# Patient Record
Sex: FEMALE | Race: WHITE | Employment: OTHER
[De-identification: names, ages, dates, MRNs, and addresses within clinical notes are randomized per-mention and may not be internally consistent; named-entity substitution may affect disease eponyms.]

---

## 2017-01-11 ENCOUNTER — PRIOR ORIGINAL RECORDS (OUTPATIENT)
Dept: OTHER | Age: 67
End: 2017-01-11

## 2017-01-11 DIAGNOSIS — I20.1 PRINZMETAL ANGINA (HCC): Primary | ICD-10-CM

## 2017-01-11 RX ORDER — METOPROLOL TARTRATE 50 MG/1
TABLET, FILM COATED ORAL
Qty: 180 TABLET | Refills: 0 | Status: SHIPPED | OUTPATIENT
Start: 2017-01-11 | End: 2017-04-05

## 2017-01-11 RX ORDER — LISINOPRIL AND HYDROCHLOROTHIAZIDE 25; 20 MG/1; MG/1
TABLET ORAL
Qty: 90 TABLET | Refills: 0 | Status: SHIPPED | OUTPATIENT
Start: 2017-01-11 | End: 2017-04-05

## 2017-02-13 ENCOUNTER — APPOINTMENT (OUTPATIENT)
Dept: LAB | Age: 67
End: 2017-02-13
Attending: FAMILY MEDICINE
Payer: COMMERCIAL

## 2017-02-13 DIAGNOSIS — E11.9 TYPE II OR UNSPECIFIED TYPE DIABETES MELLITUS WITHOUT MENTION OF COMPLICATION, NOT STATED AS UNCONTROLLED: ICD-10-CM

## 2017-02-13 LAB
EST. AVERAGE GLUCOSE BLD GHB EST-MCNC: 148 MG/DL (ref 68–126)
GLUCOSE BLD-MCNC: 154 MG/DL (ref 70–99)
HBA1C MFR BLD HPLC: 6.8 % (ref ?–5.7)

## 2017-02-13 PROCEDURE — 83036 HEMOGLOBIN GLYCOSYLATED A1C: CPT | Performed by: FAMILY MEDICINE

## 2017-02-13 PROCEDURE — 82947 ASSAY GLUCOSE BLOOD QUANT: CPT | Performed by: FAMILY MEDICINE

## 2017-02-13 PROCEDURE — 36415 COLL VENOUS BLD VENIPUNCTURE: CPT | Performed by: FAMILY MEDICINE

## 2017-03-01 RX ORDER — NIFEDIPINE 60 MG/1
TABLET, FILM COATED, EXTENDED RELEASE ORAL
Qty: 90 TABLET | Refills: 0 | Status: SHIPPED | OUTPATIENT
Start: 2017-03-01 | End: 2017-05-27

## 2017-03-01 RX ORDER — RANOLAZINE 500 MG/1
TABLET, FILM COATED, EXTENDED RELEASE ORAL
Qty: 90 TABLET | Refills: 0 | Status: SHIPPED | OUTPATIENT
Start: 2017-03-01 | End: 2017-04-29

## 2017-03-01 RX ORDER — SIMVASTATIN 20 MG
TABLET ORAL
Qty: 90 TABLET | Refills: 0 | Status: SHIPPED | OUTPATIENT
Start: 2017-03-01 | End: 2017-05-27

## 2017-03-15 DIAGNOSIS — R14.0 ABDOMINAL DISTENTION: Primary | ICD-10-CM

## 2017-03-23 RX ORDER — OMEPRAZOLE 20 MG/1
CAPSULE, DELAYED RELEASE ORAL
Qty: 180 CAPSULE | Refills: 1 | Status: SHIPPED | OUTPATIENT
Start: 2017-03-23 | End: 2017-09-14

## 2017-04-05 RX ORDER — METOPROLOL TARTRATE 50 MG/1
TABLET, FILM COATED ORAL
Qty: 180 TABLET | Refills: 0 | Status: SHIPPED | OUTPATIENT
Start: 2017-04-05 | End: 2017-07-02

## 2017-04-05 RX ORDER — LISINOPRIL AND HYDROCHLOROTHIAZIDE 25; 20 MG/1; MG/1
TABLET ORAL
Qty: 90 TABLET | Refills: 0 | Status: SHIPPED | OUTPATIENT
Start: 2017-04-05 | End: 2017-07-02

## 2017-04-11 ENCOUNTER — APPOINTMENT (OUTPATIENT)
Dept: CT IMAGING | Facility: HOSPITAL | Age: 67
DRG: 066 | End: 2017-04-11
Attending: EMERGENCY MEDICINE
Payer: COMMERCIAL

## 2017-04-11 ENCOUNTER — PRIOR ORIGINAL RECORDS (OUTPATIENT)
Dept: OTHER | Age: 67
End: 2017-04-11

## 2017-04-11 ENCOUNTER — HOSPITAL ENCOUNTER (INPATIENT)
Facility: HOSPITAL | Age: 67
LOS: 1 days | Discharge: HOME | DRG: 066 | End: 2017-04-12
Attending: EMERGENCY MEDICINE | Admitting: HOSPITALIST
Payer: COMMERCIAL

## 2017-04-11 ENCOUNTER — APPOINTMENT (OUTPATIENT)
Dept: MRI IMAGING | Facility: HOSPITAL | Age: 67
DRG: 066 | End: 2017-04-11
Attending: Other
Payer: COMMERCIAL

## 2017-04-11 ENCOUNTER — TELEPHONE (OUTPATIENT)
Dept: INTERNAL MEDICINE CLINIC | Facility: CLINIC | Age: 67
End: 2017-04-11

## 2017-04-11 ENCOUNTER — APPOINTMENT (OUTPATIENT)
Dept: GENERAL RADIOLOGY | Facility: HOSPITAL | Age: 67
DRG: 066 | End: 2017-04-11
Attending: EMERGENCY MEDICINE
Payer: COMMERCIAL

## 2017-04-11 DIAGNOSIS — I63.9 ACUTE CVA (CEREBROVASCULAR ACCIDENT) (HCC): Primary | ICD-10-CM

## 2017-04-11 PROBLEM — Z91.81 AT RISK FOR FALLING: Status: ACTIVE | Noted: 2017-04-11

## 2017-04-11 PROCEDURE — 99223 1ST HOSP IP/OBS HIGH 75: CPT | Performed by: OTHER

## 2017-04-11 PROCEDURE — 99223 1ST HOSP IP/OBS HIGH 75: CPT | Performed by: HOSPITALIST

## 2017-04-11 PROCEDURE — 70553 MRI BRAIN STEM W/O & W/DYE: CPT

## 2017-04-11 PROCEDURE — 70549 MR ANGIOGRAPH NECK W/O&W/DYE: CPT

## 2017-04-11 PROCEDURE — 70450 CT HEAD/BRAIN W/O DYE: CPT

## 2017-04-11 PROCEDURE — 70546 MR ANGIOGRAPH HEAD W/O&W/DYE: CPT

## 2017-04-11 PROCEDURE — 71010 XR CHEST AP PORTABLE  (CPT=71010): CPT

## 2017-04-11 RX ORDER — ACETAMINOPHEN 500 MG
500 TABLET ORAL EVERY 6 HOURS PRN
Status: DISCONTINUED | OUTPATIENT
Start: 2017-04-11 | End: 2017-04-11 | Stop reason: DRUGHIGH

## 2017-04-11 RX ORDER — PANTOPRAZOLE SODIUM 40 MG/1
40 TABLET, DELAYED RELEASE ORAL
Status: DISCONTINUED | OUTPATIENT
Start: 2017-04-12 | End: 2017-04-12

## 2017-04-11 RX ORDER — FAMOTIDINE 20 MG/1
20 TABLET ORAL DAILY
Status: DISCONTINUED | OUTPATIENT
Start: 2017-04-11 | End: 2017-04-11 | Stop reason: ALTCHOICE

## 2017-04-11 RX ORDER — DEXTROSE MONOHYDRATE 25 G/50ML
50 INJECTION, SOLUTION INTRAVENOUS
Status: DISCONTINUED | OUTPATIENT
Start: 2017-04-11 | End: 2017-04-12

## 2017-04-11 RX ORDER — ONDANSETRON 2 MG/ML
4 INJECTION INTRAMUSCULAR; INTRAVENOUS EVERY 6 HOURS PRN
Status: DISCONTINUED | OUTPATIENT
Start: 2017-04-11 | End: 2017-04-12

## 2017-04-11 RX ORDER — ACETAMINOPHEN 650 MG/1
650 SUPPOSITORY RECTAL EVERY 4 HOURS PRN
Status: DISCONTINUED | OUTPATIENT
Start: 2017-04-11 | End: 2017-04-12

## 2017-04-11 RX ORDER — ASPIRIN 325 MG
325 TABLET ORAL ONCE
Status: COMPLETED | OUTPATIENT
Start: 2017-04-11 | End: 2017-04-11

## 2017-04-11 RX ORDER — ASPIRIN 300 MG
300 SUPPOSITORY, RECTAL RECTAL DAILY
Status: DISCONTINUED | OUTPATIENT
Start: 2017-04-12 | End: 2017-04-12

## 2017-04-11 RX ORDER — NITROGLYCERIN 0.4 MG/1
0.4 TABLET SUBLINGUAL EVERY 5 MIN PRN
Status: DISCONTINUED | OUTPATIENT
Start: 2017-04-11 | End: 2017-04-12

## 2017-04-11 RX ORDER — ATORVASTATIN CALCIUM 80 MG/1
80 TABLET, FILM COATED ORAL NIGHTLY
Status: DISCONTINUED | OUTPATIENT
Start: 2017-04-11 | End: 2017-04-12

## 2017-04-11 RX ORDER — NIFEDIPINE 60 MG/1
60 TABLET, EXTENDED RELEASE ORAL DAILY
Status: DISCONTINUED | OUTPATIENT
Start: 2017-04-12 | End: 2017-04-12

## 2017-04-11 RX ORDER — FOLIC ACID 1 MG/1
1 TABLET ORAL DAILY
Status: DISCONTINUED | OUTPATIENT
Start: 2017-04-11 | End: 2017-04-12

## 2017-04-11 RX ORDER — ASPIRIN 325 MG
325 TABLET ORAL DAILY
Status: DISCONTINUED | OUTPATIENT
Start: 2017-04-12 | End: 2017-04-12

## 2017-04-11 RX ORDER — ACETAMINOPHEN 325 MG/1
650 TABLET ORAL EVERY 4 HOURS PRN
Status: DISCONTINUED | OUTPATIENT
Start: 2017-04-11 | End: 2017-04-12

## 2017-04-11 RX ORDER — METOPROLOL TARTRATE 50 MG/1
50 TABLET, FILM COATED ORAL
Status: DISCONTINUED | OUTPATIENT
Start: 2017-04-11 | End: 2017-04-12

## 2017-04-11 RX ORDER — RANOLAZINE 500 MG/1
500 TABLET, EXTENDED RELEASE ORAL DAILY
Status: DISCONTINUED | OUTPATIENT
Start: 2017-04-12 | End: 2017-04-12

## 2017-04-11 RX ORDER — ENOXAPARIN SODIUM 100 MG/ML
40 INJECTION SUBCUTANEOUS DAILY
Status: DISCONTINUED | OUTPATIENT
Start: 2017-04-11 | End: 2017-04-12

## 2017-04-11 RX ORDER — ONDANSETRON 2 MG/ML
4 INJECTION INTRAMUSCULAR; INTRAVENOUS ONCE
Status: COMPLETED | OUTPATIENT
Start: 2017-04-11 | End: 2017-04-11

## 2017-04-11 RX ORDER — DOXEPIN HYDROCHLORIDE 50 MG/1
1 CAPSULE ORAL DAILY
Status: DISCONTINUED | OUTPATIENT
Start: 2017-04-11 | End: 2017-04-12

## 2017-04-11 RX ORDER — POTASSIUM CHLORIDE 20 MEQ/1
40 TABLET, EXTENDED RELEASE ORAL EVERY 4 HOURS
Status: COMPLETED | OUTPATIENT
Start: 2017-04-11 | End: 2017-04-11

## 2017-04-11 RX ORDER — LABETALOL HYDROCHLORIDE 5 MG/ML
10 INJECTION, SOLUTION INTRAVENOUS EVERY 10 MIN PRN
Status: DISCONTINUED | OUTPATIENT
Start: 2017-04-11 | End: 2017-04-12

## 2017-04-11 RX ORDER — ACETAMINOPHEN 500 MG
500 TABLET ORAL EVERY 6 HOURS PRN
COMMUNITY

## 2017-04-11 RX ORDER — LISINOPRIL AND HYDROCHLOROTHIAZIDE 25; 20 MG/1; MG/1
1 TABLET ORAL
Status: DISCONTINUED | OUTPATIENT
Start: 2017-04-11 | End: 2017-04-11 | Stop reason: SDUPTHER

## 2017-04-11 RX ORDER — MELATONIN
100 DAILY
Status: DISCONTINUED | OUTPATIENT
Start: 2017-04-11 | End: 2017-04-12

## 2017-04-11 RX ORDER — SODIUM CHLORIDE 9 MG/ML
INJECTION, SOLUTION INTRAVENOUS CONTINUOUS
Status: DISCONTINUED | OUTPATIENT
Start: 2017-04-11 | End: 2017-04-12

## 2017-04-11 RX ORDER — FAMOTIDINE 10 MG/ML
20 INJECTION, SOLUTION INTRAVENOUS DAILY
Status: DISCONTINUED | OUTPATIENT
Start: 2017-04-11 | End: 2017-04-11 | Stop reason: ALTCHOICE

## 2017-04-11 NOTE — PROGRESS NOTES
04/11/17 1517   Clinical Encounter Type   Visited With Patient   Routine Visit Introduction   Continue Visiting No   Patient's Supportive Strategies/Resources  provided emotional support for patient   Faith Encounters   Spiritual Requests Du

## 2017-04-11 NOTE — CONSULTS
[facility]  Report of Consultation    Charlene Hickey Patient Status:  Observation    4/15/1950 MRN [id number]   [facility] 7NE-A Attending Primo Niurka  [address] Day # 0 PCP Mari Polanco MD     Reason for Consultation:  SHALINI randolph she has nausea and emesis when she takes any NSAID but tolerated in ED.     Otherwise, patient denies any recent weight change, fevers, chills, nausea, double vision/ blurry vision / loss of vision, chest pain, palpitations, shortness of breath, rashes, tejinder Grandmother      bone cancer      reports that she has never smoked. She has never used smokeless tobacco. She reports that she drinks alcohol. She reports that she does not use illicit drugs.     Allergies:    Penicillins             Rash, Swelling  Advil Disp:  Rfl:  4/10/2017 at 2100   Isosorbide Mononitrate  MG Oral Tablet 24 Hr Take 120 mg by mouth nightly. Disp:  Rfl:  4/10/2017 at 2100   simvastatin 20 MG Oral Tab Take 20 mg by mouth nightly.  Disp:  Rfl:  4/10/2017 at 2100   NITROSTAT 0.4 MG Sub 100 mg, Oral, Daily  •  multivitamin (ADULT) tab 1 tablet, 1 tablet, Oral, Daily  •  Enoxaparin Sodium (LOVENOX) 40 MG/0.4ML injection 40 mg, 40 mg, Subcutaneous, Daily  •  insulin aspart (NOVOLOG) 100 UNIT/ML flexpen 1-50 Units, 1-50 Units, Subcutaneous, throughout, toes downgoing bilaterally, no clonus  Sensory: minimally reduced in R leg ~80% of normal compared to left leg to pin and light tough; otherwise, intact to light touch, pinprick, vibration and proprioception  Coord: FNF and HKS intact with no t order set in place   -  daily   - Lipitor 80 mg daily   - hemoglobin A1C pending, lipids with LDL 80;   - symptoms >24 hrs ago - goal BP normotensive  - PT/OT/speech therapy   - smoking cessation education   - SCDs for DVT ppx    Will follow    Patricia Abdullahi

## 2017-04-11 NOTE — ED INITIAL ASSESSMENT (HPI)
Pt presents to ER with numbness and tingling starting at 0630 when pt woke up. Pt noted numbness/tingling to bilateral lower extremities, worse right leg. Pt also noted a heavy feeling in right arm, but that has since resolved. [place] steady.  Pt moving all e

## 2017-04-11 NOTE — ED NOTES
Discussed with pt risks and benefits of taking aspirin. Pt concerned about nausea. Nausea medication given. Pt voice understanding. Decision to take aspirin.

## 2017-04-11 NOTE — PLAN OF CARE
Patient A&OX4, VSS, afebrile, SpO2 > 92% on RA. C/o mild HA, refused pain medication as it had resolved. NSR on tele. Stroke protocol, neuro q 2 until 2100 then q 4.   C/o of intermittent right foot tingling/numbness/feeling heavy as well as intermittent

## 2017-04-11 NOTE — PROGRESS NOTES
[facility] Pharmacy Note:  Renal Dose Adjustment    Simon Velez has been prescribed famotidine (PEPCID) 20 mg IV/PO every 12 hours. Estimated Creatinine Clearance: 46.9 mL/min (based on Cr of 0.99).     Her calculated creatinine clearance is <50 ml/min, the

## 2017-04-11 NOTE — ED PROVIDER NOTES
Patient Seen in: [facility] Emergency Department    History   Patient presents with:  Numbness Weakness (neurologic)    Stated Complaint: R side weakness    HPI    55-year-old female that comes the hospital stating that when she awoke she noticed th 01/01/1999    BACK SURGERY      Comment Spinal diskectomy lumbar    HYSTERECTOMY  01/01/981    Comment d/t fibroids    UPPER GI ENDOSCOPY PERFORMED  01/24/2011    HERNIA SURGERY  72/27/1393    Comment Umbilical hernia repair;     COLONOSCOPY      SPINE MARY Grandmother    • Cancer Paternal Grandmother      bone cancer         Smoking Status: Never Smoker                      Alcohol Use: Yes           0.0 oz/week       0 Standard drinks or equivalent per week       Comment: very rare      Review of Systems DIFFERENTIAL WITH PLATELET    Narrative: The following orders were created for panel order CBC WITH DIFFERENTIAL WITH PLATELET.   Procedure                               Abnormality         Status                     ---------  The mastoids are clear.     SKULL:  No depressed calvarial fracture.                  XR CHEST AP PORTABLE  (CPT=71010) (Final result) Result time: 04/11/17 11:17:22     Final result by Kayleigh Garvey MD (04/11/17 11:17:22)     Impression:     CONCLUSION

## 2017-04-11 NOTE — H&P
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 Alert and oriented x 3. HEENT: Normocephalic atraumatic. Moist mucous membranes. EOM-I. PERRLA. Anicteric.   Neck: insulin. 4. Hyperlipidemia-we will continue on statin therapy. 5. GERD- Will continue on PPI  6. MISSY- Will place on MISSY protocol.     Quality:  · DVT Prophylaxis: Heparin  · CODE status: FUll  · [place]: N/A    Plan of care discussed with pt and spouse at

## 2017-04-11 NOTE — PROGRESS NOTES
Pharmacy note: Duplicate Proton Pump Inhibitor with Histamine 2 blocker. Dong Knowles is a 73year old female who has been prescribed both Famotidine and Protonix.   Pepcid was discontinued per [employer] approved protocol for duplicate therapy in adult papi

## 2017-04-12 ENCOUNTER — APPOINTMENT (OUTPATIENT)
Dept: CV DIAGNOSTICS | Facility: HOSPITAL | Age: 67
DRG: 066 | End: 2017-04-12
Attending: HOSPITALIST
Payer: COMMERCIAL

## 2017-04-12 VITALS
SYSTOLIC BLOOD PRESSURE: 136 MMHG | TEMPERATURE: 98 F | HEART RATE: 83 BPM | HEIGHT: 63 IN | WEIGHT: 160 LBS | DIASTOLIC BLOOD PRESSURE: 71 MMHG | RESPIRATION RATE: 17 BRPM | BODY MASS INDEX: 28.35 KG/M2 | OXYGEN SATURATION: 95 %

## 2017-04-12 PROCEDURE — 93306 TTE W/DOPPLER COMPLETE: CPT | Performed by: INTERNAL MEDICINE

## 2017-04-12 PROCEDURE — 99239 HOSP IP/OBS DSCHRG MGMT >30: CPT | Performed by: HOSPITALIST

## 2017-04-12 PROCEDURE — 99233 SBSQ HOSP IP/OBS HIGH 50: CPT | Performed by: OTHER

## 2017-04-12 PROCEDURE — 93306 TTE W/DOPPLER COMPLETE: CPT

## 2017-04-12 RX ORDER — ATORVASTATIN CALCIUM 80 MG/1
80 TABLET, FILM COATED ORAL NIGHTLY
Qty: 30 TABLET | Refills: 1 | Status: SHIPPED | OUTPATIENT
Start: 2017-04-12 | End: 2017-06-14

## 2017-04-12 RX ORDER — CLOPIDOGREL BISULFATE 75 MG/1
75 TABLET ORAL DAILY
Qty: 30 TABLET | Refills: 1 | Status: SHIPPED | OUTPATIENT
Start: 2017-04-12 | End: 2017-06-05

## 2017-04-12 NOTE — PLAN OF CARE
AOx4. Denies any pain or discomfort. Pt came back from MRI at 2145. MRI result relayed to Dr. Prentis Siemens. Remains on Neuro check Q4hr. Pt is on bedrest until PT/OT eval in the morning. Plan of care discussed with pt and spouse. Call light within reach.

## 2017-04-12 NOTE — CM/SW NOTE
Pt on CVA protocol. Pt was screened during rounds and no needs are identified at this time. RN to contact SW/CM if needs arise.    PT recommending Outpt PT/OT     04/12/17 1100   CM/SW Screening   Referral Source Physician   Information Source Nursing rou

## 2017-04-12 NOTE — PLAN OF CARE
Assumed care at 0730. Alert and oriented x4. States N/T has resolved. Says she still feels slight tingling to left foot. Room air. NSR on tele. SB with sleep. BP stable. Echo done with EF  65%. Tolerating diet. No complaints of pain. Up with SBA.  Ambulate

## 2017-04-12 NOTE — PHYSICAL THERAPY NOTE
PHYSICAL THERAPY EVALUATION - INPATIENT     Room Number: 7844/9933-X  Evaluation Date: 4/12/2017  Type of Evaluation: Initial  Physician Order: PT Eval and Treat    Presenting Problem: CVA  Reason for Therapy: Mobility Dysfunction and Discharge Claude Comment Spinal diskectomy lumbar    HYSTERECTOMY  01/01/981    Comment d/t fibroids    UPPER GI ENDOSCOPY PERFORMED  01/24/2011    HERNIA SURGERY  65/34/6159    Comment Umbilical hernia repair;     COLONOSCOPY      SPINE SURGERY PROCEDURE UNLISTED Tests:  Modified Ramon Balance Scale           Modified Ramon Balance Scale: 22/28                     NEUROLOGICAL FINDINGS  Neurological Findings: Coordination - Heel to Shin;Sensation;Tone;Coordination - Rapid Alternating Movement     Coordination - Heel t training  Transfer training  balance, discharge planning    Patient End of Session: Up in chair; With [facility] staff;Needs met;Call light within reach;RN aware of session/findings; All patient questions and concerns addressed    ASSESSMENT     Patient is a 73 year

## 2017-04-12 NOTE — PHYSICAL THERAPY NOTE
PT order received.  Attempted to see pt for PT eval, but pt is on bedrest until 1:30pm per RN.  Will f/u again later as appropriate and as time allows.  RN in agreement with this plan.

## 2017-04-12 NOTE — OCCUPATIONAL THERAPY NOTE
OCCUPATIONAL THERAPY EVALUATION - INPATIENT     Room Number: 5714/6926-E  Evaluation Date: 4/12/2017  Type of Evaluation: Initial  Presenting Problem: CVA    Physician Order: IP Consult to Occupational Therapy  Reason for Therapy: ADL/IADL Dysfunction and Comment status normal resolved on 2007    COLONOSCOPY  2011    Comment colon polyp removal  francesca cee 5      1987    CHOLECYSTECTOMY  1999    BACK SURGERY      Comment Spinal diskectomy lumbar    HYSTERECTOMY  98 cooperative and motivated during session. RANGE OF MOTION AND STRENGTH ASSESSMENT  Upper extremity ROM is WNL     Upper extremity strength is R: 4-/5, L UE 5/5    COORDINATION  Gross Motor    intact    Fine Motor    impaired R coordination.       ADDIT recommendations with /    ASSESSMENT     Patient is a 73year old female admitted 4/11/2017 for CVA.   In this OT evaluation patient presents with the following impairments: impaired L UE coordination, and impaired fine motor precis

## 2017-04-12 NOTE — PROGRESS NOTES
[id number] [facility] Progress Note    Larissa Martin Patient Status:  Observation    4/15/1950 MRN [id number]   [facility] 7NE-A Attending Nicholas Tam [address] Day # 1 PCP Jose Valdivia MD         Subjective:  Tarun Hayden CO2 28.0 04/11/2017    04/11/2017   CA 9.9 04/11/2017   ALB 3.7 04/11/2017   ALKPHO 90 04/11/2017   BILT 0.5 04/11/2017   TP 7.4 04/11/2017   AST 14 04/11/2017   ALT 20 04/11/2017   TSH 1.420 04/11/2017   TROP <0.046 04/11/2017   PGLU 142 04/12/20 presented with right sided paresthesia that has resolved   1. Ischemic stroke order set in place  2.  mg daily  3. Hgb A1c 6.8 - primary to manage  4. LDL 80 with HDL 51, , cont statin   5. Echo pending   6.  MRA H/N; no significant stenosis or to pin throughout today  Coord: FNF intact with no tremor or dysmetria  Romberg:absent  Gait: normal casual gait     Labs:  Reviewed in EPIC;     Lab Results  Component Value Date   WBC 4.2 04/11/2017   HGB 12.9 04/11/2017   HCT 38.7 04/11/2017   . 0 Wall thickness was normal.     Systolic function was normal. The estimated ejection fraction was 65%. No     diagnostic evidence for regional wall motion abnormalities.  Doppler     parameters are consistent with abnormal left ventricular relaxation -     g at the address below; also listed in discharge paperwork    [address] [place] [address]  [address], [facility] [address] [place] [address]  [phone]      Rekha Lara MD, Neurology  Christo Benoit

## 2017-04-12 NOTE — SLP NOTE
ADULT SWALLOWING EVALUATION    ASSESSMENT & PLAN   ASSESSMENT  Order received for bedside swallow evaluation.   Per chart review:    History of Present Illness: Charlene Hickey is a 73year old female with history of hypertension, type 2 diabetes, GERD pres Function Score: No abnormality detected    PLAN   Diet Recommendations - Solids: Regular  Diet Recommendations - Liquid:  Thin  Safe swallow strategies reviewed                      Treatment Plan: Communication evaluation  Discharge Recommendations/Plan: H Results: 4/11/17  CONCLUSION:  No acute disease. OBJECTIVE   ORAL MOTOR EXAMINATION  Dentition: Functional  Symmetry: Within Functional Limits  Strength:  Within Functional Limits  Tone: Within Functional Limits  Range of Motion: Within Functional Limits

## 2017-04-12 NOTE — PROGRESS NOTES
NURSING DISCHARGE NOTE    Discharged Home via Wheelchair. Accompanied by Support staff  Belongings Taken by patient/family.   IV and tele removed  Discharge instructions given to patient and her

## 2017-04-12 NOTE — PLAN OF CARE
Problem: Impaired Swallowing  Goal: Minimize aspiration risk  Interventions:  - Patient should be alert and upright for all feedings (90 degrees preferred)  - Offer food and liquids at a slow rate    - Encourage small bites of food and small sips of liquid

## 2017-04-12 NOTE — OCCUPATIONAL THERAPY NOTE
OT order received. Attempted to see pt for OT eval, but pt is on bedrest until 1:30pm per RN. Will f/u again later as appropriate and as time allows. RN in agreement with this plan.

## 2017-04-13 ENCOUNTER — PRIOR ORIGINAL RECORDS (OUTPATIENT)
Dept: OTHER | Age: 67
End: 2017-04-13

## 2017-04-13 ENCOUNTER — TELEPHONE (OUTPATIENT)
Dept: INTERNAL MEDICINE CLINIC | Facility: CLINIC | Age: 67
End: 2017-04-13

## 2017-04-13 ENCOUNTER — APPOINTMENT (OUTPATIENT)
Dept: MRI IMAGING | Facility: HOSPITAL | Age: 67
End: 2017-04-13
Attending: EMERGENCY MEDICINE
Payer: COMMERCIAL

## 2017-04-13 ENCOUNTER — HOSPITAL ENCOUNTER (EMERGENCY)
Facility: HOSPITAL | Age: 67
Discharge: HOME | End: 2017-04-13
Attending: EMERGENCY MEDICINE
Payer: COMMERCIAL

## 2017-04-13 VITALS
BODY MASS INDEX: 28.35 KG/M2 | RESPIRATION RATE: 19 BRPM | OXYGEN SATURATION: 95 % | SYSTOLIC BLOOD PRESSURE: 119 MMHG | WEIGHT: 160 LBS | HEART RATE: 55 BPM | TEMPERATURE: 99 F | DIASTOLIC BLOOD PRESSURE: 82 MMHG | HEIGHT: 63 IN

## 2017-04-13 DIAGNOSIS — I63.9 ACUTE ISCHEMIC STROKE (HCC): ICD-10-CM

## 2017-04-13 DIAGNOSIS — R20.0 RIGHT FACIAL NUMBNESS: Primary | ICD-10-CM

## 2017-04-13 PROCEDURE — 84484 ASSAY OF TROPONIN QUANT: CPT | Performed by: EMERGENCY MEDICINE

## 2017-04-13 PROCEDURE — 85610 PROTHROMBIN TIME: CPT | Performed by: EMERGENCY MEDICINE

## 2017-04-13 PROCEDURE — 87086 URINE CULTURE/COLONY COUNT: CPT | Performed by: EMERGENCY MEDICINE

## 2017-04-13 PROCEDURE — 85730 THROMBOPLASTIN TIME PARTIAL: CPT | Performed by: EMERGENCY MEDICINE

## 2017-04-13 PROCEDURE — 85025 COMPLETE CBC W/AUTO DIFF WBC: CPT | Performed by: EMERGENCY MEDICINE

## 2017-04-13 PROCEDURE — 81001 URINALYSIS AUTO W/SCOPE: CPT | Performed by: EMERGENCY MEDICINE

## 2017-04-13 PROCEDURE — 70551 MRI BRAIN STEM W/O DYE: CPT

## 2017-04-13 PROCEDURE — 93005 ELECTROCARDIOGRAM TRACING: CPT

## 2017-04-13 PROCEDURE — 99285 EMERGENCY DEPT VISIT HI MDM: CPT

## 2017-04-13 PROCEDURE — 93010 ELECTROCARDIOGRAM REPORT: CPT

## 2017-04-13 PROCEDURE — 82962 GLUCOSE BLOOD TEST: CPT

## 2017-04-13 PROCEDURE — 80053 COMPREHEN METABOLIC PANEL: CPT | Performed by: EMERGENCY MEDICINE

## 2017-04-13 PROCEDURE — 36415 COLL VENOUS BLD VENIPUNCTURE: CPT

## 2017-04-13 NOTE — TELEPHONE ENCOUNTER
I spoke with pt ,started today R facial numbness , R eye watering , Not sure if drooping on R side of face ,Discharged from hospital yesterday ,    I advised ER for eval. ( Dr Ascencion Ni  will take her by wheelchair to ER )

## 2017-04-13 NOTE — DISCHARGE SUMMARY
[facility]  DISCHARGE SUMMARY     Corby Griffin Patient Status:  Inpatient    4/15/1950 MRN [id number]   [facility] 7NE-A Attending No att. providers found   [facility] Day [facility] 1 PCP Lieutenant Brian MD     Date of Admission: 2017  Date Patient denies any weakness in any of the extremities.  Patient denies any visual or hearing changes.  Patient has been denied any slurred speech or any alteration in mental status.  No headache, sinus/nasal congestion, or cough.  Patient denies any fevers known as:  PLAVIX        Take 1 tablet (75 mg total) by mouth daily.     Quantity:  30 tablet   Refills:  1         CHANGE how you take these medications       Instructions Prescription details    Mometasone Furoate 0.1 % Crea   Commonly known as:  Paradise Contreras drug:  nitroGLYCERIN        Place 0.4 mg under the tongue every 5 (five) minutes as needed. Refills:  5       PEG 3350 Pack   Commonly known as:  MIRALAX        Take 17 g by mouth nightly.     Refills:  0       RANEXA 500 MG Tb12   Last time this was giv 4/13/2017    Time spent:  > 30 minutes

## 2017-04-13 NOTE — ED INITIAL ASSESSMENT (HPI)
Pt to ED with numbness to right side of face that started at 0900. Pt sts she was discharged from [facility] yesterday with dx of stroke. Denies headache, blurry vision or pain. Decreased sensation to right arm and leg.  Pt sts these are the same symptoms she h

## 2017-04-13 NOTE — ED NOTES
Pt ambulatory to the bathroom and provided a urine sample w/o difficulty or incident.  Sample immediately sent to lab for analysis

## 2017-04-13 NOTE — ED PROVIDER NOTES
Patient Seen in: [facility] Emergency Department    History   Patient presents with:  Numbness Weakness (neurologic)  Stroke (neurologic)    Stated Complaint: right sided face numbness, recent stroke    HPI    78-year-old female, discharged yesterda UNLISTED      Comment bulging discs in lower back    SHOULDER SURG PROC UNLISTED         Medications :   Atorvastatin Calcium 80 MG Oral Tab,  Take 1 tablet (80 mg total) by mouth nightly.    Clopidogrel Bisulfate 75 MG Oral Tab,  Take 1 tablet (75 mg total stated complaint: right sided face numbness, recent stroke  Other systems are as noted in HPI. Constitutional and vital signs reviewed. All other systems reviewed and negative except as noted above.     PSFH elements reviewed from today and agreed exc Esterase Urine Trace (*)     Squamous Epi.  Cells Moderate (*)     Hyaline Casts Present (*)     Mucous Urine 1+ (*)     All other components within normal limits   POCT GLUCOSE - Abnormal; Notable for the following:     POC Glucose 104 (*)     All other co changes and no new stroke. He reviewed the MRI results look at the images. By this time the patient's symptoms have actually improved and she has had some lingering facial numbness. Patient has been taking her Plavix.   Given recent workup and resoldonald

## 2017-04-14 NOTE — CM/SW NOTE
04/14/17 0900   Discharge disposition   Discharged to: Home or Self   Name of Marcia Hawkins Provider Home   Outpatient services Physical therapy; Occupational therapy   Home services after discharge None

## 2017-04-17 RX ORDER — ISOSORBIDE MONONITRATE 120 MG/1
TABLET, EXTENDED RELEASE ORAL
Qty: 90 TABLET | Refills: 0 | Status: SHIPPED | OUTPATIENT
Start: 2017-04-17 | End: 2017-07-14

## 2017-04-19 ENCOUNTER — OFFICE VISIT (OUTPATIENT)
Dept: NEUROLOGY | Facility: CLINIC | Age: 67
End: 2017-04-19

## 2017-04-19 ENCOUNTER — TELEPHONE (OUTPATIENT)
Dept: INTERNAL MEDICINE CLINIC | Facility: CLINIC | Age: 67
End: 2017-04-19

## 2017-04-19 VITALS
WEIGHT: 164 LBS | TEMPERATURE: 99 F | HEART RATE: 60 BPM | DIASTOLIC BLOOD PRESSURE: 60 MMHG | SYSTOLIC BLOOD PRESSURE: 120 MMHG | RESPIRATION RATE: 20 BRPM | BODY MASS INDEX: 29 KG/M2

## 2017-04-19 DIAGNOSIS — I63.9 ACUTE ISCHEMIC STROKE (HCC): Primary | ICD-10-CM

## 2017-04-19 DIAGNOSIS — I63.212 ACUTE ARTERIAL ISCHEMIC STROKE, VERTEBROBASILAR, THALAMIC, LEFT (HCC): Primary | ICD-10-CM

## 2017-04-19 DIAGNOSIS — I63.22 ACUTE ARTERIAL ISCHEMIC STROKE, VERTEBROBASILAR, THALAMIC, LEFT (HCC): Primary | ICD-10-CM

## 2017-04-19 PROCEDURE — 99214 OFFICE O/P EST MOD 30 MIN: CPT | Performed by: OTHER

## 2017-04-19 NOTE — PATIENT INSTRUCTIONS
Follow up in 3 weeks ~ 1 month from initial stroke  Continue Plavix 75 mg daily and Lipitor 80 mg daily     After your visit at the [address] [place] [address] office  today,  please direct any follow up questions or medication needs to the staff in our  [facility] notified you that the test has been approved by your insurer. Depending on your insurance carrier, approval may take 3-10 days. It is highly recommended patients contact their insurance carrier directly to determine coverage.   If test is done without insu

## 2017-04-19 NOTE — PROGRESS NOTES
[employer] Progress Note    HPI  Patient presents with:  Hospital F/U: Rm. 8  Stroke: Acute ischemic stroke            Patient presents in follow-up after recently discharged 4/12/17, following admission for acute R sided paresthesia wi 2011    Comment colon polyp removal  francesca cee 5      1987    CHOLECYSTECTOMY  1999    BACK SURGERY      Comment Spinal diskectomy lumbar    HYSTERECTOMY      Comment d/t fibroids    UPPER GI ENDOSCOPY PERFORMED   ISOSORBIDE MONONITRATE  MG Oral Tablet 24 Hr, TAKE 1 TABLET BY MOUTH EVERY DAY, Disp: 90 tablet, Rfl: 0  •  Atorvastatin Calcium 80 MG Oral Tab, Take 1 tablet (80 mg total) by mouth nightly., Disp: 30 tablet, Rfl: 1  •  Clopidogrel Bisulfate 75 MG Or following:    Height as of 4/13/17: 63\". Weight as of this encounter: 164 lb.     Gen: well developed, well nourished, no acute distress  HEENT: normocephalic  Heart; normal J9/Y8, regular rate and rhythm  Lungs: clear to auscultation bilaterally  Extre Small subacute infarct left basal ganglia. #2. Patent carotid and vertebral basilar systems. Mild carotid bifurcation atherosclerosis. #3. Unremarkable [place] MRA. Echo:   Conclusions:    1. Left ventricle:  The cavity size was normal. Wall t (was nauseated on ASA and on Plavix for this )     She was also advised to report immediately to the emergency room should she experience any symptoms including, but not limited to the following: sudden onset of slurred speech, numbness/tingling/weakness o

## 2017-04-26 ENCOUNTER — OFFICE VISIT (OUTPATIENT)
Dept: INTERNAL MEDICINE CLINIC | Facility: CLINIC | Age: 67
End: 2017-04-26

## 2017-04-26 VITALS
BODY MASS INDEX: 28.88 KG/M2 | TEMPERATURE: 98 F | DIASTOLIC BLOOD PRESSURE: 60 MMHG | SYSTOLIC BLOOD PRESSURE: 112 MMHG | HEIGHT: 63 IN | HEART RATE: 55 BPM | WEIGHT: 163 LBS | RESPIRATION RATE: 16 BRPM | OXYGEN SATURATION: 98 %

## 2017-04-26 DIAGNOSIS — R20.2 NUMBNESS AND TINGLING OF RIGHT ARM AND LEG: ICD-10-CM

## 2017-04-26 DIAGNOSIS — R20.0 NUMBNESS AND TINGLING OF RIGHT FACE: ICD-10-CM

## 2017-04-26 DIAGNOSIS — E11.8 TYPE 2 DIABETES MELLITUS WITH COMPLICATION, UNSPECIFIED LONG TERM INSULIN USE STATUS: ICD-10-CM

## 2017-04-26 DIAGNOSIS — E78.00 PURE HYPERCHOLESTEROLEMIA: ICD-10-CM

## 2017-04-26 DIAGNOSIS — R20.0 NUMBNESS AND TINGLING OF RIGHT ARM AND LEG: ICD-10-CM

## 2017-04-26 DIAGNOSIS — R20.2 NUMBNESS AND TINGLING OF RIGHT FACE: ICD-10-CM

## 2017-04-26 DIAGNOSIS — I10 ESSENTIAL HYPERTENSION, BENIGN: ICD-10-CM

## 2017-04-26 DIAGNOSIS — I63.9 ACUTE ISCHEMIC STROKE (HCC): Primary | ICD-10-CM

## 2017-04-26 PROCEDURE — 99495 TRANSJ CARE MGMT MOD F2F 14D: CPT | Performed by: FAMILY MEDICINE

## 2017-04-26 NOTE — PROGRESS NOTES
HPI:    Sweetie Tucker is a 78year old female here today for hospital follow up.    She was discharged from Inpatient hospital, [facility] to Home   Admission Date: 4/13/17   Discharge Date: 4/13/17  Hospital Discharge Diagnosis: Acute stroke  TCM with oral diabetes medications being held.  Patient was evaluated by speech and passed swallow–evaluation patient was seen by physical therapy who recommended outpatient PT with patient's symptoms resolving echocardiogram negative patient was clear for dis No current facility-administered medications on file prior to visit. HISTORY: reconciled and review with patient  She  has a past medical history of Acute bronchitis (04/11/2011); Chest pain, unspecified (12/05);  Unspecified gastritis and gastrodu 98%  Physical Exam   NECK:  Supple No LNs  LUNGS;  CTA  COR  RRR wo murmur  EXT  No CCE   Bilateral barefoot skin diabetic exam is normal, visualized feet and the appearance is normal.  Bilateral monofilament/sensation of both feet is normal.  Pulsation pe Diagnoses and/or Management Options: moderate  · Amount and/or Complexity of Data to Be Reviewed: moderate  · Risk of Significant Complications, Morbidity, and/or Mortality: moderate    Overall Risk:   moderate    Patient seen within 14 days from date of d

## 2017-05-01 ENCOUNTER — APPOINTMENT (OUTPATIENT)
Dept: OCCUPATIONAL MEDICINE | Facility: HOSPITAL | Age: 67
End: 2017-05-01
Attending: Other
Payer: COMMERCIAL

## 2017-05-01 RX ORDER — RANOLAZINE 500 MG/1
TABLET, FILM COATED, EXTENDED RELEASE ORAL
Qty: 90 TABLET | Refills: 0 | Status: SHIPPED | OUTPATIENT
Start: 2017-05-01 | End: 2017-09-06

## 2017-05-02 ENCOUNTER — APPOINTMENT (OUTPATIENT)
Dept: PHYSICAL THERAPY | Facility: HOSPITAL | Age: 67
End: 2017-05-02
Attending: FAMILY MEDICINE
Payer: COMMERCIAL

## 2017-05-04 ENCOUNTER — HOSPITAL ENCOUNTER (OUTPATIENT)
Dept: OCCUPATIONAL MEDICINE | Facility: HOSPITAL | Age: 67
Setting detail: THERAPIES SERIES
Discharge: HOME | End: 2017-05-04
Attending: Other
Payer: COMMERCIAL

## 2017-05-04 ENCOUNTER — HOSPITAL ENCOUNTER (OUTPATIENT)
Dept: PHYSICAL THERAPY | Facility: HOSPITAL | Age: 67
Setting detail: THERAPIES SERIES
Discharge: HOME | End: 2017-05-04
Attending: FAMILY MEDICINE
Payer: COMMERCIAL

## 2017-05-04 DIAGNOSIS — I63.9 ACUTE CVA (CEREBROVASCULAR ACCIDENT) (HCC): Primary | ICD-10-CM

## 2017-05-04 DIAGNOSIS — I63.9 ACUTE ISCHEMIC STROKE (HCC): Primary | ICD-10-CM

## 2017-05-04 PROCEDURE — 97161 PT EVAL LOW COMPLEX 20 MIN: CPT

## 2017-05-04 PROCEDURE — 97166 OT EVAL MOD COMPLEX 45 MIN: CPT

## 2017-05-04 PROCEDURE — 97110 THERAPEUTIC EXERCISES: CPT

## 2017-05-04 NOTE — PROGRESS NOTES
NEUROLOGICAL EVALUATION:   Referring Physician: Dr. Lenny Vargas  Diagnosis: Acute ischemic stroke     Date of Service: 5/4/2017     PATIENT SUMMARY   Nahum Mcarthur is a 78year old female who presents to therapy today following acute ischemic stroke on 4/11/1 limitations include imbalance on uneven surfaces and with small MARY, especially SLS.  She feels that she must perform everything slower to makes sure that she does not trip, most notably with turns; sometimes catches her R foot on the ground and has small s of complaint surfaces. Brunilda Donal would benefit from skilled Physical Therapy to address the above impairments to improve RLE strength, coordination, activity tolerance, and dynamic standing balance to decrease fear of falling and return to PLOF.      Lennox Saliva Mobility:  30 sec sit<>stand: 9   Fall Risk: yes  Gait Patient ambulates independently without device. Gait slowed with min dec arm swing on R, min dec stance time on R.     Timed Up and Go (AD, time): 8 sec  Fall Risk: no  4 Item Dynamic Gait Index Score: and has agreed to actively participate in planning and for this course of care. Thank you for your referral. Please co-sign or sign and return this letter via fax as soon as possible to [phone].  If you have any questions, please contact me at Dept:

## 2017-05-04 NOTE — PROGRESS NOTES
OCCUPATIONAL THERAPY UPPER EXTREMITY EVALUATION   Referring Physician: Dr. Jaswinder Cortes  Diagnosis: CVA    Date of Service: 5/4/2017     PATIENT SUMMARY   Heidi Mack is a 78year old y/o female who presents to therapy today with complaints of arm weakness a background of mild chronic small vessel ischemic disease.     The ventricles and sulci are within normal limits.  There is no midline shift or mass effect.  The basal cisterns are patent.       There is no acute intracranial hemorrhage or extra-axial flu Pinch: 5 9   3 pt Pinch: 7 12   Lateral Pinch: 11 14     SPECIAL TESTS: 9 hole peg R=25 sec, L=21 sec. Pt demonstrates difficulty with in-hand manipulation of washer and balls. Pt demonstrates decreased fluidity of motion.    Handwriting is legible for n required: Yes  I certify the need for these services furnished under this plan of treatment and while under my care.     X___________________________________________________ Date____________________    Certification From: 8/5/8252  To:8/2/2017

## 2017-05-08 ENCOUNTER — APPOINTMENT (OUTPATIENT)
Dept: OCCUPATIONAL MEDICINE | Facility: HOSPITAL | Age: 67
End: 2017-05-08
Attending: Other
Payer: COMMERCIAL

## 2017-05-09 ENCOUNTER — APPOINTMENT (OUTPATIENT)
Dept: PHYSICAL THERAPY | Facility: HOSPITAL | Age: 67
End: 2017-05-09
Attending: FAMILY MEDICINE
Payer: COMMERCIAL

## 2017-05-10 ENCOUNTER — OFFICE VISIT (OUTPATIENT)
Dept: NEUROLOGY | Facility: CLINIC | Age: 67
End: 2017-05-10

## 2017-05-10 VITALS
DIASTOLIC BLOOD PRESSURE: 62 MMHG | BODY MASS INDEX: 29 KG/M2 | HEART RATE: 48 BPM | WEIGHT: 162 LBS | RESPIRATION RATE: 20 BRPM | SYSTOLIC BLOOD PRESSURE: 100 MMHG

## 2017-05-10 DIAGNOSIS — I63.212 ACUTE ARTERIAL ISCHEMIC STROKE, VERTEBROBASILAR, THALAMIC, LEFT (HCC): Primary | ICD-10-CM

## 2017-05-10 DIAGNOSIS — I63.22 ACUTE ARTERIAL ISCHEMIC STROKE, VERTEBROBASILAR, THALAMIC, LEFT (HCC): Primary | ICD-10-CM

## 2017-05-10 PROCEDURE — 99213 OFFICE O/P EST LOW 20 MIN: CPT | Performed by: OTHER

## 2017-05-10 NOTE — PATIENT INSTRUCTIONS
Please follow up with PCP and cardiology for your heart rate   Follow up in clinic with neurology in 2 months  Continue same medications - discuss decreasing metoprolol with PCP and / or cardiology    After your visit at the [facility] office  today,  luiz ordered radiology tests such as MRI or CT scans, do not schedule the test until this office has notified you that the test has been approved by your insurer. Depending on your insurance carrier, approval may take 3-10 days.  It is highly recommended patien

## 2017-05-10 NOTE — PROGRESS NOTES
[facility] Progress Note    HPI  Patient presents with:  Stroke: occasional tingling in her face  Shoulder Injury: bounced her L shoulder pain up against something this past weekend and now she's having pain on the left lower side of th during pregnancy right leg   • Incontinence    • Esophageal reflux    • Diverticulosis of large intestine      unsure if divertiulitis or diverticulosis   • Visual impairment    • Diabetes ([facility]    • Stroke ([facility]      r/o 4/11/17         Past Surgical Feels faint  Naproxen                Dizziness    Comment:Tabs Feels faint  Norvasc [Amlodipine*    Nausea and vomiting    Comment:tabs  Nsaids                  Nausea only, Dizziness  Vicodin [Hydrocodon*    Nausea only, Dizziness    Comment:Tabs  Zetia [ g, Rfl: 0  •  Multiple Vitamin (MULTI-VITAMIN) Oral Tab, Take 1 Tab by mouth daily. , Disp: , Rfl:     Review of Systems:  No chest pain or palpitations; otherwise as noted in HPI.     Exam:  /62 mmHg  Pulse 48  Resp 20  Wt 162 lb  Estimated body mass dissection. No focal high grade stenosis.       The anterior, middle and posterior cerebral arteries are patent.  No intracranial aneurysm or arterial venous malformation demonstrated.        The superior cerebellar, AICA and PICA segments demonstrate flow subacute stroke, but location is same as previous and suspect this is more related to extension of prior stroke than a new ischemic event; workup thus far with normal Echo, LDL 80, A1C 6.5, and likely small vessel etiology.        Plan to continue Plavix 75

## 2017-05-11 ENCOUNTER — HOSPITAL ENCOUNTER (OUTPATIENT)
Dept: PHYSICAL THERAPY | Facility: HOSPITAL | Age: 67
Setting detail: THERAPIES SERIES
Discharge: HOME | End: 2017-05-11
Attending: FAMILY MEDICINE
Payer: COMMERCIAL

## 2017-05-11 ENCOUNTER — TELEPHONE (OUTPATIENT)
Dept: INTERNAL MEDICINE CLINIC | Facility: CLINIC | Age: 67
End: 2017-05-11

## 2017-05-11 ENCOUNTER — HOSPITAL ENCOUNTER (OUTPATIENT)
Dept: OCCUPATIONAL MEDICINE | Facility: HOSPITAL | Age: 67
Setting detail: THERAPIES SERIES
Discharge: HOME | End: 2017-05-11
Attending: Other
Payer: COMMERCIAL

## 2017-05-11 ENCOUNTER — PRIOR ORIGINAL RECORDS (OUTPATIENT)
Dept: OTHER | Age: 67
End: 2017-05-11

## 2017-05-11 DIAGNOSIS — R00.1 BRADYCARDIA: Primary | ICD-10-CM

## 2017-05-11 DIAGNOSIS — I20.1 PRINZMETAL ANGINA (HCC): ICD-10-CM

## 2017-05-11 PROCEDURE — 97112 NEUROMUSCULAR REEDUCATION: CPT

## 2017-05-11 PROCEDURE — 97110 THERAPEUTIC EXERCISES: CPT

## 2017-05-11 NOTE — PROGRESS NOTES
Dx: Acute CVA         Authorized # of Visits:  8         Next MD visit: none scheduled  Fall Risk: standard         Precautions: n/a             Subjective: \"The gripping of the putty was easy, but the pinching was much harder. \"    Objective:  R=32,

## 2017-05-12 ENCOUNTER — MYAURORA ACCOUNT LINK (OUTPATIENT)
Dept: OTHER | Age: 67
End: 2017-05-12

## 2017-05-12 ENCOUNTER — HOSPITAL ENCOUNTER (OUTPATIENT)
Dept: LAB | Facility: HOSPITAL | Age: 67
Discharge: HOME | End: 2017-05-12
Attending: INTERNAL MEDICINE
Payer: COMMERCIAL

## 2017-05-12 ENCOUNTER — PRIOR ORIGINAL RECORDS (OUTPATIENT)
Dept: OTHER | Age: 67
End: 2017-05-12

## 2017-05-12 PROCEDURE — 84443 ASSAY THYROID STIM HORMONE: CPT | Performed by: INTERNAL MEDICINE

## 2017-05-12 PROCEDURE — 84439 ASSAY OF FREE THYROXINE: CPT | Performed by: INTERNAL MEDICINE

## 2017-05-12 PROCEDURE — 36415 COLL VENOUS BLD VENIPUNCTURE: CPT | Performed by: INTERNAL MEDICINE

## 2017-05-12 NOTE — TELEPHONE ENCOUNTER
Pt informed ,states she takes metoprolol BID is she to take half tablet once a day or twice a day ( already took whole dose this morning )Has appt with cardio this morning at 6232

## 2017-05-15 ENCOUNTER — APPOINTMENT (OUTPATIENT)
Dept: OCCUPATIONAL MEDICINE | Facility: HOSPITAL | Age: 67
End: 2017-05-15
Attending: Other
Payer: COMMERCIAL

## 2017-05-15 ENCOUNTER — HOSPITAL ENCOUNTER (OUTPATIENT)
Dept: CV DIAGNOSTICS | Facility: HOSPITAL | Age: 67
Discharge: HOME | End: 2017-05-15
Attending: INTERNAL MEDICINE
Payer: COMMERCIAL

## 2017-05-15 DIAGNOSIS — R00.2 PALPITATIONS: ICD-10-CM

## 2017-05-15 DIAGNOSIS — R00.1 BRADYCARDIA: ICD-10-CM

## 2017-05-15 PROCEDURE — 93225 XTRNL ECG REC<48 HRS REC: CPT | Performed by: INTERNAL MEDICINE

## 2017-05-15 PROCEDURE — 93226 XTRNL ECG REC<48 HR SCAN A/R: CPT | Performed by: INTERNAL MEDICINE

## 2017-05-15 PROCEDURE — 93227 XTRNL ECG REC<48 HR R&I: CPT | Performed by: INTERNAL MEDICINE

## 2017-05-16 ENCOUNTER — HOSPITAL ENCOUNTER (OUTPATIENT)
Dept: PHYSICAL THERAPY | Facility: HOSPITAL | Age: 67
Setting detail: THERAPIES SERIES
Discharge: HOME | End: 2017-05-16
Attending: FAMILY MEDICINE
Payer: COMMERCIAL

## 2017-05-16 LAB
FREE T4: 1 MG/DL
THYROID STIMULATING HORMONE: 1.17 MLU/L

## 2017-05-16 PROCEDURE — 97112 NEUROMUSCULAR REEDUCATION: CPT

## 2017-05-16 PROCEDURE — 97530 THERAPEUTIC ACTIVITIES: CPT

## 2017-05-16 PROCEDURE — 97110 THERAPEUTIC EXERCISES: CPT

## 2017-05-16 NOTE — PROGRESS NOTES
Dx: Acute ischemic stroke          Authorized # of Visits:  10         Next MD visit: none scheduled  Fall Risk: standard         Precautions: n/a             Subjective: Patient reports that she continues to feel better every day, things are slowly gettin training within tolerance. Progress towards goals as able. Date: 5/11/2017  Tx#: 2/10 Date: 5/16/17  Tx#: 3/10 Date: Tx#: 4/ Date: Tx#: 5/ Date: Tx#: 6/ Date: Tx#: 7/ Date:    Tx#: 8/   NuStep L4 x 5 min NuStep L5 x 5 min        - SLR, 2# 2 x 10

## 2017-05-18 ENCOUNTER — HOSPITAL ENCOUNTER (OUTPATIENT)
Dept: PHYSICAL THERAPY | Facility: HOSPITAL | Age: 67
Setting detail: THERAPIES SERIES
Discharge: HOME | End: 2017-05-18
Attending: FAMILY MEDICINE
Payer: COMMERCIAL

## 2017-05-18 ENCOUNTER — HOSPITAL ENCOUNTER (OUTPATIENT)
Dept: OCCUPATIONAL MEDICINE | Facility: HOSPITAL | Age: 67
Setting detail: THERAPIES SERIES
Discharge: HOME | End: 2017-05-18
Attending: Other
Payer: COMMERCIAL

## 2017-05-18 PROCEDURE — 97110 THERAPEUTIC EXERCISES: CPT

## 2017-05-18 PROCEDURE — 97112 NEUROMUSCULAR REEDUCATION: CPT

## 2017-05-18 NOTE — PROGRESS NOTES
Progress Summary  Dx: Acute ischemic stroke          Authorized # of Visits:  10         Next MD visit: none scheduled  Fall Risk: standard         Precautions: n/a   Pt has attended 4, cancelled 1, and no shown 0 visits in Physical Therapy.      Subjectiv improve RLE strength to 5/5 throughout to improve ability to ambulate reciprocally over stairs without handrail assist. - MET   2. Patient will improve SLS time to 20 sec PATY to improve ability to ambulate over complaint surfaces and decrease risk for fall marching x 20 Bosu  - step ups x 12 R/L  - side step up x 12 R/L  - marching x 20       - Obstacle course airex, hurdles, 6\" step, SLS tap to cones  - fwd x 4  - with 8# box and p/u cones x 4 - SLS x 5 reps ea    - tandem x 2 reps ea       - Coordination

## 2017-05-18 NOTE — PROGRESS NOTES
Dx: Acute CVA         Authorized # of Visits:  8         Next MD visit: none scheduled  Fall Risk: standard         Precautions: n/a             Subjective: \"I tried moving the pen around and I ended up flipping it out of my hand.'    Objective:  R=37 Services:  Increased strengthening    Charges: Owen 3( 40 min)    Total Timed Treatment: 45 min     Total Treatment Time: 40 min

## 2017-05-19 LAB
ALBUMIN: 3.7 G/DL
ALBUMIN: 3.8 G/DL
ALKALINE PHOSPHATATE(ALK PHOS): 111 IU/L
ALKALINE PHOSPHATATE(ALK PHOS): 90 IU/L
BILIRUBIN TOTAL: 0.5 MG/DL
BILIRUBIN TOTAL: 0.5 MG/DL
BUN: 15 MG/DL
BUN: 16 MG/DL
CALCIUM: 9.7 MG/DL
CALCIUM: 9.9 MG/DL
CHLORIDE: 103 MEQ/L
CHLORIDE: 105 MEQ/L
CHOLESTEROL, TOTAL: 162 MG/DL
CREATININE, SERUM: 0.99 MG/DL
CREATININE, SERUM: 0.99 MG/DL
GLUCOSE: 123 MG/DL
GLUCOSE: 97 MG/DL
HDL CHOLESTEROL: 51 MG/DL
HEMATOCRIT: 38.7 %
HEMATOCRIT: 40.5 %
HEMOGLOBIN A1C: 6.5 %
HEMOGLOBIN: 12.9 G/DL
HEMOGLOBIN: 13.4 G/DL
LDL CHOLESTEROL: 80 MG/DL
PLATELETS: 296 K/UL
PLATELETS: 328 K/UL
POTASSIUM, SERUM: 3.6 MEQ/L
POTASSIUM, SERUM: 3.7 MEQ/L
POTASSIUM, SERUM: 3.8 MEQ/L
PROTEIN, TOTAL: 7.4 G/DL
PROTEIN, TOTAL: 7.5 G/DL
RED BLOOD COUNT: 4.48 X 10-6/U
RED BLOOD COUNT: 4.62 X 10-6/U
SGOT (AST): 14 IU/L
SGOT (AST): 14 IU/L
SGPT (ALT): 20 IU/L
SGPT (ALT): 23 IU/L
SODIUM: 140 MEQ/L
SODIUM: 140 MEQ/L
THYROID STIMULATING HORMONE: 1.42 MLU/L
TRIGLYCERIDES: 157 MG/DL
WHITE BLOOD COUNT: 4.2 X 10-3/U
WHITE BLOOD COUNT: 4.8 X 10-3/U

## 2017-05-22 ENCOUNTER — PRIOR ORIGINAL RECORDS (OUTPATIENT)
Dept: OTHER | Age: 67
End: 2017-05-22

## 2017-05-22 ENCOUNTER — APPOINTMENT (OUTPATIENT)
Dept: OCCUPATIONAL MEDICINE | Facility: HOSPITAL | Age: 67
End: 2017-05-22
Attending: Other
Payer: COMMERCIAL

## 2017-05-25 ENCOUNTER — HOSPITAL ENCOUNTER (OUTPATIENT)
Dept: OCCUPATIONAL MEDICINE | Facility: HOSPITAL | Age: 67
Setting detail: THERAPIES SERIES
Discharge: HOME | End: 2017-05-25
Attending: Other
Payer: COMMERCIAL

## 2017-05-25 PROCEDURE — 97110 THERAPEUTIC EXERCISES: CPT

## 2017-05-25 NOTE — PROGRESS NOTES
Dx: Acute CVA         Authorized # of Visits:  8         Next MD visit: none scheduled  Fall Risk: standard         Precautions: n/a              Discharge Summary    Pt has attended 4, cancelled 1, and no shown 0 visits in Occupational Therapy.      Subjec In-hand manipulation with ball Roll/pinch green foam pieces Green theraband  -tripod pinch  -lateral pinch  -digit extension       Tape tear, bilateral coordination and pinch Tip pinch with multicolored clothespins In-hand manipulation tasks       [place]

## 2017-05-30 RX ORDER — SIMVASTATIN 20 MG
TABLET ORAL
Qty: 90 TABLET | Refills: 0 | Status: SHIPPED | OUTPATIENT
Start: 2017-05-30 | End: 2017-06-14

## 2017-05-30 RX ORDER — NIFEDIPINE 60 MG/1
TABLET, FILM COATED, EXTENDED RELEASE ORAL
Qty: 90 TABLET | Refills: 0 | Status: SHIPPED | OUTPATIENT
Start: 2017-05-30 | End: 2017-10-18

## 2017-06-05 ENCOUNTER — TELEPHONE (OUTPATIENT)
Dept: NEUROLOGY | Facility: CLINIC | Age: 67
End: 2017-06-05

## 2017-06-05 DIAGNOSIS — I63.9 ACUTE ISCHEMIC STROKE (HCC): Primary | ICD-10-CM

## 2017-06-05 DIAGNOSIS — I63.9 ACUTE CVA (CEREBROVASCULAR ACCIDENT) (HCC): Primary | ICD-10-CM

## 2017-06-05 RX ORDER — ATORVASTATIN CALCIUM 80 MG/1
80 TABLET, FILM COATED ORAL NIGHTLY
Qty: 30 TABLET | Refills: 1 | OUTPATIENT
Start: 2017-06-05

## 2017-06-05 RX ORDER — CLOPIDOGREL BISULFATE 75 MG/1
75 TABLET ORAL DAILY
Qty: 30 TABLET | Refills: 2 | Status: SHIPPED | OUTPATIENT
Start: 2017-06-05 | End: 2017-08-28

## 2017-06-05 NOTE — TELEPHONE ENCOUNTER
Medication: atorvastatin    Date of last refill: 4/12/17  [place]    Last office visit: 5/10/2017  Due back to clinic per last office note:  RTC in 2 months  Date next office visit scheduled:  Future Appointments  Jaime

## 2017-06-05 NOTE — TELEPHONE ENCOUNTER
Medication: Clopidogrel    Date of last refill: 4/12/2017 for 30 tabs/1 refill  [place]    Last office visit: 5/10/2017  Due back to clinic per last office note:  RTC in 2 months  Date next office

## 2017-06-14 RX ORDER — ATORVASTATIN CALCIUM 80 MG/1
80 TABLET, FILM COATED ORAL NIGHTLY
Qty: 30 TABLET | Refills: 0 | Status: SHIPPED | OUTPATIENT
Start: 2017-06-14 | End: 2017-07-11

## 2017-06-14 NOTE — TELEPHONE ENCOUNTER
Discussed recent reason for call with patient as noted above. Per Dr. Sadi Howell, ok to refill 80mg of Atorvastatin.  Left detailed message on VM (ok per HIPAA) relaying that Dr. Sadi Howell will send over Atorvastatin 80mg to pharmacy and to make sure this is t

## 2017-06-15 ENCOUNTER — TELEPHONE (OUTPATIENT)
Dept: NEUROLOGY | Facility: CLINIC | Age: 67
End: 2017-06-15

## 2017-06-15 NOTE — TELEPHONE ENCOUNTER
Received clearance request for colonoscopy, no date set. Requesting neuro clearance for anesthesia. Endorsed to Dr. Aston Lopez for signature.

## 2017-06-15 NOTE — TELEPHONE ENCOUNTER
Per Dr. Juancarlos Srtinger [facility] for clearance. Signed form faxed to [facility] gastro.

## 2017-07-03 RX ORDER — METOPROLOL TARTRATE 50 MG/1
TABLET, FILM COATED ORAL
Qty: 180 TABLET | Refills: 0 | Status: SHIPPED | OUTPATIENT
Start: 2017-07-03 | End: 2017-10-02

## 2017-07-03 RX ORDER — LISINOPRIL AND HYDROCHLOROTHIAZIDE 25; 20 MG/1; MG/1
TABLET ORAL
Qty: 90 TABLET | Refills: 0 | Status: SHIPPED | OUTPATIENT
Start: 2017-07-03 | End: 2017-10-02

## 2017-07-11 DIAGNOSIS — I63.9 ACUTE ISCHEMIC STROKE (HCC): Primary | ICD-10-CM

## 2017-07-11 RX ORDER — ATORVASTATIN CALCIUM 80 MG/1
80 TABLET, FILM COATED ORAL NIGHTLY
Qty: 30 TABLET | Refills: 0 | Status: SHIPPED | OUTPATIENT
Start: 2017-07-11 | End: 2017-07-24

## 2017-07-11 NOTE — TELEPHONE ENCOUNTER
Medication: Atorvastatin 80 mg    Date of last refill: 06/14/17  [place]     Last office visit: 5/10/2017  Due back to clinic per last office note:  RTN in 2 months  Date next office visit scheduled:  Future Appointments (primary encounter diagnosis)  Plan: as noted above      Return in about 2 months (around 7/10/2017

## 2017-07-12 ENCOUNTER — OFFICE VISIT (OUTPATIENT)
Dept: NEUROLOGY | Facility: CLINIC | Age: 67
End: 2017-07-12

## 2017-07-12 VITALS
HEART RATE: 60 BPM | BODY MASS INDEX: 29 KG/M2 | SYSTOLIC BLOOD PRESSURE: 110 MMHG | WEIGHT: 162 LBS | RESPIRATION RATE: 14 BRPM | DIASTOLIC BLOOD PRESSURE: 64 MMHG

## 2017-07-12 DIAGNOSIS — Z86.73 HISTORY OF STROKE WITHIN LAST YEAR: Primary | ICD-10-CM

## 2017-07-12 PROCEDURE — 99213 OFFICE O/P EST LOW 20 MIN: CPT | Performed by: OTHER

## 2017-07-12 NOTE — PROGRESS NOTES
[facility] Progress Note    HPI  Patient presents with:  Stroke: 4.2017    In summary of prior visits:   \"     Patient presents in follow-up after recently discharged 4/12/17, following admission for acute R sided paresthesia with stro Diverticulosis of large intestine     unsure if divertiulitis or diverticulosis   • Esophageal reflux    • Essential hypertension, benign 8/02/2543   • Helicobacter pylori gastritis 2004   • High blood pressure    • High cholesterol    • Incontinence    • [Ibuprofen]       Nausea and vomiting, Dizziness    Comment:Tabs Feels faint  Aspirin                 Nausea and vomiting, Dizziness    Comment:Feels faint  Codeine Sulfate         Nausea and vomiting    Comment:Tabs  Demerol                 Nausea only, D DAY), Disp: 180 capsule, Rfl: 1  •  PEG 3350 Oral Powd Pack, Take 17 g by mouth nightly.  , Disp: , Rfl:   •  NITROSTAT 0.4 MG Sublingual SL Tab, Place 0.4 mg under the tongue every 5 (five) minutes as needed.   , Disp: , Rfl: 5  •  Mometasone Furoate 0.1 % hyperintensity.       There is scattered mild chronic small vessel disease. No abnormal extra-axial fluid. Normal central flow voids.       The carotid arteries are patent. There is mild carotid bifurcation atherosclerosis.  Narrowing is estimated at no m to 40-45 mmHg.     Labs:   LDL: 80  A1C: 6.5           Impression/ Plan:    Loren Mari is a 78year old woman with past medical history significant for HTN, DM type 2, GERD, who originally presented to ED 4/11/17, with numbness in R face/arm/ leg and

## 2017-07-12 NOTE — PATIENT INSTRUCTIONS
After your visit at the [facility] office  today,  please direct any follow up questions or medication needs to the staff in our  [address] [place] [address] office so that your concerns may be promptly addressed.   We are available through [employer] or at the numbers below: has notified you that the test has been approved by your insurer. Depending on your insurance carrier, approval may take 3-10 days. It is highly recommended patients contact their insurance carrier directly to determine coverage.   If test is done without

## 2017-07-12 NOTE — PROGRESS NOTES
Patient here to follow up regarding a stroke in 4/2017. States that she has been doing well since last visit.

## 2017-07-14 ENCOUNTER — TELEPHONE (OUTPATIENT)
Dept: NEUROLOGY | Facility: CLINIC | Age: 67
End: 2017-07-14

## 2017-07-14 NOTE — TELEPHONE ENCOUNTER
Clearance requested for upcoming colonoscopy, scheduled for 8/21/17.     Last seen by Dr. Maddi Healy 7/12 with notes:  Gita Oconnell is a 78year old woman with past medical history significant for HTN, DM type 2, GERD, who originally presented to ED 4/11

## 2017-07-17 RX ORDER — ISOSORBIDE MONONITRATE 120 MG/1
TABLET, EXTENDED RELEASE ORAL
Qty: 90 TABLET | Refills: 0 | Status: SHIPPED | OUTPATIENT
Start: 2017-07-17 | End: 2017-10-16

## 2017-07-24 ENCOUNTER — OFFICE VISIT (OUTPATIENT)
Dept: INTERNAL MEDICINE CLINIC | Facility: CLINIC | Age: 67
End: 2017-07-24

## 2017-07-24 VITALS
TEMPERATURE: 98 F | HEART RATE: 57 BPM | BODY MASS INDEX: 28 KG/M2 | SYSTOLIC BLOOD PRESSURE: 118 MMHG | WEIGHT: 156.25 LBS | OXYGEN SATURATION: 97 % | DIASTOLIC BLOOD PRESSURE: 60 MMHG | RESPIRATION RATE: 12 BRPM

## 2017-07-24 DIAGNOSIS — Z86.73 STATUS POST CVA: ICD-10-CM

## 2017-07-24 DIAGNOSIS — E78.00 PURE HYPERCHOLESTEROLEMIA: ICD-10-CM

## 2017-07-24 DIAGNOSIS — Z23 NEED FOR VACCINATION FOR PNEUMOCOCCUS: ICD-10-CM

## 2017-07-24 DIAGNOSIS — E11.8 TYPE 2 DIABETES MELLITUS WITH COMPLICATION, UNSPECIFIED LONG TERM INSULIN USE STATUS: Primary | ICD-10-CM

## 2017-07-24 DIAGNOSIS — I10 ESSENTIAL HYPERTENSION, BENIGN: ICD-10-CM

## 2017-07-24 PROCEDURE — 99214 OFFICE O/P EST MOD 30 MIN: CPT | Performed by: FAMILY MEDICINE

## 2017-07-24 PROCEDURE — 90471 IMMUNIZATION ADMIN: CPT | Performed by: FAMILY MEDICINE

## 2017-07-24 PROCEDURE — 90670 PCV13 VACCINE IM: CPT | Performed by: FAMILY MEDICINE

## 2017-07-24 RX ORDER — ATORVASTATIN CALCIUM 80 MG/1
80 TABLET, FILM COATED ORAL NIGHTLY
Qty: 90 TABLET | Refills: 1 | Status: SHIPPED | OUTPATIENT
Start: 2017-07-24 | End: 2018-01-18

## 2017-07-24 NOTE — PROGRESS NOTES
HPI:    Patient ID: Joshua Bonds is a 78year old female. HPI  Joshua Bonds is a 78year old female.   HPI:   Here for med ck   Overall doing ok    Seeing neuro , last was few weeks ago      No issues after CVA    Still on meds   nneds labs needed.) Disp: 45 g Rfl: 0   Multiple Vitamin (MULTI-VITAMIN) Oral Tab Take 1 Tab by mouth daily.  Disp:  Rfl:       Past Medical History:   Diagnosis Date   • Abnormal maternal glucose tolerance, complicating pregnancy, childbirth, or the puerperium, unspe visualized feet and the appearance is normal.  Bilateral monofilament/sensation of both feet is normal.  Pulsation pedal pulse exam of both lower legs/feet is normal as well.       ASSESSMENT AND PLAN:   (E11.8) Type 2 diabetes mellitus with complication, u Rfl:    OMEPRAZOLE 20 MG Oral Capsule Delayed Release TAKE 2 CAPSULES BY MOUTH DAILY (Patient taking differently: TAKE 1-2 CAPSULES EVERY OTHER DAY) Disp: 180 capsule Rfl: 1   PEG 3350 Oral Powd Pack Take 17 g by mouth nightly.    Disp:  Rfl:    NITROSTAT 0

## 2017-08-23 ENCOUNTER — TELEPHONE (OUTPATIENT)
Dept: INTERNAL MEDICINE CLINIC | Facility: CLINIC | Age: 67
End: 2017-08-23

## 2017-08-23 DIAGNOSIS — Z01.818 PRE-OP TESTING: Primary | ICD-10-CM

## 2017-08-23 NOTE — TELEPHONE ENCOUNTER
Patient called in stating she will be getting her colonoscopy on 08/31/17 and they are requesting an an additional lab order for a BMP and also requesting pt to get an EKG prior to procedure date. Please call pt with additional questions.

## 2017-08-24 ENCOUNTER — APPOINTMENT (OUTPATIENT)
Dept: LAB | Age: 67
End: 2017-08-24
Attending: FAMILY MEDICINE
Payer: COMMERCIAL

## 2017-08-24 ENCOUNTER — PRIOR ORIGINAL RECORDS (OUTPATIENT)
Dept: OTHER | Age: 67
End: 2017-08-24

## 2017-08-24 DIAGNOSIS — Z01.818 PRE-OP TESTING: ICD-10-CM

## 2017-08-24 DIAGNOSIS — E78.00 PURE HYPERCHOLESTEROLEMIA: ICD-10-CM

## 2017-08-24 LAB
ALT SERPL-CCNC: 27 U/L (ref 14–54)
AST SERPL-CCNC: 15 U/L (ref 15–41)
ATRIAL RATE: 61 BPM
BUN BLD-MCNC: 13 MG/DL (ref 8–20)
CALCIUM BLD-MCNC: 9.4 MG/DL (ref 8.3–10.3)
CHLORIDE: 106 MMOL/L (ref 101–111)
CHOLEST SMN-MCNC: 123 MG/DL (ref ?–200)
CO2: 30 MMOL/L (ref 22–32)
CREAT BLD-MCNC: 0.87 MG/DL (ref 0.55–1.02)
GLUCOSE BLD-MCNC: 128 MG/DL (ref 70–99)
HDLC SERPL-MCNC: 52 MG/DL (ref 45–?)
HDLC SERPL: 2.37 {RATIO} (ref ?–4.44)
LDLC SERPL CALC-MCNC: 43 MG/DL (ref ?–130)
LDLC SERPL-MCNC: 28 MG/DL (ref 5–40)
NONHDLC SERPL-MCNC: 71 MG/DL (ref ?–130)
P AXIS: 75 DEGREES
P-R INTERVAL: 146 MS
POTASSIUM SERPL-SCNC: 3.7 MMOL/L (ref 3.6–5.1)
Q-T INTERVAL: 432 MS
QRS DURATION: 82 MS
QTC CALCULATION (BEZET): 434 MS
R AXIS: 64 DEGREES
SODIUM SERPL-SCNC: 143 MMOL/L (ref 136–144)
T AXIS: 76 DEGREES
TRIGLYCERIDES: 138 MG/DL (ref ?–150)
VENTRICULAR RATE: 61 BPM

## 2017-08-24 PROCEDURE — 93010 ELECTROCARDIOGRAM REPORT: CPT | Performed by: INTERNAL MEDICINE

## 2017-08-24 PROCEDURE — 84460 ALANINE AMINO (ALT) (SGPT): CPT

## 2017-08-24 PROCEDURE — 36415 COLL VENOUS BLD VENIPUNCTURE: CPT

## 2017-08-24 PROCEDURE — 80061 LIPID PANEL: CPT

## 2017-08-24 PROCEDURE — 93005 ELECTROCARDIOGRAM TRACING: CPT

## 2017-08-24 PROCEDURE — 80048 BASIC METABOLIC PNL TOTAL CA: CPT

## 2017-08-24 PROCEDURE — 84450 TRANSFERASE (AST) (SGOT): CPT

## 2017-08-28 DIAGNOSIS — I63.9 ACUTE CVA (CEREBROVASCULAR ACCIDENT) (HCC): ICD-10-CM

## 2017-08-28 RX ORDER — CLOPIDOGREL BISULFATE 75 MG/1
75 TABLET ORAL DAILY
Qty: 30 TABLET | Refills: 2 | Status: SHIPPED | OUTPATIENT
Start: 2017-08-28 | End: 2017-10-03

## 2017-08-28 NOTE — TELEPHONE ENCOUNTER
Medication: Clopidogrel    Date of last refill: 6/5/2017   [place] medication    Last office visit: 7/12/2017  Due back to clinic per last office note:  RTC in 3 months  Date next office visit scheduled:   Fut

## 2017-08-29 RX ORDER — NIFEDIPINE 60 MG/1
TABLET, EXTENDED RELEASE ORAL
Qty: 90 TABLET | Refills: 0 | Status: SHIPPED | OUTPATIENT
Start: 2017-08-29 | End: 2019-02-07

## 2017-09-07 RX ORDER — RANOLAZINE 500 MG/1
TABLET, FILM COATED, EXTENDED RELEASE ORAL
Qty: 90 TABLET | Refills: 0 | Status: ON HOLD | OUTPATIENT
Start: 2017-09-07 | End: 2019-12-02

## 2017-09-12 ENCOUNTER — TELEPHONE (OUTPATIENT)
Dept: INTERNAL MEDICINE CLINIC | Facility: CLINIC | Age: 67
End: 2017-09-12

## 2017-09-13 ENCOUNTER — ANESTHESIA EVENT (OUTPATIENT)
Dept: ENDOSCOPY | Facility: HOSPITAL | Age: 67
End: 2017-09-13

## 2017-09-14 ENCOUNTER — HOSPITAL ENCOUNTER (OUTPATIENT)
Facility: HOSPITAL | Age: 67
Setting detail: HOSPITAL OUTPATIENT SURGERY
Discharge: HOME | End: 2017-09-14
Attending: INTERNAL MEDICINE | Admitting: INTERNAL MEDICINE
Payer: COMMERCIAL

## 2017-09-14 ENCOUNTER — ANESTHESIA (OUTPATIENT)
Dept: ENDOSCOPY | Facility: HOSPITAL | Age: 67
End: 2017-09-14

## 2017-09-14 ENCOUNTER — SURGERY (OUTPATIENT)
Age: 67
End: 2017-09-14

## 2017-09-14 VITALS
BODY MASS INDEX: 26.93 KG/M2 | SYSTOLIC BLOOD PRESSURE: 129 MMHG | DIASTOLIC BLOOD PRESSURE: 55 MMHG | OXYGEN SATURATION: 97 % | RESPIRATION RATE: 16 BRPM | HEIGHT: 63 IN | WEIGHT: 152 LBS | HEART RATE: 50 BPM | TEMPERATURE: 98 F

## 2017-09-14 LAB — GLUCOSE BLD-MCNC: 108 MG/DL (ref 65–99)

## 2017-09-14 PROCEDURE — 82962 GLUCOSE BLOOD TEST: CPT

## 2017-09-14 PROCEDURE — 06LY4CC OCCLUSION OF HEMORRHOIDAL PLEXUS WITH EXTRALUMINAL DEVICE, PERCUTANEOUS ENDOSCOPIC APPROACH: ICD-10-PCS | Performed by: INTERNAL MEDICINE

## 2017-09-14 RX ORDER — NALOXONE HYDROCHLORIDE 0.4 MG/ML
80 INJECTION, SOLUTION INTRAMUSCULAR; INTRAVENOUS; SUBCUTANEOUS AS NEEDED
Status: DISCONTINUED | OUTPATIENT
Start: 2017-09-14 | End: 2017-09-14

## 2017-09-14 RX ORDER — DEXTROSE MONOHYDRATE 25 G/50ML
50 INJECTION, SOLUTION INTRAVENOUS
Status: DISCONTINUED | OUTPATIENT
Start: 2017-09-14 | End: 2017-09-14

## 2017-09-14 RX ORDER — SODIUM CHLORIDE, SODIUM LACTATE, POTASSIUM CHLORIDE, CALCIUM CHLORIDE 600; 310; 30; 20 MG/100ML; MG/100ML; MG/100ML; MG/100ML
INJECTION, SOLUTION INTRAVENOUS CONTINUOUS
Status: DISCONTINUED | OUTPATIENT
Start: 2017-09-14 | End: 2017-09-14

## 2017-09-14 NOTE — ANESTHESIA POSTPROCEDURE EVALUATION
[facility] Patient Status:  Hospital Outpatient Surgery   Age/Gender 78year old female MRN [id number]   Location [facility]. Attending Manolo Conklin MD   Hosp Day # 0 PCP Alfonzo Hu MD       Anesthesia Post-o

## 2017-09-14 NOTE — ANESTHESIA PREPROCEDURE EVALUATION
PRE-OP EVALUATION    Patient Name: Malu Bolivar    Pre-op Diagnosis: CONSTIPATION    Procedure(s):  COLONOSCOPY    Surgeon(s) and Role:     Jevon Villafuerte MD - Primary    Pre-op vitals reviewed.   Temp: 98.2 °F (36.8 °C)  Pulse: 58  Resp: 20  BP: CAPSULES EVERY OTHER DAY) Disp: 180 capsule Rfl: 1   PEG 3350 Oral Powd Pack Take 17 g by mouth nightly. Disp:  Rfl:    NITROSTAT 0.4 MG Sublingual SL Tab Place 0.4 mg under the tongue every 5 (five) minutes as needed.    Disp:  Rfl: 5   Mometasone Furoat rarely       Drug use: No     Available pre-op labs reviewed.        Lab Results  Component Value Date    08/24/2017   K 3.7 08/24/2017    08/24/2017   CO2 30.0 08/24/2017   BUN 13 08/24/2017   CREATSERUM 0.87 08/24/2017    (H) 08/24/2017

## 2017-09-14 NOTE — OPERATIVE REPORT
[facility] Patient Status:  Hospital Outpatient Surgery    4/15/1950 MRN [id number]   [facility] ENDOSCOPY Attending Les Roe MD   Hosp Day # 0 PCP Deejay De Jesus MD     PREOPERATIVE DIAGNOSIS/INDICATION: Screening, prolap colonoscopy in 10 years.     Wing Cipro  9/14/2017  9:53 AM

## 2017-09-14 NOTE — H&P
[facility] Patient Status:  Hospital Outpatient Surgery    4/15/1950 MRN [id number]   [facility] ENDOSCOPY Attending Lisa Kohli MD   Hosp Day # 0 PCP Boom Qureshi MD     CC: Screening    Hi age 46 Leukemia   • Diabetes Father    • Breast Cancer Paternal Grandmother    • Cancer Paternal Grandmother      bone cancer      reports that she has never smoked. She has never used smokeless tobacco. She reports that she drinks alcohol.  She reports mason dry.  Laboratory Data:    Lab Results  Component [facility]   [facility]         Assessment/Plan/Procedure:  Patient Active Problem List:     Unspecified gastritis and gastroduodenitis without mention of hemorrhage     Obstructive sleep apnea (zeynep

## 2017-09-15 RX ORDER — OMEPRAZOLE 20 MG/1
CAPSULE, DELAYED RELEASE ORAL
Qty: 180 CAPSULE | Refills: 0 | Status: SHIPPED | OUTPATIENT
Start: 2017-09-15 | End: 2017-12-10

## 2017-09-15 RX ORDER — OMEPRAZOLE 20 MG/1
CAPSULE, DELAYED RELEASE ORAL
Qty: 180 CAPSULE | Refills: 0 | OUTPATIENT
Start: 2017-09-15

## 2017-10-03 DIAGNOSIS — I63.9 ACUTE CVA (CEREBROVASCULAR ACCIDENT) (HCC): ICD-10-CM

## 2017-10-03 RX ORDER — CLOPIDOGREL BISULFATE 75 MG/1
75 TABLET ORAL DAILY
Qty: 90 TABLET | Refills: 0 | Status: SHIPPED | OUTPATIENT
Start: 2017-10-03 | End: 2017-12-26

## 2017-10-03 NOTE — TELEPHONE ENCOUNTER
Medication: Clopidogrel 75 mg     Date of last refill: 08/28/17 with 2 addt refills            [place]     Last office visit: 7/12/2017  Due back to clinic per last office note:  RTC in 3 months  D

## 2017-10-04 RX ORDER — METOPROLOL TARTRATE 50 MG/1
TABLET, FILM COATED ORAL
Qty: 180 TABLET | Refills: 0 | Status: SHIPPED | OUTPATIENT
Start: 2017-10-04 | End: 2017-12-29

## 2017-10-04 RX ORDER — LISINOPRIL AND HYDROCHLOROTHIAZIDE 25; 20 MG/1; MG/1
TABLET ORAL
Qty: 90 TABLET | Refills: 0 | Status: SHIPPED | OUTPATIENT
Start: 2017-10-04 | End: 2017-12-29

## 2017-10-11 ENCOUNTER — PRIOR ORIGINAL RECORDS (OUTPATIENT)
Dept: OTHER | Age: 67
End: 2017-10-11

## 2017-10-11 LAB
ALT (SGPT): 27 U/L
AST (SGOT): 15 U/L
BUN: 13 MG/DL
CALCIUM: 9.4 MG/DL
CHLORIDE: 106 MEQ/L
CHOLESTEROL, TOTAL: 123 MG/DL
CREATININE, SERUM: 0.87 MG/DL
GLUCOSE: 128 MG/DL
HDL CHOLESTEROL: 52 MG/DL
LDL CHOLESTEROL: 43 MG/DL
POTASSIUM, SERUM: 3.7 MEQ/L
SODIUM: 143 MEQ/L
TRIGLYCERIDES: 138 MG/DL

## 2017-10-17 ENCOUNTER — TELEPHONE (OUTPATIENT)
Dept: INTERNAL MEDICINE CLINIC | Facility: CLINIC | Age: 67
End: 2017-10-17

## 2017-10-18 ENCOUNTER — OFFICE VISIT (OUTPATIENT)
Dept: NEUROLOGY | Facility: CLINIC | Age: 67
End: 2017-10-18

## 2017-10-18 VITALS
HEART RATE: 76 BPM | SYSTOLIC BLOOD PRESSURE: 92 MMHG | RESPIRATION RATE: 16 BRPM | BODY MASS INDEX: 27 KG/M2 | WEIGHT: 154 LBS | DIASTOLIC BLOOD PRESSURE: 58 MMHG

## 2017-10-18 DIAGNOSIS — Z86.73 HISTORY OF STROKE WITHIN LAST YEAR: Primary | ICD-10-CM

## 2017-10-18 PROCEDURE — 99213 OFFICE O/P EST LOW 20 MIN: CPT | Performed by: OTHER

## 2017-10-18 RX ORDER — ISOSORBIDE MONONITRATE 120 MG/1
TABLET, EXTENDED RELEASE ORAL
Qty: 90 TABLET | Refills: 0 | Status: SHIPPED | OUTPATIENT
Start: 2017-10-18 | End: 2018-08-30

## 2017-10-18 NOTE — PATIENT INSTRUCTIONS
After your visit at the [facility] office  today,  please direct any follow up questions or medication needs to the staff in our  [place] office so that your concerns may be promptly addressed.   We are available through [employer] or at the numbers below: notified you that the test has been approved by your insurer. Depending on your insurance carrier, approval may take 3-10 days. It is highly recommended patients contact their insurance carrier directly to determine coverage.   If test is done without insu

## 2017-10-18 NOTE — PROGRESS NOTES
[facility] Progress Note    HPI  Patient presents with:  Stroke: Rm. 8: having some headaches and had some R leg numbness yesterday    In summary of prior visits:   \"     Patient presents in follow-up after recently discharged 4/12/17, others.                   Past Medical History:   Diagnosis Date   • Abnormal maternal glucose tolerance, complicating pregnancy, childbirth, or the puerperium, unspecified as to episode of care    • Acute bronchitis 04/11/2011   • Cataract    • Chest pain, name:                       Years of education:                 Number of children:               Social History Main Topics    Smoking status: Never Smoker                                                                Smokeless tobacco: Never Used differently: TAKE 1 CAPSULE BY MOUTH 4 TIMES WEEKLY), Disp: 180 capsule, Rfl: 0  •  RANEXA 500 MG Oral Tablet 12 Hr, TAKE 1 TABLET BY MOUTH EVERY DAY, Disp: 90 tablet, Rfl: 0  •  NIFEDIPINE ER OSMOTIC RELEASE 60 MG Oral Tablet 24 Hr, TAKE 1 TABLET BY MOUTH tongue and palate midline, SCM intact; otherwise, 2-12 intact  Motor: 5/5 strength throughout, tone normal - no drift   Sensory: intact to pin throughout today  Coord: FNF intact with no tremor or dysmetria  Romberg:absent  Gait: normal casual gait     Lab bowing without prolapse. There was trivial to mild     regurgitation. 3. Left atrium: The left atrial volume was mildly increased. 4. Right atrium: The atrium was mildly dilated. 5. Tricuspid valve: Structurally normal valve.  There was moderate     regu

## 2017-10-19 ENCOUNTER — TELEPHONE (OUTPATIENT)
Dept: INTERNAL MEDICINE CLINIC | Facility: CLINIC | Age: 67
End: 2017-10-19

## 2017-10-19 NOTE — TELEPHONE ENCOUNTER
Spoke with patient c/o dark green formed stools x 1 wk , today had diarrhea, patient denies any fever,abd cramping, constipation.  Patient's  is in the hospital , please advise

## 2017-10-19 NOTE — TELEPHONE ENCOUNTER
Patient called requesting to speak with the nurse. Has a concern regarding her stool - described as a dark green color, frequently going to the bathroom.  Patient is looking for a recommendation

## 2017-10-20 NOTE — TELEPHONE ENCOUNTER
Patient will callback later today with update, feeling better this AM but has not moved her bowels yet.  Has been dringking a lot of milk lately

## 2017-10-23 NOTE — TELEPHONE ENCOUNTER
Patient having constant diarrhea with green stool, nausea for last 24 hours, patient states able to keep fluids down, instructed patient to follow Brat diet tonight and hold elier lax.  Scheduled appointment 10/24/17 @ 11:45am. Patient was advised to E/R per

## 2017-10-24 ENCOUNTER — OFFICE VISIT (OUTPATIENT)
Dept: INTERNAL MEDICINE CLINIC | Facility: CLINIC | Age: 67
End: 2017-10-24

## 2017-10-24 VITALS
SYSTOLIC BLOOD PRESSURE: 102 MMHG | HEART RATE: 57 BPM | TEMPERATURE: 98 F | BODY MASS INDEX: 27 KG/M2 | WEIGHT: 152.5 LBS | RESPIRATION RATE: 12 BRPM | DIASTOLIC BLOOD PRESSURE: 56 MMHG

## 2017-10-24 DIAGNOSIS — R19.7 DIARRHEA OF PRESUMED INFECTIOUS ORIGIN: Primary | ICD-10-CM

## 2017-10-24 DIAGNOSIS — F43.21 GRIEVING: ICD-10-CM

## 2017-10-24 PROCEDURE — 99213 OFFICE O/P EST LOW 20 MIN: CPT | Performed by: FAMILY MEDICINE

## 2017-10-24 NOTE — PROGRESS NOTES
HPI:    Patient ID: Yusra Guevara is a 78year old female.     HPI  Here for diarrhea   Going on about 2 weeks   Was in [place] and thinks may have started then    Has green when wipes  Stool alos seem mushy and green but can be formed as well    No cr Take 17 g by mouth nightly. Disp:  Rfl:    NITROSTAT 0.4 MG Sublingual SL Tab Place 0.4 mg under the tongue every 5 (five) minutes as needed. Disp:  Rfl: 5   Mometasone Furoate 0.1 % Apply Externally Cream Apply 1 Application topically daily.  (Patient

## 2017-10-25 ENCOUNTER — LAB ENCOUNTER (OUTPATIENT)
Dept: LAB | Age: 67
End: 2017-10-25
Attending: FAMILY MEDICINE
Payer: COMMERCIAL

## 2017-10-25 DIAGNOSIS — R19.7 DIARRHEA OF PRESUMED INFECTIOUS ORIGIN: ICD-10-CM

## 2017-10-25 PROCEDURE — 87045 FECES CULTURE AEROBIC BACT: CPT

## 2017-10-25 PROCEDURE — 87329 GIARDIA AG IA: CPT

## 2017-10-25 PROCEDURE — 87427 SHIGA-LIKE TOXIN AG IA: CPT

## 2017-10-25 PROCEDURE — 87209 SMEAR COMPLEX STAIN: CPT

## 2017-10-25 PROCEDURE — 87177 OVA AND PARASITES SMEARS: CPT

## 2017-10-25 PROCEDURE — 87272 CRYPTOSPORIDIUM AG IF: CPT

## 2017-10-25 PROCEDURE — 87077 CULTURE AEROBIC IDENTIFY: CPT

## 2017-10-25 PROCEDURE — 87046 STOOL CULTR AEROBIC BACT EA: CPT

## 2017-10-25 PROCEDURE — 87493 C DIFF AMPLIFIED PROBE: CPT

## 2017-11-02 ENCOUNTER — TELEPHONE (OUTPATIENT)
Dept: INTERNAL MEDICINE CLINIC | Facility: CLINIC | Age: 67
End: 2017-11-02

## 2017-11-02 NOTE — TELEPHONE ENCOUNTER
Patient calling to request new prescription for needles. She was prescribed needles for blood sugar in April of 2013.  Please call patient back

## 2017-11-06 ENCOUNTER — TELEPHONE (OUTPATIENT)
Dept: INTERNAL MEDICINE CLINIC | Facility: CLINIC | Age: 67
End: 2017-11-06

## 2017-11-24 ENCOUNTER — IMMUNIZATION (OUTPATIENT)
Dept: INTERNAL MEDICINE CLINIC | Facility: CLINIC | Age: 67
End: 2017-11-24

## 2017-11-24 PROCEDURE — 90653 IIV ADJUVANT VACCINE IM: CPT | Performed by: FAMILY MEDICINE

## 2017-11-24 PROCEDURE — G0008 ADMIN INFLUENZA VIRUS VAC: HCPCS | Performed by: FAMILY MEDICINE

## 2017-11-27 RX ORDER — NIFEDIPINE 60 MG/1
TABLET, EXTENDED RELEASE ORAL
Qty: 90 TABLET | Refills: 0 | Status: SHIPPED | OUTPATIENT
Start: 2017-11-27 | End: 2017-12-20

## 2017-12-01 RX ORDER — RANOLAZINE 500 MG/1
TABLET, FILM COATED, EXTENDED RELEASE ORAL
Qty: 90 TABLET | Refills: 0 | OUTPATIENT
Start: 2017-12-01

## 2017-12-04 ENCOUNTER — TELEPHONE (OUTPATIENT)
Dept: INTERNAL MEDICINE CLINIC | Facility: CLINIC | Age: 67
End: 2017-12-04

## 2017-12-04 NOTE — TELEPHONE ENCOUNTER
Patient called because she is feeling light headed and sugar numbers are dropping over past days feeling like this.  She has been watching and eating when feeling \" off and dizzy\" she is not keeping high enough numbers, etc     Please call back asap patie

## 2017-12-04 NOTE — TELEPHONE ENCOUNTER
Since the weekend Pt has been feeling off lightheaded ,Sat fasting BS was94, Sunday 93 fasting Rechecked after supper 88 , This morning BS 99 ate breakfast just got home started feeling lightheaded ( no lunch ) was 75.  Eating now asking if she should hold

## 2017-12-04 NOTE — TELEPHONE ENCOUNTER
Ok to hold metfromin got now  She may be a bit dehydrated from the diarrhea     Push more fluids  Hopefully will get better wi 2-3 days

## 2017-12-06 ENCOUNTER — TELEPHONE (OUTPATIENT)
Dept: INTERNAL MEDICINE CLINIC | Facility: CLINIC | Age: 67
End: 2017-12-06

## 2017-12-13 ENCOUNTER — TELEPHONE (OUTPATIENT)
Dept: INTERNAL MEDICINE CLINIC | Facility: CLINIC | Age: 67
End: 2017-12-13

## 2017-12-13 RX ORDER — OMEPRAZOLE 20 MG/1
CAPSULE, DELAYED RELEASE ORAL
Qty: 180 CAPSULE | Refills: 0 | Status: SHIPPED | OUTPATIENT
Start: 2017-12-13 | End: 2018-03-07

## 2017-12-14 ENCOUNTER — TELEPHONE (OUTPATIENT)
Dept: NEUROLOGY | Facility: CLINIC | Age: 67
End: 2017-12-14

## 2017-12-14 ENCOUNTER — TELEPHONE (OUTPATIENT)
Dept: INTERNAL MEDICINE CLINIC | Facility: CLINIC | Age: 67
End: 2017-12-14

## 2017-12-14 NOTE — TELEPHONE ENCOUNTER
Called patient ; discussed symptoms - no associated focal findings; has been having a \"cold' for the past week or so as well - likely medication related or metabolic - encouraged to monitor for focal neurologic symptoms (ie, sudden onset of slurred speech

## 2017-12-14 NOTE — TELEPHONE ENCOUNTER
S: [place] Dys since Thanksgiving    B: Adelina Cortez had stroke L basal ganglia stroke in April 2017; last seen 10/18. A: Noted that her blood sugar had been low and worked with PCP to amend her DM medication (eliminated metformin about 7-10 days ago).

## 2017-12-18 ENCOUNTER — TELEPHONE (OUTPATIENT)
Dept: INTERNAL MEDICINE CLINIC | Facility: CLINIC | Age: 67
End: 2017-12-18

## 2017-12-20 ENCOUNTER — OFFICE VISIT (OUTPATIENT)
Dept: INTERNAL MEDICINE CLINIC | Facility: CLINIC | Age: 67
End: 2017-12-20

## 2017-12-20 VITALS
TEMPERATURE: 98 F | RESPIRATION RATE: 16 BRPM | HEART RATE: 53 BPM | SYSTOLIC BLOOD PRESSURE: 122 MMHG | DIASTOLIC BLOOD PRESSURE: 68 MMHG | OXYGEN SATURATION: 97 %

## 2017-12-20 DIAGNOSIS — E11.8 TYPE 2 DIABETES MELLITUS WITH COMPLICATION, UNSPECIFIED LONG TERM INSULIN USE STATUS: Primary | ICD-10-CM

## 2017-12-20 DIAGNOSIS — I10 ESSENTIAL HYPERTENSION, BENIGN: ICD-10-CM

## 2017-12-20 PROCEDURE — 99214 OFFICE O/P EST MOD 30 MIN: CPT | Performed by: FAMILY MEDICINE

## 2017-12-26 ENCOUNTER — TELEPHONE (OUTPATIENT)
Dept: INTERNAL MEDICINE CLINIC | Facility: CLINIC | Age: 67
End: 2017-12-26

## 2017-12-26 DIAGNOSIS — I63.9 ACUTE CVA (CEREBROVASCULAR ACCIDENT) (HCC): ICD-10-CM

## 2017-12-26 RX ORDER — CLOPIDOGREL BISULFATE 75 MG/1
75 TABLET ORAL DAILY
Qty: 90 TABLET | Refills: 1 | Status: SHIPPED | OUTPATIENT
Start: 2017-12-26 | End: 2018-01-18

## 2017-12-26 NOTE — TELEPHONE ENCOUNTER
Medication: plavix    Date of last refill: 10/3/17  [place]    Last office visit: 10/18/17  Due back to clinic per last office note:  3 months  Date next office visit scheduled:  Future Appointments  Date Time Provide

## 2017-12-26 NOTE — TELEPHONE ENCOUNTER
Prior Authorization for Omeprazole DR 20 mg Capsule was requested from [employer] if planning on more refills.

## 2017-12-29 RX ORDER — LISINOPRIL AND HYDROCHLOROTHIAZIDE 25; 20 MG/1; MG/1
TABLET ORAL
Qty: 90 TABLET | Refills: 0 | Status: SHIPPED | OUTPATIENT
Start: 2017-12-29 | End: 2018-03-30

## 2017-12-29 RX ORDER — METOPROLOL TARTRATE 50 MG/1
TABLET, FILM COATED ORAL
Qty: 180 TABLET | Refills: 0 | Status: SHIPPED | OUTPATIENT
Start: 2017-12-29 | End: 2018-04-04

## 2018-01-11 RX ORDER — ISOSORBIDE MONONITRATE 120 MG/1
TABLET, EXTENDED RELEASE ORAL
Qty: 90 TABLET | Refills: 0 | Status: SHIPPED | OUTPATIENT
Start: 2018-01-11 | End: 2018-01-18

## 2018-01-18 ENCOUNTER — OFFICE VISIT (OUTPATIENT)
Dept: NEUROLOGY | Facility: CLINIC | Age: 68
End: 2018-01-18

## 2018-01-18 VITALS — SYSTOLIC BLOOD PRESSURE: 128 MMHG | DIASTOLIC BLOOD PRESSURE: 72 MMHG | HEART RATE: 76 BPM

## 2018-01-18 DIAGNOSIS — Z86.73 HISTORY OF STROKE WITHIN LAST YEAR: Primary | ICD-10-CM

## 2018-01-18 PROCEDURE — 99213 OFFICE O/P EST LOW 20 MIN: CPT | Performed by: OTHER

## 2018-01-18 RX ORDER — ATORVASTATIN CALCIUM 40 MG/1
40 TABLET, FILM COATED ORAL NIGHTLY
Qty: 90 TABLET | Refills: 1 | Status: SHIPPED | OUTPATIENT
Start: 2018-01-18 | End: 2018-07-09

## 2018-01-18 RX ORDER — CLOPIDOGREL BISULFATE 75 MG/1
75 TABLET ORAL DAILY
Qty: 90 TABLET | Refills: 1 | Status: SHIPPED | OUTPATIENT
Start: 2018-01-18 | End: 2018-08-16

## 2018-01-18 NOTE — PATIENT INSTRUCTIONS
Refill policies:    • Allow 2-3 business days for refills; controlled substances may take longer.   • Contact your pharmacy at least 5 days prior to running out of medication and have them send an electronic request or submit request through the [facility] recommended that you have a procedure or additional testing performed. [facility]) will contact your insurance carrier to obtain pre-certification or prior authorization.     Unfortunately, [facility] has seen an increase in denial of paym

## 2018-01-18 NOTE — PROGRESS NOTES
[facility] Progress Note    HPI  Patient presents with:   Follow - Up: stroke  Stroke    In summary of prior visits:   \"     Patient presents in follow-up after recently discharged 4/12/17, following admission for acute R sided paresthe 12/05   • Deep vein thrombosis (HCC)     during pregnancy right leg   • Diabetes [facility])    • Diverticulosis of large intestine     unsure if divertiulitis or diverticulosis   • Esophageal reflux    • Essential hypertension, benign 0/29/4111   • Helicobacter Alcohol use:  Yes                Comment: very rarely    Drug use: No            Other Topics            Concern  Caffeine Concern        Yes    Comment:1-2 tea daily  Exercise                Yes    Comment:walking          Penicillins             Kristian BREEZE 2 TEST) In Vitro Disk, Dx E11.8   TEST ONCE DAILY, Disp: 100 each, Rfl: 5  •  RANEXA 500 MG Oral Tablet 12 Hr, TAKE 1 TABLET BY MOUTH EVERY DAY, Disp: 90 tablet, Rfl: 0  •  NIFEDIPINE ER OSMOTIC RELEASE 60 MG Oral Tablet 24 Hr, TAKE 1 TABLET BY MOUT brain / MRA head / neck (4/11/17): FINDINGS:  Ventricles and sulci are normal caliber. No mass effect or midline shift. There is a small focus of restricted diffusion involving the left basal ganglia measuring up to 0.6 cm.  There is associated minimal in the range     of 40mm Hg to 45mm Hg. Impressions: Ruth Davidin was no diagnostic evidence for a cardiac embolic source.   This study is compared with previous dated 06-02-16: Compared to the  previous study, systemic pressure has decreased from 151/70 mmHg to

## 2018-01-24 RX ORDER — ATORVASTATIN CALCIUM 80 MG/1
TABLET, FILM COATED ORAL
Qty: 90 TABLET | Refills: 0 | OUTPATIENT
Start: 2018-01-24

## 2018-02-20 RX ORDER — NIFEDIPINE 60 MG/1
TABLET, EXTENDED RELEASE ORAL
Qty: 90 TABLET | Refills: 0 | Status: SHIPPED | OUTPATIENT
Start: 2018-02-20 | End: 2018-04-18

## 2018-03-07 RX ORDER — OMEPRAZOLE 20 MG/1
CAPSULE, DELAYED RELEASE ORAL
Qty: 180 CAPSULE | Refills: 0 | Status: SHIPPED | OUTPATIENT
Start: 2018-03-07 | End: 2018-06-02

## 2018-03-30 RX ORDER — LISINOPRIL AND HYDROCHLOROTHIAZIDE 25; 20 MG/1; MG/1
TABLET ORAL
Qty: 90 TABLET | Refills: 0 | Status: SHIPPED | OUTPATIENT
Start: 2018-03-30 | End: 2018-06-25

## 2018-04-05 RX ORDER — METOPROLOL TARTRATE 50 MG/1
TABLET, FILM COATED ORAL
Qty: 180 TABLET | Refills: 0 | Status: SHIPPED | OUTPATIENT
Start: 2018-04-05 | End: 2018-06-29

## 2018-04-09 RX ORDER — ISOSORBIDE MONONITRATE 120 MG/1
TABLET, EXTENDED RELEASE ORAL
Qty: 90 TABLET | Refills: 0 | Status: SHIPPED | OUTPATIENT
Start: 2018-04-09 | End: 2018-04-18

## 2018-04-11 ENCOUNTER — PRIOR ORIGINAL RECORDS (OUTPATIENT)
Dept: OTHER | Age: 68
End: 2018-04-11

## 2018-04-11 ENCOUNTER — MYAURORA ACCOUNT LINK (OUTPATIENT)
Dept: OTHER | Age: 68
End: 2018-04-11

## 2018-04-18 ENCOUNTER — OFFICE VISIT (OUTPATIENT)
Dept: NEUROLOGY | Facility: CLINIC | Age: 68
End: 2018-04-18

## 2018-04-18 VITALS — SYSTOLIC BLOOD PRESSURE: 120 MMHG | HEART RATE: 60 BPM | DIASTOLIC BLOOD PRESSURE: 70 MMHG

## 2018-04-18 DIAGNOSIS — R20.2 RIGHT HAND PARESTHESIA: ICD-10-CM

## 2018-04-18 DIAGNOSIS — R20.2 RIGHT LEG PARESTHESIAS: ICD-10-CM

## 2018-04-18 DIAGNOSIS — Z86.73 HISTORY OF STROKE WITHIN LAST YEAR: Primary | ICD-10-CM

## 2018-04-18 PROCEDURE — 99214 OFFICE O/P EST MOD 30 MIN: CPT | Performed by: OTHER

## 2018-04-18 RX ORDER — CETIRIZINE HYDROCHLORIDE 10 MG/1
10 TABLET ORAL DAILY
COMMUNITY

## 2018-04-18 NOTE — PATIENT INSTRUCTIONS
Have MRI done as soon as you can  Have EMG done at next available time  Follow up after testing done   Refill policies:    • Allow 2-3 business days for refills; controlled substances may take longer.   • Contact your pharmacy at least 5 days prior to runni to determine coverage. If test is done without insurance authorization, patient may be responsible for the entire amount billed.       Precertification and Prior Authorizations  If your physician has recommended that you have a procedure or additional test

## 2018-04-18 NOTE — PROGRESS NOTES
[facility] Progress Note    HPI  Patient presents with:   Follow - Up    In summary of prior visits:   \"     Patient presents in follow-up after recently discharged 4/12/17, following admission for acute R sided paresthesia with stroke [facility])     during pregnancy right leg   • Diabetes [facility])    • Diverticulosis of large intestine     unsure if divertiulitis or diverticulosis   • Esophageal reflux    • Essential hypertension, benign 1/96/2920   • Helicobacter pylori gastritis 2004   • High Comment: very rarely    Drug use: No            Other Topics            Concern  Caffeine Concern        Yes    Comment:1-2 tea daily  Exercise                Yes    Comment:walking          Penicillins             Rash, Swelling  Advil [Ibuprofen] Disp: 90 tablet, Rfl: 0  •  Glucose Blood (MILAGROS BREEZE 2 TEST) In Vitro Disk, Dx E11.8   TEST ONCE DAILY, Disp: 100 each, Rfl: 5  •  RANEXA 500 MG Oral Tablet 12 Hr, TAKE 1 TABLET BY MOUTH EVERY DAY, Disp: 90 tablet, Rfl: 0  •  NIFEDIPINE ER OSMOTIC RELEA decreased over R LE up to calf then normal later  Coord: FNF intact with no tremor or dysmetria  Romberg:absent  Gait: normal casual gait     Labs:  None new    Diagnostic and imaging as previously noted below:    MRI brain / MRA head / neck (4/11/17): mildly increased. 4. Right atrium: The atrium was mildly dilated. 5. Tricuspid valve: Structurally normal valve. There was moderate     regurgitation. 6. Pulmonary arteries: Systolic pressure was mildly increased, in the range     of 40mm Hg to 45mm Hg. [address])        As noted above     No Follow-up on file.  - after testing    Jannette Pisano MD, Neurology  [facility]  Pager [phone]  4/18/2018

## 2018-04-18 NOTE — PROGRESS NOTES
Patient reports numbness in right hand has worsened, has pain in legs which is new.   Denies HA or other stroke symptoms

## 2018-04-19 ENCOUNTER — PRIOR ORIGINAL RECORDS (OUTPATIENT)
Dept: OTHER | Age: 68
End: 2018-04-19

## 2018-04-19 ENCOUNTER — LAB ENCOUNTER (OUTPATIENT)
Dept: LAB | Age: 68
End: 2018-04-19
Attending: INTERNAL MEDICINE
Payer: MEDICARE

## 2018-04-19 DIAGNOSIS — E78.00 PURE HYPERCHOLESTEROLEMIA: ICD-10-CM

## 2018-04-19 DIAGNOSIS — E11.9 DIABETES MELLITUS (HCC): Primary | ICD-10-CM

## 2018-04-19 PROCEDURE — 80061 LIPID PANEL: CPT

## 2018-04-19 PROCEDURE — 80053 COMPREHEN METABOLIC PANEL: CPT

## 2018-04-19 PROCEDURE — 83036 HEMOGLOBIN GLYCOSYLATED A1C: CPT

## 2018-04-19 PROCEDURE — 36415 COLL VENOUS BLD VENIPUNCTURE: CPT

## 2018-04-24 ENCOUNTER — PRIOR ORIGINAL RECORDS (OUTPATIENT)
Dept: OTHER | Age: 68
End: 2018-04-24

## 2018-04-24 ENCOUNTER — HOSPITAL ENCOUNTER (OUTPATIENT)
Dept: MRI IMAGING | Age: 68
Discharge: HOME | End: 2018-04-24
Attending: Other
Payer: MEDICARE

## 2018-04-24 DIAGNOSIS — R20.2 RIGHT HAND PARESTHESIA: ICD-10-CM

## 2018-04-24 DIAGNOSIS — R20.2 RIGHT LEG PARESTHESIAS: ICD-10-CM

## 2018-04-24 DIAGNOSIS — Z86.73 HISTORY OF STROKE WITHIN LAST YEAR: ICD-10-CM

## 2018-04-24 LAB
ALKALINE PHOSPHATATE(ALK PHOS): 116 IU/L
BILIRUBIN TOTAL: 0.5 MG/DL
BUN: 13 MG/DL
CALCIUM: 9.7 MG/DL
CHLORIDE: 106 MEQ/L
CHOLESTEROL, TOTAL: 161 MG/DL
CREATININE, SERUM: 1.08 MG/DL
GLUCOSE: 128 MG/DL
HDL CHOLESTEROL: 55 MG/DL
HEMOGLOBIN A1C: 6.5 %
LDL CHOLESTEROL: 73 MG/DL
POTASSIUM, SERUM: 4.2 MEQ/L
PROTEIN, TOTAL: 7.1 G/DL
SGOT (AST): 16 IU/L
SGPT (ALT): 27 IU/L
SODIUM: 141 MEQ/L
TRIGLYCERIDES: 164 MG/DL

## 2018-04-24 PROCEDURE — 70551 MRI BRAIN STEM W/O DYE: CPT | Performed by: OTHER

## 2018-04-25 PROBLEM — I70.0 ATHEROSCLEROSIS OF AORTA: Status: ACTIVE | Noted: 2018-04-25

## 2018-04-25 PROBLEM — I70.0 ATHEROSCLEROSIS OF AORTA (HCC): Status: ACTIVE | Noted: 2018-04-25

## 2018-04-26 ENCOUNTER — HOSPITAL ENCOUNTER (OUTPATIENT)
Dept: GENERAL RADIOLOGY | Age: 68
Discharge: HOME | End: 2018-04-26
Attending: FAMILY MEDICINE
Payer: MEDICARE

## 2018-04-26 ENCOUNTER — OFFICE VISIT (OUTPATIENT)
Dept: INTERNAL MEDICINE CLINIC | Facility: CLINIC | Age: 68
End: 2018-04-26

## 2018-04-26 VITALS
TEMPERATURE: 99 F | WEIGHT: 163 LBS | OXYGEN SATURATION: 96 % | HEART RATE: 63 BPM | HEIGHT: 63 IN | BODY MASS INDEX: 28.88 KG/M2 | DIASTOLIC BLOOD PRESSURE: 70 MMHG | SYSTOLIC BLOOD PRESSURE: 100 MMHG

## 2018-04-26 DIAGNOSIS — I10 ESSENTIAL HYPERTENSION, BENIGN: ICD-10-CM

## 2018-04-26 DIAGNOSIS — K29.70 GASTRITIS AND GASTRODUODENITIS: ICD-10-CM

## 2018-04-26 DIAGNOSIS — E11.8 TYPE 2 DIABETES MELLITUS WITH COMPLICATION, UNSPECIFIED WHETHER LONG TERM INSULIN USE: ICD-10-CM

## 2018-04-26 DIAGNOSIS — IMO0002 OSTEOARTHROSIS INVOLVING LOWER LEG: ICD-10-CM

## 2018-04-26 DIAGNOSIS — K21.00 GASTROESOPHAGEAL REFLUX DISEASE WITH ESOPHAGITIS: ICD-10-CM

## 2018-04-26 DIAGNOSIS — M25.532 LEFT WRIST PAIN: ICD-10-CM

## 2018-04-26 DIAGNOSIS — E78.00 PURE HYPERCHOLESTEROLEMIA: ICD-10-CM

## 2018-04-26 DIAGNOSIS — I70.0 ATHEROSCLEROSIS OF AORTA (HCC): ICD-10-CM

## 2018-04-26 DIAGNOSIS — Z00.00 INITIAL MEDICARE ANNUAL WELLNESS VISIT: Primary | ICD-10-CM

## 2018-04-26 DIAGNOSIS — Z12.31 VISIT FOR SCREENING MAMMOGRAM: ICD-10-CM

## 2018-04-26 DIAGNOSIS — Z78.0 POSTMENOPAUSAL: ICD-10-CM

## 2018-04-26 DIAGNOSIS — Z00.00 ENCOUNTER FOR ANNUAL HEALTH EXAMINATION: ICD-10-CM

## 2018-04-26 DIAGNOSIS — K29.90 GASTRITIS AND GASTRODUODENITIS: ICD-10-CM

## 2018-04-26 DIAGNOSIS — R80.9 PROTEINURIA, UNSPECIFIED TYPE: ICD-10-CM

## 2018-04-26 DIAGNOSIS — Z86.73 STATUS POST CVA: ICD-10-CM

## 2018-04-26 DIAGNOSIS — M25.561 RIGHT KNEE PAIN, UNSPECIFIED CHRONICITY: ICD-10-CM

## 2018-04-26 DIAGNOSIS — I20.1 PRINZMETAL ANGINA (HCC): ICD-10-CM

## 2018-04-26 DIAGNOSIS — G47.33 OBSTRUCTIVE SLEEP APNEA (ADULT) (PEDIATRIC): ICD-10-CM

## 2018-04-26 PROBLEM — I63.9 ACUTE CVA (CEREBROVASCULAR ACCIDENT) (HCC): Status: RESOLVED | Noted: 2017-04-11 | Resolved: 2018-04-26

## 2018-04-26 PROBLEM — Z91.81 AT RISK FOR FALLING: Status: RESOLVED | Noted: 2017-04-11 | Resolved: 2018-04-26

## 2018-04-26 PROCEDURE — 73110 X-RAY EXAM OF WRIST: CPT | Performed by: FAMILY MEDICINE

## 2018-04-26 PROCEDURE — 99214 OFFICE O/P EST MOD 30 MIN: CPT | Performed by: FAMILY MEDICINE

## 2018-04-26 PROCEDURE — G0402 INITIAL PREVENTIVE EXAM: HCPCS | Performed by: FAMILY MEDICINE

## 2018-04-26 PROCEDURE — 73560 X-RAY EXAM OF KNEE 1 OR 2: CPT | Performed by: FAMILY MEDICINE

## 2018-04-26 RX ORDER — LANCETS
EACH MISCELLANEOUS
Qty: 100 EACH | Refills: 0 | Status: SHIPPED | OUTPATIENT
Start: 2018-04-26 | End: 2018-07-22

## 2018-04-26 NOTE — PROGRESS NOTES
HPI:   Nina Avlaos is a 80year old female who presents for a Medicare Initial Annual Wellness visit (Once after 12 month Medicare anniversary) .       Annual Physical due on 04/26/2019        Fall/Risk Assessment   She has been screened for Falls and Physician (NEUROLOGY)  Britney Mckeon MD (Cardiovascular Diseases)    Patient Active Problem List:     Gastritis and gastroduodenitis     Obstructive sleep apnea (adult) (pediatric)     Esophageal reflux     Diabetes mellitus ([facility]     Hyperlipidemia MG Oral Tab Take 1 tablet (40 mg total) by mouth nightly. Clopidogrel Bisulfate 75 MG Oral Tab Take 1 tablet (75 mg total) by mouth daily.    Anytime DD MICROLET LANCETS Does not apply Misc Use once daily   ISOSORBIDE MONONITRATE  MG Oral Tablet 24 Hr TA shoulder surg proc unlisted; and colonoscopy (N/A, 9/14/2017). Her family history includes Breast Cancer in her paternal grandmother; Cancer in her father, paternal grandmother, and another family member; Diabetes in her father.    SOCIAL HISTORY:   She swelling L wrist     NT to palpation    R knee   NT   FROM     Pulses: 2+ and symmetric   Skin: Skin color, texture, turgor normal, no rashes    Lymph nodes: Cervical, supraclavicular nodes normal   Neurologic: Normal       Vaccination History     Khalif Wood (K21.0) Gastroesophageal reflux disease with esophagitis  Plan: wilfredo w meds       (E11.8) Type 2 diabetes mellitus with complication, unspecified whether long term insulin use ([facility]  Plan: last A1c was good     [facility] w meds      (E78.00) Pure hyperchole date08/24/2017       Colorectal Cancer Screening      Colonoscopy Screen every 10 years Colonoscopy,10 Years due on 09/14/2027 Update Health Maintenance if applicable    Flex Sigmoidoscopy Screen every 10 years No results found for this or any previous vis your prescription benefits, but Medicare does not cover unless Medically needed    Zoster  Not covered by Medicare Part B No vaccine history found This may be covered with your pharmacy  prescription benefits      [address] [facility] Internal [facility] o

## 2018-04-26 NOTE — PATIENT INSTRUCTIONS
Randall Aschoff Garber's SCREENING SCHEDULE   Tests on this list are recommended by your physician but may not be covered, or covered at this frequency, by your insurer. Please check with your insurance carrier before scheduling to verify coverage.    Anika Palma 65-75) IPPE only No results found for this or any previous visit.  Limited to patients who meet one of the following criteria:   • Men who are 73-68 years old and have smoked more than 100 cigarettes in their lifetime   • Anyone with a family history    Col needed after [address] due on 11/28/2017 Please get this Mammogram regularly   Immunizations      Influenza  Covered Annually   Orders placed or performed in visit on 10/24/16  -FLU VAC NO PRSV 4 STACEY 3 YRS+   Orders placed or performed in visit on forms available on it's website for anyone to review and print using their home computer and printer. (the forms are also available in [address])  [email or web address]  This link also has information from the [address] regarding Advance

## 2018-05-04 ENCOUNTER — OFFICE VISIT (OUTPATIENT)
Dept: NEUROLOGY | Facility: CLINIC | Age: 68
End: 2018-05-04

## 2018-05-04 DIAGNOSIS — G56.01 CARPAL TUNNEL SYNDROME ON RIGHT: Primary | ICD-10-CM

## 2018-05-04 PROCEDURE — 95911 NRV CNDJ TEST 9-10 STUDIES: CPT | Performed by: OTHER

## 2018-05-04 PROCEDURE — 95886 MUSC TEST DONE W/N TEST COMP: CPT | Performed by: OTHER

## 2018-05-04 RX ORDER — GABAPENTIN 100 MG/1
CAPSULE ORAL
Qty: 90 CAPSULE | Refills: 0 | Status: SHIPPED | OUTPATIENT
Start: 2018-05-04 | End: 2018-06-06

## 2018-05-09 ENCOUNTER — HOSPITAL ENCOUNTER (OUTPATIENT)
Dept: BONE DENSITY | Age: 68
Discharge: HOME | End: 2018-05-09
Attending: FAMILY MEDICINE
Payer: MEDICARE

## 2018-05-09 ENCOUNTER — HOSPITAL ENCOUNTER (OUTPATIENT)
Dept: MAMMOGRAPHY | Age: 68
Discharge: HOME | End: 2018-05-09
Attending: FAMILY MEDICINE
Payer: MEDICARE

## 2018-05-09 DIAGNOSIS — Z12.31 VISIT FOR SCREENING MAMMOGRAM: ICD-10-CM

## 2018-05-09 DIAGNOSIS — Z78.0 POSTMENOPAUSAL: ICD-10-CM

## 2018-05-09 PROCEDURE — 77063 BREAST TOMOSYNTHESIS BI: CPT | Performed by: FAMILY MEDICINE

## 2018-05-09 PROCEDURE — 77067 SCR MAMMO BI INCL CAD: CPT | Performed by: FAMILY MEDICINE

## 2018-05-09 PROCEDURE — 77080 DXA BONE DENSITY AXIAL: CPT | Performed by: FAMILY MEDICINE

## 2018-05-14 PROBLEM — M77.8 ENTHESOPATHY OF WRIST AND CARPUS: Status: ACTIVE | Noted: 2018-05-14

## 2018-05-15 RX ORDER — NIFEDIPINE 60 MG/1
TABLET, EXTENDED RELEASE ORAL
Qty: 90 TABLET | Refills: 0 | Status: SHIPPED | OUTPATIENT
Start: 2018-05-15 | End: 2018-05-17

## 2018-05-15 NOTE — TELEPHONE ENCOUNTER
Medication(s) to Refill:   Pending Prescriptions Disp Refills    NIFEDIPINE ER OSMOTIC RELEASE 60 MG Oral Tablet 24 Hr [Pharmacy Med Name: NIFEDIPINE 60MG ER (XL/OS) TABLETS] 90 tablet 0     Sig: TAKE 1 TABLET BY MOUTH EVERY DAY             Reason for [facility] FOR CANCER AND ALLIED DISEASES

## 2018-05-17 ENCOUNTER — OFFICE VISIT (OUTPATIENT)
Dept: INTERNAL MEDICINE CLINIC | Facility: CLINIC | Age: 68
End: 2018-05-17

## 2018-05-17 VITALS
WEIGHT: 165 LBS | BODY MASS INDEX: 29.23 KG/M2 | RESPIRATION RATE: 14 BRPM | DIASTOLIC BLOOD PRESSURE: 70 MMHG | SYSTOLIC BLOOD PRESSURE: 130 MMHG | HEIGHT: 63 IN | HEART RATE: 62 BPM | OXYGEN SATURATION: 99 % | TEMPERATURE: 98 F

## 2018-05-17 DIAGNOSIS — H26.9 CATARACT OF BOTH EYES, UNSPECIFIED CATARACT TYPE: ICD-10-CM

## 2018-05-17 DIAGNOSIS — Z01.818 PREOP EXAMINATION: Primary | ICD-10-CM

## 2018-05-17 PROCEDURE — 99214 OFFICE O/P EST MOD 30 MIN: CPT | Performed by: FAMILY MEDICINE

## 2018-05-17 RX ORDER — OFLOXACIN 3 MG/ML
SOLUTION/ DROPS OPHTHALMIC
Refills: 1 | COMMUNITY
Start: 2018-05-14 | End: 2018-08-30 | Stop reason: ALTCHOICE

## 2018-05-17 NOTE — PROGRESS NOTES
HPI:    Patient ID: Ivan Miguel is a 80year old female. HPI  Ivan Miguel is a 80year old female.   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   TEST ONCE DAILY Disp: 100 each Rfl: 5   RANEXA 500 MG Oral Tablet 12 H Comment: Spinal diskectomy lumbar  1987:   No date: CARDIAC CATHERTERIZATION (PBP)      Comment: status normal resolved on 1999: CHOLECYSTECTOMY  2011: COLONOSCOPY      Comment: colon polyp removal  francesca cee 5  No da Prescriptions:  ofloxacin 0.3 % Ophthalmic Solution INT 1 GTT INTO THE OD QID Disp:  Rfl: 1   gabapentin 100 MG Oral Cap Start at 100 mg nightly x1 week, increase to 200 mg x1 week, then increase to 300 mg nightly Disp: 90 capsule Rfl: 0   MICROLET LANCETS as needed.) Disp: 45 g Rfl: 0   Multiple Vitamin (MULTI-VITAMIN) Oral Tab Take 1 Tab by mouth daily. Disp:  Rfl:    PEG 3350 Oral Powd Pack Take 17 g by mouth nightly.    Disp:  Rfl:      Allergies:  Penicillins             RASH, SWELLING  Advil [Ibuprofen]

## 2018-06-04 RX ORDER — OMEPRAZOLE 20 MG/1
CAPSULE, DELAYED RELEASE ORAL
Qty: 180 CAPSULE | Refills: 0 | Status: SHIPPED | OUTPATIENT
Start: 2018-06-04 | End: 2018-06-21

## 2018-06-06 DIAGNOSIS — G56.01 CARPAL TUNNEL SYNDROME ON RIGHT: ICD-10-CM

## 2018-06-06 RX ORDER — GABAPENTIN 100 MG/1
CAPSULE ORAL
Qty: 90 CAPSULE | Refills: 2 | Status: SHIPPED | OUTPATIENT
Start: 2018-06-06 | End: 2018-08-24 | Stop reason: ALTCHOICE

## 2018-06-06 NOTE — TELEPHONE ENCOUNTER
Medication: Gabapentin     Date of last refill: 05/04/18  [place]     Last office visit: 5/4/2018  Due back to clinic per last office note: RTN in 3 months  Date next office visit scheduled:  Future Appointments  Date Ti

## 2018-06-18 NOTE — TELEPHONE ENCOUNTER
Medication(s) to Refill:   Pending Prescriptions Disp Refills    METFORMIN  MG Oral Tab [Pharmacy Med Name: METFORMIN 500MG TABLETS] 180 tablet 0     Sig: TAKE 1 TABLET(500 MG) BY MOUTH TWICE DAILY WITH MEALS         Did not pass protocol due to   M

## 2018-06-21 ENCOUNTER — TELEPHONE (OUTPATIENT)
Dept: INTERNAL MEDICINE CLINIC | Facility: CLINIC | Age: 68
End: 2018-06-21

## 2018-06-21 RX ORDER — OMEPRAZOLE 40 MG/1
40 CAPSULE, DELAYED RELEASE ORAL DAILY
Qty: 90 CAPSULE | Refills: 0 | Status: SHIPPED | OUTPATIENT
Start: 2018-06-21 | End: 2018-07-02

## 2018-06-26 RX ORDER — LISINOPRIL AND HYDROCHLOROTHIAZIDE 25; 20 MG/1; MG/1
TABLET ORAL
Qty: 90 TABLET | Refills: 0 | Status: SHIPPED | OUTPATIENT
Start: 2018-06-26 | End: 2018-09-20

## 2018-06-26 NOTE — TELEPHONE ENCOUNTER
Medication(s) to Refill:   Pending Prescriptions Disp Refills    LISINOPRIL-HYDROCHLOROTHIAZIDE 20-25 MG Oral Tab [Pharmacy Med Name: LISINOPRIL-HCTZ 20/25MG TABLETS] 90 tablet 0     Sig: TAKE 1 TABLET BY MOUTH EVERY DAY       Passed protocol   Last Time Medication was Filled: 3/30/18 90 0R  Last Office Visit with PCP: 4/26/18   When Patient was Due Back to the Office: not on file   (from when PCP last addressed condition)    Last Blood Pressures:  BP Readings from Last 2 Encounters:  05/17/18 : 130/70  04/26/18 : 100/70    Recent Labs:    Lab Results  Component Value Date    (H) 04/19/2018   BUN 13 04/19/2018   CREATSERUM 1.08 (H) 04/19/2018   GFR 70 08/24/2017   GFRNAA 53 (L) 04/19/2018   GFRAA 61 04/19/2018   CA 9.7 04/19/2018   ALKPHO 116 04/19/2018   AST 16 04/19/2018   ALT 27 04/19/2018   BILT 0.5 04/19/2018   TP 7.1 04/19/2018   ALB 3.7 04/19/2018   GLOBULT 2.8 01/18/2016   ALBGLOBRAT 1.5 01/18/2016    04/19/2018   K 4.2 04/19/2018    04/19/2018   CO2 28.0 04/19/2018

## 2018-07-02 ENCOUNTER — TELEPHONE (OUTPATIENT)
Dept: INTERNAL MEDICINE CLINIC | Facility: CLINIC | Age: 68
End: 2018-07-02

## 2018-07-02 RX ORDER — OMEPRAZOLE 40 MG/1
CAPSULE, DELAYED RELEASE ORAL
Qty: 90 CAPSULE | Refills: 0 | COMMUNITY
Start: 2018-07-02 | End: 2018-12-14

## 2018-07-02 RX ORDER — METOPROLOL TARTRATE 50 MG/1
TABLET, FILM COATED ORAL
Qty: 180 TABLET | Refills: 0 | Status: SHIPPED | OUTPATIENT
Start: 2018-07-02 | End: 2018-09-26

## 2018-07-02 NOTE — TELEPHONE ENCOUNTER
Patient called requesting to speak with the nurse. Stated that she recently picked up her prescription for Omeprazole and was confused as to why she was prescribed 40 MG, when she previously took only 20 MG.  Please call to further discuss

## 2018-07-02 NOTE — TELEPHONE ENCOUNTER
Protocol Passed    Medication(s) to Refill:   Pending Prescriptions Disp Refills    METOPROLOL TARTRATE 50 MG Oral Tab [Pharmacy Med Name: METOPROLOL TARTRATE 50MG TABLETS] 180 tablet 0     Sig: TAKE 1 TABLET BY MOUTH TWICE DAILY           Last Time Medica

## 2018-07-02 NOTE — TELEPHONE ENCOUNTER
Pt is taking omeprazole 20 mg on Sun-Mon-Wed-Fri and only second dose if needed ( Rare) Was not correct on med list Dr Andrea Sandoval decreased many years ago Just picked up new rx and it is Omeprazole 40 mg one daily Can she take this dose 4 x a week?

## 2018-07-05 RX ORDER — ISOSORBIDE MONONITRATE 120 MG/1
120 TABLET, EXTENDED RELEASE ORAL DAILY
Qty: 90 TABLET | Refills: 1 | Status: SHIPPED | OUTPATIENT
Start: 2018-07-05 | End: 2018-08-30

## 2018-07-05 NOTE — TELEPHONE ENCOUNTER
Refill requested: Isosorbide 120 mg     Failed protocol      Last refill: 10/18/17 Isosorbide  mg   Relevant Labs: CMP 4/19/18   BP Readings from Last 3 Encounters:  05/17/18 : 130/70  04/26/18 : 100/70  04/18/18 : 120/70      Last OV / RTC advised:

## 2018-07-09 DIAGNOSIS — Z86.73 HISTORY OF STROKE WITHIN LAST YEAR: ICD-10-CM

## 2018-07-09 RX ORDER — ATORVASTATIN CALCIUM 40 MG/1
40 TABLET, FILM COATED ORAL NIGHTLY
Qty: 90 TABLET | Refills: 1 | Status: SHIPPED | OUTPATIENT
Start: 2018-07-09 | End: 2019-01-02

## 2018-07-09 NOTE — TELEPHONE ENCOUNTER
Medication: atorvastatin    Date of last refill: 1/18/18  [place]    Last office visit: 4/18/18  Due back to clinic per last office note:  After testing  Date next office visit scheduled:  Future Appointments  Date Ti

## 2018-07-23 RX ORDER — LANCETS
EACH MISCELLANEOUS
Qty: 100 EACH | Refills: 0 | Status: SHIPPED | OUTPATIENT
Start: 2018-07-23 | End: 2018-10-20

## 2018-08-08 RX ORDER — NIFEDIPINE 60 MG/1
TABLET, EXTENDED RELEASE ORAL
Qty: 90 TABLET | Refills: 0 | Status: SHIPPED | OUTPATIENT
Start: 2018-08-08 | End: 2018-08-24 | Stop reason: ALTCHOICE

## 2018-08-08 NOTE — TELEPHONE ENCOUNTER
Requesting NIFEDIPINE ER OSMOTIC RELEASE 60 MG Oral Tablet 24 Hr  LOV: 05/17/2018  RTC: GABRIEL  Last Relevant Labs: 04/19/2018  Filled: 08/29/2017 #90 with 0 refills

## 2018-08-10 ENCOUNTER — TELEPHONE (OUTPATIENT)
Dept: NEUROLOGY | Facility: CLINIC | Age: 68
End: 2018-08-10

## 2018-08-10 DIAGNOSIS — M77.8 ENTHESOPATHY OF WRIST AND CARPUS: Primary | ICD-10-CM

## 2018-08-10 RX ORDER — GABAPENTIN 300 MG/1
300 CAPSULE ORAL 2 TIMES DAILY
Qty: 60 CAPSULE | Refills: 0 | Status: SHIPPED | OUTPATIENT
Start: 2018-08-10 | End: 2018-08-30

## 2018-08-10 NOTE — TELEPHONE ENCOUNTER
Taking 300 mg nightly; continues to wake up with right hand numbness/tingling/burning. Doesn't feel that the medication is helping with her CTS symptoms.     She is due for a refill; wonders if she should change dosage or hold medication until her next [facility]

## 2018-08-10 NOTE — TELEPHONE ENCOUNTER
D/W Dr. Jose Martin Woody, increase gabapentin to 300 mg BID. Hussain Benito informed, verbalized understanding. Requesting new script to pharmacy. Script sent as requested.

## 2018-08-16 DIAGNOSIS — Z86.73 HISTORY OF STROKE WITHIN LAST YEAR: ICD-10-CM

## 2018-08-16 RX ORDER — CLOPIDOGREL BISULFATE 75 MG/1
75 TABLET ORAL DAILY
Qty: 90 TABLET | Refills: 1 | Status: SHIPPED | OUTPATIENT
Start: 2018-08-16 | End: 2019-02-04

## 2018-08-16 NOTE — TELEPHONE ENCOUNTER
Medication: Clopidogrel 75 mg    Date of last refill:01/18/18 with 1 addt refill # 90  [place]     Last office visit: 5/4/2018  Due back to clinic per last office note:  RTN in 3 months  Date next office visit scheduled:

## 2018-08-21 ENCOUNTER — TELEPHONE (OUTPATIENT)
Dept: INTERNAL MEDICINE CLINIC | Facility: CLINIC | Age: 68
End: 2018-08-21

## 2018-08-21 NOTE — TELEPHONE ENCOUNTER
Patient called complaining of R shoulder pain. Requesting to see Dr. Tricia Nicholas only as soon as possible.  Please advise

## 2018-08-21 NOTE — TELEPHONE ENCOUNTER
Spoke to patient, offer appointment 8/22/18 but patient declined (babysitting) but accepted appointment for Friday 8/24/18

## 2018-08-24 ENCOUNTER — OFFICE VISIT (OUTPATIENT)
Dept: INTERNAL MEDICINE CLINIC | Facility: CLINIC | Age: 68
End: 2018-08-24
Payer: MEDICARE

## 2018-08-24 VITALS
RESPIRATION RATE: 16 BRPM | BODY MASS INDEX: 29 KG/M2 | HEART RATE: 63 BPM | SYSTOLIC BLOOD PRESSURE: 104 MMHG | TEMPERATURE: 98 F | DIASTOLIC BLOOD PRESSURE: 58 MMHG | OXYGEN SATURATION: 98 % | WEIGHT: 163 LBS

## 2018-08-24 DIAGNOSIS — S46.912A STRAIN OF LEFT SHOULDER, INITIAL ENCOUNTER: Primary | ICD-10-CM

## 2018-08-24 PROCEDURE — 99213 OFFICE O/P EST LOW 20 MIN: CPT | Performed by: FAMILY MEDICINE

## 2018-08-24 RX ORDER — PREDNISOLONE ACETATE 10 MG/ML
SUSPENSION/ DROPS OPHTHALMIC
Refills: 1 | COMMUNITY
Start: 2018-07-10 | End: 2018-08-30

## 2018-08-24 RX ORDER — CYCLOBENZAPRINE HCL 10 MG
10 TABLET ORAL 3 TIMES DAILY
Qty: 30 TABLET | Refills: 1 | Status: SHIPPED | OUTPATIENT
Start: 2018-08-24 | End: 2018-10-22

## 2018-08-24 NOTE — PROGRESS NOTES
HPI:    Patient ID: Lyric Man is a 80year old female.     HPI  Was getting off her boat 2 weeks ago , hanging on a pole and had a fall , but was holding onto the pole   Did not hurt her shoulder aware of   Later that night noted soreness on the L s RANEXA 500 MG Oral Tablet 12 Hr TAKE 1 TABLET BY MOUTH EVERY DAY Disp: 90 tablet Rfl: 0   NIFEDIPINE ER OSMOTIC RELEASE 60 MG Oral Tablet 24 Hr TAKE 1 TABLET BY MOUTH EVERY DAY Disp: 90 tablet Rfl: 0   acetaminophen 500 MG Oral Tab Take 500 mg by mouth e Imaging & Referrals:  None       WP#[id number]

## 2018-08-27 ENCOUNTER — TELEPHONE (OUTPATIENT)
Dept: INTERNAL MEDICINE CLINIC | Facility: CLINIC | Age: 68
End: 2018-08-27

## 2018-08-30 ENCOUNTER — OFFICE VISIT (OUTPATIENT)
Dept: NEUROLOGY | Facility: CLINIC | Age: 68
End: 2018-08-30
Payer: MEDICARE

## 2018-08-30 VITALS
RESPIRATION RATE: 18 BRPM | HEART RATE: 67 BPM | DIASTOLIC BLOOD PRESSURE: 63 MMHG | HEIGHT: 63 IN | BODY MASS INDEX: 30.12 KG/M2 | WEIGHT: 170 LBS | SYSTOLIC BLOOD PRESSURE: 110 MMHG

## 2018-08-30 DIAGNOSIS — Z86.73 HISTORY OF STROKE WITHIN LAST YEAR: ICD-10-CM

## 2018-08-30 DIAGNOSIS — G56.01 CARPAL TUNNEL SYNDROME ON RIGHT: Primary | ICD-10-CM

## 2018-08-30 PROCEDURE — 99213 OFFICE O/P EST LOW 20 MIN: CPT | Performed by: OTHER

## 2018-08-30 RX ORDER — GABAPENTIN 300 MG/1
300 CAPSULE ORAL 2 TIMES DAILY
Qty: 60 CAPSULE | Refills: 0 | Status: SHIPPED | OUTPATIENT
Start: 2018-08-30 | End: 2018-10-01

## 2018-08-30 NOTE — PROGRESS NOTES
[facility] Progress Note    HPI  Patient presents with:  Tingling: gabapentin med follow up- feels med it too much  Numbness: Same as above    In summary of prior visits:   \"     Patient presents in follow-up after recently discharged • Chest pain, unspecified 12/05    admit 12/05   • Deep vein thrombosis ([address]     during pregnancy right leg   • Diabetes [facility])    • Diverticulosis of large intestine     unsure if divertiulitis or diverticulosis   • Esophageal reflux    • Essential hypert Smokeless tobacco: Never Used                        Alcohol use:  Yes                Comment: very rarely    Drug use: No            Other Topics            Concern  Caffeine Concern        Yes    Comment:1-2 tea daily  Exercise                Yes    Com •  METFORMIN  MG Oral Tab, TAKE 1 TABLET(500 MG) BY MOUTH TWICE DAILY WITH MEALS (Patient taking differently: TAKE 1 TABLET(500 MG) BY MOUTH DAILY WITH MEALS), Disp: 180 tablet, Rfl: 0  •  cetirizine 10 MG Oral Tab, Take 10 mg by mouth daily. , Disp: Speech fluent and conversational     CN: PERRL, EOMI without nystagmus, VFF, smile symmetric, sensation intact, tongue and palate midline, SCM intact; otherwise, 2-12 intact  Motor: 5/5 strength throughout, tone normal - no drift   Sensory: intact to light             Median Palm - Ulnar Palm   1.15         Motor NCS      Nerve / Sites Muscle Latency Amplitude Amp % Duration Area Segments Distance Lat Diff Velocity       ms mV % ms mVms   cm ms m/s   R Median - APB      Wrist APB 4.43 4.2 100 7.55 13.4 Wrist R. Gastrocnemius (Medial head) Tibial S1-S2 N None None None None N N N N   R. Extensor hallucis longus Deep peroneal (Fibular) L5-S1 N None None None None 1+ 1+ N Reduced               Summary:     1.  Right sural sensory response was borderline due to bor This study is abnormal.  There is electrophysiologic evidence consistent with a Right median nerve entrapment across the wrist, with demyelinating features.   In addition, this is suggestive of but not diagnostic for a mild, chronic R lower lumbar radiculop    parameters are consistent with abnormal left ventricular relaxation -     grade 1 diastolic dysfunction. 2. Mitral valve: Systolic bowing without prolapse. There was trivial to mild     regurgitation.   3. Left atrium: The left atrial volume was mildly Otherwise, for secondary stroke prevention,  continue Plavix 75 mg daily and Lipitor 40 mg daily (could not tolerate ASA).        (G56.01) Carpal tunnel syndrome on right  (primary encounter diagnosis)  Plan: gabapentin 300 MG Oral Cap        As noted ab

## 2018-09-17 ENCOUNTER — TELEPHONE (OUTPATIENT)
Dept: INTERNAL MEDICINE CLINIC | Facility: CLINIC | Age: 68
End: 2018-09-17

## 2018-09-17 DIAGNOSIS — M25.512 LEFT SHOULDER PAIN, UNSPECIFIED CHRONICITY: Primary | ICD-10-CM

## 2018-09-17 RX ORDER — OMEPRAZOLE 40 MG/1
CAPSULE, DELAYED RELEASE ORAL
Qty: 90 CAPSULE | Refills: 0 | Status: SHIPPED | OUTPATIENT
Start: 2018-09-17 | End: 2018-10-15 | Stop reason: ALTCHOICE

## 2018-09-17 NOTE — TELEPHONE ENCOUNTER
Patient called requesting to speak with the nurse. Stated that she was prescribed cyclobenzaprine for her shoulder pain at last OV on 8/24/18.  Patient is also taking Gabapentin 300 MG and lately has been feeling \"out of it\"  Please advise

## 2018-09-17 NOTE — TELEPHONE ENCOUNTER
Taking gabapentin 300 mg BID along with flexeril every night sometimes during the day once she is home for the day. Still waking in middle of night with shoulder pain/collar bone pain, Asking if testing should be done ?  Medicine not really helping and arcelia

## 2018-09-18 ENCOUNTER — HOSPITAL ENCOUNTER (OUTPATIENT)
Dept: GENERAL RADIOLOGY | Age: 68
Discharge: HOME | End: 2018-09-18
Attending: FAMILY MEDICINE
Payer: MEDICARE

## 2018-09-18 DIAGNOSIS — M25.512 LEFT SHOULDER PAIN, UNSPECIFIED CHRONICITY: ICD-10-CM

## 2018-09-18 PROCEDURE — 73030 X-RAY EXAM OF SHOULDER: CPT | Performed by: FAMILY MEDICINE

## 2018-09-20 RX ORDER — LISINOPRIL AND HYDROCHLOROTHIAZIDE 25; 20 MG/1; MG/1
TABLET ORAL
Qty: 90 TABLET | Refills: 0 | Status: SHIPPED | OUTPATIENT
Start: 2018-09-20 | End: 2018-12-23

## 2018-09-27 RX ORDER — METOPROLOL TARTRATE 50 MG/1
TABLET, FILM COATED ORAL
Qty: 180 TABLET | Refills: 0 | Status: SHIPPED | OUTPATIENT
Start: 2018-09-27 | End: 2018-12-23

## 2018-10-01 DIAGNOSIS — G56.01 CARPAL TUNNEL SYNDROME ON RIGHT: ICD-10-CM

## 2018-10-01 RX ORDER — GABAPENTIN 300 MG/1
300 CAPSULE ORAL 2 TIMES DAILY
Qty: 60 CAPSULE | Refills: 2 | Status: SHIPPED | OUTPATIENT
Start: 2018-10-01 | End: 2019-02-07

## 2018-10-02 RX ORDER — ISOSORBIDE MONONITRATE 120 MG/1
TABLET, EXTENDED RELEASE ORAL
Qty: 90 TABLET | Refills: 0 | Status: SHIPPED | OUTPATIENT
Start: 2018-10-02 | End: 2019-05-13

## 2018-10-13 ENCOUNTER — TELEPHONE (OUTPATIENT)
Dept: FAMILY MEDICINE CLINIC | Facility: CLINIC | Age: 68
End: 2018-10-13

## 2018-10-13 NOTE — TELEPHONE ENCOUNTER
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  If she blows her nose she has drainage, and coughs while laying down I told her that she can use coric

## 2018-10-15 ENCOUNTER — OFFICE VISIT (OUTPATIENT)
Dept: INTERNAL MEDICINE CLINIC | Facility: CLINIC | Age: 68
End: 2018-10-15
Payer: MEDICARE

## 2018-10-15 ENCOUNTER — TELEPHONE (OUTPATIENT)
Dept: INTERNAL MEDICINE CLINIC | Facility: CLINIC | Age: 68
End: 2018-10-15

## 2018-10-15 VITALS
WEIGHT: 170.75 LBS | HEART RATE: 75 BPM | DIASTOLIC BLOOD PRESSURE: 76 MMHG | OXYGEN SATURATION: 98 % | TEMPERATURE: 98 F | RESPIRATION RATE: 16 BRPM | BODY MASS INDEX: 30 KG/M2 | SYSTOLIC BLOOD PRESSURE: 114 MMHG

## 2018-10-15 DIAGNOSIS — B96.89 ACUTE BACTERIAL SINUSITIS: Primary | ICD-10-CM

## 2018-10-15 DIAGNOSIS — J01.90 ACUTE BACTERIAL SINUSITIS: Primary | ICD-10-CM

## 2018-10-15 PROCEDURE — 99213 OFFICE O/P EST LOW 20 MIN: CPT | Performed by: FAMILY MEDICINE

## 2018-10-15 RX ORDER — AZITHROMYCIN 250 MG/1
TABLET, FILM COATED ORAL
Qty: 6 TABLET | Refills: 0 | Status: SHIPPED | OUTPATIENT
Start: 2018-10-15 | End: 2018-11-14 | Stop reason: ALTCHOICE

## 2018-10-15 NOTE — PROGRESS NOTES
HPI:    Patient ID: Maximo Ward is a 80year old female. HPI     HPI:   Maximo Ward is a 80year old female who presents for upper respiratory symptoms for  10  days.  Patient reports sore throat only at the beginning of sx's, congestion, low Oral Powd Pack Take 17 g by mouth nightly. Disp:  Rfl:    NITROSTAT 0.4 MG Sublingual SL Tab Place 0.4 mg under the tongue every 5 (five) minutes as needed.    Disp:  Rfl: 5   Mometasone Furoate 0.1 % Apply Externally Cream Apply 1 Application topically d 01/01/981    d/t fibroids   • SHOULDER SURG PROC UNLISTED     • SPINE SURGERY PROCEDURE UNLISTED      bulging discs in lower back   • UPPER GI ENDOSCOPY PERFORMED  01/24/2011      Family History   Problem Relation Age of Onset   • Cancer Father         dec Clopidogrel Bisulfate 75 MG Oral Tab Take 1 tablet (75 mg total) by mouth daily.  Disp: 90 tablet Rfl: 1   MICROLET LANCETS Does not apply Misc TEST ONCE DAILY AS DIRECTED Disp: 100 each Rfl: 0   atorvastatin 40 MG Oral Tab Take 1 tablet (40 mg total) by faint  Norvasc [Amlodipine*    NAUSEA AND VOMITING    Comment:tabs  Nsaids                  NAUSEA ONLY, DIZZINESS  Vicodin [Hydrocodon*    NAUSEA ONLY, DIZZINESS    Comment:Tabs  Zetia [Ezetimibe]       NAUSEA AND VOMITING    Comment:Tabs,Muscle spasm   P

## 2018-10-15 NOTE — TELEPHONE ENCOUNTER
Patient called and stated that she spoke with the on call doctor over the weekend for her cough. She would like to speak with Dr. Main Camacho nurse. Please advise.

## 2018-10-17 ENCOUNTER — PRIOR ORIGINAL RECORDS (OUTPATIENT)
Dept: OTHER | Age: 68
End: 2018-10-17

## 2018-10-17 ENCOUNTER — MYAURORA ACCOUNT LINK (OUTPATIENT)
Dept: OTHER | Age: 68
End: 2018-10-17

## 2018-10-22 ENCOUNTER — TELEPHONE (OUTPATIENT)
Dept: INTERNAL MEDICINE CLINIC | Facility: CLINIC | Age: 68
End: 2018-10-22

## 2018-10-22 RX ORDER — LANCETS
EACH MISCELLANEOUS
Qty: 100 EACH | Refills: 3 | Status: SHIPPED | OUTPATIENT
Start: 2018-10-22 | End: 2020-08-14

## 2018-10-22 RX ORDER — CYCLOBENZAPRINE HCL 10 MG
TABLET ORAL
Qty: 50 TABLET | Refills: 0 | Status: SHIPPED | OUTPATIENT
Start: 2018-10-22 | End: 2018-12-12

## 2018-10-22 NOTE — TELEPHONE ENCOUNTER
Patient would like to get clarification on the dose of her prescription for Cyclobenzaprine 10mg. She stated that she was sick and Dr. Aaron Copeland told her to cut the pills in half. She wants to know if he wants her to continue that. Please advise.

## 2018-10-22 NOTE — TELEPHONE ENCOUNTER
Pt is requesting a refill of the flexeril 10 mg tabs she is currently out for L shoulder pain .  Will take 1/2 tab bid during the day and full tab at night send to [facility] on [address]

## 2018-11-05 RX ORDER — NIFEDIPINE 60 MG/1
TABLET, EXTENDED RELEASE ORAL
Qty: 90 TABLET | Refills: 0 | Status: SHIPPED | OUTPATIENT
Start: 2018-11-05 | End: 2018-11-14

## 2018-11-05 NOTE — TELEPHONE ENCOUNTER
Refill requested:   Requested Prescriptions     Pending Prescriptions Disp Refills   • NIFEDIPINE ER OSMOTIC RELEASE 60 MG Oral Tablet 24 Hr [Pharmacy Med Name: NIFEDIPINE 60MG ER (XL/OS) TABLETS] 90 tablet 0     Sig: TAKE 1 TABLET BY MOUTH EVERY DAY     P

## 2018-11-14 ENCOUNTER — OFFICE VISIT (OUTPATIENT)
Dept: NEUROLOGY | Facility: CLINIC | Age: 68
End: 2018-11-14
Payer: MEDICARE

## 2018-11-14 VITALS
SYSTOLIC BLOOD PRESSURE: 126 MMHG | WEIGHT: 170 LBS | HEART RATE: 61 BPM | BODY MASS INDEX: 30.12 KG/M2 | DIASTOLIC BLOOD PRESSURE: 67 MMHG | RESPIRATION RATE: 16 BRPM | HEIGHT: 63 IN

## 2018-11-14 DIAGNOSIS — G56.01 CARPAL TUNNEL SYNDROME ON RIGHT: Primary | ICD-10-CM

## 2018-11-14 DIAGNOSIS — Z86.73 HISTORY OF STROKE WITHOUT RESIDUAL DEFICITS: ICD-10-CM

## 2018-11-14 PROCEDURE — 99213 OFFICE O/P EST LOW 20 MIN: CPT | Performed by: OTHER

## 2018-11-14 RX ORDER — MOMETASONE FUROATE 1 MG/G
OINTMENT TOPICAL AS NEEDED
COMMUNITY

## 2018-11-14 NOTE — PROGRESS NOTES
[facility] Progress Note    HPI  Patient presents with:  Carpal Tunnel Syndrome: Follow up    In summary of prior visits:   \"     Patient presents in follow-up after recently discharged 4/12/17, following admission for acute R sided pa • Chest pain, unspecified 12/05    admit 12/05   • Deep vein thrombosis ([address]     during pregnancy right leg   • Diabetes [facility])    • Diverticulosis of large intestine     unsure if divertiulitis or diverticulosis   • Esophageal reflux    • Essential hypert Transportation needs - medical: Not on file      Transportation needs - non-medical: Not on file    Occupational History      Not on file    Tobacco Use      Smoking status: Never Smoker      Smokeless tobacco: Never Used    Substance and Sexual Activi •  MICROLET LANCETS Does not apply Misc, TEST ONCE DAILY AS DIRECTED, Disp: 100 each, Rfl: 3  •  Cyclobenzaprine HCl 10 MG Oral Tab, Take half tablet 5 mg during the day BID PRN and one tablet 10 mg at HS PRN, Disp: 50 tablet, Rfl: 0  •  ISOSORBIDE MONONIT Estimated body mass index is 30.11 kg/m² as calculated from the following:    Height as of this encounter: 63\". Weight as of this encounter: 170 lb.     Gen: well developed, well nourished, no acute distress  HEENT: normocephalic  Heart; normal F1/N6, r Wrist Dig V 2.14 2.92 33.5 43.7 Wrist - Dig V 11   52      B. Elbow Dig V 2.08 2.86 33.4 45.6 B. Elbow - Wrist   -0.05     R Radial - Anatomical snuff box (Forearm)      Forearm Wrist 1.77 2.34 31.5 49.2 Forearm - Wrist 10   56   R Sural - Ankle (Calf) R. Deltoid Axillary C5-C6 N None None None None N N N N   R. Biceps brachii Musculocutaneous C5-C6 N None None None None N N N N   R. Triceps brachii Radial C6-C8 N None None None None N N N N   R.  Extensor digitorum communis Radial C7-C8 N None None None Concentric needle EMG was performed on the selected muscles listed above in the table, with the following findings:   1. No increased insertional or spontaneous activity was noted in any of the muscles  2.  Motor unit potentials demonstrated chronic denerva #2. Patent carotid and vertebral basilar systems. Mild carotid bifurcation atherosclerosis. #3. Unremarkable [place] of [place] MRA. Echo:   Conclusions:    1. Left ventricle:  The cavity size was normal. Wall thickness was normal.     Systolic function w Patient previously endorsed R sided paresthesias, MRI gilberto was done with no acute infarct and EMG showed R median nerve entrapment and still with tingling in R hand -     Patient continues to feel drowsy on gabapentin during the day, despite adjustment of

## 2018-12-10 NOTE — TELEPHONE ENCOUNTER
Called pt. To notify medication refill on metformin was declined. Scheduled pt on Wednesday 12/12/18 @1pm so doc. Can order labs (A1c and microalbumin).    Notified patient that since she is not getting a lipid panel she does not need to fast (pt asked if

## 2018-12-12 ENCOUNTER — APPOINTMENT (OUTPATIENT)
Dept: LAB | Age: 68
End: 2018-12-12
Attending: FAMILY MEDICINE
Payer: MEDICARE

## 2018-12-12 ENCOUNTER — OFFICE VISIT (OUTPATIENT)
Dept: INTERNAL MEDICINE CLINIC | Facility: CLINIC | Age: 68
End: 2018-12-12
Payer: MEDICARE

## 2018-12-12 ENCOUNTER — TELEPHONE (OUTPATIENT)
Dept: INTERNAL MEDICINE CLINIC | Facility: CLINIC | Age: 68
End: 2018-12-12

## 2018-12-12 VITALS — SYSTOLIC BLOOD PRESSURE: 138 MMHG | OXYGEN SATURATION: 98 % | HEART RATE: 70 BPM | DIASTOLIC BLOOD PRESSURE: 86 MMHG

## 2018-12-12 DIAGNOSIS — R51.9 HEADACHE DISORDER: ICD-10-CM

## 2018-12-12 DIAGNOSIS — I10 ESSENTIAL HYPERTENSION, BENIGN: Primary | ICD-10-CM

## 2018-12-12 DIAGNOSIS — E11.8 TYPE 2 DIABETES MELLITUS WITH COMPLICATION, UNSPECIFIED WHETHER LONG TERM INSULIN USE: ICD-10-CM

## 2018-12-12 DIAGNOSIS — M77.8 ENTHESOPATHY OF WRIST AND CARPUS: ICD-10-CM

## 2018-12-12 PROCEDURE — 83036 HEMOGLOBIN GLYCOSYLATED A1C: CPT

## 2018-12-12 PROCEDURE — 36415 COLL VENOUS BLD VENIPUNCTURE: CPT

## 2018-12-12 PROCEDURE — 82043 UR ALBUMIN QUANTITATIVE: CPT

## 2018-12-12 PROCEDURE — 82570 ASSAY OF URINE CREATININE: CPT

## 2018-12-12 PROCEDURE — 99214 OFFICE O/P EST MOD 30 MIN: CPT | Performed by: FAMILY MEDICINE

## 2018-12-12 RX ORDER — OMEPRAZOLE 20 MG/1
CAPSULE, DELAYED RELEASE ORAL
Qty: 180 CAPSULE | Refills: 0 | Status: SHIPPED | OUTPATIENT
Start: 2018-12-12 | End: 2018-12-12

## 2018-12-12 NOTE — PROGRESS NOTES
HPI:    Patient ID: Ewa Henry is a 80year old female.     HPI  Has been getting R parietal LINDER lately   Off on  Has also had some stress w the holidays     No HA now     Throbbing   Feels like when she had her stroke in hte past   Took tylenol and d Powd Pack Take 17 g by mouth nightly. Disp:  Rfl:    NITROSTAT 0.4 MG Sublingual SL Tab Place 0.4 mg under the tongue every 5 (five) minutes as needed. Disp:  Rfl: 5   Multiple Vitamin (MULTI-VITAMIN) Oral Tab Take 1 Tab by mouth daily.  Disp:  Rfl: and carpus  Plan: states wants to use gabapentin only hs, not BID    Does not feel well that way          Essential hypertension, benign  (primary encounter diagnosis)  Headache disorder  Type 2 diabetes mellitus with complication, unspecified whether long

## 2018-12-12 NOTE — TELEPHONE ENCOUNTER
Due to recent mumps outbreak and pt being born before 1956     Does pt need MMR booster ? Or titers to determine ?        Call pt to advise

## 2018-12-13 RX ORDER — OMEPRAZOLE 20 MG/1
CAPSULE, DELAYED RELEASE ORAL
Qty: 180 CAPSULE | Refills: 0 | Status: SHIPPED | OUTPATIENT
Start: 2018-12-13 | End: 2018-12-14 | Stop reason: DRUGHIGH

## 2018-12-13 RX ORDER — OMEPRAZOLE 40 MG/1
CAPSULE, DELAYED RELEASE ORAL
Qty: 90 CAPSULE | Refills: 0 | OUTPATIENT
Start: 2018-12-13

## 2018-12-13 NOTE — TELEPHONE ENCOUNTER
Metformin dosage changed as of 12/13/18 labs results    Omeprazole last filled 7/2/18    LOV 12/12/18

## 2018-12-14 DIAGNOSIS — E11.8 TYPE 2 DIABETES MELLITUS WITH COMPLICATION, UNSPECIFIED WHETHER LONG TERM INSULIN USE: Primary | ICD-10-CM

## 2018-12-14 RX ORDER — OMEPRAZOLE 40 MG/1
CAPSULE, DELAYED RELEASE ORAL
Qty: 90 CAPSULE | Refills: 0 | COMMUNITY
Start: 2018-12-14 | End: 2019-08-01

## 2018-12-24 RX ORDER — LISINOPRIL AND HYDROCHLOROTHIAZIDE 25; 20 MG/1; MG/1
TABLET ORAL
Qty: 90 TABLET | Refills: 0 | Status: SHIPPED | OUTPATIENT
Start: 2018-12-24 | End: 2019-03-22

## 2018-12-24 RX ORDER — METOPROLOL TARTRATE 50 MG/1
TABLET, FILM COATED ORAL
Qty: 180 TABLET | Refills: 0 | Status: SHIPPED | OUTPATIENT
Start: 2018-12-24 | End: 2019-02-07 | Stop reason: DRUGHIGH

## 2018-12-27 ENCOUNTER — TELEPHONE (OUTPATIENT)
Dept: INTERNAL MEDICINE CLINIC | Facility: CLINIC | Age: 68
End: 2018-12-27

## 2018-12-27 NOTE — TELEPHONE ENCOUNTER
Pt called stating that both of the top of her feet were black and blue and she is concerned. Pt did state that she has worn socks to bed the last two nights (which she never does). Pt notices feet were black in blue while in shower.  Please call to discuss

## 2018-12-27 NOTE — TELEPHONE ENCOUNTER
Pt states top of both feet are partially black and blue Did not drop anything on feet or bump them .  Currently on Plavix /Wore socks last night to bed and also was wearing slippers

## 2019-01-01 ENCOUNTER — EXTERNAL RECORD (OUTPATIENT)
Dept: HEALTH INFORMATION MANAGEMENT | Facility: OTHER | Age: 69
End: 2019-01-01

## 2019-01-02 ENCOUNTER — OFFICE VISIT (OUTPATIENT)
Dept: INTERNAL MEDICINE CLINIC | Facility: CLINIC | Age: 69
End: 2019-01-02
Payer: MEDICARE

## 2019-01-02 VITALS
TEMPERATURE: 98 F | SYSTOLIC BLOOD PRESSURE: 126 MMHG | DIASTOLIC BLOOD PRESSURE: 60 MMHG | WEIGHT: 171 LBS | BODY MASS INDEX: 30.3 KG/M2 | HEART RATE: 63 BPM | HEIGHT: 63 IN | OXYGEN SATURATION: 98 %

## 2019-01-02 DIAGNOSIS — L81.9 DISCOLORATION OF SKIN: Primary | ICD-10-CM

## 2019-01-02 DIAGNOSIS — Z86.73 HISTORY OF STROKE WITHIN LAST YEAR: ICD-10-CM

## 2019-01-02 PROCEDURE — G0008 ADMIN INFLUENZA VIRUS VAC: HCPCS | Performed by: FAMILY MEDICINE

## 2019-01-02 PROCEDURE — 90686 IIV4 VACC NO PRSV 0.5 ML IM: CPT | Performed by: FAMILY MEDICINE

## 2019-01-02 PROCEDURE — 99213 OFFICE O/P EST LOW 20 MIN: CPT | Performed by: FAMILY MEDICINE

## 2019-01-02 RX ORDER — ATORVASTATIN CALCIUM 40 MG/1
40 TABLET, FILM COATED ORAL NIGHTLY
Qty: 90 TABLET | Refills: 1 | Status: SHIPPED | OUTPATIENT
Start: 2019-01-02 | End: 2019-07-15

## 2019-01-02 NOTE — TELEPHONE ENCOUNTER
Medication: Atorvastatin 40 mg    Date of last refill: 07/09/2018 (#90/1)  [place]     Last office visit: 11/14/2018  Due back to clinic per last office note:  3 months  Date next office visit scheduled:    Future Appoin tunnel syndrome on right  (primary encounter diagnosis)  Plan: as noted above      (Z86.73) History of stroke without residual deficits  Plan: as noted above

## 2019-01-02 NOTE — PROGRESS NOTES
HPI:    Patient ID: Jose Manuel Donnelly is a 80year old female.     HPI  Here to ck feet  Felt cold last week  Noted them to be bluish in the mid foot      No pain w it   Did seem to get better but did come back as well   No injury   No new shoes    No swell (MULTI-VITAMIN) Oral Tab Take 1 Tab by mouth daily.  Disp:  Rfl:      Allergies:  Penicillins             RASH, SWELLING  Advil [Ibuprofen]       NAUSEA AND VOMITING, DIZZINESS    Comment:Tabs Feels faint  Aspirin                 NAUSEA AND VOMITING, DIZZIN

## 2019-01-06 ENCOUNTER — MOBILE ENCOUNTER (OUTPATIENT)
Dept: INTERNAL MEDICINE CLINIC | Facility: CLINIC | Age: 69
End: 2019-01-06

## 2019-01-06 NOTE — PROGRESS NOTES
Patient paged on call service stating she has had diarrhea since Friday and little appetite. She is diabetic and concerned if she should take her metformin.  Discussed ok to take metformin, also educated on monitoring glucose more often with illness and imp

## 2019-01-07 ENCOUNTER — OFFICE VISIT (OUTPATIENT)
Dept: INTERNAL MEDICINE CLINIC | Facility: CLINIC | Age: 69
End: 2019-01-07
Payer: MEDICARE

## 2019-01-07 VITALS
WEIGHT: 168 LBS | RESPIRATION RATE: 16 BRPM | DIASTOLIC BLOOD PRESSURE: 72 MMHG | TEMPERATURE: 98 F | HEIGHT: 63 IN | HEART RATE: 68 BPM | BODY MASS INDEX: 29.77 KG/M2 | SYSTOLIC BLOOD PRESSURE: 134 MMHG

## 2019-01-07 DIAGNOSIS — K52.9 GE (GASTROENTERITIS): Primary | ICD-10-CM

## 2019-01-07 PROCEDURE — 99213 OFFICE O/P EST LOW 20 MIN: CPT | Performed by: FAMILY MEDICINE

## 2019-01-08 NOTE — PROGRESS NOTES
HPI:    Patient ID: Maximo Ward is a 80year old female.     HPI  Here for diarrhea   strted 3d ago   Pure water at first   Then seemed to calm down and then restarted again      Some gurgling in hte abd was noted     May be some cramps   Then started NITROSTAT 0.4 MG Sublingual SL Tab Place 0.4 mg under the tongue every 5 (five) minutes as needed. Disp:  Rfl: 5   Multiple Vitamin (MULTI-VITAMIN) Oral Tab Take 1 Tab by mouth daily.  Disp:  Rfl:      Allergies:  Penicillins             RASH, SWELLING

## 2019-02-02 RX ORDER — NIFEDIPINE 60 MG/1
TABLET, EXTENDED RELEASE ORAL
Qty: 90 TABLET | Refills: 0 | Status: SHIPPED | OUTPATIENT
Start: 2019-02-02 | End: 2019-04-28

## 2019-02-02 NOTE — TELEPHONE ENCOUNTER
Protocol passed    Medication(s) to Refill:   Requested Prescriptions     Pending Prescriptions Disp Refills   • NIFEDIPINE ER OSMOTIC RELEASE 60 MG Oral Tablet 24 Hr [Pharmacy Med Name: NIFEDIPINE 60MG ER (XL/OS) TABLETS] 90 tablet 0     Sig: TAKE 1 TABLE

## 2019-02-04 DIAGNOSIS — Z86.73 HISTORY OF STROKE WITHIN LAST YEAR: ICD-10-CM

## 2019-02-04 NOTE — TELEPHONE ENCOUNTER
Medication: Clopidrogel 75 mg     Date of last refill: 08/16/18 with 1 addt refill # 180  [place]      Last office visit: 11/14/2018  Due back to clinic per last office note:  3 months  Date next office visit scheduled:    (G56.01) Carpal tunnel syndrome on right  (primary encounter diagnosis)  Plan: as noted above      (Z86.73) History of stroke without residual deficits  Plan: as noted above

## 2019-02-05 ENCOUNTER — TELEPHONE (OUTPATIENT)
Dept: INTERNAL MEDICINE CLINIC | Facility: CLINIC | Age: 69
End: 2019-02-05

## 2019-02-05 RX ORDER — CLOPIDOGREL BISULFATE 75 MG/1
75 TABLET ORAL DAILY
Qty: 90 TABLET | Refills: 1 | Status: SHIPPED | OUTPATIENT
Start: 2019-02-05 | End: 2019-08-01

## 2019-02-07 ENCOUNTER — OFFICE VISIT (OUTPATIENT)
Dept: NEUROLOGY | Facility: CLINIC | Age: 69
End: 2019-02-07
Payer: MEDICARE

## 2019-02-07 VITALS
SYSTOLIC BLOOD PRESSURE: 122 MMHG | BODY MASS INDEX: 29.77 KG/M2 | DIASTOLIC BLOOD PRESSURE: 60 MMHG | HEIGHT: 63 IN | RESPIRATION RATE: 16 BRPM | WEIGHT: 168 LBS | HEART RATE: 57 BPM

## 2019-02-07 DIAGNOSIS — G56.01 CARPAL TUNNEL SYNDROME ON RIGHT: Primary | ICD-10-CM

## 2019-02-07 DIAGNOSIS — R20.2 NUMBNESS AND TINGLING IN LEFT HAND: ICD-10-CM

## 2019-02-07 DIAGNOSIS — R20.0 NUMBNESS AND TINGLING IN LEFT HAND: ICD-10-CM

## 2019-02-07 DIAGNOSIS — Z86.73 HISTORY OF STROKE WITHOUT RESIDUAL DEFICITS: ICD-10-CM

## 2019-02-07 PROCEDURE — 99213 OFFICE O/P EST LOW 20 MIN: CPT | Performed by: OTHER

## 2019-02-07 RX ORDER — GABAPENTIN 300 MG/1
CAPSULE ORAL
Qty: 90 CAPSULE | Refills: 2 | Status: SHIPPED | OUTPATIENT
Start: 2019-02-07 | End: 2019-05-07

## 2019-02-07 RX ORDER — METOPROLOL TARTRATE 50 MG/1
TABLET, FILM COATED ORAL
COMMUNITY
End: 2019-05-07 | Stop reason: DRUGHIGH

## 2019-02-07 NOTE — PROGRESS NOTES
[facility] Progress Note    HPI  Patient presents with:  Carpal Tunnel Syndrome: Follow up    In summary of prior visits:   \"     Patient presents in follow-up after recently discharged 4/12/17, following admission for acute R sided pa • Acute bronchitis 04/11/2011   • Cataract    • Chest pain, unspecified 12/05    admit 12/05   • Deep vein thrombosis ([address]     during pregnancy right leg   • Diabetes ([address]    • Diverticulosis of large intestine     unsure if divertiulitis or diverticulos Substance and Sexual Activity      Alcohol use: Yes        Comment: very rarely      Drug use: No    Other Topics      Concerns:        Caffeine Concern: Yes          1-2 cups of coffee/ tea daily        Exercise: Yes          walking        Penicillins •  MICROLET LANCETS Does not apply Misc, TEST ONCE DAILY AS DIRECTED, Disp: 100 each, Rfl: 3  •  ISOSORBIDE MONONITRATE  MG Oral Tablet 24 Hr, TAKE 1 TABLET(120 MG) BY MOUTH DAILY, Disp: 90 tablet, Rfl: 0  •  cetirizine 10 MG Oral Tab, Take 10 mg by Coord: FNF intact with no tremor or dysmetria  Romberg:absent  Gait: normal casual gait     Labs:  None new since last visit:       Viviana as noted below:    MRI brain (4/24//18):      FINDINGS:    Cerebral atrophy is present with symmetrical prominence of t R Median - APB      Wrist APB 4.43 4.2 100 7.55 13.4 Wrist - APB 7          Elbow APB 8.28 4.0 96.3 7.45 13.0 Elbow - Wrist 19.5 3.85 51   R Ulnar - ADM      Wrist ADM 2.45 10.6 100 5.63 30.1 Wrist - ADM 7          B. Elbow ADM 5.57 10.7 102 5.89 31.4 B. Elb 1. Right sural sensory response was borderline due to borderline amplitude, with normal peak latency and normal conduction velocity.    2. Right median sensory response was abnormal due to prolonged peak latency, with normal amplitude and slowed conduction Diagnostic and imaging as previously noted below:    MRI brain / MRA head / neck (4/11/17): FINDINGS:  Ventricles and sulci are normal caliber. No mass effect or midline shift.  There is a small focus of restricted diffusion involving the left basal neal 6. Pulmonary arteries: Systolic pressure was mildly increased, in the range     of 40mm Hg to 45mm Hg. Impressions: Ruth Almazan was no diagnostic evidence for a cardiac embolic source.   This study is compared with previous dated 06-02-16: Compared to the  prev As noted above     (Z86.73) History of stroke without residual deficits  Plan: as noted above     (R20.0,  R20.2) Numbness and tingling in left hand  Plan: gabapentin 300 MG Oral Cap        As noted above     Return in about 3 months (around 5/7/2019

## 2019-02-28 VITALS
BODY MASS INDEX: 26.75 KG/M2 | WEIGHT: 151 LBS | SYSTOLIC BLOOD PRESSURE: 102 MMHG | DIASTOLIC BLOOD PRESSURE: 54 MMHG | HEART RATE: 60 BPM | HEIGHT: 63 IN

## 2019-02-28 VITALS
DIASTOLIC BLOOD PRESSURE: 72 MMHG | WEIGHT: 164 LBS | SYSTOLIC BLOOD PRESSURE: 132 MMHG | BODY MASS INDEX: 29.06 KG/M2 | HEIGHT: 63 IN | HEART RATE: 58 BPM

## 2019-02-28 VITALS
BODY MASS INDEX: 29.02 KG/M2 | HEIGHT: 64 IN | SYSTOLIC BLOOD PRESSURE: 112 MMHG | DIASTOLIC BLOOD PRESSURE: 64 MMHG | HEART RATE: 72 BPM | WEIGHT: 170 LBS

## 2019-03-01 VITALS
DIASTOLIC BLOOD PRESSURE: 68 MMHG | WEIGHT: 174 LBS | HEART RATE: 64 BPM | HEIGHT: 63 IN | SYSTOLIC BLOOD PRESSURE: 120 MMHG | BODY MASS INDEX: 30.83 KG/M2

## 2019-03-01 VITALS
WEIGHT: 63 LBS | SYSTOLIC BLOOD PRESSURE: 130 MMHG | HEART RATE: 55 BPM | HEIGHT: 63 IN | DIASTOLIC BLOOD PRESSURE: 72 MMHG | BODY MASS INDEX: 11.16 KG/M2

## 2019-03-16 DIAGNOSIS — E11.8 TYPE 2 DIABETES MELLITUS WITH COMPLICATION, UNSPECIFIED WHETHER LONG TERM INSULIN USE: ICD-10-CM

## 2019-03-18 ENCOUNTER — TELEPHONE (OUTPATIENT)
Dept: INTERNAL MEDICINE CLINIC | Facility: CLINIC | Age: 69
End: 2019-03-18

## 2019-03-18 NOTE — TELEPHONE ENCOUNTER
\"Notes recorded by Oscar Malhotra MD on 12/13/2018 at 7:36 AM CST  BS is creeping up     i'd increase the metformin to 1000mg BID #60 3r for now and watch the diet better     Berkley 4 months A1c\"    Pt informed to repeat blood work.

## 2019-03-19 NOTE — TELEPHONE ENCOUNTER
Spoke with Pharmacy and verified that patient should be taking Metformin 1000 per labs completed 12/12/18

## 2019-03-24 RX ORDER — CYCLOBENZAPRINE HCL 10 MG
TABLET ORAL
COMMUNITY
Start: 2018-10-17 | End: 2019-04-24

## 2019-03-24 RX ORDER — METOPROLOL TARTRATE 50 MG/1
25 TABLET, FILM COATED ORAL 2 TIMES DAILY
COMMUNITY
Start: 2017-12-05

## 2019-03-24 RX ORDER — MULTIVITAMIN
CAPSULE ORAL
COMMUNITY

## 2019-03-24 RX ORDER — NITROGLYCERIN 0.4 MG/1
TABLET SUBLINGUAL
COMMUNITY
Start: 2018-01-10

## 2019-03-24 RX ORDER — OMEPRAZOLE 20 MG/1
CAPSULE, DELAYED RELEASE ORAL
COMMUNITY
Start: 2016-04-01

## 2019-03-24 RX ORDER — CLOPIDOGREL BISULFATE 75 MG/1
TABLET ORAL
COMMUNITY
Start: 2017-12-05 | End: 2019-04-24 | Stop reason: SDUPTHER

## 2019-03-24 RX ORDER — RANOLAZINE 500 MG/1
TABLET, EXTENDED RELEASE ORAL
COMMUNITY
Start: 2018-10-17 | End: 2019-04-24 | Stop reason: SDUPTHER

## 2019-03-24 RX ORDER — NIFEDIPINE 60 MG/1
TABLET, FILM COATED, EXTENDED RELEASE ORAL
COMMUNITY
Start: 2013-08-26

## 2019-03-24 RX ORDER — ATORVASTATIN CALCIUM 40 MG/1
TABLET, FILM COATED ORAL
COMMUNITY
Start: 2018-11-04

## 2019-03-24 RX ORDER — ISOSORBIDE MONONITRATE 120 MG/1
TABLET, EXTENDED RELEASE ORAL
COMMUNITY

## 2019-03-24 RX ORDER — CETIRIZINE HYDROCHLORIDE 10 MG/1
TABLET ORAL
COMMUNITY

## 2019-03-24 RX ORDER — LISINOPRIL AND HYDROCHLOROTHIAZIDE 25; 20 MG/1; MG/1
TABLET ORAL
COMMUNITY
Start: 2013-08-26

## 2019-03-25 RX ORDER — METOPROLOL TARTRATE 50 MG/1
TABLET, FILM COATED ORAL
Qty: 180 TABLET | Refills: 0 | Status: SHIPPED | OUTPATIENT
Start: 2019-03-25 | End: 2019-05-07 | Stop reason: DRUGHIGH

## 2019-03-25 RX ORDER — LISINOPRIL AND HYDROCHLOROTHIAZIDE 25; 20 MG/1; MG/1
TABLET ORAL
Qty: 90 TABLET | Refills: 0 | Status: SHIPPED | OUTPATIENT
Start: 2019-03-25 | End: 2019-06-22

## 2019-04-05 NOTE — PROGRESS NOTES
Dx: Acute ischemic stroke          Authorized # of Visits:  10         Next MD visit: none scheduled  Fall Risk: standard         Precautions: n/a             Subjective: Patient reports that she went back to see her neurologist and her HR was very low.  Bharati Lemus Follow up appointments  Call Tal Leyva at [phone] to schedule follow up appt with Dr. Romy Salazar in  10 - 14 days. When to call your Orthopaedic Surgeon:  -unrelieved pain  -Signs of infection-if your incision is red; continues to have drainage; drainage has a foul odor or if you have a persistent fever over 101 degrees  -Signs of a blood clot in your leg-calf pain, tenderness, redness, swelling of lower leg  When to call your Primary Care Physician:  -Concerns about medical conditions such as diabetes, high blood pressure, asthma, congestive heart failure  -Call if blood sugars are elevated, persistent headache or dizziness, coughing or congestion, constipation or diarrhea, burning with urination, abnormal heart rate  When to call 911and go to the nearest emergency room  -acute onset of chest pain, shortness of breath, difficulty breathing    Activity - ice and elevate, keep splint clean and dry, no weight bearing on right ankle, use crutches for ambulation    Incision Care - keep splint clean and dry, do not remove    Preventing blood clots - take an aspirin 81mg twice daily for 3 weeks      Pain management  -take pain medication as prescribed; decrease the amount you use as your pain lessens  -avoid alcoholic beverages while taking pain medication  -Please be aware that many medications contain Tylenol. We do not want you to over medicate so please read the information below as a guide. Do not take more than 4 Grams of Tylenol in a 24 hour period. (There are 1000 milligrams in one Gram)  Percocet contains 325 mg of Tylenol per tablet (do not take more than 12 tablets in 24 hours)  Lortab contains 500 mg of Tylenol per tablet (do not take more than 8 tablets in 24 hours)  Norco contains 325 mg of Tylenol per tablet (do not take more than 12 tablets in 24 hours).   -You may place an ice bag on your affected extremity     Diet  -resume usual diet; drink plenty of fluids; eat foods high in Airex  - marching 2 x 20  - mod tandem catch x 3 min         Bosu  - step ups x 12 R/L  - side steps x 12 R/L         -         -         -         -         Skilled Services: NR balance training with initiation compliant surfaces to improve ability t fiber  -you may want to take a stool softener (such as Senokot-S or Colace) to prevent constipation while you are taking pain medication.   If constipation occurs, take a laxative (such as Dulcolax tablets, Milk of Magnesia, or a suppository)

## 2019-04-08 ENCOUNTER — LAB ENCOUNTER (OUTPATIENT)
Dept: LAB | Age: 69
End: 2019-04-08
Attending: FAMILY MEDICINE
Payer: MEDICARE

## 2019-04-08 DIAGNOSIS — E78.00 PURE HYPERCHOLESTEROLEMIA: Primary | ICD-10-CM

## 2019-04-08 DIAGNOSIS — E11.8 TYPE 2 DIABETES MELLITUS WITH COMPLICATION, UNSPECIFIED WHETHER LONG TERM INSULIN USE: ICD-10-CM

## 2019-04-08 PROCEDURE — 80053 COMPREHEN METABOLIC PANEL: CPT

## 2019-04-08 PROCEDURE — 83036 HEMOGLOBIN GLYCOSYLATED A1C: CPT

## 2019-04-08 PROCEDURE — 36415 COLL VENOUS BLD VENIPUNCTURE: CPT

## 2019-04-08 PROCEDURE — 80061 LIPID PANEL: CPT

## 2019-04-24 ENCOUNTER — OFFICE VISIT (OUTPATIENT)
Dept: CARDIOLOGY | Age: 69
End: 2019-04-24

## 2019-04-24 VITALS
SYSTOLIC BLOOD PRESSURE: 112 MMHG | DIASTOLIC BLOOD PRESSURE: 58 MMHG | HEART RATE: 60 BPM | BODY MASS INDEX: 28.53 KG/M2 | HEIGHT: 63 IN | WEIGHT: 161 LBS

## 2019-04-24 DIAGNOSIS — R07.2 PRECORDIAL PAIN: Chronic | ICD-10-CM

## 2019-04-24 DIAGNOSIS — I10 HYPERTENSION, BENIGN: ICD-10-CM

## 2019-04-24 DIAGNOSIS — E78.00 PURE HYPERCHOLESTEROLEMIA: Primary | ICD-10-CM

## 2019-04-24 DIAGNOSIS — E11.9 TYPE 2 DIABETES MELLITUS WITHOUT COMPLICATION, WITHOUT LONG-TERM CURRENT USE OF INSULIN (CMD): ICD-10-CM

## 2019-04-24 PROBLEM — I63.9 CVA (CEREBRAL VASCULAR ACCIDENT) (CMD): Status: ACTIVE | Noted: 2017-10-11

## 2019-04-24 PROCEDURE — 99214 OFFICE O/P EST MOD 30 MIN: CPT | Performed by: INTERNAL MEDICINE

## 2019-04-24 RX ORDER — CLOPIDOGREL BISULFATE 75 MG/1
TABLET ORAL
Qty: 90 TABLET | Refills: 3 | Status: SHIPPED | OUTPATIENT
Start: 2019-04-24

## 2019-04-24 RX ORDER — RANOLAZINE 500 MG/1
TABLET, EXTENDED RELEASE ORAL
Qty: 90 TABLET | Refills: 3 | Status: SHIPPED | OUTPATIENT
Start: 2019-04-24 | End: 2020-04-23 | Stop reason: SDUPTHER

## 2019-04-24 ASSESSMENT — ENCOUNTER SYMPTOMS
HEMATOCHEZIA: 0
CHILLS: 0
HEMOPTYSIS: 0
SUSPICIOUS LESIONS: 0
ALLERGIC/IMMUNOLOGIC COMMENTS: NO NEW FOOD ALLERGIES
COUGH: 0
WEIGHT GAIN: 0
BRUISES/BLEEDS EASILY: 0
FEVER: 0
WEIGHT LOSS: 0

## 2019-04-29 RX ORDER — NIFEDIPINE 60 MG/1
TABLET, EXTENDED RELEASE ORAL
Qty: 90 TABLET | Refills: 0 | Status: SHIPPED | OUTPATIENT
Start: 2019-04-29 | End: 2019-08-05

## 2019-05-07 ENCOUNTER — OFFICE VISIT (OUTPATIENT)
Dept: NEUROLOGY | Facility: CLINIC | Age: 69
End: 2019-05-07
Payer: MEDICARE

## 2019-05-07 VITALS
WEIGHT: 168 LBS | HEIGHT: 63 IN | BODY MASS INDEX: 29.77 KG/M2 | HEART RATE: 61 BPM | RESPIRATION RATE: 16 BRPM | SYSTOLIC BLOOD PRESSURE: 118 MMHG | DIASTOLIC BLOOD PRESSURE: 56 MMHG

## 2019-05-07 DIAGNOSIS — R20.2 NUMBNESS AND TINGLING IN LEFT HAND: ICD-10-CM

## 2019-05-07 DIAGNOSIS — R20.0 NUMBNESS AND TINGLING IN LEFT HAND: ICD-10-CM

## 2019-05-07 DIAGNOSIS — G56.01 CARPAL TUNNEL SYNDROME ON RIGHT: ICD-10-CM

## 2019-05-07 PROCEDURE — 99214 OFFICE O/P EST MOD 30 MIN: CPT | Performed by: OTHER

## 2019-05-07 RX ORDER — GABAPENTIN 300 MG/1
600 CAPSULE ORAL 2 TIMES DAILY
Qty: 120 CAPSULE | Refills: 2 | Status: SHIPPED | OUTPATIENT
Start: 2019-05-07 | End: 2019-08-05

## 2019-05-07 NOTE — PATIENT INSTRUCTIONS
Increase gabapentin to 600 mg bid (2 pills twice daily)  Schedule EMG left arm earliest convenience  Follow up in ~ 3 months, but after EMG done     Refill policies:    • Allow 2-3 business days for refills; controlled substances may take longer.   • Contac your physician has ordered radiology tests such as MRI or CT scans, do not schedule the test until this office has notified you that the test has been approved by your insurer. Depending on your insurance carrier, approval may take 3-10 days.  It is highly to follow above steps may result in the delay of form completion.

## 2019-05-07 NOTE — PROGRESS NOTES
[facility] Progress Note    HPI  Patient presents with:  Neurologic Problem:  Follow up    In summary of prior visits:   \"     Patient presents in follow-up after recently discharged 4/12/17, following admission for acute R sided parest She is on gabapentin at increased dose of 300 mg / 600 mg and has noted improvement in R hand but left hand is still tingling. No strokes or stroke like symptoms - remains on Plavix 75 mg and Lipitor 40 mg daily - no bleeding issues or myalgias. • Breast Cancer Paternal Grandmother    • Cancer Paternal Grandmother         bone cancer   • Cancer Other      Social History    Socioeconomic History      Marital status:        Spouse name: Not on file      Number of children: Not on file      Jayashree •  Clopidogrel Bisulfate 75 MG Oral Tab, Take 1 tablet (75 mg total) by mouth daily. , Disp: 90 tablet, Rfl: 1  •  atorvastatin 40 MG Oral Tab, Take 1 tablet (40 mg total) by mouth nightly., Disp: 90 tablet, Rfl: 1  •  Omeprazole 40 MG Oral Capsule Delayed Speech fluent and conversational     CN: PERRL, EOMI without nystagmus, VFF, smile symmetric, sensation intact, tongue and palate midline, SCM intact; otherwise, 2-12 intact  Motor: 5/5 strength throughout, tone normal - no drift; no tremor noted; no molly 3 Ankle 2.66 3.33 2.5 5.2           R Median, Ulnar - Transcarpal comparison      Median Palm Wrist 2.50 3.07 19.5 28.4 Median Palm - Wrist 8   32      Ulnar Palm Wrist 1.35 1.93 24.0 25.8 Ulnar Palm - Wrist 8   59               Median Palm - Trans[employer]aigne R. Vastus medialis Femoral L2-L4 N None None None None N N N N   R. Tibialis anterior Deep peroneal (Fibular) L4-L5 N None None None None 1+ 1+ N Reduced   R. Peroneus longus Perineal L5-S1 N None None None None 1+ 1+ N Reduced   R.  Gastrocnemius (Medial h 2. Motor unit potentials demonstrated chronic denervation with increased amplitude, duration and reduced recruitment in the R lower extremity in the R TA, peroneus longus and EHL muscles.         Impression:      This study is abnormal.  There is electroph 1. Left ventricle: The cavity size was normal. Wall thickness was normal.     Systolic function was normal. The estimated ejection fraction was 65%. No     diagnostic evidence for regional wall motion abnormalities.  Doppler     parameters are consistent wi Patient previously endorsed R sided paresthesias, MRI brain was done with no acute infarct and EMG showed R median nerve entrapment and still with tingling in R hand, now with some symptoms in left hand which have been persistent and now worsening previous

## 2019-05-14 RX ORDER — ISOSORBIDE MONONITRATE 120 MG/1
TABLET, EXTENDED RELEASE ORAL
Qty: 90 TABLET | Refills: 0 | Status: SHIPPED | OUTPATIENT
Start: 2019-05-14 | End: 2019-08-12

## 2019-05-14 NOTE — TELEPHONE ENCOUNTER
Last OV: 1/7/19 with Dr. Sheela Aviles  Last refill date: 10/2/18     #/refills: #90, 0 refills  When pt was asked to return for OV: no follow-up on file   Upcoming appt: 5/16/19 with Dr. Sheela Aviles  Last labs 4/8/19

## 2019-05-16 ENCOUNTER — OFFICE VISIT (OUTPATIENT)
Dept: INTERNAL MEDICINE CLINIC | Facility: CLINIC | Age: 69
End: 2019-05-16
Payer: MEDICARE

## 2019-05-16 VITALS
HEIGHT: 63 IN | WEIGHT: 159 LBS | RESPIRATION RATE: 14 BRPM | HEART RATE: 64 BPM | OXYGEN SATURATION: 98 % | BODY MASS INDEX: 28.17 KG/M2 | DIASTOLIC BLOOD PRESSURE: 64 MMHG | TEMPERATURE: 98 F | SYSTOLIC BLOOD PRESSURE: 118 MMHG

## 2019-05-16 DIAGNOSIS — M79.605 LEFT LEG PAIN: ICD-10-CM

## 2019-05-16 DIAGNOSIS — E78.00 PURE HYPERCHOLESTEROLEMIA: ICD-10-CM

## 2019-05-16 DIAGNOSIS — K29.90 GASTRITIS AND GASTRODUODENITIS: ICD-10-CM

## 2019-05-16 DIAGNOSIS — I70.0 ATHEROSCLEROSIS OF AORTA (HCC): ICD-10-CM

## 2019-05-16 DIAGNOSIS — Z00.00 ENCOUNTER FOR ANNUAL HEALTH EXAMINATION: ICD-10-CM

## 2019-05-16 DIAGNOSIS — E11.8 TYPE 2 DIABETES MELLITUS WITH COMPLICATION, UNSPECIFIED WHETHER LONG TERM INSULIN USE: ICD-10-CM

## 2019-05-16 DIAGNOSIS — Z86.73 STATUS POST CVA: ICD-10-CM

## 2019-05-16 DIAGNOSIS — Z00.00 MEDICARE ANNUAL WELLNESS VISIT, SUBSEQUENT: Primary | ICD-10-CM

## 2019-05-16 DIAGNOSIS — K29.70 GASTRITIS AND GASTRODUODENITIS: ICD-10-CM

## 2019-05-16 DIAGNOSIS — M77.8 ENTHESOPATHY OF WRIST AND CARPUS: ICD-10-CM

## 2019-05-16 DIAGNOSIS — K21.00 GASTROESOPHAGEAL REFLUX DISEASE WITH ESOPHAGITIS: ICD-10-CM

## 2019-05-16 DIAGNOSIS — R80.9 PROTEINURIA, UNSPECIFIED TYPE: ICD-10-CM

## 2019-05-16 DIAGNOSIS — I10 ESSENTIAL HYPERTENSION, BENIGN: ICD-10-CM

## 2019-05-16 DIAGNOSIS — Z12.31 VISIT FOR SCREENING MAMMOGRAM: ICD-10-CM

## 2019-05-16 DIAGNOSIS — I20.1 PRINZMETAL ANGINA (HCC): ICD-10-CM

## 2019-05-16 DIAGNOSIS — G47.33 OBSTRUCTIVE SLEEP APNEA (ADULT) (PEDIATRIC): ICD-10-CM

## 2019-05-16 DIAGNOSIS — Z11.59 NEED FOR HEPATITIS C SCREENING TEST: ICD-10-CM

## 2019-05-16 DIAGNOSIS — IMO0002 OSTEOARTHROSIS INVOLVING LOWER LEG: ICD-10-CM

## 2019-05-16 PROCEDURE — 99214 OFFICE O/P EST MOD 30 MIN: CPT | Performed by: FAMILY MEDICINE

## 2019-05-16 PROCEDURE — G0438 PPPS, INITIAL VISIT: HCPCS | Performed by: FAMILY MEDICINE

## 2019-05-16 NOTE — PATIENT INSTRUCTIONS
Kassi Fagan's SCREENING SCHEDULE   Tests on this list are recommended by your physician but may not be covered, or covered at this frequency, by your insurer. Please check with your insurance carrier before scheduling to verify coverage.    Sophia Soares results found for this or any previous visit.  Limited to patients who meet one of the following criteria:   • Men who are 73-68 years old and have smoked more than 100 cigarettes in their lifetime   • Anyone with a family history    Colorectal Cancer [address] Mammogram due on 05/09/2019 Please get this Mammogram regularly   Immunizations      Influenza  Covered Annually Orders placed or performed in visit on 10/24/16   • FLU VAC NO PRSV 4 STACEY 3 YRS+   Orders placed or performed in visit on 11/05/15   • FLU V it's website for anyone to review and print using their home computer and printer. (the forms are also available in [address])  [email or web address]  This link also has information from the [facility] regarding Advance Directives.

## 2019-05-16 NOTE — PROGRESS NOTES
HPI:   Ewa Henry is a 77year old female who presents for a Medicare Subsequent Annual Wellness visit (Pt already had Initial Annual Wellness).       Her last annual assessment has been over 1 year: Annual Physical due on 04/26/2019         Fall/Ris PCP - Angélica Moreira MD as Consulting Physician (Radha Phoenix)  Aurora Chahal MD (Cardiovascular Diseases)    Patient Active Problem List:     Gastritis and gastroduodenitis     Obstructive sleep apnea (adult) (pediatric)     Esophageal reflux     D TAKE 1 TABLET(1000 MG) BY MOUTH TWICE DAILY WITH MEALS   Clopidogrel Bisulfate 75 MG Oral Tab Take 1 tablet (75 mg total) by mouth daily. atorvastatin 40 MG Oral Tab Take 1 tablet (40 mg total) by mouth nightly.    Omeprazole 40 MG Oral Capsule Delayed Re Diabetes in her father. SOCIAL HISTORY:   She  reports that she has never smoked. She has never used smokeless tobacco. She reports that she drinks alcohol. She reports that she does not use drugs.      REVIEW OF SYSTEMS:   GENERAL: feels well otherwise supraclavicular nodes normal   Neurologic: Normal       Vaccination History     Immunization History   Administered Date(s) Administered   • >=3 YRS FLUZONE OR FLUARIX QUAD PRESERVE FREE SINGLE DOSE (41565) FLU CLINIC 11/05/2015   • Depo-Medrol 40mg Inj 05 last A1c good       (K21.0) Gastroesophageal reflux disease with esophagitis  Plan: stable       (K29.70,  K29.90) Gastritis and gastroduodenitis  Plan: no issues    (M77.8) Enthesopathy of wrist and carpus  Plan: seeing Dr Michelle Parmar    (M17.10) Alayna Tamayo (CPT=[id number]) 05/09/2018    No flowsheet data found. Pap and Pelvic      Pap: Every 3 yrs age 21-68 or Pap+HPV every 5 yrs age 33-67, age 72 and older at high risk There are no preventive care reminders to display for this patient.  Update [facility] Creatinine (mg/dL)   Date Value   04/08/2019 0.88    No flowsheet data found. Drug Serum Conc  Annually No results found for: DIGOXIN, DIG, VALP No flowsheet data found.        Diabetes      HgbA1C  Annually HEMOGLOBIN A1c (% of total Hgb)   Date Sonya capsule Rfl: 0   MICROLET LANCETS Does not apply Misc TEST ONCE DAILY AS DIRECTED Disp: 100 each Rfl: 3   cetirizine 10 MG Oral Tab Take 10 mg by mouth daily.  Disp:  Rfl:    RANEXA 500 MG Oral Tablet 12 Hr TAKE 1 TABLET BY MOUTH EVERY DAY Disp: 90 tablet R leg  Proteinuria, unspecified type    No orders of the defined types were placed in this encounter.       Meds This Visit:  Requested Prescriptions      No prescriptions requested or ordered in this encounter       Imaging & Referrals:  None       TI#[id number]

## 2019-05-20 ENCOUNTER — PROCEDURE VISIT (OUTPATIENT)
Dept: NEUROLOGY | Facility: CLINIC | Age: 69
End: 2019-05-20
Payer: MEDICARE

## 2019-05-20 DIAGNOSIS — R20.0 NUMBNESS AND TINGLING IN LEFT HAND: Primary | ICD-10-CM

## 2019-05-20 DIAGNOSIS — R20.2 NUMBNESS AND TINGLING IN LEFT HAND: Primary | ICD-10-CM

## 2019-05-20 PROCEDURE — 95886 MUSC TEST DONE W/N TEST COMP: CPT | Performed by: OTHER

## 2019-05-20 PROCEDURE — 95909 NRV CNDJ TST 5-6 STUDIES: CPT | Performed by: OTHER

## 2019-05-21 ENCOUNTER — APPOINTMENT (OUTPATIENT)
Dept: LAB | Age: 69
End: 2019-05-21
Attending: FAMILY MEDICINE
Payer: MEDICARE

## 2019-05-21 ENCOUNTER — HOSPITAL ENCOUNTER (OUTPATIENT)
Dept: MAMMOGRAPHY | Age: 69
Discharge: HOME | End: 2019-05-21
Attending: FAMILY MEDICINE
Payer: MEDICARE

## 2019-05-21 DIAGNOSIS — Z11.59 NEED FOR HEPATITIS C SCREENING TEST: ICD-10-CM

## 2019-05-21 DIAGNOSIS — Z12.31 VISIT FOR SCREENING MAMMOGRAM: ICD-10-CM

## 2019-05-21 DIAGNOSIS — N64.52 NIPPLE DISCHARGE: ICD-10-CM

## 2019-05-21 PROCEDURE — 77063 BREAST TOMOSYNTHESIS BI: CPT | Performed by: FAMILY MEDICINE

## 2019-05-21 PROCEDURE — 84146 ASSAY OF PROLACTIN: CPT

## 2019-05-21 PROCEDURE — 86803 HEPATITIS C AB TEST: CPT

## 2019-05-21 PROCEDURE — 77067 SCR MAMMO BI INCL CAD: CPT | Performed by: FAMILY MEDICINE

## 2019-05-21 PROCEDURE — 36415 COLL VENOUS BLD VENIPUNCTURE: CPT

## 2019-05-22 ENCOUNTER — TELEPHONE (OUTPATIENT)
Dept: INTERNAL MEDICINE CLINIC | Facility: CLINIC | Age: 69
End: 2019-05-22

## 2019-05-22 DIAGNOSIS — N64.52 NIPPLE DISCHARGE: Primary | ICD-10-CM

## 2019-05-22 NOTE — TELEPHONE ENCOUNTER
----- Message from Pam Day MD sent at 5/22/2019  9:37 AM CDT -----  MMG is nl but they commented on a DC   Is she aware of this?

## 2019-05-22 NOTE — TELEPHONE ENCOUNTER
Pt has had discharge from inverted L nipple for several years Has mentioned to mammography Dept before at mammogram that she had leakage from nipple.   Pt states leakage is not all the time and usually noticed when she pops out her inverted nipple and it ha

## 2019-05-23 DIAGNOSIS — N64.52 NIPPLE DISCHARGE: Primary | ICD-10-CM

## 2019-05-28 ENCOUNTER — TELEPHONE (OUTPATIENT)
Dept: INTERNAL MEDICINE CLINIC | Facility: CLINIC | Age: 69
End: 2019-05-28

## 2019-05-28 DIAGNOSIS — M79.605 LEFT LEG PAIN: Primary | ICD-10-CM

## 2019-05-28 DIAGNOSIS — M25.552 PAIN OF LEFT HIP JOINT: ICD-10-CM

## 2019-05-28 NOTE — PROCEDURES
Natacha  [address] [place], [address] [facility]  Ph: [phone]  FAX: [phone]        Full Name: Vasquez Burnett Gender: Female  Patient ID: [id number] YOB: 1950      Visit Date: 5/20/2019 11:59 B.Elbow ADM 5.42 9.7 97 6.30 31.5 B. Elbow - Wrist 18.5 3.02 61       F  Wave      Nerve F Lat M Lat F-M Lat    ms ms ms   L Median - APB 28.9 4.6 24.3   L Ulnar - ADM 25.6 2.8 22.9       EMG         EMG Summary Table     Spontaneous MUAP Recruitment   M ------------------------------

## 2019-05-28 NOTE — TELEPHONE ENCOUNTER
Pt feels worse than when she was seen . Still trying to get in with PT has calls out . Pain is in L leg and feels tight and more sciatica . Taking tylenol 2 tabs now for pain but once it wears off pain returns Requesting muscle relaxer . Hard to sleep at nig

## 2019-05-29 ENCOUNTER — OFFICE VISIT (OUTPATIENT)
Dept: PHYSICAL THERAPY | Age: 69
End: 2019-05-29
Attending: FAMILY MEDICINE
Payer: MEDICARE

## 2019-05-29 DIAGNOSIS — M79.605 LEFT LEG PAIN: ICD-10-CM

## 2019-05-29 PROCEDURE — 97140 MANUAL THERAPY 1/> REGIONS: CPT

## 2019-05-29 PROCEDURE — 97163 PT EVAL HIGH COMPLEX 45 MIN: CPT

## 2019-05-29 NOTE — TELEPHONE ENCOUNTER
Pt called back to the office and had another question Planning on going on trip to [place] with daughter which requires 5-6 hour car drive and will be doing a lot of walking/sightseeing . Asking if she should hold off on doing trip till MRI done?

## 2019-05-29 NOTE — TELEPHONE ENCOUNTER
JUSTICE Manzo wanted to make sure Dr aware she has shaking of head periodically since March Her nuero is aware and does not think it is parkinson's but may be her gabapentin  Or tremors

## 2019-05-29 NOTE — PROGRESS NOTES
LOWER EXTREMITY EVALUATION:   Referring Physician: Dr. Angely Bonds  Diagnosis: L hip pathology and sciatica Date of Service: 5/29/2019     PATIENT SUMMARY:   Sarina Love is a 77year old y/o female who presents to therapy today with complaints of pain in daughter/family out of state next week. Past medical history was reviewed with [employer].  Significant findings include CVA 2017 (good resolution of impairments), diabetes, hypertension, gastritis, hyperlipidemia, sleep apnea, OA        ASSESSMENT:   Palomo WNL    Strength/MMT: 5/5 PATY LE without pain except 4+/5 L hip IR, ER, flex, ext with soreness    Special tests:   Hip Scour: R (-), L (+) hip sxs reproduction  LAD: no change PATY   Lumbar AROM flex and Ext WNL  Lumbar Repeated motions: repeated flexion in lumbar and hip accessory motion to be able to perform bed mobility and sit>stand transfers with <3/10 hip pain  · Pt will demonstrate improved functional hip strength and stability to be able to ambulate without limp/antalgia 90% of the time   · Pt will be

## 2019-05-30 ENCOUNTER — HOSPITAL ENCOUNTER (OUTPATIENT)
Dept: GENERAL RADIOLOGY | Age: 69
Discharge: HOME | End: 2019-05-30
Attending: FAMILY MEDICINE
Payer: MEDICARE

## 2019-05-30 ENCOUNTER — OFFICE VISIT (OUTPATIENT)
Dept: PHYSICAL THERAPY | Age: 69
End: 2019-05-30
Attending: FAMILY MEDICINE
Payer: MEDICARE

## 2019-05-30 DIAGNOSIS — M79.605 LEFT LEG PAIN: ICD-10-CM

## 2019-05-30 DIAGNOSIS — M25.552 PAIN OF LEFT HIP JOINT: ICD-10-CM

## 2019-05-30 PROCEDURE — 97112 NEUROMUSCULAR REEDUCATION: CPT

## 2019-05-30 PROCEDURE — 73502 X-RAY EXAM HIP UNI 2-3 VIEWS: CPT | Performed by: FAMILY MEDICINE

## 2019-05-30 PROCEDURE — 97140 MANUAL THERAPY 1/> REGIONS: CPT

## 2019-05-30 PROCEDURE — 97110 THERAPEUTIC EXERCISES: CPT

## 2019-05-30 NOTE — TELEPHONE ENCOUNTER
FW: Hip X-Ray   Received: Today   Message Contents   Jass Castillo MD  P [facility] Clinical Staff             Please order L hip xray    Previous Messages      ----- Message -----   From: Radha Cabrera PT   Sent: 5/29/2019   6:24 PM   To:  Michelle Cameron,

## 2019-05-30 NOTE — PROGRESS NOTES
Dx: L hip pathology and sciatica         Insurance (Authorized # of Visits):  Medicare           Authorizing Physician: Dr. Magdalena Watson MD visit: none scheduled  Fall Risk: standard         Precautions: n/a             Subjective: MD ordered x-rays for her TX#: 5/ Date:    Tx#: 6/   Manual   L hip Gr II lateral glides with belt 3x30s  -with ER 3x30s   -Inferior glides 3x30s  Rotation long lever glides x30s    Manual supine piriformis stretch 3x30s L    Manual supine HS stretch 3x30s L    prone L piriform

## 2019-06-03 ENCOUNTER — HOSPITAL ENCOUNTER (OUTPATIENT)
Dept: MRI IMAGING | Age: 69
Discharge: HOME | End: 2019-06-03
Attending: FAMILY MEDICINE
Payer: MEDICARE

## 2019-06-03 DIAGNOSIS — M51.9 DISC DISORDER OF LUMBAR REGION: Primary | ICD-10-CM

## 2019-06-03 DIAGNOSIS — M79.605 LEFT LEG PAIN: ICD-10-CM

## 2019-06-03 PROCEDURE — 72148 MRI LUMBAR SPINE W/O DYE: CPT | Performed by: FAMILY MEDICINE

## 2019-06-05 ENCOUNTER — OFFICE VISIT (OUTPATIENT)
Dept: PHYSICAL THERAPY | Age: 69
End: 2019-06-05
Attending: FAMILY MEDICINE
Payer: MEDICARE

## 2019-06-05 PROCEDURE — 97112 NEUROMUSCULAR REEDUCATION: CPT

## 2019-06-05 PROCEDURE — 97012 MECHANICAL TRACTION THERAPY: CPT

## 2019-06-05 PROCEDURE — 97140 MANUAL THERAPY 1/> REGIONS: CPT

## 2019-06-05 NOTE — PROGRESS NOTES
Dx: L hip pathology and sciatica         Insurance (Authorized # of Visits):  Medicare           Authorizing Physician: Dr. Yovani Madrigal  Next MD visit: none scheduled  Fall Risk: standard         Precautions: n/a             Subjective: Pt reports she got her hi changes throughout the lumbar spine. A few Schmorl's nodes are noted. No focal worrisome marrow signal abnormality is seen. The distal spinal cord and conus medullaris have a normal signal and morphology.   The conus medullaris terminates at the lower traversing left L5 nerve root. Clinical correlation for left L5 radiculopathy is recommended. There is mild right and moderate left neural foraminal stenosis.      L5-S1:  Diffuse disc bulge with endplate osteophytes and a superimposed right paracentral/f achieved in PT    Plan: review overnight response to traction and lumbar mobilizations; review education/activity modifications; update HEP  Date: 5/30/2019  TX#: 2/10 Date: 6/5/2019                 TX#: 3/10 Date:                 TX#: 4/ Date: explanation of exit from foramen and path through body  · 3 things nerves need to perform at their best: space, movement, and blood flow  · Samples of imaging research which demonstrates normal/nonpainful changes in the spine   · Nociception and nociceptiv

## 2019-06-06 ENCOUNTER — OFFICE VISIT (OUTPATIENT)
Dept: PHYSICAL THERAPY | Age: 69
End: 2019-06-06
Attending: FAMILY MEDICINE
Payer: MEDICARE

## 2019-06-06 PROCEDURE — 97140 MANUAL THERAPY 1/> REGIONS: CPT

## 2019-06-06 PROCEDURE — 97112 NEUROMUSCULAR REEDUCATION: CPT

## 2019-06-06 NOTE — PROGRESS NOTES
Dx: L hip pathology and sciatica         Insurance (Authorized # of Visits):  Medicare           Authorizing Physician: Dr. Bernadette King  Next MD visit: none scheduled  Fall Risk: standard         Precautions: n/a             Subjective: Pt reports that she felt compliant with comprehensive HEP to maintain progress achieved in PT    Plan: review updated HEP  Date: 5/30/2019  TX#: 2/10 Date: 6/5/2019                 TX#: 3/10 Date: 6/6/2019                TX#: 4/10 Date:                 TX#: 5/ Date:    Tx#: 6/ - x10  -w BKFO x10 ea  -w heel slide x10 ea  TA standing w tandem stance 2x30 ea (fair)  Airex stance w marching 2x20     Modalities  Ice x10 min Modalities  Ice x10 min Modalities  Declined     HEP: Hooklying TA engagement; BKFO; heel slides; isometric hip

## 2019-06-07 ENCOUNTER — OFFICE VISIT (OUTPATIENT)
Dept: INTERNAL MEDICINE CLINIC | Facility: CLINIC | Age: 69
End: 2019-06-07
Payer: MEDICARE

## 2019-06-07 VITALS
SYSTOLIC BLOOD PRESSURE: 100 MMHG | WEIGHT: 158.25 LBS | DIASTOLIC BLOOD PRESSURE: 60 MMHG | RESPIRATION RATE: 16 BRPM | TEMPERATURE: 98 F | HEART RATE: 63 BPM | OXYGEN SATURATION: 98 % | BODY MASS INDEX: 28 KG/M2

## 2019-06-07 DIAGNOSIS — L03.116 CELLULITIS OF LEFT LOWER EXTREMITY: Primary | ICD-10-CM

## 2019-06-07 PROCEDURE — 99213 OFFICE O/P EST LOW 20 MIN: CPT | Performed by: FAMILY MEDICINE

## 2019-06-07 RX ORDER — CLINDAMYCIN HYDROCHLORIDE 150 MG/1
150 CAPSULE ORAL 3 TIMES DAILY
Qty: 21 CAPSULE | Refills: 0 | Status: SHIPPED | OUTPATIENT
Start: 2019-06-07 | End: 2019-06-20 | Stop reason: ALTCHOICE

## 2019-06-07 NOTE — PROGRESS NOTES
HPI:    Patient ID: Corby Cota is a 77year old female.     HPI  Here to ck cut on her left leg a week ago in [facility] yard   [place] very little   Washed it and put peroxide and neosporin     Seemed to be healing well but noted 2d ago seemed reddish NITROSTAT 0.4 MG Sublingual SL Tab Place 0.4 mg under the tongue every 5 (five) minutes as needed. Disp:  Rfl: 5   Multiple Vitamin (MULTI-VITAMIN) Oral Tab Take 1 Tab by mouth daily.  Disp:  Rfl:      Allergies:  Penicillins             RASH, SWELLING

## 2019-06-10 ENCOUNTER — OFFICE VISIT (OUTPATIENT)
Dept: PHYSICAL THERAPY | Age: 69
End: 2019-06-10
Attending: FAMILY MEDICINE
Payer: MEDICARE

## 2019-06-10 PROCEDURE — 97112 NEUROMUSCULAR REEDUCATION: CPT

## 2019-06-10 PROCEDURE — 97140 MANUAL THERAPY 1/> REGIONS: CPT

## 2019-06-10 NOTE — PROGRESS NOTES
Dx: L hip pathology and sciatica         Insurance (Authorized # of Visits):  Medicare           Authorizing Physician: Dr. Andree Bhagat  Next MD visit: none scheduled  Fall Risk: standard         Precautions: n/a             Subjective: Pain has been worse.  Bad PAIN - ADULT    • Verbalizes/displays adequate comfort level or patient's stated pain goal Progressing        Patient/Family Goals    • Patient/Family Long Term Goal Progressing    • Patient/Family Short Term Goal Progressing        SKIN/TISSUE INTEGRITY - sit>stand and walking following mat activities. Required ice at end of session. Due to worsening symptoms; pt would benefit from Spine consult and possible injection.  Instructed pt that since she is not scheduled for her spine consult until Friday, will co Manual supine piriformis stretch 3x30s L     Manual supine HS stretch L -too painful    sidelying L piriformis STM x8 min     Sidelying  LTR mobilizations Gr I>II>I L1-5 x2 min  Manual   L hip Gr II lateral glides 3x30s   -Inferior glides 3x30s   Rotat ABD    Charges: man x2, neuro re-ed x1      Total Timed Treatment: 45 min  Total Treatment Time: 55 min

## 2019-06-12 ENCOUNTER — OFFICE VISIT (OUTPATIENT)
Dept: SURGERY | Facility: CLINIC | Age: 69
End: 2019-06-12
Payer: MEDICARE

## 2019-06-12 VITALS
DIASTOLIC BLOOD PRESSURE: 70 MMHG | WEIGHT: 158 LBS | BODY MASS INDEX: 28 KG/M2 | HEIGHT: 63 IN | SYSTOLIC BLOOD PRESSURE: 118 MMHG

## 2019-06-12 DIAGNOSIS — N64.52 DISCHARGE FROM LEFT NIPPLE: Primary | ICD-10-CM

## 2019-06-12 PROCEDURE — 99203 OFFICE O/P NEW LOW 30 MIN: CPT | Performed by: SURGERY

## 2019-06-12 NOTE — H&P
New Patient Visit Note       Active Problems      1. Discharge from left nipple        Chief Complaint   Patient presents with:  Breast Problem: pt c/o of discharge from left nipple onset 1 month ago.  per pt has inverted left nipple, Pt had mammogram done [facility])     during pregnancy right leg   • Diabetes [facility])    • Diverticulosis of large intestine     unsure if divertiulitis or diverticulosis   • Esophageal reflux    • Essential hypertension, benign 3/37/9302   • Helicobacter pylori gastritis 2004   • High Comment: very rarely      Drug use: No    Other Topics      Concerns:        Caffeine Concern: Yes          1-2 cups of coffee/ tea daily        Exercise: Yes          walking       Current Outpatient Medications:   •  clindamycin HCl 150 MG Oral Cap, Take (five) minutes as needed. , Disp: , Rfl: 5  •  Multiple Vitamin (MULTI-VITAMIN) Oral Tab, Take 1 Tab by mouth daily. , Disp: , Rfl:       Review of Systems  The Review of Systems has been reviewed by me during today.   Review of Systems   Constitutional: Ne and no tenderness. Left breast exhibits inverted nipple. Left breast exhibits no mass, no nipple discharge, no skin change and no tenderness. No breast swelling. There is sebum type material in the inverted left nipple.   There is no erythema or drainage explaining the results in lay terms has been sent to the patient. This exam was evaluated with a computer-aided device.   This patient's information has been entered into a reminder system with a target due date for the next mammogram.           Dictated b

## 2019-06-13 ENCOUNTER — OFFICE VISIT (OUTPATIENT)
Dept: PHYSICAL THERAPY | Age: 69
End: 2019-06-13
Attending: FAMILY MEDICINE
Payer: MEDICARE

## 2019-06-13 PROCEDURE — 97140 MANUAL THERAPY 1/> REGIONS: CPT

## 2019-06-13 PROCEDURE — 97112 NEUROMUSCULAR REEDUCATION: CPT

## 2019-06-13 NOTE — PROGRESS NOTES
Dx: L hip pathology and sciatica         Insurance (Authorized # of Visits):  Medicare           Authorizing Physician: Dr. Fleta Lennox  Next MD visit: none scheduled  Fall Risk: standard         Precautions: n/a             Subjective: Pt reports that pari reyes functional hip strength and stability to be able to ambulate without limp/antalgia 90% of the time -progress  · Pt will be independent and compliant with comprehensive HEP to maintain progress achieved in PT -fair progress    Plan: review PA impression and Manual supine piriformis stretch 3x30s L     Manual supine HS stretch L -3x3s (low range)    SLR flossing L x3 sets hip, knee, ankle    sidelying L piriformis STM x6 min (good)    Sidelying  LTR mobilizations Gr I L1-5 x2 min (good)   Therex  LTR   -fe

## 2019-06-14 ENCOUNTER — OFFICE VISIT (OUTPATIENT)
Dept: SURGERY | Facility: CLINIC | Age: 69
End: 2019-06-14
Payer: MEDICARE

## 2019-06-14 ENCOUNTER — TELEPHONE (OUTPATIENT)
Dept: INTERNAL MEDICINE CLINIC | Facility: CLINIC | Age: 69
End: 2019-06-14

## 2019-06-14 ENCOUNTER — TELEPHONE (OUTPATIENT)
Dept: SURGERY | Facility: CLINIC | Age: 69
End: 2019-06-14

## 2019-06-14 VITALS
DIASTOLIC BLOOD PRESSURE: 78 MMHG | HEIGHT: 63 IN | WEIGHT: 158 LBS | HEART RATE: 62 BPM | SYSTOLIC BLOOD PRESSURE: 126 MMHG | BODY MASS INDEX: 28 KG/M2

## 2019-06-14 DIAGNOSIS — M48.061 SPINAL STENOSIS OF LUMBAR REGION, UNSPECIFIED WHETHER NEUROGENIC CLAUDICATION PRESENT: ICD-10-CM

## 2019-06-14 DIAGNOSIS — M51.16 LUMBAR DISC HERNIATION WITH RADICULOPATHY: Primary | ICD-10-CM

## 2019-06-14 DIAGNOSIS — M25.552 LEFT HIP PAIN: ICD-10-CM

## 2019-06-14 PROCEDURE — 99203 OFFICE O/P NEW LOW 30 MIN: CPT | Performed by: PHYSICIAN ASSISTANT

## 2019-06-14 RX ORDER — METHYLPREDNISOLONE 4 MG/1
TABLET ORAL
Qty: 1 PACKAGE | Refills: 0 | Status: SHIPPED | OUTPATIENT
Start: 2019-06-14 | End: 2019-07-11 | Stop reason: ALTCHOICE

## 2019-06-14 NOTE — PROGRESS NOTES
Location of Pain:  Pt states that she has issues with lumbar pain. Pt states that she has issues with left leg pain. Pt states that she has issues with numbness and tinging in the left leg. Pt states that she has issues with weakness.  Pt states that she ha

## 2019-06-14 NOTE — TELEPHONE ENCOUNTER
Patient walked in, inquiring about the RX methylPREDNISolone (MEDROL) 4 MG.     Pt was prescribed this medication by Dr Mary Carmen Lucas, and they wanted to run it through patient's PCP, to verify if the medication is safe to take, considering the fact pt is

## 2019-06-14 NOTE — PATIENT INSTRUCTIONS
Refill policies:    • Allow 2-3 business days for refills; controlled substances may take longer.   • Contact your pharmacy at least 5 days prior to running out of medication and have them send an electronic request or submit request through the “request re been approved by your insurer. Depending on your insurance carrier, approval may take 3-10 days. It is highly recommended patients contact their insurance carrier directly to determine coverage.   If test is done without insurance authorization, patient ma cleared by her PCP we can try on a Medrol Dosepak  3. Pain center for evaluation for left L3-4 L4-5 epidural  4. We will need to get Dr. Meet Mishra permission for coming off the Plavix for injection  5.   Follow-up with Dr. Carissa Landon after injections if they

## 2019-06-14 NOTE — PROGRESS NOTES
[facility] Neurosurgery Consultation      HISTORY OF PRESENT Darling Pete is a 77year old female here for spinal consultation.   She gives a history of bar laminectomy in 1984 with no residual.  She had a left basal ganglia stroke in 2017 she was h hemorrhage    • Visual impairment     glasses       PAST SURGICAL HISTORY:  Past Surgical History:   Procedure Laterality Date   • BACK SURGERY      Spinal diskectomy lumbar   •   1987   • CARDIAC CATHERTERIZATION (PBP)      status rory ISOSORBIDE MONONITRATE  MG Oral Tablet 24 Hr TAKE 1 TABLET(120 MG) BY MOUTH DAILY Disp: 90 tablet Rfl: 0   metoprolol Tartrate 25 MG Oral Tab Take by mouth.  50 mg in the AM and 25 mg at night Disp:  Rfl:    gabapentin 300 MG Oral Cap Take 2 capsule atraumatic  SKIN: Warm, dry, no rashes. NEUROLOGICAL:  This patient is alert and orientated x 3. Speech fluent. Comprehension intact. Face is symmetrical.    SPINE: No neck pain on flexion and extension.  Sensation to light touch is intact bilateral in Diabetes  7. Anticoagulation    PLAN:  1. Patient is unable to tolerate hydrocodone, codeine for pain  2. If cleared by her PCP we can try on a Medrol Dosepak  3. Pain center for evaluation for left L3-4 L4-5 epidural  4.   We will need to get Dr. Maurice Rivera

## 2019-06-15 NOTE — TELEPHONE ENCOUNTER
This is fine for Pt to take. It may increase her glucose levels but Pt should monitor them and watch her diet. And Pt is only on them for a 6 days.

## 2019-06-16 DIAGNOSIS — E11.8 TYPE 2 DIABETES MELLITUS WITH COMPLICATION (HCC): ICD-10-CM

## 2019-06-17 ENCOUNTER — TELEPHONE (OUTPATIENT)
Dept: PHYSICAL THERAPY | Age: 69
End: 2019-06-17

## 2019-06-17 NOTE — TELEPHONE ENCOUNTER
Called to discuss POC following Spine consult. Plan to hold PT until after pain management consult/possible injections. Pt reports she also started a steroid pack this week.  If sxs are more manageable, then will resume PT; pt in agreement

## 2019-06-17 NOTE — TELEPHONE ENCOUNTER
Attempted to call pt but call did not go through. Pt views [facility] [employer] messages. [employer] message sent to pt to notify her of provider response. Pt called back and notified her of message. Pt verbalized understanding.

## 2019-06-18 ENCOUNTER — APPOINTMENT (OUTPATIENT)
Dept: PHYSICAL THERAPY | Age: 69
End: 2019-06-18
Attending: FAMILY MEDICINE
Payer: MEDICARE

## 2019-06-20 ENCOUNTER — APPOINTMENT (OUTPATIENT)
Dept: PHYSICAL THERAPY | Age: 69
End: 2019-06-20
Attending: FAMILY MEDICINE
Payer: MEDICARE

## 2019-06-20 ENCOUNTER — OFFICE VISIT (OUTPATIENT)
Dept: PAIN CLINIC | Facility: CLINIC | Age: 69
End: 2019-06-20
Payer: MEDICARE

## 2019-06-20 ENCOUNTER — TELEPHONE (OUTPATIENT)
Dept: PAIN CLINIC | Facility: CLINIC | Age: 69
End: 2019-06-20

## 2019-06-20 VITALS
HEART RATE: 66 BPM | DIASTOLIC BLOOD PRESSURE: 50 MMHG | OXYGEN SATURATION: 94 % | SYSTOLIC BLOOD PRESSURE: 112 MMHG | HEIGHT: 63 IN | WEIGHT: 158 LBS | BODY MASS INDEX: 28 KG/M2

## 2019-06-20 DIAGNOSIS — M54.16 LUMBAR RADICULOPATHY: Primary | ICD-10-CM

## 2019-06-20 PROCEDURE — 99203 OFFICE O/P NEW LOW 30 MIN: CPT | Performed by: ANESTHESIOLOGY

## 2019-06-20 NOTE — PROGRESS NOTES
HPI:    Patient ID:   PHYSICAL EXAM:   Physical Exam   Constitutional:           Pain location: low back down left leg    Pain level: 3/10    MRI SPINE LUMBAR (CPT=72148)                 (6-3-2019)      Location of Pain: left leg    Date Pain Began: 4/2019

## 2019-06-20 NOTE — PATIENT INSTRUCTIONS
Refill policies:    • Allow 2-3 business days for refills; controlled substances may take longer.   • Contact your pharmacy at least 5 days prior to running out of medication and have them send an electronic request or submit request through the “request re Depending on your insurance carrier, approval may take 3-10 days. It is highly recommended patients contact their insurance carrier directly to determine coverage.   If test is done without insurance authorization, patient may be responsible for the entire AND/OR RESCHEDULING: PLEASE CALL [facility] PRE-PROCEDURE LINE [phone] FOR DETAILED INSTRUCTIONS FIVE TO SEVEN DAYS PRIOR TO PROCEDURE**        Prior to the procedure:  Please update us prior to the procedure if you are experiencing any symptoms of infect procedures require 3 days  • Ibuprofen (Motrin, Advil, Vicoprofen), Naproxen (Naprosyn, Aleve), Piroxcam (Feldene), Meloxicam (Mobic)--(Cervical procedures will require 3 days), Oxaprozin (Daypro), Diclofenac (Voltaren),  Indomethacin (Indocin), Etodolac ( steroid, or anti-inflammatory medication, into the opening at the side of the spine where nerve roots exit. This opening is known as a foramen. What is the purpose of a transforaminal injection?   The long acting anti-inflammatory medication, or steroid, antiseptic solution and numbed with local anesthetic. Then the injection needle is placed using X-ray guidance. Finally, the needle is removed and an adhesive bandage is applied. What should I expect after the transforaminal injection?   Immediately after injection help me? Patients who have pain radiating from the spine down into the arms or legs respond better to the injections than patients who have only pure neck or back pain.  Patients with a recent onset of pain may respond much better than patients w

## 2019-06-20 NOTE — TELEPHONE ENCOUNTER
Medical clearance requested from Dr. Jb Holder for patient to hold plavix for 7 days for TBD procedures. Awaiting response.     Patient recommended for:    TLESI left x3

## 2019-06-20 NOTE — PROGRESS NOTES
Spoke with patient regarding medical clearance process and injections. Reviewed pre-op instructions. Patient verbalized understanding, no further questions at this time. Pre-op instructions handout given to patient.     Confirmed procedures to be done with

## 2019-06-20 NOTE — H&P
Name: Joshua Bonds   :    DOS: 2019     Chief complaint: Lumbar radiculopathy    History of present illness:  Joshua Bonds is a 77year old female referred for evaluation of a one-month history of low back pain with pain going do TAKE 1 TABLET(120 MG) BY MOUTH DAILY Disp: 90 tablet Rfl: 0   metoprolol Tartrate 25 MG Oral Tab Take by mouth. 50 mg in the AM and 25 mg at night Disp:  Rfl:    gabapentin 300 MG Oral Cap Take 2 capsules (600 mg total) by mouth 2 (two) times daily.  Disp: repair;    • HYSTERECTOMY  01/01/981    d/t fibroids   • SHOULDER SURG PROC UNLISTED     • SPINE SURGERY PROCEDURE UNLISTED      bulging discs in lower back   • UPPER GI ENDOSCOPY PERFORMED  01/24/2011      Family History   Problem Relation Age of Onset The patient has a left paracentral disc extrusion resulting in impingement of the transversing left L5 nerve root. Assessment:  Lumbar radiculopathy  (primary encounter diagnosis).       Plan:     The patient is a 28-year-old female with a history of low

## 2019-06-20 NOTE — TELEPHONE ENCOUNTER
you do not have specific back restrictions but things that can aggravate it would be bending twisting and lifting. I would avoid anything that precipitates your pain.

## 2019-06-21 NOTE — TELEPHONE ENCOUNTER
Called patient notified of medical clearance approval and scheduled procedures. Confirmed procedures to be done with Dr. Blanca Longoria. Patient requested local. Reviewed local instructions that are listed below.  Advised patient no need to fast and no need for drive ? Please note-No prescriptions will be written by Pain Clinic in [place] on the day of procedure. If you require a refill of medications, please contact the office 48 hours prior to your procedure.   ? If you have an implanted Spinal Cord or Peripheral Nerve Sti In the event you need to cancel or reschedule your appointment, you must notify the office 24 hours prior.     Post-procedure instructions:        Please schedule a follow up visit within 2 to 4 weeks after your last procedure date   Please call our office The transforaminal injection involves inserting a needle through skin and deeper tissues The skin and deeper tissues are numbed with a local anesthetic before inserting the injection needle.  Once numbed, placing the injection needle often feels like more o Immediately after the injection, your arm or leg may feel slightly heavy and may be numb. This may be due to the location where the injection was done and how much local anesthetic was used.  Despite the numbness, most patients can still actively move their Patients who have pain radiating from the spine down into the arms or legs respond better to the injections than patients who have only pure neck or back pain.  Patients with a recent onset of pain may respond much better than patients with longstanding claudette

## 2019-06-22 RX ORDER — LISINOPRIL AND HYDROCHLOROTHIAZIDE 25; 20 MG/1; MG/1
TABLET ORAL
Qty: 90 TABLET | Refills: 0 | Status: SHIPPED | OUTPATIENT
Start: 2019-06-22 | End: 2019-09-19

## 2019-06-22 RX ORDER — METOPROLOL TARTRATE 50 MG/1
TABLET, FILM COATED ORAL
Qty: 180 TABLET | Refills: 0 | OUTPATIENT
Start: 2019-06-22

## 2019-06-24 ENCOUNTER — APPOINTMENT (OUTPATIENT)
Dept: PHYSICAL THERAPY | Age: 69
End: 2019-06-24
Attending: FAMILY MEDICINE
Payer: MEDICARE

## 2019-06-24 RX ORDER — OMEPRAZOLE 40 MG/1
CAPSULE, DELAYED RELEASE ORAL
Qty: 90 CAPSULE | Refills: 3 | Status: ON HOLD | OUTPATIENT
Start: 2019-06-24 | End: 2019-12-02

## 2019-06-25 ENCOUNTER — TELEPHONE (OUTPATIENT)
Dept: PHYSICAL THERAPY | Age: 69
End: 2019-06-25

## 2019-06-25 NOTE — TELEPHONE ENCOUNTER
Pt called to check-in with PT. She reports that she is scheduled for epidural injections with Dr. Matias Primrose on 7/1, 7/22, 8/12. She reports that the oral steroids significantly helped her pain; she was almost pain free during the day for a couple days.  Steroid

## 2019-06-26 ENCOUNTER — TELEPHONE (OUTPATIENT)
Dept: SURGERY | Facility: CLINIC | Age: 69
End: 2019-06-26

## 2019-06-26 NOTE — TELEPHONE ENCOUNTER
Spoke to patient, confirmed procedure date of 7/1/19 with Dr Sandra Hatch and to be checked in at outpatient registration at 12:15 pm. Patient instructed to call pre-procedure line before procedure at [phone].  Patient instructed to call office if there are ad

## 2019-06-27 ENCOUNTER — APPOINTMENT (OUTPATIENT)
Dept: PHYSICAL THERAPY | Age: 69
End: 2019-06-27
Attending: FAMILY MEDICINE
Payer: MEDICARE

## 2019-07-01 ENCOUNTER — APPOINTMENT (OUTPATIENT)
Dept: GENERAL RADIOLOGY | Facility: HOSPITAL | Age: 69
End: 2019-07-01
Attending: ANESTHESIOLOGY
Payer: MEDICARE

## 2019-07-01 ENCOUNTER — HOSPITAL ENCOUNTER (OUTPATIENT)
Facility: HOSPITAL | Age: 69
Setting detail: HOSPITAL OUTPATIENT SURGERY
Discharge: HOME | End: 2019-07-01
Attending: ANESTHESIOLOGY | Admitting: ANESTHESIOLOGY
Payer: MEDICARE

## 2019-07-01 VITALS
DIASTOLIC BLOOD PRESSURE: 56 MMHG | HEART RATE: 60 BPM | OXYGEN SATURATION: 98 % | TEMPERATURE: 98 F | RESPIRATION RATE: 18 BRPM | SYSTOLIC BLOOD PRESSURE: 115 MMHG

## 2019-07-01 DIAGNOSIS — M54.16 LUMBAR RADICULOPATHY: ICD-10-CM

## 2019-07-01 LAB — GLUCOSE BLD-MCNC: 87 MG/DL (ref 70–99)

## 2019-07-01 PROCEDURE — 82962 GLUCOSE BLOOD TEST: CPT

## 2019-07-01 PROCEDURE — 3E0R33Z INTRODUCTION OF ANTI-INFLAMMATORY INTO SPINAL CANAL, PERCUTANEOUS APPROACH: ICD-10-PCS | Performed by: ANESTHESIOLOGY

## 2019-07-01 RX ORDER — 0.9 % SODIUM CHLORIDE 0.9 %
VIAL (ML) INJECTION AS NEEDED
Status: DISCONTINUED | OUTPATIENT
Start: 2019-07-01 | End: 2019-07-01

## 2019-07-01 RX ORDER — LIDOCAINE HYDROCHLORIDE 10 MG/ML
INJECTION, SOLUTION EPIDURAL; INFILTRATION; INTRACAUDAL; PERINEURAL AS NEEDED
Status: DISCONTINUED | OUTPATIENT
Start: 2019-07-01 | End: 2019-07-01

## 2019-07-01 RX ORDER — DIPHENHYDRAMINE HYDROCHLORIDE 50 MG/ML
50 INJECTION INTRAMUSCULAR; INTRAVENOUS ONCE AS NEEDED
Status: DISCONTINUED | OUTPATIENT
Start: 2019-07-01 | End: 2019-07-01

## 2019-07-01 RX ORDER — ONDANSETRON 2 MG/ML
4 INJECTION INTRAMUSCULAR; INTRAVENOUS ONCE AS NEEDED
Status: DISCONTINUED | OUTPATIENT
Start: 2019-07-01 | End: 2019-07-01

## 2019-07-01 RX ORDER — DEXTROSE MONOHYDRATE 25 G/50ML
50 INJECTION, SOLUTION INTRAVENOUS
Status: CANCELLED | OUTPATIENT
Start: 2019-07-01

## 2019-07-01 RX ORDER — INSULIN ASPART 100 [IU]/ML
3 INJECTION, SOLUTION INTRAVENOUS; SUBCUTANEOUS ONCE
Status: CANCELLED | OUTPATIENT
Start: 2019-07-01 | End: 2019-07-01

## 2019-07-01 RX ORDER — DEXAMETHASONE SODIUM PHOSPHATE 10 MG/ML
INJECTION, SOLUTION INTRAMUSCULAR; INTRAVENOUS AS NEEDED
Status: DISCONTINUED | OUTPATIENT
Start: 2019-07-01 | End: 2019-07-01

## 2019-07-01 NOTE — TELEPHONE ENCOUNTER
Patient called to confirm that she does not need to fast with local as the pre-procedure line instructions does not state if fasting is necessary or not with local. Patient states that she does remember instructions verbally given to her and wrote down no

## 2019-07-01 NOTE — OPERATIVE REPORT
[facility]  Operative Report  2019     Angel Pardo Patient Status:  Hospital Outpatient Surgery    4/15/1950 MRN [id number]   [facility] ENDOSCOPY Attending Rayne Kahn MD   Hosp Day # 0 PCP Serina Wick MD     Indication: saline with 10 mg of dexamethasone was injected without complication. The needle was withdrawn with stylet in situ. The patient tolerated procedure very well. The patient was observed until discharge criteria met.   Discharge instructions were given and p

## 2019-07-01 NOTE — H&P
History & Physical Examination    Patient Name: Maico Harper  MRN: [id number]  [facility]: [id number]  YOB: 1950    Pre-Operative Diagnosis:  Lumbar radiculopathy [M54.16]    Present Illness: Patient with lumbar radiculopathy for transforaminal mouth every 6 (six) hours as needed for Pain. Disp:  Rfl:  Taking   PEG 3350 Oral Powd Pack Take 17 g by mouth nightly. Disp:  Rfl:  Taking   NITROSTAT 0.4 MG Sublingual SL Tab Place 0.4 mg under the tongue every 5 (five) minutes as needed.    Disp:  Rfl: hemorrhage    • Visual impairment     glasses     Past Surgical History:   Procedure Laterality Date   • BACK SURGERY      Spinal diskectomy lumbar   •   1987   • CARDIAC CATHERTERIZATION (PBP)      status normal resolved on 2007

## 2019-07-11 ENCOUNTER — TELEPHONE (OUTPATIENT)
Dept: PHYSICAL THERAPY | Age: 69
End: 2019-07-11

## 2019-07-11 ENCOUNTER — OFFICE VISIT (OUTPATIENT)
Dept: SURGERY | Facility: CLINIC | Age: 69
End: 2019-07-11
Payer: MEDICARE

## 2019-07-11 VITALS — DIASTOLIC BLOOD PRESSURE: 70 MMHG | SYSTOLIC BLOOD PRESSURE: 120 MMHG | HEART RATE: 64 BPM

## 2019-07-11 DIAGNOSIS — M51.16 LUMBAR DISC HERNIATION WITH RADICULOPATHY: Primary | ICD-10-CM

## 2019-07-11 PROCEDURE — 99213 OFFICE O/P EST LOW 20 MIN: CPT | Performed by: PHYSICIAN ASSISTANT

## 2019-07-11 NOTE — PROGRESS NOTES
Pt is here for follow up post injections. LEFT LUMBAR 4-5 TRANSFORAMINAL EPIDURAL STEROID INJECTION WITH LOCAL 7/1/19 50% Present.

## 2019-07-11 NOTE — PROGRESS NOTES
Follow-up post LEFT LUMBAR 4-5 TRANSFORAMINAL EPIDURAL STEROID INJECTION (7/1/19)    Last OV 6/14/19    Pain location:    Injection relief reported:    Medrol Dosepack relief reported:

## 2019-07-11 NOTE — TELEPHONE ENCOUNTER
Called to follow-up. Pt reports that she is better since the first injection. She is still having some pain with sitting for too long in car or home; also still sore with trying to lift bags or grandson.  Is scheduled for 2 more injections (7/22 and 8/12);

## 2019-07-11 NOTE — PROGRESS NOTES
Neurosurgery Clinic Visit  2019    Joshua Bonds PCP:  Kulwinder Coto MD     MRN [id number]     HISTORY OF PRESENT ILLNESS:  Joshua Bonds is a(n) 77year old female who is here for follow-up for lumbar radiculopathy.   Since her las us after injections are completed. Pt appreciative. Dr. Andres Dennis evaluated the patient and is in agreement with the plan. Total time spent: 15 Minutes  Greater than 50% of the time was spent on counseling/coordination of care.   Chelsi Miller of education

## 2019-07-15 DIAGNOSIS — Z86.73 HISTORY OF STROKE WITHIN LAST YEAR: ICD-10-CM

## 2019-07-15 RX ORDER — ATORVASTATIN CALCIUM 40 MG/1
40 TABLET, FILM COATED ORAL NIGHTLY
Qty: 90 TABLET | Refills: 1 | Status: SHIPPED | OUTPATIENT
Start: 2019-07-15 | End: 2020-01-06

## 2019-07-15 NOTE — TELEPHONE ENCOUNTER
Medication: Atorvastatin    Date of last refill: 1/2/19  (#90/1)  [place]    Last office visit: 5/7/2019  Due back to clinic per last office note:  3 months  Date next office visit scheduled:    Future Appointments [address] [place] [address])        As noted above     Return in about 3 months (around 8/7/2019).

## 2019-07-17 ENCOUNTER — TELEPHONE (OUTPATIENT)
Dept: SURGERY | Facility: CLINIC | Age: 69
End: 2019-07-17

## 2019-07-17 NOTE — TELEPHONE ENCOUNTER
Spoke to patient, confirmed procedure date of 7/22/19 with Dr Chelsey Tim and to be checked in at outpatient registration at 12:15 pm. Patient called pre-procedure line and understood instructions.  Patient instructed to call office if there are additional questio

## 2019-07-22 ENCOUNTER — HOSPITAL ENCOUNTER (OUTPATIENT)
Facility: HOSPITAL | Age: 69
Setting detail: HOSPITAL OUTPATIENT SURGERY
Discharge: HOME | End: 2019-07-22
Attending: ANESTHESIOLOGY | Admitting: ANESTHESIOLOGY
Payer: MEDICARE

## 2019-07-22 ENCOUNTER — APPOINTMENT (OUTPATIENT)
Dept: GENERAL RADIOLOGY | Facility: HOSPITAL | Age: 69
End: 2019-07-22
Attending: ANESTHESIOLOGY
Payer: MEDICARE

## 2019-07-22 VITALS
DIASTOLIC BLOOD PRESSURE: 61 MMHG | SYSTOLIC BLOOD PRESSURE: 108 MMHG | RESPIRATION RATE: 18 BRPM | TEMPERATURE: 97 F | OXYGEN SATURATION: 95 % | HEART RATE: 56 BPM

## 2019-07-22 DIAGNOSIS — M54.16 LUMBAR RADICULOPATHY: ICD-10-CM

## 2019-07-22 LAB — GLUCOSE BLD-MCNC: 122 MG/DL (ref 70–99)

## 2019-07-22 PROCEDURE — 3E0R33Z INTRODUCTION OF ANTI-INFLAMMATORY INTO SPINAL CANAL, PERCUTANEOUS APPROACH: ICD-10-PCS | Performed by: ANESTHESIOLOGY

## 2019-07-22 PROCEDURE — 82962 GLUCOSE BLOOD TEST: CPT

## 2019-07-22 RX ORDER — DEXAMETHASONE SODIUM PHOSPHATE 10 MG/ML
INJECTION, SOLUTION INTRAMUSCULAR; INTRAVENOUS AS NEEDED
Status: DISCONTINUED | OUTPATIENT
Start: 2019-07-22 | End: 2019-07-22

## 2019-07-22 RX ORDER — ONDANSETRON 2 MG/ML
4 INJECTION INTRAMUSCULAR; INTRAVENOUS ONCE AS NEEDED
Status: CANCELLED | OUTPATIENT
Start: 2019-07-22 | End: 2019-07-22

## 2019-07-22 RX ORDER — INSULIN ASPART 100 [IU]/ML
3 INJECTION, SOLUTION INTRAVENOUS; SUBCUTANEOUS ONCE
Status: DISCONTINUED | OUTPATIENT
Start: 2019-07-22 | End: 2019-07-22

## 2019-07-22 RX ORDER — DIPHENHYDRAMINE HYDROCHLORIDE 50 MG/ML
50 INJECTION INTRAMUSCULAR; INTRAVENOUS ONCE AS NEEDED
Status: DISCONTINUED | OUTPATIENT
Start: 2019-07-22 | End: 2019-07-22

## 2019-07-22 RX ORDER — LIDOCAINE HYDROCHLORIDE 10 MG/ML
INJECTION, SOLUTION EPIDURAL; INFILTRATION; INTRACAUDAL; PERINEURAL AS NEEDED
Status: DISCONTINUED | OUTPATIENT
Start: 2019-07-22 | End: 2019-07-22

## 2019-07-22 RX ORDER — 0.9 % SODIUM CHLORIDE 0.9 %
VIAL (ML) INJECTION AS NEEDED
Status: DISCONTINUED | OUTPATIENT
Start: 2019-07-22 | End: 2019-07-22

## 2019-07-22 RX ORDER — SODIUM CHLORIDE, SODIUM LACTATE, POTASSIUM CHLORIDE, CALCIUM CHLORIDE 600; 310; 30; 20 MG/100ML; MG/100ML; MG/100ML; MG/100ML
100 INJECTION, SOLUTION INTRAVENOUS CONTINUOUS
Status: CANCELLED | OUTPATIENT
Start: 2019-07-22

## 2019-07-22 RX ORDER — DEXTROSE MONOHYDRATE 25 G/50ML
50 INJECTION, SOLUTION INTRAVENOUS
Status: DISCONTINUED | OUTPATIENT
Start: 2019-07-22 | End: 2019-07-22

## 2019-07-22 RX ORDER — ONDANSETRON 2 MG/ML
4 INJECTION INTRAMUSCULAR; INTRAVENOUS ONCE AS NEEDED
Status: DISCONTINUED | OUTPATIENT
Start: 2019-07-22 | End: 2019-07-22

## 2019-07-22 NOTE — H&P
History & Physical Examination    Patient Name: Joshua Bonds  MRN: [id number]  CSN: [id number]  YOB: 1950    Pre-Operative Diagnosis:  Lumbar radiculopathy [M54.16]    Present Illness: Patient with lumbar radiculopathy for transforaminal 17 g by mouth nightly. Disp:  Rfl:  Taking   NITROSTAT 0.4 MG Sublingual SL Tab Place 0.4 mg under the tongue every 5 (five) minutes as needed. Disp:  Rfl: 5 Taking   Multiple Vitamin (MULTI-VITAMIN) Oral Tab Take 1 Tab by mouth daily.  Disp:  Rfl:  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 [ x ] I have reviewed the History and Physical done within the last 30 days. Any changes noted above.     Li Weaver MD

## 2019-07-22 NOTE — OPERATIVE REPORT
[facility]  Operative Report  2019     Yusra Guevara Patient Status:  Hospital Outpatient Surgery    4/15/1950 MRN [id number]   [facility] ENDOSCOPY Attending Jyotsna Benavides MD   Hosp Day # 0 PCP Deanna Haynes MD     Indication: saline with 10 mg of dexamethasone was injected without complication. The needle was withdrawn with stylet in situ. The patient tolerated procedure very well. The patient was observed until discharge criteria met.   Discharge instructions were given and p

## 2019-07-23 ENCOUNTER — TELEPHONE (OUTPATIENT)
Dept: SURGERY | Facility: CLINIC | Age: 69
End: 2019-07-23

## 2019-07-23 NOTE — TELEPHONE ENCOUNTER
Pt states during procedure when physician gave her shot she screamed \"ouch\" and physician said \"it's over\". Pt states she wanted to know why during this procedure did she feel pain? Pt states is it normal if that happens?  Please contact pt regarding is

## 2019-07-23 NOTE — TELEPHONE ENCOUNTER
Spoke to pt who states she thought she would receive an epidural that would deliver medication through a small tube. Explained to pt that this describes an epidural rcv'd during labor. Explained the procedure pt rcv'd yesterday, 7/22/19, to her.  Pt verbali

## 2019-08-01 ENCOUNTER — OFFICE VISIT (OUTPATIENT)
Dept: NEUROLOGY | Facility: CLINIC | Age: 69
End: 2019-08-01
Payer: MEDICARE

## 2019-08-01 VITALS
DIASTOLIC BLOOD PRESSURE: 46 MMHG | BODY MASS INDEX: 28 KG/M2 | RESPIRATION RATE: 15 BRPM | SYSTOLIC BLOOD PRESSURE: 98 MMHG | WEIGHT: 156 LBS | HEART RATE: 58 BPM

## 2019-08-01 DIAGNOSIS — G56.03 BILATERAL CARPAL TUNNEL SYNDROME: Primary | ICD-10-CM

## 2019-08-01 DIAGNOSIS — Z86.73 HISTORY OF STROKE WITHOUT RESIDUAL DEFICITS: ICD-10-CM

## 2019-08-01 PROCEDURE — 99213 OFFICE O/P EST LOW 20 MIN: CPT | Performed by: OTHER

## 2019-08-01 RX ORDER — CLOPIDOGREL BISULFATE 75 MG/1
75 TABLET ORAL DAILY
Qty: 90 TABLET | Refills: 1 | Status: SHIPPED | OUTPATIENT
Start: 2019-08-01 | End: 2020-05-18

## 2019-08-01 NOTE — PROGRESS NOTES
[facility] Progress Note    HPI  No chief complaint on file.     In summary of prior visits:   \"     Patient presents in follow-up after recently discharged 4/12/17, following admission for acute R sided paresthesia with stroke in left • Chest pain, unspecified 12/05    admit 12/05   • Deep vein thrombosis ([address]     during pregnancy right leg   • Diabetes [facility])    • Diverticulosis of large intestine     unsure if divertiulitis or diverticulosis   • Esophageal reflux    • Essential hypert Number of children: Not on file      Years of education: Not on file      Highest education level: Not on file    Tobacco Use      Smoking status: Never Smoker      Smokeless tobacco: Never Used    Substance and Sexual Activity      Alcohol use:  Yes •  NIFEDIPINE ER OSMOTIC RELEASE 60 MG Oral Tablet 24 Hr, TAKE 1 TABLET BY MOUTH EVERY DAY, Disp: 90 tablet, Rfl: 0  •  Clopidogrel Bisulfate 75 MG Oral Tab, Take 1 tablet (75 mg total) by mouth daily. , Disp: 90 tablet, Rfl: 1  •  mometasone 0.1 % External Motor: 5/5 strength throughout, tone normal - no drift; no tremor noted; no bradykinesia or rigidity noted   DTR: 2+, symmetric, throughout, toes downgoing bilaterally; no clonus  Sensory: intact to light touch, vibration, proprioception and pin throughout Ulnar Palm Wrist 1.35 1.93 24.0 25.8 Ulnar Palm - Wrist 8   59               Median Palm - Ulnar Palm   1.15         Motor NCS      Nerve / Sites Muscle Latency Amplitude Amp % Duration Area Segments Distance Lat Diff Velocity       ms mV % ms mVms   cm R. Peroneus longus Perineal L5-S1 N None None None None 1+ 1+ N Reduced   R. Gastrocnemius (Medial head) Tibial S1-S2 N None None None None N N N N   R.  Extensor hallucis longus Deep peroneal (Fibular) L5-S1 N None None None None 1+ 1+ N Reduced           This study is abnormal.  There is electrophysiologic evidence consistent with a Right median nerve entrapment across the wrist, with demyelinating features.   In addition, this is suggestive of but not diagnostic for a mild, chronic R lower lumbar radiculop    parameters are consistent with abnormal left ventricular relaxation -     grade 1 diastolic dysfunction. 2. Mitral valve: Systolic bowing without prolapse. There was trivial to mild     regurgitation.   3. Left atrium: The left atrial volume was mildly Otherwise, for secondary stroke prevention,  continue Plavix 75 mg daily and Lipitor 40 mg daily (could not tolerate ASA).        (G56.03) Bilateral carpal tunnel syndrome  (primary encounter diagnosis)  Plan: as noted above     (Z86.73) History of strok

## 2019-08-05 DIAGNOSIS — R20.0 NUMBNESS AND TINGLING IN LEFT HAND: ICD-10-CM

## 2019-08-05 DIAGNOSIS — R20.2 NUMBNESS AND TINGLING IN LEFT HAND: ICD-10-CM

## 2019-08-05 DIAGNOSIS — G56.01 CARPAL TUNNEL SYNDROME ON RIGHT: ICD-10-CM

## 2019-08-05 RX ORDER — NIFEDIPINE 60 MG/1
60 TABLET, EXTENDED RELEASE ORAL
Qty: 90 TABLET | Refills: 0 | Status: SHIPPED | OUTPATIENT
Start: 2019-08-05 | End: 2019-11-01

## 2019-08-05 NOTE — TELEPHONE ENCOUNTER
[address] is calling in a refill for the patient for NIFEDIPINE ER OSMOTIC RELEASE 60 MG Oral Tablet 24 Hr. Needs to be sent to [address], [facility] [address], [phone], [phone].  Gene

## 2019-08-05 NOTE — TELEPHONE ENCOUNTER
Medication: Gabapentin 300 mg  Date of last refill: 05/07/2019 with 2 addt refills  [place]     Last office visit: 8/1/2019  Due back to clinic per last office note: RTN in 6 months  Date next office visit scheduled:

## 2019-08-07 ENCOUNTER — TELEPHONE (OUTPATIENT)
Dept: SURGERY | Facility: CLINIC | Age: 69
End: 2019-08-07

## 2019-08-07 NOTE — TELEPHONE ENCOUNTER
Spoke to patient, confirmed procedure date of 8/12/19 with Dr Missael Slaughter and to be checked in at outpatient registration at 12:15 pm. Patient called pre-procedure line and understood instructions.  Patient instructed to call office if there are additional questio

## 2019-08-08 RX ORDER — GABAPENTIN 300 MG/1
CAPSULE ORAL
Qty: 120 CAPSULE | Refills: 5 | Status: ON HOLD | OUTPATIENT
Start: 2019-08-08 | End: 2019-12-02

## 2019-08-12 ENCOUNTER — APPOINTMENT (OUTPATIENT)
Dept: GENERAL RADIOLOGY | Facility: HOSPITAL | Age: 69
End: 2019-08-12
Attending: ANESTHESIOLOGY
Payer: MEDICARE

## 2019-08-12 ENCOUNTER — HOSPITAL ENCOUNTER (OUTPATIENT)
Facility: HOSPITAL | Age: 69
Setting detail: HOSPITAL OUTPATIENT SURGERY
Discharge: HOME | End: 2019-08-12
Attending: ANESTHESIOLOGY | Admitting: ANESTHESIOLOGY
Payer: MEDICARE

## 2019-08-12 VITALS
HEART RATE: 56 BPM | OXYGEN SATURATION: 97 % | TEMPERATURE: 98 F | SYSTOLIC BLOOD PRESSURE: 121 MMHG | RESPIRATION RATE: 18 BRPM | DIASTOLIC BLOOD PRESSURE: 60 MMHG

## 2019-08-12 DIAGNOSIS — M54.16 LUMBAR RADICULOPATHY: ICD-10-CM

## 2019-08-12 LAB — GLUCOSE BLD-MCNC: 88 MG/DL (ref 70–99)

## 2019-08-12 PROCEDURE — 82962 GLUCOSE BLOOD TEST: CPT

## 2019-08-12 PROCEDURE — 3E0R33Z INTRODUCTION OF ANTI-INFLAMMATORY INTO SPINAL CANAL, PERCUTANEOUS APPROACH: ICD-10-PCS | Performed by: ANESTHESIOLOGY

## 2019-08-12 RX ORDER — DIPHENHYDRAMINE HYDROCHLORIDE 50 MG/ML
50 INJECTION INTRAMUSCULAR; INTRAVENOUS ONCE AS NEEDED
Status: CANCELLED | OUTPATIENT
Start: 2019-08-12 | End: 2019-08-12

## 2019-08-12 RX ORDER — 0.9 % SODIUM CHLORIDE 0.9 %
VIAL (ML) INJECTION AS NEEDED
Status: DISCONTINUED | OUTPATIENT
Start: 2019-08-12 | End: 2019-08-12

## 2019-08-12 RX ORDER — LIDOCAINE HYDROCHLORIDE 10 MG/ML
INJECTION, SOLUTION EPIDURAL; INFILTRATION; INTRACAUDAL; PERINEURAL AS NEEDED
Status: DISCONTINUED | OUTPATIENT
Start: 2019-08-12 | End: 2019-08-12

## 2019-08-12 RX ORDER — DEXTROSE MONOHYDRATE 25 G/50ML
50 INJECTION, SOLUTION INTRAVENOUS
Status: DISCONTINUED | OUTPATIENT
Start: 2019-08-12 | End: 2019-08-12

## 2019-08-12 RX ORDER — SODIUM CHLORIDE, SODIUM LACTATE, POTASSIUM CHLORIDE, CALCIUM CHLORIDE 600; 310; 30; 20 MG/100ML; MG/100ML; MG/100ML; MG/100ML
100 INJECTION, SOLUTION INTRAVENOUS CONTINUOUS
Status: DISCONTINUED | OUTPATIENT
Start: 2019-08-12 | End: 2019-08-12

## 2019-08-12 RX ORDER — INSULIN ASPART 100 [IU]/ML
3 INJECTION, SOLUTION INTRAVENOUS; SUBCUTANEOUS ONCE
Status: DISCONTINUED | OUTPATIENT
Start: 2019-08-12 | End: 2019-08-12

## 2019-08-12 RX ORDER — ONDANSETRON 2 MG/ML
4 INJECTION INTRAMUSCULAR; INTRAVENOUS ONCE AS NEEDED
Status: DISCONTINUED | OUTPATIENT
Start: 2019-08-12 | End: 2019-08-12

## 2019-08-12 RX ORDER — ONDANSETRON 2 MG/ML
4 INJECTION INTRAMUSCULAR; INTRAVENOUS ONCE AS NEEDED
Status: CANCELLED | OUTPATIENT
Start: 2019-08-12 | End: 2019-08-12

## 2019-08-12 RX ORDER — DEXAMETHASONE SODIUM PHOSPHATE 10 MG/ML
INJECTION, SOLUTION INTRAMUSCULAR; INTRAVENOUS AS NEEDED
Status: DISCONTINUED | OUTPATIENT
Start: 2019-08-12 | End: 2019-08-12

## 2019-08-12 RX ORDER — OMEGA-3-ACID ETHYL ESTERS 1 G/1
1 CAPSULE, LIQUID FILLED ORAL DAILY
COMMUNITY
End: 2021-07-15 | Stop reason: CLARIF

## 2019-08-12 NOTE — OPERATIVE REPORT
[facility]  Operative Report  2019     Sarina Love Patient Status:  Hospital Outpatient Surgery    4/15/1950 MRN [id number]   [facility] ENDOSCOPY Attending Susana Funes MD   Hosp Day # 0 PCP Leticia Oakes MD     Indication: saline with 10 mg of dexamethasone was injected without complication. The needle was withdrawn with stylet in situ. The patient tolerated procedure very well. The patient was observed until discharge criteria met.   Discharge instructions were given and p

## 2019-08-12 NOTE — H&P
History & Physical Examination    Patient Name: Loren Mari  MRN: [id number]  CSN: [id number]  YOB: 1950    Pre-Operative Diagnosis:  Lumbar radiculopathy [M54.16]    Present Illness: Patient with lumbar radiculopathy for transforaminal Take 17 g by mouth nightly. Disp:  Rfl:  Taking   NITROSTAT 0.4 MG Sublingual SL Tab Place 0.4 mg under the tongue every 5 (five) minutes as needed. Disp:  Rfl: 5 Taking   Multiple Vitamin (MULTI-VITAMIN) Oral Tab Take 1 Tab by mouth daily.  Disp:  Rfl: divertiulitis or diverticulosis   • Esophageal reflux    • Essential hypertension, benign 5/55/6143   • Helicobacter pylori gastritis 2004   • High blood pressure    • High cholesterol    • Incontinence    • Palpitations    • Personal history of venous thr HEART [x ] [x ]    LUNGS [x ] [x ]    ABDOMEN [x ] [x ]    UROGENITAL [x ] [x ]    EXTREMITIES [x ] [x ]    OTHER        [ x ] I have discussed the risks and benefits and alternatives with the patient/family.   They understand and agree to proceed with pl

## 2019-08-13 RX ORDER — ISOSORBIDE MONONITRATE 120 MG/1
TABLET, EXTENDED RELEASE ORAL
Qty: 90 TABLET | Refills: 0 | Status: SHIPPED | OUTPATIENT
Start: 2019-08-13 | End: 2019-11-08

## 2019-08-27 ENCOUNTER — OFFICE VISIT (OUTPATIENT)
Dept: SURGERY | Facility: CLINIC | Age: 69
End: 2019-08-27
Payer: MEDICARE

## 2019-08-27 VITALS — SYSTOLIC BLOOD PRESSURE: 108 MMHG | HEART RATE: 70 BPM | DIASTOLIC BLOOD PRESSURE: 74 MMHG

## 2019-08-27 DIAGNOSIS — M51.16 LUMBAR DISC HERNIATION WITH RADICULOPATHY: Primary | ICD-10-CM

## 2019-08-27 PROCEDURE — 99213 OFFICE O/P EST LOW 20 MIN: CPT | Performed by: PHYSICIAN ASSISTANT

## 2019-08-27 NOTE — PROGRESS NOTES
LEFT LUMBAR 4-5 TRANSFORAMINAL EPIDURAL STEROID INJECTION WITH LOCAL  7/22/19 40 % of relief x 1 day       LEFT LUMBAR 4-5 TRANSFORAMINAL EPIDURAL STEROID INJECTION WITH LOCAL 8/12/19  50 % relief x 2 days    Pt states she is having issues on the right adan

## 2019-08-27 NOTE — PROGRESS NOTES
Neurosurgery Clinic Visit  2019    Malu Bolivar PCP:  Ely Rob MD     MRN [id number]     HISTORY OF PRESENT ILLNESS:  Malu Bolivar is a(n) 77year old female who is here for follow-up for lumbar radiculopathy.   Since her las block is helpful, she may benefit from surgery. She should return to see us after these are completed. Pt appreciative. Dr. Marcy Karimi evaluated the patient and is in agreement with the plan.       Total time spent: 15 Minutes  Greater than 50% of th

## 2019-08-28 ENCOUNTER — TELEPHONE (OUTPATIENT)
Dept: PAIN CLINIC | Facility: CLINIC | Age: 69
End: 2019-08-28

## 2019-08-28 DIAGNOSIS — M54.16 LUMBAR RADICULITIS: Primary | ICD-10-CM

## 2019-08-28 NOTE — TELEPHONE ENCOUNTER
Medical clearance requested from Dr. Benjamin Anderson to hold plavix for 7 days prior to procedure.

## 2019-08-29 NOTE — TELEPHONE ENCOUNTER
Called patient. Notified of medical clearance approval. Spoke with patient and scheduled injection(s). Reviewed prior auth process and pre-op instructions. Patient verbalized understanding.  Agreeable to holding plavix and fish oil medications as indicated Please update us prior to the procedure if you are experiencing any symptoms of infection such as cough, fever, chills, urinary symptoms, or have recently been prescribed antibiotics, have open wounds, have recently had surgery or dental procedures.     **T ? Ibuprofen (Motrin, Advil, Vicoprofen), Naproxen (Naprosyn, Aleve), Piroxcam (Feldene), Meloxicam (Mobic)--(Cervical procedures will require 3 days), Oxaprozin (Daypro), Diclofenac (Voltaren),  Indomethacin (Indocin), Etodolac (Lodine), Nabumetone (Relafe

## 2019-08-29 NOTE — TELEPHONE ENCOUNTER
Response to Medical Clearance Letter from Dr Sophia Vegas for upcoming Dr Phillip Byrne procedures is in Epic,reprinted and endorsed to Pain Nurse Medical Clearance folder in [address],[phone]

## 2019-09-04 ENCOUNTER — TELEPHONE (OUTPATIENT)
Dept: PAIN CLINIC | Facility: CLINIC | Age: 69
End: 2019-09-04

## 2019-09-04 DIAGNOSIS — M54.16 LUMBAR RADICULITIS: Primary | ICD-10-CM

## 2019-09-04 DIAGNOSIS — M47.817 LUMBOSACRAL SPONDYLOSIS WITHOUT MYELOPATHY: ICD-10-CM

## 2019-09-04 DIAGNOSIS — M46.1 SACROILIITIS (HCC): ICD-10-CM

## 2019-09-04 NOTE — TELEPHONE ENCOUNTER
Spoke to patient, confirmed procedure date of 9/9 and to be checked in at outpatient registration at 10:15am. Patient called pre-procedure line and understood instructions.  Patient instructed to call office if there are additional questions after listening

## 2019-09-11 ENCOUNTER — TELEPHONE (OUTPATIENT)
Dept: INTERNAL MEDICINE CLINIC | Facility: CLINIC | Age: 69
End: 2019-09-11

## 2019-09-11 ENCOUNTER — HOSPITAL ENCOUNTER (EMERGENCY)
Facility: HOSPITAL | Age: 69
Discharge: HOME | End: 2019-09-11
Attending: EMERGENCY MEDICINE
Payer: MEDICARE

## 2019-09-11 ENCOUNTER — APPOINTMENT (OUTPATIENT)
Dept: CT IMAGING | Facility: HOSPITAL | Age: 69
End: 2019-09-11
Attending: EMERGENCY MEDICINE
Payer: MEDICARE

## 2019-09-11 ENCOUNTER — OFFICE VISIT (OUTPATIENT)
Dept: INTERNAL MEDICINE CLINIC | Facility: CLINIC | Age: 69
End: 2019-09-11
Payer: MEDICARE

## 2019-09-11 VITALS
HEART RATE: 62 BPM | DIASTOLIC BLOOD PRESSURE: 67 MMHG | HEIGHT: 63 IN | OXYGEN SATURATION: 97 % | BODY MASS INDEX: 26.93 KG/M2 | RESPIRATION RATE: 14 BRPM | SYSTOLIC BLOOD PRESSURE: 124 MMHG | TEMPERATURE: 99 F | WEIGHT: 152 LBS

## 2019-09-11 VITALS
WEIGHT: 152.5 LBS | TEMPERATURE: 98 F | BODY MASS INDEX: 27.02 KG/M2 | SYSTOLIC BLOOD PRESSURE: 122 MMHG | DIASTOLIC BLOOD PRESSURE: 70 MMHG | RESPIRATION RATE: 12 BRPM | OXYGEN SATURATION: 100 % | HEART RATE: 68 BPM | HEIGHT: 63 IN

## 2019-09-11 DIAGNOSIS — R19.7 DIARRHEA, UNSPECIFIED TYPE: ICD-10-CM

## 2019-09-11 DIAGNOSIS — K52.9 GASTROENTERITIS: Primary | ICD-10-CM

## 2019-09-11 DIAGNOSIS — R51.9 HEADACHE, TEMPORAL: ICD-10-CM

## 2019-09-11 DIAGNOSIS — R51.9 ACUTE NONINTRACTABLE HEADACHE, UNSPECIFIED HEADACHE TYPE: Primary | ICD-10-CM

## 2019-09-11 LAB
ALBUMIN SERPL-MCNC: 3.8 G/DL (ref 3.4–5)
ALBUMIN/GLOB SERPL: 1.1 {RATIO} (ref 1–2)
ALP LIVER SERPL-CCNC: 83 U/L (ref 55–142)
ALT SERPL-CCNC: 38 U/L (ref 13–56)
ANION GAP SERPL CALC-SCNC: 5 MMOL/L (ref 0–18)
AST SERPL-CCNC: 29 U/L (ref 15–37)
BASOPHILS # BLD AUTO: 0.02 X10(3) UL (ref 0–0.2)
BASOPHILS NFR BLD AUTO: 0.4 %
BILIRUB SERPL-MCNC: 0.6 MG/DL (ref 0.1–2)
BUN BLD-MCNC: 16 MG/DL (ref 7–18)
BUN/CREAT SERPL: 18 (ref 10–20)
CALCIUM BLD-MCNC: 9.5 MG/DL (ref 8.5–10.1)
CHLORIDE SERPL-SCNC: 108 MMOL/L (ref 98–112)
CO2 SERPL-SCNC: 28 MMOL/L (ref 21–32)
CREAT BLD-MCNC: 0.89 MG/DL (ref 0.55–1.02)
DEPRECATED RDW RBC AUTO: 42.3 FL (ref 35.1–46.3)
EOSINOPHIL # BLD AUTO: 0.11 X10(3) UL (ref 0–0.7)
EOSINOPHIL NFR BLD AUTO: 2.2 %
ERYTHROCYTE [DISTWIDTH] IN BLOOD BY AUTOMATED COUNT: 13.2 % (ref 11–15)
GLOBULIN PLAS-MCNC: 3.5 G/DL (ref 2.8–4.4)
GLUCOSE BLD-MCNC: 91 MG/DL (ref 70–99)
HCT VFR BLD AUTO: 38 % (ref 35–48)
HGB BLD-MCNC: 12.7 G/DL (ref 12–16)
IMM GRANULOCYTES # BLD AUTO: 0.01 X10(3) UL (ref 0–1)
IMM GRANULOCYTES NFR BLD: 0.2 %
LYMPHOCYTES # BLD AUTO: 1.24 X10(3) UL (ref 1–4)
LYMPHOCYTES NFR BLD AUTO: 24.3 %
M PROTEIN MFR SERPL ELPH: 7.3 G/DL (ref 6.4–8.2)
MCH RBC QN AUTO: 29.3 PG (ref 26–34)
MCHC RBC AUTO-ENTMCNC: 33.4 G/DL (ref 31–37)
MCV RBC AUTO: 87.8 FL (ref 80–100)
MONOCYTES # BLD AUTO: 0.61 X10(3) UL (ref 0.1–1)
MONOCYTES NFR BLD AUTO: 12 %
NEUTROPHILS # BLD AUTO: 3.11 X10 (3) UL (ref 1.5–7.7)
NEUTROPHILS # BLD AUTO: 3.11 X10(3) UL (ref 1.5–7.7)
NEUTROPHILS NFR BLD AUTO: 60.9 %
OSMOLALITY SERPL CALC.SUM OF ELEC: 293 MOSM/KG (ref 275–295)
PLATELET # BLD AUTO: 299 10(3)UL (ref 150–450)
POTASSIUM SERPL-SCNC: 4.1 MMOL/L (ref 3.5–5.1)
RBC # BLD AUTO: 4.33 X10(6)UL (ref 3.8–5.3)
SODIUM SERPL-SCNC: 141 MMOL/L (ref 136–145)
WBC # BLD AUTO: 5.1 X10(3) UL (ref 4–11)

## 2019-09-11 PROCEDURE — 99285 EMERGENCY DEPT VISIT HI MDM: CPT

## 2019-09-11 PROCEDURE — 96361 HYDRATE IV INFUSION ADD-ON: CPT

## 2019-09-11 PROCEDURE — 85025 COMPLETE CBC W/AUTO DIFF WBC: CPT | Performed by: EMERGENCY MEDICINE

## 2019-09-11 PROCEDURE — 80053 COMPREHEN METABOLIC PANEL: CPT | Performed by: EMERGENCY MEDICINE

## 2019-09-11 PROCEDURE — 70450 CT HEAD/BRAIN W/O DYE: CPT | Performed by: EMERGENCY MEDICINE

## 2019-09-11 PROCEDURE — 99214 OFFICE O/P EST MOD 30 MIN: CPT | Performed by: FAMILY MEDICINE

## 2019-09-11 PROCEDURE — 96360 HYDRATION IV INFUSION INIT: CPT

## 2019-09-11 RX ORDER — ONDANSETRON 4 MG/1
4 TABLET, FILM COATED ORAL EVERY 8 HOURS PRN
Qty: 20 TABLET | Refills: 0 | Status: SHIPPED | OUTPATIENT
Start: 2019-09-11 | End: 2019-09-11 | Stop reason: CLARIF

## 2019-09-11 RX ORDER — TRAMADOL HYDROCHLORIDE 50 MG/1
50 TABLET ORAL ONCE
Status: DISCONTINUED | OUTPATIENT
Start: 2019-09-11 | End: 2019-09-11

## 2019-09-11 RX ORDER — ONDANSETRON 2 MG/ML
4 INJECTION INTRAMUSCULAR; INTRAVENOUS ONCE
Status: DISCONTINUED | OUTPATIENT
Start: 2019-09-11 | End: 2019-09-11

## 2019-09-11 NOTE — PROGRESS NOTES
CHIEF COMPLAINT:   Patient presents with:  Nausea  Diarrhea      HPI:   Deniz Montgomery is a 77year old female who presents for complaints of a headache that started on Sunday that has not improved with Tylenol.  Pt is also having some numbness/tinglin total) by mouth nightly.  Disp: 90 tablet Rfl: 1   OMEPRAZOLE 40 MG Oral Capsule Delayed Release TAKE 1 CAPSULE(40 MG) BY MOUTH DAILY Disp: 90 capsule Rfl: 3   LISINOPRIL-HYDROCHLOROTHIAZIDE 20-25 MG Oral Tab TAKE 1 TABLET BY MOUTH EVERY DAY Disp: 90 tablet r/o 4/11/17   • Unspecified gastritis and gastroduodenitis without mention of hemorrhage 2004   • Visual impairment     glasses      Social History:  Social History    Tobacco Use      Smoking status: Never Smoker      Smokeless tobacco: Never Used    A Hx and headache that is not improving  ASSESSMENT AND PLAN:     Gastroenteritis  (primary encounter diagnosis)  Headache, temporal    No orders of the defined types were placed in this encounter.       Meds & Refills for this Visit:  Requested Prescriptions

## 2019-09-11 NOTE — ED PROVIDER NOTES
Patient Seen in: [facility] Emergency Department    History   Patient presents with:  Headache (neurologic)    Stated Complaint: HA since Sunday no injury    HPI    Patient is a 62-year-old female with a history of CVA in April 2017, presented with large intestine     unsure if divertiulitis or diverticulosis   • Esophageal reflux    • Essential hypertension, benign 7/32/6110   • Helicobacter pylori gastritis 2004   • High blood pressure    • High cholesterol    • Incontinence    • Palpitations    • systems reviewed and negative except as noted above.     Physical Exam     ED Triage Vitals [09/11/19 1345]   BP 99/61   Pulse 67   Resp 18   Temp 98.9 °F (37.2 °C)   Temp src Temporal   SpO2 97 %   O2 Device None (Room air)       Current:/67   Pulse Final result                 Please view results for these tests on the individual orders. CBC W/ DIFFERENTIAL          Blood was obtained and peripherally access was established. She was given IV normal saline, Zofran.   She is allergic to most a deficits, therefore I do not believe she has any indication for emergent MRI of her brain. Work-up is reassuring as described above.   I believe her symptoms are either due to a benign essential headache, or due to being some mildly dehydrated due to the d

## 2019-09-11 NOTE — ED INITIAL ASSESSMENT (HPI)
C/o R sided h/a since Sunday. Reports initially pain was intermittent, now consistent now with n/v/d. Seen by Dr. Hany Dobbs and sent here for further evaluation due to pmh of stroke. Fever Sunday-Tuesday. . No vomiting today, has had diarrhea

## 2019-09-11 NOTE — TELEPHONE ENCOUNTER
Pt scheduled OV for today. When I spoke with pt for further s/s, she stated that labor day weekend she started seeing flashing lights and then developed a HA. Sunday she developed a pain to the top of head. Pt states that pain comes and goes.  Also

## 2019-09-12 ENCOUNTER — OFFICE VISIT (OUTPATIENT)
Dept: NEUROLOGY | Facility: CLINIC | Age: 69
End: 2019-09-12
Payer: MEDICARE

## 2019-09-12 ENCOUNTER — TELEPHONE (OUTPATIENT)
Dept: NEUROLOGY | Facility: CLINIC | Age: 69
End: 2019-09-12

## 2019-09-12 ENCOUNTER — HOSPITAL ENCOUNTER (OUTPATIENT)
Dept: MRI IMAGING | Age: 69
Discharge: HOME | End: 2019-09-12
Attending: Other
Payer: MEDICARE

## 2019-09-12 ENCOUNTER — TELEPHONE (OUTPATIENT)
Dept: INTERNAL MEDICINE CLINIC | Facility: CLINIC | Age: 69
End: 2019-09-12

## 2019-09-12 VITALS
WEIGHT: 151 LBS | RESPIRATION RATE: 16 BRPM | SYSTOLIC BLOOD PRESSURE: 112 MMHG | DIASTOLIC BLOOD PRESSURE: 60 MMHG | HEIGHT: 63 IN | HEART RATE: 66 BPM | BODY MASS INDEX: 26.75 KG/M2

## 2019-09-12 DIAGNOSIS — R51.9 ACUTE INTRACTABLE HEADACHE, UNSPECIFIED HEADACHE TYPE: ICD-10-CM

## 2019-09-12 DIAGNOSIS — R11.0 NAUSEA: ICD-10-CM

## 2019-09-12 DIAGNOSIS — Z86.73 HISTORY OF STROKE WITHOUT RESIDUAL DEFICITS: ICD-10-CM

## 2019-09-12 DIAGNOSIS — G50.1 ATYPICAL FACIAL PAIN: ICD-10-CM

## 2019-09-12 DIAGNOSIS — R51.9 ACUTE INTRACTABLE HEADACHE, UNSPECIFIED HEADACHE TYPE: Primary | ICD-10-CM

## 2019-09-12 DIAGNOSIS — E11.8 TYPE 2 DIABETES MELLITUS WITH COMPLICATION (HCC): ICD-10-CM

## 2019-09-12 DIAGNOSIS — R19.7 DIARRHEA, UNSPECIFIED TYPE: Primary | ICD-10-CM

## 2019-09-12 DIAGNOSIS — R20.0 RIGHT FACIAL NUMBNESS: ICD-10-CM

## 2019-09-12 PROCEDURE — 70544 MR ANGIOGRAPHY HEAD W/O DYE: CPT | Performed by: OTHER

## 2019-09-12 PROCEDURE — 99215 OFFICE O/P EST HI 40 MIN: CPT | Performed by: OTHER

## 2019-09-12 PROCEDURE — 70553 MRI BRAIN STEM W/O & W/DYE: CPT | Performed by: OTHER

## 2019-09-12 PROCEDURE — A9575 INJ GADOTERATE MEGLUMI 0.1ML: HCPCS | Performed by: OTHER

## 2019-09-12 RX ORDER — ONDANSETRON 4 MG/1
4 TABLET, FILM COATED ORAL EVERY 8 HOURS PRN
Refills: 0 | COMMUNITY
Start: 2019-09-11 | End: 2019-12-12 | Stop reason: ALTCHOICE

## 2019-09-12 NOTE — TELEPHONE ENCOUNTER
S: Patient continues to have pain in the top of her head.  Patient has been seen by PCP and ER due to severe h/a, N/V and diarrhea     B:  LOV was dated 8/1/2019 -Patient seen for history of stroke, acute arterial ischemic stroke -vertebrobasilar, thalamic,

## 2019-09-12 NOTE — TELEPHONE ENCOUNTER
Metformin HCL 1000 Mg Oral Tab    Passed Protocol    Last OV relevant to medication: 5/16/2019    Last refill date: 6/17/2019     #/refills: #180 w/ 0 refills     When pt was asked to return for OV: none noted    Upcoming appt/reason: No future appointment

## 2019-09-12 NOTE — TELEPHONE ENCOUNTER
Pt on her way to f/u with Dr Yousuf Cummins at 345pm today. Pt is still having diarrhea since Sunday and told by SM viral blood work in Er showed normal cbc. Following bland diet . Diarrhea has caused incontinence  Pt asking idf she shouls have CT of abd ?

## 2019-09-12 NOTE — TELEPHONE ENCOUNTER
Consulted with Dr. Tiffanie Marx, no CT needed of abdomen. He feels she should have stool culture and O&P done.

## 2019-09-12 NOTE — PROGRESS NOTES
[facility] Progress Note    HPI  Patient presents with:  Headache: since Sunday 9-8-19    In summary of prior visits:   \"     Patient presents in follow-up after recently discharged 4/12/17, following admission for acute R sided parest She had been off Plavix since 9/2/19 in anticipation of nerve block in low back but resumed this this past week after starting to have these symptoms.           Past Medical History:   Diagnosis Date   • Abnormal maternal glucose tolerance, complicating p Performed by Wenceslao Le MD at [facility] ENDOSCOPY   • TRANSFORAMINAL LUMBAR EPIDURAL STEROID INJECTION SINGLE LEVEL Left 7/1/2019    Performed by Wenceslao Le MD at [facility] ENDOSCOPY   • UPPER GI ENDOSCOPY PERFORMED  01/24/2011     Family History   Problem Relation •  Isosorbide Mononitrate  MG Oral Tablet 24 Hr, TAKE 1 TABLET(120 MG) BY MOUTH DAILY, Disp: 90 tablet, Rfl: 0  •  Omega-3-acid Ethyl Esters 1 g Oral Cap, Take 1 g by mouth daily. , Disp: , Rfl:   •  GABAPENTIN 300 MG Oral Cap, TAKE 2 CAPSULES BY MOUT /60 (BP Location: Left arm, Patient Position: Sitting, Cuff Size: adult)   Pulse 66   Resp 16   Ht 63\"   Wt 151 lb   BMI 26.75 kg/m²   Estimated body mass index is 26.75 kg/m² as calculated from the following:    Height as of this encounter: 63\". 0.00 - 0.70 x10(3) uL 0.11   Basophils Absolute      0.00 - 0.20 x10(3) uL 0.02   Immature Granulocyte Absolute      0.00 - 1.00 x10(3) uL 0.01   Neutrophils %      % 60.9   Lymphocytes %      % 24.3   Monocytes %      % 12.0   Eosinophils %      % 2. 2 1. No acute intracranial findings. 2. Cerebral atrophy with small vessel changes. Other procedures:     None new since last visit    Prior as noted below    EMG 5/4/18:     Sensory NCS      Nerve / Sites Rec.  Site Onset Lat Peak Lat NP Amp PP Amp Segme Pop fossa Tib Ant 5.57 7.8 99.1 8.65 42.6 Pop fossa - Fib Head 7.5 1.46 51       F  Wave      Nerve F Lat M Lat F-M Lat     ms ms ms   R Peroneal - EDB NR NR NR   R Tibial - AH 39.8 4.9 34.8   R Median - APB 27.4 3.9 23.5   R Ulnar - ADM 25.5 2.1 23.4 6. Right common peroneal motor response was abnormal due to markedly reduced amplitude, with normal distal motor latency and normal conduction velocity; F wave was absent  7.  Right tibial motor response was normal; F wave was normal.  8. Right median motor   The carotid arteries are patent. There is mild carotid bifurcation atherosclerosis. Narrowing is estimated at no more than 15-20% bilaterally. The vertebral arteries are patent. The left is dominant in caliber. The basilar artery is patent.  There is no AST/ALT: normal     Prior as noted below:     LDL: 80  A1C: 6.5           Impression/ Plan:    Deniz Montgomery is a 77year old woman with past medical history significant for HTN, DM type 2, GERD, who originally presented to ED 4/11/17, with numbness in Plan: MRI BRAIN(W+WO)/MRA BRAIN (CPT=70553/95196)        As noted above     No follow-ups on file.     Sakina Nixon MD, Neurology  [facility]  Pager [phone]  9/12/2019

## 2019-09-12 NOTE — TELEPHONE ENCOUNTER
Patient was in for OV yesterday and then was sent to ER for f/u.  Patient advised to make ER f/u appointment with Dr. Madeline Vuong, however patient would like to discuss with nurse first. Please call

## 2019-09-12 NOTE — TELEPHONE ENCOUNTER
Per Dr. Leona Morgan, patient to come to [facility] today for OV. Called patient and scheduled for 3:20 OV. Patient VU and denies any further needs at this time.

## 2019-09-13 ENCOUNTER — TELEPHONE (OUTPATIENT)
Dept: NEUROLOGY | Facility: CLINIC | Age: 69
End: 2019-09-13

## 2019-09-13 DIAGNOSIS — R51.9 NEW ONSET OF HEADACHES AFTER AGE 50: ICD-10-CM

## 2019-09-13 DIAGNOSIS — I67.1 UNRUPTURED CEREBRAL ANEURYSM: Primary | ICD-10-CM

## 2019-09-14 ENCOUNTER — LAB ENCOUNTER (OUTPATIENT)
Dept: LAB | Age: 69
End: 2019-09-14
Attending: FAMILY MEDICINE
Payer: MEDICARE

## 2019-09-14 DIAGNOSIS — R19.7 DIARRHEA, UNSPECIFIED TYPE: ICD-10-CM

## 2019-09-14 PROCEDURE — 87329 GIARDIA AG IA: CPT

## 2019-09-14 PROCEDURE — 87209 SMEAR COMPLEX STAIN: CPT

## 2019-09-14 PROCEDURE — 87045 FECES CULTURE AEROBIC BACT: CPT

## 2019-09-14 PROCEDURE — 87177 OVA AND PARASITES SMEARS: CPT

## 2019-09-14 PROCEDURE — 87427 SHIGA-LIKE TOXIN AG IA: CPT

## 2019-09-14 PROCEDURE — 87046 STOOL CULTR AEROBIC BACT EA: CPT

## 2019-09-14 PROCEDURE — 87272 CRYPTOSPORIDIUM AG IF: CPT

## 2019-09-15 LAB
CRYPTOSP AG STL QL IA: NEGATIVE
G LAMBLIA AG STL QL IA: NEGATIVE

## 2019-09-16 NOTE — TELEPHONE ENCOUNTER
Called patient; aneurysm noted on MRA - no hemorrhage or acute ischemia - will refer to NeuroIR - first available

## 2019-09-18 ENCOUNTER — TELEPHONE (OUTPATIENT)
Dept: SURGERY | Facility: CLINIC | Age: 69
End: 2019-09-18

## 2019-09-18 ENCOUNTER — TELEPHONE (OUTPATIENT)
Dept: PAIN CLINIC | Facility: CLINIC | Age: 69
End: 2019-09-18

## 2019-09-18 ENCOUNTER — OFFICE VISIT (OUTPATIENT)
Dept: SURGERY | Facility: CLINIC | Age: 69
End: 2019-09-18
Payer: MEDICARE

## 2019-09-18 VITALS
SYSTOLIC BLOOD PRESSURE: 118 MMHG | WEIGHT: 151 LBS | BODY MASS INDEX: 26.75 KG/M2 | HEART RATE: 74 BPM | DIASTOLIC BLOOD PRESSURE: 78 MMHG | HEIGHT: 63 IN

## 2019-09-18 DIAGNOSIS — I67.1 LEFT CAVERNOUS CAROTID ANEURYSM: Primary | ICD-10-CM

## 2019-09-18 LAB
OVA AND PARASITE, TRICHROME STAIN: NEGATIVE
OVA AND PARASITE, WET MOUNT: NEGATIVE

## 2019-09-18 PROCEDURE — 99203 OFFICE O/P NEW LOW 30 MIN: CPT | Performed by: NURSE PRACTITIONER

## 2019-09-18 NOTE — TELEPHONE ENCOUNTER
New Medical Clearance request submitted to Dr. Anibal Davis for patient to hold Plavix for 7 days prior to upcoming injections.  Previous medical clearance received on August 29th, however patient states she has had changes to her health and is requesting addit

## 2019-09-18 NOTE — PROGRESS NOTES
Pt here for evaluation with Dr. Adriana Mckinney. Pt has complaints of severe headache for 6-7 days starting on  9/8/19.       Pt states when headaches occur, she experiences tingling on her right side of the face, with some vision changes (but these symptoms ar

## 2019-09-18 NOTE — TELEPHONE ENCOUNTER
Patient returned call, requesting to reschedule SNRB as well as Right SIJI as recommended by Dr. Dolores Brown on 8/27. Procedures scheduled, pre-procedure instructions reviewed.  Patient instructed to hold Plavix for 7 days prior to each injection, and Fish Oil ? Please note-No prescriptions will be written by Pain Clinic in [place] on the day of procedure. If you require a refill of medications, please contact the office 48 hours prior to your procedure.   ? If you have an implanted Spinal Cord or Peripheral Nerve Sti In the event you need to cancel or reschedule your appointment, you must notify the office 24 hours prior.     Post-procedure instructions:        Please schedule a follow up visit within 2 to 4 weeks after your last procedure date   Please call our office

## 2019-09-19 RX ORDER — LISINOPRIL AND HYDROCHLOROTHIAZIDE 25; 20 MG/1; MG/1
TABLET ORAL
Qty: 90 TABLET | Refills: 1 | Status: ON HOLD | OUTPATIENT
Start: 2019-09-19 | End: 2019-12-02

## 2019-09-19 NOTE — TELEPHONE ENCOUNTER
Last OV: 9/11/19 with Jabier Crook, NP  Last refill date: 6/22/19     #/refills: #90, 0 refills  When pt was asked to return for OV: no follow-up on file  Upcoming appt/reason: no upcoming appt  Last labs September 2019

## 2019-09-20 NOTE — TELEPHONE ENCOUNTER
Patient returned call, advised of clearance approval from Dr. Halima Chavez to hold Plavix for 7 days prior to upcoming injections. Patient verbalized understanding, also verbalized understanding of need to hold Omega-3 for 5 days.  Confirmed procedure dates of

## 2019-09-25 ENCOUNTER — TELEPHONE (OUTPATIENT)
Dept: SURGERY | Facility: CLINIC | Age: 69
End: 2019-09-25

## 2019-09-25 NOTE — TELEPHONE ENCOUNTER
Returned patients' call, states her ride is unavailable on scheduled dates, requesting to look at different Monday or Wednesday opportunities. Procedures rescheduled, patient appreciative, no further needs.       [address]  PRE-PROCEDURE I ? Effient (Prasugrel) 7 days  ? Pradaxa 10 days  ? Trental 7 days  ? Eliquis (Apixaban) 3 days  ? Xarelto (Rivaroxaban) 3 days  ? Lovenox (Enoxaparin) 24 hours  ? Aspirin  ? 81mg 24 hours  ? Greater than 81 mg (325mg) 7 days  ?  Coumadin       5 days   · Pr It is normal to have increased pain at injection site for up to 3-5 days after procedure, you can use heat or ice (20 minutes on 20 minutes off) for comfort.       ** TO AVOID CANCELLATION AND/OR RESCHEDULING: PLEASE CALL [facility] PRE-PROCEDURE LINE [phone]

## 2019-10-03 ENCOUNTER — TELEPHONE (OUTPATIENT)
Dept: NEUROLOGY | Facility: CLINIC | Age: 69
End: 2019-10-03

## 2019-10-03 ENCOUNTER — TELEPHONE (OUTPATIENT)
Dept: SURGERY | Facility: CLINIC | Age: 69
End: 2019-10-03

## 2019-10-03 NOTE — TELEPHONE ENCOUNTER
Spoke to patient, confirmed procedure date of 10-9-19 and to be checked in at outpatient registration at 9:45 am. Patient called pre-procedure line and understood instructions

## 2019-10-03 NOTE — TELEPHONE ENCOUNTER
Gary Tenorio, RN  Julianna Soriano 2 Nurse 19 minutes ago (3:54 PM)      Please see note. Pt reports groin pain has returned and she was provided instruction by Laisha Hudson in the even this happened.      Routing comment

## 2019-10-03 NOTE — TELEPHONE ENCOUNTER
Spoke to pt who states she would like to change her appt time on 10/9/19 to an AM time closer to 10:00. Advised pt there is a 9:45 opening. Pt elected to change appt to the 9:45 time slot. Pt inquired about alternate openings for 10/23/19.  Advised pt that

## 2019-10-03 NOTE — TELEPHONE ENCOUNTER
Spoke to pt to discuss the procedures she is scheduled for on 10/9/19 and 10/23/19. Provided pt education regarding steroid SEs and location of injections. Pt states she was informed by Dr Ayla Gaytan that if she obtains pain relief, he will consider sx.  Pt d Mukesh Mason M.S., ALYCIA JESUS [facility]  [address], [address]  [facility], [address]  [phone]

## 2019-10-08 NOTE — TELEPHONE ENCOUNTER
Spoke to pt who states she will need to f/u w/ Dr Elizabet Malave, but does not know when. Reviewed Dr Aurelia Green LOV notes and cannot determine when f/u is required. Advised pt her request would be sent to NS for a response. Pt verbalized understanding.

## 2019-10-08 NOTE — TELEPHONE ENCOUNTER
Patient is to f/u after all injections completed. She is scheduled to have last injection at the end of this month. Transferred to  to schedule appointment.      ASSESSMENT and PLAN:  1.  Acute left L4-5 disc herniation with left L4 radiculopath

## 2019-10-09 ENCOUNTER — HOSPITAL ENCOUNTER (OUTPATIENT)
Facility: HOSPITAL | Age: 69
Setting detail: HOSPITAL OUTPATIENT SURGERY
Discharge: HOME | End: 2019-10-09
Attending: ANESTHESIOLOGY | Admitting: ANESTHESIOLOGY
Payer: MEDICARE

## 2019-10-09 ENCOUNTER — APPOINTMENT (OUTPATIENT)
Dept: GENERAL RADIOLOGY | Facility: HOSPITAL | Age: 69
End: 2019-10-09
Attending: ANESTHESIOLOGY
Payer: MEDICARE

## 2019-10-09 VITALS
DIASTOLIC BLOOD PRESSURE: 68 MMHG | HEART RATE: 57 BPM | RESPIRATION RATE: 18 BRPM | SYSTOLIC BLOOD PRESSURE: 128 MMHG | OXYGEN SATURATION: 97 % | TEMPERATURE: 97 F

## 2019-10-09 DIAGNOSIS — M54.16 LUMBAR RADICULITIS: ICD-10-CM

## 2019-10-09 PROCEDURE — 82962 GLUCOSE BLOOD TEST: CPT

## 2019-10-09 PROCEDURE — 3E0R33Z INTRODUCTION OF ANTI-INFLAMMATORY INTO SPINAL CANAL, PERCUTANEOUS APPROACH: ICD-10-PCS | Performed by: ANESTHESIOLOGY

## 2019-10-09 PROCEDURE — 3E0R3BZ INTRODUCTION OF ANESTHETIC AGENT INTO SPINAL CANAL, PERCUTANEOUS APPROACH: ICD-10-PCS | Performed by: ANESTHESIOLOGY

## 2019-10-09 RX ORDER — DEXTROSE MONOHYDRATE 25 G/50ML
50 INJECTION, SOLUTION INTRAVENOUS
Status: DISCONTINUED | OUTPATIENT
Start: 2019-10-09 | End: 2019-10-09

## 2019-10-09 RX ORDER — ONDANSETRON 2 MG/ML
4 INJECTION INTRAMUSCULAR; INTRAVENOUS ONCE AS NEEDED
Status: DISCONTINUED | OUTPATIENT
Start: 2019-10-09 | End: 2019-10-09

## 2019-10-09 RX ORDER — LIDOCAINE HYDROCHLORIDE 10 MG/ML
INJECTION, SOLUTION EPIDURAL; INFILTRATION; INTRACAUDAL; PERINEURAL AS NEEDED
Status: DISCONTINUED | OUTPATIENT
Start: 2019-10-09 | End: 2019-10-09

## 2019-10-09 RX ORDER — DIPHENHYDRAMINE HYDROCHLORIDE 50 MG/ML
50 INJECTION INTRAMUSCULAR; INTRAVENOUS ONCE AS NEEDED
Status: DISCONTINUED | OUTPATIENT
Start: 2019-10-09 | End: 2019-10-09

## 2019-10-09 RX ORDER — ONDANSETRON 2 MG/ML
4 INJECTION INTRAMUSCULAR; INTRAVENOUS ONCE AS NEEDED
Status: CANCELLED | OUTPATIENT
Start: 2019-10-09 | End: 2019-10-09

## 2019-10-09 RX ORDER — DEXAMETHASONE SODIUM PHOSPHATE 10 MG/ML
INJECTION, SOLUTION INTRAMUSCULAR; INTRAVENOUS AS NEEDED
Status: DISCONTINUED | OUTPATIENT
Start: 2019-10-09 | End: 2019-10-09

## 2019-10-09 RX ORDER — 0.9 % SODIUM CHLORIDE 0.9 %
VIAL (ML) INJECTION AS NEEDED
Status: DISCONTINUED | OUTPATIENT
Start: 2019-10-09 | End: 2019-10-09

## 2019-10-09 RX ORDER — SODIUM CHLORIDE, SODIUM LACTATE, POTASSIUM CHLORIDE, CALCIUM CHLORIDE 600; 310; 30; 20 MG/100ML; MG/100ML; MG/100ML; MG/100ML
100 INJECTION, SOLUTION INTRAVENOUS CONTINUOUS
Status: CANCELLED | OUTPATIENT
Start: 2019-10-09

## 2019-10-09 RX ORDER — INSULIN ASPART 100 [IU]/ML
3 INJECTION, SOLUTION INTRAVENOUS; SUBCUTANEOUS ONCE
Status: DISCONTINUED | OUTPATIENT
Start: 2019-10-09 | End: 2019-10-09

## 2019-10-09 NOTE — H&P
History & Physical Examination    Patient Name: Jose Botello  MRN: [id number]  CSN: [id number]  YOB: 1950    Pre-Operative Diagnosis:  Lumbar radiculitis [M54.16]    Present Illness: Patient with low back pain and radiculopathy here for s for Pain. Disp:  Rfl:  Taking   PEG 3350 Oral Powd Pack Take 17 g by mouth nightly. Disp:  Rfl:  Taking   NITROSTAT 0.4 MG Sublingual SL Tab Place 0.4 mg under the tongue every 5 (five) minutes as needed.    Disp:  Rfl: 5 Taking   Multiple Vitamin (MULTI- hypertension, benign 8/87/3250   • Helicobacter pylori gastritis 2004   • High blood pressure    • High cholesterol    • Incontinence    • Palpitations    • 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 11/09   • PONV (postopera Physical Exam is Normal If not normal, please explain:   HEENT [x ] [x ]    NECK & BACK [x ] [x ]    HEART [x ] [x ]    LUNGS [x ] [x ]    ABDOMEN [x ] [x ]    UROGENITAL [x ] [x ]    EXTREMITIES [x ] [x ]    OTHER        [ x ] I have discussed the risks a

## 2019-10-09 NOTE — OPERATIVE REPORT
[facility]  Operative Report  10/9/2019     Angel Pardo Patient Status:  Hospital Outpatient Surgery    4/15/1950 MRN [id number]   [facility] ENDOSCOPY Attending Rayne Kahn MD   Hosp Day # 0 PCP Serina Wick MD     Indication: was injected without complication. An additional 3 cc 1% lidocaine was injected along the nerve root. The needle was withdrawn with stylet in situ. The patient tolerated procedure very well. The patient was observed until discharge criteria met.   Claudeen Dow

## 2019-10-23 ENCOUNTER — APPOINTMENT (OUTPATIENT)
Dept: GENERAL RADIOLOGY | Facility: HOSPITAL | Age: 69
End: 2019-10-23
Attending: ANESTHESIOLOGY
Payer: MEDICARE

## 2019-10-23 ENCOUNTER — HOSPITAL ENCOUNTER (OUTPATIENT)
Facility: HOSPITAL | Age: 69
Setting detail: HOSPITAL OUTPATIENT SURGERY
Discharge: HOME | End: 2019-10-23
Attending: ANESTHESIOLOGY | Admitting: ANESTHESIOLOGY
Payer: MEDICARE

## 2019-10-23 VITALS
HEART RATE: 56 BPM | TEMPERATURE: 98 F | RESPIRATION RATE: 18 BRPM | OXYGEN SATURATION: 96 % | SYSTOLIC BLOOD PRESSURE: 136 MMHG | DIASTOLIC BLOOD PRESSURE: 65 MMHG

## 2019-10-23 DIAGNOSIS — M47.817 LUMBOSACRAL SPONDYLOSIS WITHOUT MYELOPATHY: ICD-10-CM

## 2019-10-23 DIAGNOSIS — M46.1 SACROILIITIS (HCC): ICD-10-CM

## 2019-10-23 PROCEDURE — 3E0U3BZ INTRODUCTION OF ANESTHETIC AGENT INTO JOINTS, PERCUTANEOUS APPROACH: ICD-10-PCS | Performed by: ANESTHESIOLOGY

## 2019-10-23 PROCEDURE — 82962 GLUCOSE BLOOD TEST: CPT

## 2019-10-23 PROCEDURE — 3E0U33Z INTRODUCTION OF ANTI-INFLAMMATORY INTO JOINTS, PERCUTANEOUS APPROACH: ICD-10-PCS | Performed by: ANESTHESIOLOGY

## 2019-10-23 RX ORDER — METHYLPREDNISOLONE ACETATE 40 MG/ML
INJECTION, SUSPENSION INTRA-ARTICULAR; INTRALESIONAL; INTRAMUSCULAR; SOFT TISSUE AS NEEDED
Status: DISCONTINUED | OUTPATIENT
Start: 2019-10-23 | End: 2019-10-23

## 2019-10-23 RX ORDER — ONDANSETRON 2 MG/ML
4 INJECTION INTRAMUSCULAR; INTRAVENOUS ONCE AS NEEDED
Status: CANCELLED | OUTPATIENT
Start: 2019-10-23 | End: 2019-10-23

## 2019-10-23 RX ORDER — SODIUM CHLORIDE, SODIUM LACTATE, POTASSIUM CHLORIDE, CALCIUM CHLORIDE 600; 310; 30; 20 MG/100ML; MG/100ML; MG/100ML; MG/100ML
100 INJECTION, SOLUTION INTRAVENOUS CONTINUOUS
Status: CANCELLED | OUTPATIENT
Start: 2019-10-23

## 2019-10-23 RX ORDER — ONDANSETRON 2 MG/ML
4 INJECTION INTRAMUSCULAR; INTRAVENOUS ONCE AS NEEDED
Status: DISCONTINUED | OUTPATIENT
Start: 2019-10-23 | End: 2019-10-23

## 2019-10-23 RX ORDER — LIDOCAINE HYDROCHLORIDE 10 MG/ML
INJECTION, SOLUTION EPIDURAL; INFILTRATION; INTRACAUDAL; PERINEURAL AS NEEDED
Status: DISCONTINUED | OUTPATIENT
Start: 2019-10-23 | End: 2019-10-23

## 2019-10-23 RX ORDER — BUPIVACAINE HYDROCHLORIDE 5 MG/ML
INJECTION, SOLUTION EPIDURAL; INTRACAUDAL AS NEEDED
Status: DISCONTINUED | OUTPATIENT
Start: 2019-10-23 | End: 2019-10-23

## 2019-10-23 RX ORDER — DIPHENHYDRAMINE HYDROCHLORIDE 50 MG/ML
50 INJECTION INTRAMUSCULAR; INTRAVENOUS ONCE AS NEEDED
Status: DISCONTINUED | OUTPATIENT
Start: 2019-10-23 | End: 2019-10-23

## 2019-10-23 RX ORDER — INSULIN ASPART 100 [IU]/ML
3 INJECTION, SOLUTION INTRAVENOUS; SUBCUTANEOUS ONCE
Status: CANCELLED | OUTPATIENT
Start: 2019-10-23 | End: 2019-10-23

## 2019-10-23 RX ORDER — DEXTROSE MONOHYDRATE 25 G/50ML
50 INJECTION, SOLUTION INTRAVENOUS
Status: CANCELLED | OUTPATIENT
Start: 2019-10-23

## 2019-10-23 NOTE — H&P
History & Physical Examination    Patient Name: Soumya Bowen  MRN: [id number]  [facility]: [id number]  YOB: 1950    Pre-Operative Diagnosis:  Sacroiliitis ([facility] [address] [M46.1]  Lumbosacral spondylosis without myelopathy [M47.817]    Present Illness: Pa mouth every 6 (six) hours as needed for Pain., Disp: , Rfl: , Taking  PEG 3350 Oral Powd Pack, Take 17 g by mouth nightly.  , Disp: , Rfl: , Taking  NITROSTAT 0.4 MG Sublingual SL Tab, Place 0.4 mg under the tongue every 5 (five) minutes as needed.   , Disp mention of hemorrhage    • Visual impairment     glasses     Past Surgical History:   Procedure Laterality Date   • BACK SURGERY      Spinal diskectomy lumbar   •   1987   • CARDIAC CATHERTERIZATION (PBP)      status normal resolved on 1 and alternatives with the patient/family. They understand and agree to proceed with plan of care. [ x ] I have reviewed the History and Physical done within the last 30 days. Any changes noted above.     Niki Connors MD

## 2019-10-23 NOTE — OPERATIVE REPORT
[facility]  Operative Report  10/23/2019     Yusra Guevara Patient Status:  Hospital Outpatient Surgery    4/15/1950 MRN [id number]   [facility] ENDOSCOPY Attending Jyotsna Benavides MD   Hosp Day # 0 PCP Deanna Haynes MD     Indication stylet in situ. The patient tolerated the procedure very well. There was no subjective or objective loss of motor strength. The patient was observed until discharge criteria met.   Discharge instructions were given and patient was released to a responsib

## 2019-10-28 ENCOUNTER — TELEPHONE (OUTPATIENT)
Dept: INTERNAL MEDICINE CLINIC | Facility: CLINIC | Age: 69
End: 2019-10-28

## 2019-10-28 DIAGNOSIS — M54.16 LUMBAR RADICULOPATHY: Primary | ICD-10-CM

## 2019-10-28 NOTE — TELEPHONE ENCOUNTER
Patient would like Dr. Sheela Aviles to refer her to a doctor to get a second opinion for her back issues. Please advise.

## 2019-10-29 NOTE — TELEPHONE ENCOUNTER
Pt has seen Dr Charlene Frazier and has upcoming appt 11/07/19 they have tried back inj,nerve block wiyjout relief , They are now talking poss surgery and she would like to get 2nd opinion

## 2019-10-30 NOTE — TELEPHONE ENCOUNTER
[facility]     Dr Mitchell Civil  [address] .[phone]

## 2019-11-02 RX ORDER — NIFEDIPINE 60 MG/1
TABLET, EXTENDED RELEASE ORAL
Qty: 90 TABLET | Refills: 0 | Status: ON HOLD | OUTPATIENT
Start: 2019-11-02 | End: 2019-12-02

## 2019-11-02 NOTE — TELEPHONE ENCOUNTER
NIFEDIPINE ER OSMOTIC RELEASE 60 MG Oral Tablet 24 Hr    Passed Protocol    Last OV relevant to medication: 9/11/2019  Last refill date: 8/5/2019     #/refills: #90 w/ 0 refills   When pt was asked to return for OV: none noted  Upcoming appt/reason: no fut

## 2019-11-07 ENCOUNTER — TELEPHONE (OUTPATIENT)
Dept: SURGERY | Facility: CLINIC | Age: 69
End: 2019-11-07

## 2019-11-07 ENCOUNTER — OFFICE VISIT (OUTPATIENT)
Dept: SURGERY | Facility: CLINIC | Age: 69
End: 2019-11-07
Payer: MEDICARE

## 2019-11-07 VITALS — DIASTOLIC BLOOD PRESSURE: 70 MMHG | SYSTOLIC BLOOD PRESSURE: 124 MMHG | HEART RATE: 72 BPM

## 2019-11-07 DIAGNOSIS — M51.16 LUMBAR DISC HERNIATION WITH RADICULOPATHY: Primary | ICD-10-CM

## 2019-11-07 PROCEDURE — 99214 OFFICE O/P EST MOD 30 MIN: CPT | Performed by: NEUROLOGICAL SURGERY

## 2019-11-07 NOTE — TELEPHONE ENCOUNTER
pt calling to see what Dr Shan Locke 1st available for sx date is. Pt going for 2nd opinion but wanted to know when his availability is.  Please call cell#

## 2019-11-07 NOTE — PROGRESS NOTES
Pt is here for follow up post injections     LEFT LUMBAR 5 SELECTIVE NERVE ROOT BLOCK WITHOUT SEDATION 10/9/19 70% of relief 4 hours.         RIGHT SACROILIAC JOINT INJECTION WITHOUT SEDATION  10/23/19 100% to present     Pt states that since LOV she has be

## 2019-11-07 NOTE — PROGRESS NOTES
Neurosurgery Clinic Visit  2019    Shawn Calderon PCP:  Pierre Carl MD     MRN [id number]     HISTORY OF PRESENT ILLNESS:  Shawn Calderon is a(n) 77year old female here for follow-up  She got her L5 nerve root block  This gave her

## 2019-11-07 NOTE — TELEPHONE ENCOUNTER
Called patient with available dates for surgery with Dr. Vibha Yap. Patient states she would like a surgery date for early December. Patient will contact us with decision after she has office visit with \"2nd opinion doctor\".     Patient stated she un

## 2019-11-08 DIAGNOSIS — E11.8 TYPE 2 DIABETES MELLITUS WITH COMPLICATION (HCC): ICD-10-CM

## 2019-11-08 NOTE — TELEPHONE ENCOUNTER
Last OV: 9/11/19 with Christoph Adame NP  When pt was asked to return for OV: no follow-up on file  Upcoming appt/reason: no upcoming appt  Last labs 9/11/19 (CBC/CMP)     Isosorbide Mononitrate ER 120mg last refill 8/13/19 (#90, 0 refills)     Metformin 100

## 2019-11-10 RX ORDER — ISOSORBIDE MONONITRATE 120 MG/1
TABLET, EXTENDED RELEASE ORAL
Qty: 90 TABLET | Refills: 0 | Status: ON HOLD | OUTPATIENT
Start: 2019-11-10 | End: 2019-12-02

## 2019-11-12 NOTE — TELEPHONE ENCOUNTER
Pt presented to office to schedule surgery after having 2nd opinion; pt also states Dr. Wendy Lane recommended another CT, pt asking for order to be placed, please call back

## 2019-11-13 NOTE — TELEPHONE ENCOUNTER
Called patient back to determine date for surgery. Patient chose 12/2/19. Discussed pre-op instructions over the phone and [employer] message sent to patient with instructions as well.

## 2019-11-13 NOTE — TELEPHONE ENCOUNTER
Called Dr. Sharon Ford office to clarify fax number. Jamil from office gave alternative number for faxes:    [phone]    Made attempts x 2 to get fax to Dr. Sharon Ford office. Received fax receipt confirmation.

## 2019-11-13 NOTE — TELEPHONE ENCOUNTER
You are scheduled for L 4-5 microdiscectomy on 12/2/19 with . Pre-op instructions discussed with patient and surgical packet provided:    · You will need to contact the Pre-admission department at [phone].  You will speak with a nurse who you are discharged from the hospital  · Post operative appointment to be made 2 weeks after surgery. When speaking with Pre-admissions you will be told about a spine class as it relates to your surgery.  Although the class is not mandatory, you are stro

## 2019-11-13 NOTE — TELEPHONE ENCOUNTER
PCP clearance letter sent to Dr. Divya Cloud office via fax number:  [phone]      Multiple attempts made to fax at above number without results. Double checked fax number. Will re-try to fax later in day.

## 2019-11-14 ENCOUNTER — HOSPITAL ENCOUNTER (OUTPATIENT)
Dept: CT IMAGING | Age: 69
Discharge: HOME | End: 2019-11-14
Attending: PHYSICIAN ASSISTANT
Payer: MEDICARE

## 2019-11-14 DIAGNOSIS — M51.16 LUMBAR DISC HERNIATION WITH RADICULOPATHY: ICD-10-CM

## 2019-11-14 PROCEDURE — 72131 CT LUMBAR SPINE W/O DYE: CPT | Performed by: PHYSICIAN ASSISTANT

## 2019-11-14 RX ORDER — SODIUM CHLORIDE, SODIUM LACTATE, POTASSIUM CHLORIDE, CALCIUM CHLORIDE 600; 310; 30; 20 MG/100ML; MG/100ML; MG/100ML; MG/100ML
INJECTION, SOLUTION INTRAVENOUS CONTINUOUS
Status: CANCELLED | OUTPATIENT
Start: 2019-11-14

## 2019-11-14 NOTE — TELEPHONE ENCOUNTER
Spoke with patient who stated this is not a workman's comp case. Patient stated it started when she tripped over her dog and then her carpet. States this should be billed to Medicare.

## 2019-11-14 NOTE — TELEPHONE ENCOUNTER
Medicare Indicates Other Payer Primary   Medicare Secondary: Workers' Compensation  Medicare indicates that this patient has the following coverage as primary. [facility] [employer] FOR RISK [employer]NAGE.       Medicare indicates plan is not primary, please confirm Wo

## 2019-11-15 ENCOUNTER — APPOINTMENT (OUTPATIENT)
Dept: LAB | Age: 69
End: 2019-11-15
Attending: NEUROLOGICAL SURGERY
Payer: MEDICARE

## 2019-11-15 ENCOUNTER — TELEPHONE (OUTPATIENT)
Dept: INTERNAL MEDICINE CLINIC | Facility: CLINIC | Age: 69
End: 2019-11-15

## 2019-11-15 ENCOUNTER — LABORATORY ENCOUNTER (OUTPATIENT)
Dept: LAB | Age: 69
End: 2019-11-15
Attending: NEUROLOGICAL SURGERY
Payer: MEDICARE

## 2019-11-15 DIAGNOSIS — E78.00 PURE HYPERCHOLESTEROLEMIA: ICD-10-CM

## 2019-11-15 DIAGNOSIS — E11.9 DIABETES MELLITUS (HCC): ICD-10-CM

## 2019-11-15 DIAGNOSIS — I10 ESSENTIAL HYPERTENSION, BENIGN: ICD-10-CM

## 2019-11-15 DIAGNOSIS — M54.16 LUMBAR RADICULOPATHY: ICD-10-CM

## 2019-11-15 LAB
ABSOLUTE IMMATURE GRANULOCYTES (OFFPRE24): NORMAL
ALBUMIN SERPL-MCNC: 3.9 G/DL
ALBUMIN/GLOB SERPL: NORMAL {RATIO}
ALP SERPL-CCNC: 98 U/L
ALT SERPL-CCNC: 32 U/L
ANION GAP SERPL CALC-SCNC: NORMAL MMOL/L
AST SERPL-CCNC: 17 U/L
BASO+EOS+MONOS # BLD: NORMAL 10*3/UL
BASO+EOS+MONOS NFR BLD: NORMAL %
BASOPHILS # BLD: NORMAL 10*3/UL
BASOPHILS NFR BLD: NORMAL %
BILIRUB SERPL-MCNC: 0.4 MG/DL
BUN SERPL-MCNC: 22 MG/DL
BUN/CREAT SERPL: NORMAL
CALCIUM SERPL-MCNC: 9.6 MG/DL
CHLORIDE SERPL-SCNC: 107 MMOL/L
CO2 SERPL-SCNC: NORMAL MMOL/L
CREAT SERPL-MCNC: 0.85 MG/DL
DIFFERENTIAL METHOD BLD: NORMAL
EOSINOPHIL # BLD: NORMAL 10*3/UL
EOSINOPHIL NFR BLD: NORMAL %
ERYTHROCYTE [DISTWIDTH] IN BLOOD: NORMAL %
GLOBULIN SER-MCNC: 3.8 G/DL
GLUCOSE SERPL-MCNC: 88 MG/DL
HCT VFR BLD CALC: 39.6 %
HGB BLD-MCNC: 12.7 G/DL
IMMATURE GRANULOCYTES (OFFPRE25): NORMAL
LENGTH OF FAST TIME PATIENT: NORMAL H
LYMPHOCYTES # BLD: NORMAL 10*3/UL
LYMPHOCYTES NFR BLD: NORMAL %
MCH RBC QN AUTO: NORMAL PG
MCHC RBC AUTO-ENTMCNC: NORMAL G/DL
MCV RBC AUTO: NORMAL FL
MONOCYTES # BLD: NORMAL 10*3/UL
MONOCYTES NFR BLD: NORMAL %
MPV (OFFPRE2): NORMAL
NEUTROPHILS # BLD: NORMAL 10*3/UL
NEUTROPHILS NFR BLD: NORMAL %
NRBC BLD MANUAL-RTO: NORMAL %
PLAT MORPH BLD: NORMAL
PLATELET # BLD: 323 10*3/UL
POTASSIUM SERPL-SCNC: 4.5 MMOL/L
PROT SERPL-MCNC: 7.7 G/DL
RBC # BLD: 4.36 10*6/UL
RBC MORPH BLD: NORMAL
SODIUM SERPL-SCNC: 140 MMOL/L
WBC # BLD: 6 10*3/UL
WBC MORPH BLD: NORMAL

## 2019-11-15 PROCEDURE — 93010 ELECTROCARDIOGRAM REPORT: CPT | Performed by: INTERNAL MEDICINE

## 2019-11-15 PROCEDURE — 87081 CULTURE SCREEN ONLY: CPT

## 2019-11-15 PROCEDURE — 85025 COMPLETE CBC W/AUTO DIFF WBC: CPT

## 2019-11-15 PROCEDURE — 93005 ELECTROCARDIOGRAM TRACING: CPT

## 2019-11-15 PROCEDURE — 80053 COMPREHEN METABOLIC PANEL: CPT

## 2019-11-15 PROCEDURE — 36415 COLL VENOUS BLD VENIPUNCTURE: CPT

## 2019-11-15 PROCEDURE — 85730 THROMBOPLASTIN TIME PARTIAL: CPT

## 2019-11-15 PROCEDURE — 85610 PROTHROMBIN TIME: CPT

## 2019-11-15 NOTE — TELEPHONE ENCOUNTER
Pt would like a call back says that she having spine surgery with Dr [employer] and would like some questions answered before

## 2019-11-18 ENCOUNTER — TELEPHONE (OUTPATIENT)
Dept: SURGERY | Facility: CLINIC | Age: 69
End: 2019-11-18

## 2019-11-18 ENCOUNTER — TELEPHONE (OUTPATIENT)
Dept: NEUROLOGY | Facility: CLINIC | Age: 69
End: 2019-11-18

## 2019-11-18 NOTE — TELEPHONE ENCOUNTER
Neurology is controlling the Plavix and heart doctor unsure what is needed. EKG was done on Friday. Neither got clearance letters from us. Pt is concerned. Please call.

## 2019-11-18 NOTE — TELEPHONE ENCOUNTER
[facility]  Called patient to inform clearance letters were sent to Dr. Remington Pierce and Dr. Jah Dukes

## 2019-11-18 NOTE — TELEPHONE ENCOUNTER
Per Dr. Naheed Echols, \"Pt having Schueler surgery 12/12/19, needs to be off plavix 7 days before and after\".

## 2019-11-19 ENCOUNTER — TELEPHONE (OUTPATIENT)
Dept: CARDIOLOGY | Age: 69
End: 2019-11-19

## 2019-11-19 NOTE — TELEPHONE ENCOUNTER
Pt confirmed we received request for surgical clearance from Dr. Demetria Carr. Surgery is scheduled for 12/2/19. Assured pt request has been sent to Dr. Amberly Alexandre.

## 2019-11-20 ENCOUNTER — HOSPITAL ENCOUNTER (OUTPATIENT)
Dept: PHYSICAL THERAPY | Facility: HOSPITAL | Age: 69
Discharge: HOME | End: 2019-11-20
Attending: NEUROLOGICAL SURGERY
Payer: MEDICARE

## 2019-11-20 ENCOUNTER — OFFICE VISIT (OUTPATIENT)
Dept: CARDIOLOGY | Age: 69
End: 2019-11-20

## 2019-11-20 VITALS
DIASTOLIC BLOOD PRESSURE: 60 MMHG | HEART RATE: 72 BPM | HEIGHT: 63 IN | BODY MASS INDEX: 27.29 KG/M2 | SYSTOLIC BLOOD PRESSURE: 102 MMHG | WEIGHT: 154 LBS

## 2019-11-20 DIAGNOSIS — M54.16 LUMBAR RADICULOPATHY: ICD-10-CM

## 2019-11-20 DIAGNOSIS — I10 HYPERTENSION, BENIGN: ICD-10-CM

## 2019-11-20 DIAGNOSIS — E78.00 PURE HYPERCHOLESTEROLEMIA: ICD-10-CM

## 2019-11-20 DIAGNOSIS — Z01.818 PRE-OP EVALUATION: Primary | ICD-10-CM

## 2019-11-20 DIAGNOSIS — E11.9 TYPE 2 DIABETES MELLITUS WITHOUT COMPLICATION, WITHOUT LONG-TERM CURRENT USE OF INSULIN (CMD): ICD-10-CM

## 2019-11-20 PROCEDURE — 99215 OFFICE O/P EST HI 40 MIN: CPT | Performed by: INTERNAL MEDICINE

## 2019-11-20 RX ORDER — CHLORAL HYDRATE 500 MG
1 CAPSULE ORAL DAILY
COMMUNITY

## 2019-11-20 ASSESSMENT — PATIENT HEALTH QUESTIONNAIRE - PHQ9
1. LITTLE INTEREST OR PLEASURE IN DOING THINGS: NOT AT ALL
2. FEELING DOWN, DEPRESSED OR HOPELESS: NOT AT ALL
SUM OF ALL RESPONSES TO PHQ9 QUESTIONS 1 AND 2: 0
SUM OF ALL RESPONSES TO PHQ9 QUESTIONS 1 AND 2: 0

## 2019-11-20 ASSESSMENT — ENCOUNTER SYMPTOMS
SUSPICIOUS LESIONS: 0
COUGH: 0
FEVER: 0
HEMATOCHEZIA: 0
HEMOPTYSIS: 0
BRUISES/BLEEDS EASILY: 0
WEIGHT LOSS: 0
ALLERGIC/IMMUNOLOGIC COMMENTS: NO NEW FOOD ALLERGIES
CHILLS: 0
WEIGHT GAIN: 0

## 2019-11-22 ENCOUNTER — OFFICE VISIT (OUTPATIENT)
Dept: INTERNAL MEDICINE CLINIC | Facility: CLINIC | Age: 69
End: 2019-11-22
Payer: MEDICARE

## 2019-11-22 VITALS
OXYGEN SATURATION: 98 % | HEART RATE: 56 BPM | HEIGHT: 63 IN | TEMPERATURE: 98 F | SYSTOLIC BLOOD PRESSURE: 116 MMHG | WEIGHT: 154 LBS | BODY MASS INDEX: 27.29 KG/M2 | RESPIRATION RATE: 14 BRPM | DIASTOLIC BLOOD PRESSURE: 68 MMHG

## 2019-11-22 DIAGNOSIS — M54.16 LUMBAR RADICULOPATHY: ICD-10-CM

## 2019-11-22 DIAGNOSIS — Z01.818 PREOP EXAMINATION: Primary | ICD-10-CM

## 2019-11-22 DIAGNOSIS — I10 ESSENTIAL HYPERTENSION, BENIGN: ICD-10-CM

## 2019-11-22 DIAGNOSIS — E11.9 TYPE 2 DIABETES MELLITUS WITHOUT COMPLICATION, WITHOUT LONG-TERM CURRENT USE OF INSULIN (HCC): ICD-10-CM

## 2019-11-22 DIAGNOSIS — E11.8 TYPE 2 DIABETES MELLITUS WITH COMPLICATION (HCC): ICD-10-CM

## 2019-11-22 PROCEDURE — 99214 OFFICE O/P EST MOD 30 MIN: CPT | Performed by: FAMILY MEDICINE

## 2019-11-22 NOTE — TELEPHONE ENCOUNTER
Per PCP- on 11/22/19: \"Preop exam:    She does appear stable   Labs are acceptable   EKG is unchanged  Her BSs are good.    Usually in the 90s   To hold metformin on the day of sx    Will resume at 500mg BID   She may proceed with her procedure wit

## 2019-11-22 NOTE — PROGRESS NOTES
HPI:    Patient ID: Jose Botello is a 77year old female. HPI  Jose Botello is a 77year old female. HPI:   Here for preop exam for her upcoming L4 L5 microdiscectomy under the care of Dr Nury Blanco. Has failed conservative therapy.     Date is mg in the AM and 25 mg at night     • mometasone 0.1 % External Ointment Apply topically as needed. • MICROLET LANCETS Does not apply Misc TEST ONCE DAILY AS DIRECTED 100 each 3   • cetirizine 10 MG Oral Tab Take 10 mg by mouth daily.      • RANEXA 500 diskectomy lumbar   •   1987   • CARDIAC CATHERTERIZATION (PBP)      status normal resolved on 2007   • CATARACT     • CHOLECYSTECTOMY  1999   • COLONOSCOPY  2011    colon polyp removal  francesca cee 5   • COLONOSCOPY     • CO 154 lb (69.9 kg)   SpO2 98%   BMI 27.28 kg/m²   GENERAL: well developed, well nourished,in no apparent distress  SKIN: no rashes  NECK: supple,no adenopathy  LUNGS: clear to auscultation  CARDIO: RRR without murmur  GI: good BS's,no masses, HSM or tenderne cetirizine 10 MG Oral Tab Take 10 mg by mouth daily.      • RANEXA 500 MG Oral Tablet 12 Hr TAKE 1 TABLET BY MOUTH EVERY DAY (Patient taking differently: Take 500 mg by mouth every morning.  ) 90 tablet 0   • acetaminophen 500 MG Oral Tab Take 500 mg by roxana

## 2019-11-22 NOTE — TELEPHONE ENCOUNTER
Per Dr. Leona Morgan (Neurology/TE on 11/18/19:    \"Ok to hold antiplatelet therapy\". Neuro clearance obtained.

## 2019-11-22 NOTE — TELEPHONE ENCOUNTER
Per Dr. Nathan Frederick on 11/20/19: (Cardiovascular/Cardiology)    Iban Earing is ACC/AHA acceptable risk for cardiac complications with the planned noncardiac surgery. \"    Cards clearance obtained. Awaiting PCP clearance.

## 2019-11-26 ENCOUNTER — TELEPHONE (OUTPATIENT)
Dept: INTERNAL MEDICINE CLINIC | Facility: CLINIC | Age: 69
End: 2019-11-26

## 2019-11-26 NOTE — TELEPHONE ENCOUNTER
Surgical clearance for procedure on 12/2/19 faxed to:    [phone] - Pre admission department    [phone] - Neuroscience office    With confirmations

## 2019-12-02 ENCOUNTER — ANESTHESIA (OUTPATIENT)
Dept: SURGERY | Facility: HOSPITAL | Age: 69
DRG: 519 | End: 2019-12-02
Payer: MEDICARE

## 2019-12-02 ENCOUNTER — APPOINTMENT (OUTPATIENT)
Dept: GENERAL RADIOLOGY | Facility: HOSPITAL | Age: 69
DRG: 519 | End: 2019-12-02
Attending: NEUROLOGICAL SURGERY
Payer: MEDICARE

## 2019-12-02 ENCOUNTER — HOSPITAL ENCOUNTER (INPATIENT)
Facility: HOSPITAL | Age: 69
LOS: 3 days | Discharge: HOME | DRG: 519 | End: 2019-12-06
Attending: NEUROLOGICAL SURGERY | Admitting: NEUROLOGICAL SURGERY
Payer: MEDICARE

## 2019-12-02 ENCOUNTER — ANESTHESIA EVENT (OUTPATIENT)
Dept: SURGERY | Facility: HOSPITAL | Age: 69
DRG: 519 | End: 2019-12-02
Payer: MEDICARE

## 2019-12-02 DIAGNOSIS — Z86.73 HISTORY OF STROKE WITHOUT RESIDUAL DEFICITS: ICD-10-CM

## 2019-12-02 DIAGNOSIS — E78.00 PURE HYPERCHOLESTEROLEMIA: ICD-10-CM

## 2019-12-02 DIAGNOSIS — M51.16 LUMBAR DISC HERNIATION WITH RADICULOPATHY: ICD-10-CM

## 2019-12-02 DIAGNOSIS — E11.9 DIABETES MELLITUS (HCC): ICD-10-CM

## 2019-12-02 DIAGNOSIS — M54.16 LUMBAR RADICULOPATHY: Primary | ICD-10-CM

## 2019-12-02 DIAGNOSIS — I10 ESSENTIAL HYPERTENSION, BENIGN: ICD-10-CM

## 2019-12-02 PROCEDURE — 99223 1ST HOSP IP/OBS HIGH 75: CPT | Performed by: STUDENT IN AN ORGANIZED HEALTH CARE EDUCATION/TRAINING PROGRAM

## 2019-12-02 PROCEDURE — 0SB20ZZ EXCISION OF LUMBAR VERTEBRAL DISC, OPEN APPROACH: ICD-10-PCS | Performed by: NEUROLOGICAL SURGERY

## 2019-12-02 PROCEDURE — 76000 FLUOROSCOPY <1 HR PHYS/QHP: CPT | Performed by: NEUROLOGICAL SURGERY

## 2019-12-02 RX ORDER — ISOSORBIDE MONONITRATE 60 MG/1
120 TABLET, EXTENDED RELEASE ORAL DAILY
Status: DISCONTINUED | OUTPATIENT
Start: 2019-12-02 | End: 2019-12-03

## 2019-12-02 RX ORDER — ISOSORBIDE MONONITRATE 120 MG/1
120 TABLET, EXTENDED RELEASE ORAL DAILY
COMMUNITY
End: 2020-02-05

## 2019-12-02 RX ORDER — NITROGLYCERIN 0.4 MG/1
0.4 TABLET SUBLINGUAL EVERY 5 MIN PRN
Status: DISCONTINUED | OUTPATIENT
Start: 2019-12-02 | End: 2019-12-06

## 2019-12-02 RX ORDER — HYDROCODONE BITARTRATE AND ACETAMINOPHEN 10; 325 MG/1; MG/1
2 TABLET ORAL EVERY 4 HOURS PRN
Status: DISCONTINUED | OUTPATIENT
Start: 2019-12-02 | End: 2019-12-03

## 2019-12-02 RX ORDER — ACETAMINOPHEN 500 MG
500 TABLET ORAL EVERY 6 HOURS PRN
Status: DISCONTINUED | OUTPATIENT
Start: 2019-12-02 | End: 2019-12-03

## 2019-12-02 RX ORDER — PANTOPRAZOLE SODIUM 40 MG/1
40 TABLET, DELAYED RELEASE ORAL
Status: DISCONTINUED | OUTPATIENT
Start: 2019-12-03 | End: 2019-12-06

## 2019-12-02 RX ORDER — SODIUM CHLORIDE, SODIUM LACTATE, POTASSIUM CHLORIDE, CALCIUM CHLORIDE 600; 310; 30; 20 MG/100ML; MG/100ML; MG/100ML; MG/100ML
INJECTION, SOLUTION INTRAVENOUS CONTINUOUS
Status: DISCONTINUED | OUTPATIENT
Start: 2019-12-02 | End: 2019-12-02 | Stop reason: HOSPADM

## 2019-12-02 RX ORDER — SODIUM CHLORIDE AND POTASSIUM CHLORIDE .9; .15 G/100ML; G/100ML
75 SOLUTION INTRAVENOUS CONTINUOUS
Status: DISCONTINUED | OUTPATIENT
Start: 2019-12-02 | End: 2019-12-06

## 2019-12-02 RX ORDER — POLYETHYLENE GLYCOL 3350 17 G/17G
17 POWDER, FOR SOLUTION ORAL DAILY PRN
Status: DISCONTINUED | OUTPATIENT
Start: 2019-12-02 | End: 2019-12-06

## 2019-12-02 RX ORDER — METOPROLOL TARTRATE 50 MG/1
50 TABLET, FILM COATED ORAL EVERY MORNING
Status: ON HOLD | COMMUNITY
End: 2019-12-06

## 2019-12-02 RX ORDER — METOCLOPRAMIDE HYDROCHLORIDE 5 MG/ML
10 INJECTION INTRAMUSCULAR; INTRAVENOUS EVERY 6 HOURS PRN
Status: DISCONTINUED | OUTPATIENT
Start: 2019-12-02 | End: 2019-12-06

## 2019-12-02 RX ORDER — ONDANSETRON 2 MG/ML
4 INJECTION INTRAMUSCULAR; INTRAVENOUS EVERY 4 HOURS PRN
Status: DISPENSED | OUTPATIENT
Start: 2019-12-02 | End: 2019-12-04

## 2019-12-02 RX ORDER — METOPROLOL TARTRATE 50 MG/1
25 TABLET, FILM COATED ORAL NIGHTLY
Status: ON HOLD | COMMUNITY
End: 2019-12-06

## 2019-12-02 RX ORDER — INSULIN ASPART 100 [IU]/ML
INJECTION, SOLUTION INTRAVENOUS; SUBCUTANEOUS ONCE
Status: COMPLETED | OUTPATIENT
Start: 2019-12-02 | End: 2019-12-02

## 2019-12-02 RX ORDER — LIDOCAINE HYDROCHLORIDE 10 MG/ML
INJECTION, SOLUTION EPIDURAL; INFILTRATION; INTRACAUDAL; PERINEURAL AS NEEDED
Status: DISCONTINUED | OUTPATIENT
Start: 2019-12-02 | End: 2019-12-02 | Stop reason: SURG

## 2019-12-02 RX ORDER — ACETAMINOPHEN 325 MG/1
650 TABLET ORAL EVERY 4 HOURS PRN
Status: DISCONTINUED | OUTPATIENT
Start: 2019-12-02 | End: 2019-12-02

## 2019-12-02 RX ORDER — LABETALOL HYDROCHLORIDE 5 MG/ML
5 INJECTION, SOLUTION INTRAVENOUS EVERY 5 MIN PRN
Status: DISCONTINUED | OUTPATIENT
Start: 2019-12-02 | End: 2019-12-02 | Stop reason: HOSPADM

## 2019-12-02 RX ORDER — DEXTROSE MONOHYDRATE 25 G/50ML
50 INJECTION, SOLUTION INTRAVENOUS
Status: DISCONTINUED | OUTPATIENT
Start: 2019-12-02 | End: 2019-12-02 | Stop reason: HOSPADM

## 2019-12-02 RX ORDER — MORPHINE SULFATE 4 MG/ML
INJECTION, SOLUTION INTRAMUSCULAR; INTRAVENOUS
Status: DISPENSED
Start: 2019-12-02 | End: 2019-12-03

## 2019-12-02 RX ORDER — LISINOPRIL AND HYDROCHLOROTHIAZIDE 25; 20 MG/1; MG/1
1 TABLET ORAL DAILY
Status: ON HOLD | COMMUNITY
End: 2019-12-06

## 2019-12-02 RX ORDER — MORPHINE SULFATE 4 MG/ML
1 INJECTION, SOLUTION INTRAMUSCULAR; INTRAVENOUS EVERY 2 HOUR PRN
Status: DISCONTINUED | OUTPATIENT
Start: 2019-12-02 | End: 2019-12-06

## 2019-12-02 RX ORDER — MIDAZOLAM HYDROCHLORIDE 1 MG/ML
1 INJECTION INTRAMUSCULAR; INTRAVENOUS EVERY 5 MIN PRN
Status: DISCONTINUED | OUTPATIENT
Start: 2019-12-02 | End: 2019-12-02 | Stop reason: HOSPADM

## 2019-12-02 RX ORDER — TRAMADOL HYDROCHLORIDE 50 MG/1
TABLET ORAL EVERY 6 HOURS PRN
Status: DISCONTINUED | OUTPATIENT
Start: 2019-12-02 | End: 2019-12-06

## 2019-12-02 RX ORDER — GLYCOPYRROLATE 0.2 MG/ML
INJECTION, SOLUTION INTRAMUSCULAR; INTRAVENOUS AS NEEDED
Status: DISCONTINUED | OUTPATIENT
Start: 2019-12-02 | End: 2019-12-02 | Stop reason: SURG

## 2019-12-02 RX ORDER — BUPIVACAINE HYDROCHLORIDE AND EPINEPHRINE 2.5; 5 MG/ML; UG/ML
INJECTION, SOLUTION EPIDURAL; INFILTRATION; INTRACAUDAL; PERINEURAL AS NEEDED
Status: DISCONTINUED | OUTPATIENT
Start: 2019-12-02 | End: 2019-12-02 | Stop reason: HOSPADM

## 2019-12-02 RX ORDER — MIDAZOLAM HYDROCHLORIDE 1 MG/ML
INJECTION INTRAMUSCULAR; INTRAVENOUS AS NEEDED
Status: DISCONTINUED | OUTPATIENT
Start: 2019-12-02 | End: 2019-12-02 | Stop reason: SURG

## 2019-12-02 RX ORDER — BISACODYL 10 MG
10 SUPPOSITORY, RECTAL RECTAL
Status: DISCONTINUED | OUTPATIENT
Start: 2019-12-02 | End: 2019-12-06

## 2019-12-02 RX ORDER — GABAPENTIN 300 MG/1
600 CAPSULE ORAL 2 TIMES DAILY
COMMUNITY
End: 2020-02-05

## 2019-12-02 RX ORDER — METHYLPREDNISOLONE ACETATE 80 MG/ML
INJECTION, SUSPENSION INTRA-ARTICULAR; INTRALESIONAL; INTRAMUSCULAR; SOFT TISSUE AS NEEDED
Status: DISCONTINUED | OUTPATIENT
Start: 2019-12-02 | End: 2019-12-02 | Stop reason: HOSPADM

## 2019-12-02 RX ORDER — LISINOPRIL AND HYDROCHLOROTHIAZIDE 25; 20 MG/1; MG/1
1 TABLET ORAL DAILY
Status: DISCONTINUED | OUTPATIENT
Start: 2019-12-02 | End: 2019-12-02 | Stop reason: SDUPTHER

## 2019-12-02 RX ORDER — CYCLOBENZAPRINE HCL 10 MG
10 TABLET ORAL 3 TIMES DAILY PRN
Status: DISCONTINUED | OUTPATIENT
Start: 2019-12-02 | End: 2019-12-06

## 2019-12-02 RX ORDER — ONDANSETRON 2 MG/ML
INJECTION INTRAMUSCULAR; INTRAVENOUS AS NEEDED
Status: DISCONTINUED | OUTPATIENT
Start: 2019-12-02 | End: 2019-12-02 | Stop reason: SURG

## 2019-12-02 RX ORDER — RANOLAZINE 500 MG/1
500 TABLET, EXTENDED RELEASE ORAL DAILY
COMMUNITY
End: 2021-01-21

## 2019-12-02 RX ORDER — HYDROMORPHONE HYDROCHLORIDE 1 MG/ML
0.2 INJECTION, SOLUTION INTRAMUSCULAR; INTRAVENOUS; SUBCUTANEOUS EVERY 2 HOUR PRN
Status: DISCONTINUED | OUTPATIENT
Start: 2019-12-02 | End: 2019-12-02

## 2019-12-02 RX ORDER — POLYETHYLENE GLYCOL 3350 17 G/17G
17 POWDER, FOR SOLUTION ORAL NIGHTLY
Status: DISCONTINUED | OUTPATIENT
Start: 2019-12-02 | End: 2019-12-05

## 2019-12-02 RX ORDER — HYDROMORPHONE HYDROCHLORIDE 1 MG/ML
0.4 INJECTION, SOLUTION INTRAMUSCULAR; INTRAVENOUS; SUBCUTANEOUS EVERY 2 HOUR PRN
Status: DISCONTINUED | OUTPATIENT
Start: 2019-12-02 | End: 2019-12-02

## 2019-12-02 RX ORDER — BACITRACIN 50000 [USP'U]/1
INJECTION, POWDER, LYOPHILIZED, FOR SOLUTION INTRAMUSCULAR AS NEEDED
Status: DISCONTINUED | OUTPATIENT
Start: 2019-12-02 | End: 2019-12-02 | Stop reason: HOSPADM

## 2019-12-02 RX ORDER — ROCURONIUM BROMIDE 10 MG/ML
INJECTION, SOLUTION INTRAVENOUS AS NEEDED
Status: DISCONTINUED | OUTPATIENT
Start: 2019-12-02 | End: 2019-12-02 | Stop reason: SURG

## 2019-12-02 RX ORDER — HYDROMORPHONE HYDROCHLORIDE 1 MG/ML
0.8 INJECTION, SOLUTION INTRAMUSCULAR; INTRAVENOUS; SUBCUTANEOUS EVERY 2 HOUR PRN
Status: DISCONTINUED | OUTPATIENT
Start: 2019-12-02 | End: 2019-12-02

## 2019-12-02 RX ORDER — INSULIN ASPART 100 [IU]/ML
INJECTION, SOLUTION INTRAVENOUS; SUBCUTANEOUS
Status: DISCONTINUED
Start: 2019-12-02 | End: 2019-12-02 | Stop reason: WASHOUT

## 2019-12-02 RX ORDER — DIPHENHYDRAMINE HCL 25 MG
25 CAPSULE ORAL EVERY 4 HOURS PRN
Status: DISCONTINUED | OUTPATIENT
Start: 2019-12-02 | End: 2019-12-06

## 2019-12-02 RX ORDER — MIDAZOLAM HYDROCHLORIDE 1 MG/ML
INJECTION INTRAMUSCULAR; INTRAVENOUS
Status: COMPLETED
Start: 2019-12-02 | End: 2019-12-02

## 2019-12-02 RX ORDER — MORPHINE SULFATE 4 MG/ML
2 INJECTION, SOLUTION INTRAMUSCULAR; INTRAVENOUS EVERY 5 MIN PRN
Status: DISCONTINUED | OUTPATIENT
Start: 2019-12-02 | End: 2019-12-02 | Stop reason: HOSPADM

## 2019-12-02 RX ORDER — SODIUM PHOSPHATE, DIBASIC AND SODIUM PHOSPHATE, MONOBASIC 7; 19 G/133ML; G/133ML
1 ENEMA RECTAL ONCE AS NEEDED
Status: DISCONTINUED | OUTPATIENT
Start: 2019-12-02 | End: 2019-12-06

## 2019-12-02 RX ORDER — METOCLOPRAMIDE HYDROCHLORIDE 5 MG/5ML
5 SOLUTION ORAL
Status: DISCONTINUED | OUTPATIENT
Start: 2019-12-02 | End: 2019-12-02

## 2019-12-02 RX ORDER — MORPHINE SULFATE 10 MG/ML
INJECTION, SOLUTION INTRAMUSCULAR; INTRAVENOUS
Status: COMPLETED
Start: 2019-12-02 | End: 2019-12-02

## 2019-12-02 RX ORDER — OMEPRAZOLE 40 MG/1
40 CAPSULE, DELAYED RELEASE ORAL DAILY
COMMUNITY
End: 2021-01-21

## 2019-12-02 RX ORDER — NALOXONE HYDROCHLORIDE 0.4 MG/ML
80 INJECTION, SOLUTION INTRAMUSCULAR; INTRAVENOUS; SUBCUTANEOUS AS NEEDED
Status: DISCONTINUED | OUTPATIENT
Start: 2019-12-02 | End: 2019-12-02 | Stop reason: HOSPADM

## 2019-12-02 RX ORDER — NEOSTIGMINE METHYLSULFATE 1 MG/ML
INJECTION INTRAVENOUS AS NEEDED
Status: DISCONTINUED | OUTPATIENT
Start: 2019-12-02 | End: 2019-12-02 | Stop reason: SURG

## 2019-12-02 RX ORDER — HYDROCODONE BITARTRATE AND ACETAMINOPHEN 10; 325 MG/1; MG/1
1 TABLET ORAL EVERY 4 HOURS PRN
Status: DISCONTINUED | OUTPATIENT
Start: 2019-12-02 | End: 2019-12-03

## 2019-12-02 RX ORDER — DIPHENHYDRAMINE HYDROCHLORIDE 50 MG/ML
25 INJECTION INTRAMUSCULAR; INTRAVENOUS EVERY 4 HOURS PRN
Status: DISCONTINUED | OUTPATIENT
Start: 2019-12-02 | End: 2019-12-06

## 2019-12-02 RX ORDER — MORPHINE SULFATE 4 MG/ML
2 INJECTION, SOLUTION INTRAMUSCULAR; INTRAVENOUS EVERY 2 HOUR PRN
Status: DISCONTINUED | OUTPATIENT
Start: 2019-12-02 | End: 2019-12-06

## 2019-12-02 RX ORDER — CETIRIZINE HYDROCHLORIDE 10 MG/1
10 TABLET ORAL DAILY
Status: DISCONTINUED | OUTPATIENT
Start: 2019-12-02 | End: 2019-12-06

## 2019-12-02 RX ORDER — METOPROLOL TARTRATE 50 MG/1
50 TABLET, FILM COATED ORAL EVERY MORNING
Status: DISCONTINUED | OUTPATIENT
Start: 2019-12-02 | End: 2019-12-02

## 2019-12-02 RX ORDER — NIFEDIPINE 60 MG/1
60 TABLET, EXTENDED RELEASE ORAL DAILY
Status: DISCONTINUED | OUTPATIENT
Start: 2019-12-02 | End: 2019-12-03

## 2019-12-02 RX ORDER — METOCLOPRAMIDE HYDROCHLORIDE 5 MG/ML
INJECTION INTRAMUSCULAR; INTRAVENOUS AS NEEDED
Status: DISCONTINUED | OUTPATIENT
Start: 2019-12-02 | End: 2019-12-02 | Stop reason: SURG

## 2019-12-02 RX ORDER — RANOLAZINE 500 MG/1
500 TABLET, EXTENDED RELEASE ORAL DAILY
Status: DISCONTINUED | OUTPATIENT
Start: 2019-12-03 | End: 2019-12-06

## 2019-12-02 RX ORDER — ACETAMINOPHEN 500 MG
1000 TABLET ORAL ONCE
Status: DISCONTINUED | OUTPATIENT
Start: 2019-12-02 | End: 2019-12-02 | Stop reason: HOSPADM

## 2019-12-02 RX ORDER — DEXTROSE MONOHYDRATE 25 G/50ML
50 INJECTION, SOLUTION INTRAVENOUS
Status: DISCONTINUED | OUTPATIENT
Start: 2019-12-02 | End: 2019-12-06

## 2019-12-02 RX ORDER — ONDANSETRON 2 MG/ML
4 INJECTION INTRAMUSCULAR; INTRAVENOUS AS NEEDED
Status: DISCONTINUED | OUTPATIENT
Start: 2019-12-02 | End: 2019-12-02 | Stop reason: HOSPADM

## 2019-12-02 RX ORDER — GABAPENTIN 300 MG/1
600 CAPSULE ORAL 2 TIMES DAILY
Status: DISCONTINUED | OUTPATIENT
Start: 2019-12-02 | End: 2019-12-06

## 2019-12-02 RX ORDER — NIFEDIPINE 60 MG/1
60 TABLET, EXTENDED RELEASE ORAL DAILY
COMMUNITY
End: 2020-02-01

## 2019-12-02 RX ORDER — DOXEPIN HYDROCHLORIDE 50 MG/1
1 CAPSULE ORAL DAILY
Status: DISCONTINUED | OUTPATIENT
Start: 2019-12-02 | End: 2019-12-06

## 2019-12-02 RX ORDER — DOCUSATE SODIUM 100 MG/1
100 CAPSULE, LIQUID FILLED ORAL 2 TIMES DAILY
Status: DISCONTINUED | OUTPATIENT
Start: 2019-12-02 | End: 2019-12-06

## 2019-12-02 RX ORDER — ATORVASTATIN CALCIUM 40 MG/1
40 TABLET, FILM COATED ORAL NIGHTLY
Status: DISCONTINUED | OUTPATIENT
Start: 2019-12-02 | End: 2019-12-06

## 2019-12-02 RX ADMIN — LIDOCAINE HYDROCHLORIDE 30 MG: 10 INJECTION, SOLUTION EPIDURAL; INFILTRATION; INTRACAUDAL; PERINEURAL at 10:13:00

## 2019-12-02 RX ADMIN — MIDAZOLAM HYDROCHLORIDE 2 MG: 1 INJECTION INTRAMUSCULAR; INTRAVENOUS at 10:09:00

## 2019-12-02 RX ADMIN — METOCLOPRAMIDE HYDROCHLORIDE 10 MG: 5 INJECTION INTRAMUSCULAR; INTRAVENOUS at 10:20:00

## 2019-12-02 RX ADMIN — ROCURONIUM BROMIDE 30 MG: 10 INJECTION, SOLUTION INTRAVENOUS at 10:13:00

## 2019-12-02 RX ADMIN — SODIUM CHLORIDE, SODIUM LACTATE, POTASSIUM CHLORIDE, CALCIUM CHLORIDE: 600; 310; 30; 20 INJECTION, SOLUTION INTRAVENOUS at 12:10:00

## 2019-12-02 RX ADMIN — ONDANSETRON 4 MG: 2 INJECTION INTRAMUSCULAR; INTRAVENOUS at 11:29:00

## 2019-12-02 RX ADMIN — GLYCOPYRROLATE 0.6 MG: 0.2 INJECTION, SOLUTION INTRAMUSCULAR; INTRAVENOUS at 11:50:00

## 2019-12-02 RX ADMIN — SODIUM CHLORIDE, SODIUM LACTATE, POTASSIUM CHLORIDE, CALCIUM CHLORIDE: 600; 310; 30; 20 INJECTION, SOLUTION INTRAVENOUS at 10:08:00

## 2019-12-02 RX ADMIN — NEOSTIGMINE METHYLSULFATE 3 MG: 1 INJECTION INTRAVENOUS at 11:50:00

## 2019-12-02 NOTE — OPERATIVE REPORT
Operative Note   Patient Name: Jesús Miller  [birth year]  12/2/2019  Preoperative Diagnosis: lumbar radiculopathy    Postoperative Diagnosis: same   Primary Surgeon: Michel Hernnades M.D.    Assistant: Josefina Ferro pa-c, who was essential to the case placed over the nerve. The wound was closed in layers. Dermabond for the skin. Patient was taken off the OR table and awakened by anesthesia.      I would use the 22 modifier as her previous surgery caused lots of scarring at the operative level which mad

## 2019-12-02 NOTE — ANESTHESIA PREPROCEDURE EVALUATION
PRE-OP EVALUATION    Patient Name: Jesús Miller    Pre-op Diagnosis: Lumbar disc herniation with radiculopathy [M51.16]    Procedure(s):  LEFT LUMBAR 4 - LUMBAR 5 MICRODISCECTOMY AND ALL INDICATED PROCEDURES.     Surgeon(s) and Role:     * V[employer] 0  [DISCONTINUED] METFORMIN HCL 1000 MG Oral Tab, TAKE 1 TABLET(1000 MG) BY MOUTH TWICE DAILY WITH MEALS, Disp: 120 tablet, Rfl: 0  Omega-3-acid Ethyl Esters 1 g Oral Cap, Take 1 g by mouth daily. , Disp: , Rfl:   Clopidogrel Bisulfate 75 MG Oral Tab, Take aorta (HCC)     Enthesopathy of wrist and carpus     Left leg pain     Lumbar radiculopathy          Past Surgical History:   Procedure Laterality Date   • BACK SURGERY      Spinal diskectomy lumbar   •   1987   • CARDIAC CATHERTERIZATION (P Results   Component Value Date     11/15/2019    K 4.5 11/15/2019     11/15/2019    CO2 31.0 11/15/2019    BUN 22 (H) 11/15/2019    CREATSERUM 0.85 11/15/2019    GLU 88 11/15/2019    CA 9.6 11/15/2019     Lab Results   Component Value Date    I

## 2019-12-02 NOTE — CONSULTS
[facility]  [address] [place] [address] Patient Status:  Outpatient in a Bed    4/15/1950 MRN [id number]   [facility] 3SW-A Attending Tone Carmona MD   Hosp Day # 0 PCP Domenic Degroot MD     Reason for consult: Medical Mg HERNIA SURGERY  16/33/1956    Umbilical hernia repair;    • HYSTERECTOMY  01/01/981    d/t fibroids   • OTHER      Spinal Steroid Injection    • SACROILIAC JOINT INJECTION RIGHT OR LEFT Right 10/23/2019    Performed by Quynh Ndiaye MD at [facility] ENDOSCOPY   • S [Ezetimibe]       NAUSEA AND VOMITING    Comment:Tabs,Muscle spasm    Medications:  No current facility-administered medications on file prior to encounter. Ranolazine  MG Oral Tablet 12 Hr, Take 500 mg by mouth daily.   , Disp: , Rfl:   Isosorbide point review of systems was completed. Pertinent positives and negatives noted in the HPI.     Physical Exam:    /58 (BP Location: Right arm)   Pulse 64   Temp 97.4 °F (36.3 °C) (Oral)   Resp 12   Ht 5' 3\" (1.6 m)   Wt 155 lb 6.8 oz (70.5 kg)   Sp

## 2019-12-02 NOTE — BRIEF OP NOTE
Pre-Operative Diagnosis: Lumbar disc herniation with radiculopathy [M51.16]     Post-Operative Diagnosis: same     Procedure Performed:   Procedure(s):  LEFT LUMBAR 4 - LUMBAR 5 MICRODISCECTOMY     Surgeon(s) and Role:     * Meredith De Guzman MD - Robert Barrera

## 2019-12-02 NOTE — ANESTHESIA POSTPROCEDURE EVALUATION
[facility] Patient Status:  Outpatient in a Bed   Age/Gender 77year old female MRN [id number]   [facility] SURGERY Attending Mily Rodriguez MD   Hosp Day # 0 PCP Leah Fernandez MD       Anesthesia Post-op Note

## 2019-12-02 NOTE — ANESTHESIA PROCEDURE NOTES
Airway  Date/Time: 12/2/2019 10:14 AM  Urgency: elective    Airway not difficult    General Information and Staff    Patient location during procedure: OR  Anesthesiologist: Lawyer Otoniel MD  Performed: anesthesiologist     Indications and Patient Condi

## 2019-12-02 NOTE — H&P
Delicia Campbell PCP:  Mari Polanco MD     MRN [id number]      HISTORY OF PRESENT ILLNESS:  Delicia Campbell is a(n) 77year old female here for follow-up  She got her L5 nerve root block  This gave her great relief for 4 hours  She

## 2019-12-03 ENCOUNTER — APPOINTMENT (OUTPATIENT)
Dept: GENERAL RADIOLOGY | Facility: HOSPITAL | Age: 69
DRG: 519 | End: 2019-12-03
Attending: STUDENT IN AN ORGANIZED HEALTH CARE EDUCATION/TRAINING PROGRAM
Payer: MEDICARE

## 2019-12-03 ENCOUNTER — CLINICAL ABSTRACT (OUTPATIENT)
Dept: CARDIOLOGY | Age: 69
End: 2019-12-03

## 2019-12-03 PROCEDURE — 99233 SBSQ HOSP IP/OBS HIGH 50: CPT | Performed by: STUDENT IN AN ORGANIZED HEALTH CARE EDUCATION/TRAINING PROGRAM

## 2019-12-03 PROCEDURE — 74019 RADEX ABDOMEN 2 VIEWS: CPT | Performed by: STUDENT IN AN ORGANIZED HEALTH CARE EDUCATION/TRAINING PROGRAM

## 2019-12-03 RX ORDER — ACETAMINOPHEN 500 MG
1000 TABLET ORAL EVERY 6 HOURS
Status: DISCONTINUED | OUTPATIENT
Start: 2019-12-03 | End: 2019-12-06

## 2019-12-03 RX ORDER — SODIUM CHLORIDE 9 MG/ML
INJECTION, SOLUTION INTRAVENOUS CONTINUOUS
Status: DISCONTINUED | OUTPATIENT
Start: 2019-12-03 | End: 2019-12-04

## 2019-12-03 RX ORDER — MORPHINE SULFATE 0.5 MG/ML
INJECTION, SOLUTION EPIDURAL; INTRATHECAL; INTRAVENOUS AS NEEDED
Status: DISCONTINUED | OUTPATIENT
Start: 2019-12-03 | End: 2019-12-03

## 2019-12-03 NOTE — PROGRESS NOTES
POD #1 spine newsletter provided to patient. Reviewed indications, side effects of pain medication/narcotics and constipation prevention.  Once allowed to eat, stressed importance of increased fluids/roughage in diet, continued use stool softeners along wit

## 2019-12-03 NOTE — PLAN OF CARE
Patient has been very nauseous and had several episodes of emesis after the narcotics, but resolved after Zofran and Reglan. Reports intolerance to most narcotics, Started on tramadol and tolerated it well.  Incision is with dermabond CDI, VS are stable, wa

## 2019-12-03 NOTE — OCCUPATIONAL THERAPY NOTE
OCCUPATIONAL THERAPY EVALUATION - INPATIENT     Room Number: 375/375-A  Evaluation Date: 12/3/2019  Type of Evaluation: Initial  Presenting Problem: (left L4-5 microdiscectomy)    Physician Order: IP Consult to Occupational Therapy  Reason for Therapy: ADL • CHOLECYSTECTOMY  01/01/1999   • COLONOSCOPY  01/24/2011    colon polyp removal  francesca cee 5   • COLONOSCOPY     • COLONOSCOPY N/A 9/14/2017    Performed by Caroline Hendricks MD at [facility] ENDOSCOPY   • HERNIA SURGERY  35/51/3638    Umbilical hernia repair Restriction: None                PAIN ASSESSMENT  Rating: Unable to rate  Location: (belly/stomach)  Management Techniques: Relaxation;Repositioning    COGNITION  Overall Cognitive Status:  WFL - within functional limits  Problem Solving:  assistance requi adaptive techniques and equipment for lower body dressing. Patient was able to don lower body garments with moderate A to thread d/t decr problem solving using reacher.     Patient completed sit to stand and functional mobility using RW with CGA, distance a impacting engagement in ADL/IADL MODERATE  3 - 5 performance deficits   Client Assessment/Performance Deficits MODERATE - Comorbidities and min to mod modifications of tasks    Clinical Decision Making MODERATE - Analysis of occupational profile, detailed

## 2019-12-03 NOTE — PROGRESS NOTES
[facility]  Progress Note     Yusra Guevara Patient Status:  Outpatient in a Bed    4/15/1950 MRN [id number]   [facility] 3SW-A Attending Donny Musa MD   Hosp Day # 0 PCP Deanna Haynes MD     Chief Complaint: Medical Acme • multivitamin  1 tablet Oral Daily   • NIFEdipine ER osmotic release  60 mg Oral Daily   • Pantoprazole Sodium  40 mg Oral QAM AC   • Ranolazine ER  500 mg Oral Daily   • PEG 3350  17 g Oral Nightly   • docusate sodium  100 mg Oral BID   • Insulin Aspar

## 2019-12-03 NOTE — PROGRESS NOTES
[facility]  Neurosurgery Progress Note    Soumya Bowen Patient Status:  Outpatient in a Bed    4/15/1950 MRN [id number]   [facility] 3SW-A Attending Meredith De Guzman MD   Hosp Day # 0 PCP Jose Valdivia MD     Chief Complaint: up  Following

## 2019-12-03 NOTE — PHYSICAL THERAPY NOTE
PHYSICAL THERAPY EVALUATION - INPATIENT     Room Number: 375/375-A  Evaluation Date: 12/3/2019  Type of Evaluation: Initial  Physician Order: PT Eval and Treat    Presenting Problem: S/p Left L4-L5 Microdiscectomy on 12/02/19  Reason for Therapy:  Mob COLONOSCOPY     • COLONOSCOPY N/A 9/14/2017    Performed by Alvaro Ramirez MD at [facility] ENDOSCOPY   • HERNIA SURGERY  90/35/1286    Umbilical hernia repair;    • HYSTERECTOMY  01/01/981    d/t fibroids   • OTHER      Spinal Steroid Injection    • Erlin Main back @ surgical site, Abdominal pain  Management Techniques: Activity promotion; Body mechanics;Breathing techniques;Relaxation;Repositioning    COGNITION  · Overall Cognitive Status:  WFL - within functional limits    RANGE OF MOTION AND STRENGTH ASSESSMEN demonstrate practice of proper body mechanics during independent bed mobility, functional transfers. Patient required CGA for safety during 200 ft ambulation with RW - tends to shuffle steps & decreased pace + step length.  Unable to assess stairs this time and this patient is demonstrating a 36% degree of impairment in mobility. Research supports that patients with this level of impairment may benefit from Home with family assist @ d/c.  Expect patient to achieve supervision to modified independent level of f

## 2019-12-03 NOTE — PLAN OF CARE
Pt's pain is controlled with the PO ultram. Pt has good dorsi/plantar flexion bilaterally. Encouraged ankle pump exercises. Pt is up safely to the bathroom with minimal assist. Pt refused to ambulate in the halls last evening.  HR has been in the 60's overn

## 2019-12-03 NOTE — PLAN OF CARE
Patient had an episode of hypotension and lightheadedness and nausea after getting to the chair, It revolved after Reglan and laying down. Per Dr Angela Button started on IV fluids.    She reports having chronic constipation , today feeling abdominal pain, has bee

## 2019-12-04 PROCEDURE — 99232 SBSQ HOSP IP/OBS MODERATE 35: CPT | Performed by: HOSPITALIST

## 2019-12-04 RX ORDER — NIFEDIPINE 60 MG/1
60 TABLET, EXTENDED RELEASE ORAL DAILY
Status: DISCONTINUED | OUTPATIENT
Start: 2019-12-04 | End: 2019-12-06

## 2019-12-04 RX ORDER — ISOSORBIDE MONONITRATE 60 MG/1
120 TABLET, EXTENDED RELEASE ORAL DAILY
Status: DISCONTINUED | OUTPATIENT
Start: 2019-12-04 | End: 2019-12-06

## 2019-12-04 NOTE — PLAN OF CARE
Problem: PAIN - ADULT  Goal: Verbalizes/displays adequate comfort level or patient's stated pain goal  Description  INTERVENTIONS:  - Encourage pt to monitor pain and request assistance  - Assess pain using appropriate pain scale  - Administer analgesics Educate and encourage patient/family in tolerated functional activity level and precautions during self-care  }   Outcome: Progressing   Pt alert and oriented. Clear liq diet ordered. L4-L5 microdiscectomy. Pt on room air and needed 1 liter o2 at night.

## 2019-12-04 NOTE — OCCUPATIONAL THERAPY NOTE
OCCUPATIONAL THERAPY TREATMENT NOTE - INPATIENT     Room Number: 375/375-A  Session: 1/2  Number of Visits to Meet Established Goals: 2    Presenting Problem: (left L4-5 microdiscectomy)      History related to current admission:   Admitted for elective le francesca cee 5   • COLONOSCOPY     • COLONOSCOPY N/A 9/14/2017    Performed by Pablito Mathias MD at [facility] ENDOSCOPY   • HERNIA SURGERY  13/86/3952    Umbilical hernia repair;    • HYSTERECTOMY  01/01/981    d/t fibroids   • LUMBAR MICRODISCECTOMY 1 LEVEL N/ upper body clothing?: A Little  -   Taking care of personal grooming such as brushing teeth?: None  -   Eating meals?: None    AM-PAC Score:  Score: 20  Approx Degree of Impairment: 38.32%  Standardized Score (AM-PAC Scale): 42.03  CMS Modifier (G-Code): C prep for ADL tasks with AD as needed with supervision.      FUNCTIONAL TRANSFER GOALS   Patient will transfer to Toilet with supervision -MET     ADDITIONAL GOALS  Patient will recall all spinal precautions and incorporate into ADLs -MET

## 2019-12-04 NOTE — PLAN OF CARE
Pt A&O. Requiring 1L O2 while asleep. MISSY, no CPAP. Tele in place per protocol. SCDs and Roscoe hose to BLE. BP stable this shift. Incision to low back with Dermabond drsg C/D/I. Strong dorsi/plantar flexion. Denies numbness and tingling. Pt NPO.  Accuchecks Q

## 2019-12-04 NOTE — PHYSICAL THERAPY NOTE
PHYSICAL THERAPY TREATMENT NOTE - INPATIENT    Room Number: 375/375-A     Session: 1   Number of Visits to Meet Established Goals: 0    Presenting Problem: S/p Left L4-L5 Microdiscectomy on 12/02/19  History related to current admission: Patient is 77 yea Performed by Tristen Hoover MD at [facility] ENDOSCOPY   • HERNIA SURGERY  94/70/3591    Umbilical hernia repair;    • HYSTERECTOMY  01/01/981    d/t fibroids   • LUMBAR MICRODISCECTOMY 1 LEVEL N/A 12/2/2019    Performed by Cortes Alexander MD at [place] [address]. over in bed (including adjusting bedclothes, sheets and blankets)?: None   -   Sitting down on and standing up from a chair with arms (e.g., wheelchair, bedside commode, etc.): None   -   Moving from lying on back to sitting on the side of the bed?: None supine - sit EOB @ level: independent      Goal #2 Patient is able to demonstrate transfers EOB to/from Chair/Wheelchair at assistance level: modified independent   Goal #3 Patient is able to ambulate 200 feet with assist device: walker - rolling at assist

## 2019-12-04 NOTE — PROGRESS NOTES
[facility]  Neurosurgery Progress Note    Jesús Miller Patient Status:  Inpatient    4/15/1950 MRN [id number]   [facility] 3SW-A Attending Dillon Hernandez MD   Hosp Day # 1 PCP Johanna Cantrell MD     Chief Complaint:  Lumbar ra cleared medically

## 2019-12-04 NOTE — PROGRESS NOTES
[facility]  Progress Note     Loren Mari Patient Status:  Inpatient    4/15/1950 MRN [id number]   [facility] 3SW-A Attending Darryle Gross, MD   Hosp Day # 1 PCP Carson Paz MD     Chief Complaint: Medical management • cetirizine  10 mg Oral Daily   • gabapentin  600 mg Oral BID   • multivitamin  1 tablet Oral Daily   • Pantoprazole Sodium  40 mg Oral QAM AC   • Ranolazine ER  500 mg Oral Daily   • PEG 3350  17 g Oral Nightly   • docusate sodium  100 mg Oral BID   •

## 2019-12-05 PROCEDURE — 99233 SBSQ HOSP IP/OBS HIGH 50: CPT | Performed by: HOSPITALIST

## 2019-12-05 RX ORDER — POLYETHYLENE GLYCOL 3350 17 G/17G
17 POWDER, FOR SOLUTION ORAL 2 TIMES DAILY
Status: DISCONTINUED | OUTPATIENT
Start: 2019-12-05 | End: 2019-12-06

## 2019-12-05 NOTE — PLAN OF CARE
Page to Dr. Ngozi Domínguez at [phone] re diet advancement. Patient tolerated clear liquid this AM, had flatus last night, BS+ (slightly decreased after eating clears), reports bloating sensation greatly improved/gone, no nausea no emesis, has appetite.  Return call fr

## 2019-12-05 NOTE — PLAN OF CARE
Pain well controlled with scheduled Tylenol and Flexeril for muscle spasms. Pt moving well, ambulating frequently inside room and hallway. Abdomen soft, no BM yet but passing flatus. Denies any abdominal discomfort, no n/v.  Tolerating CLD. VSS,  no c/o n/t

## 2019-12-05 NOTE — PROGRESS NOTES
[facility]  Progress Note     Hudson Taylor Patient Status:  Inpatient    4/15/1950 MRN [id number]   [facility] 3SW-A Attending Viv Colbert MD   Hosp Day # 2 PCP Aura Henderson MD     Chief Complaint: Ileus    S: Patient NIFEdipine ER osmotic release  60 mg Oral Daily   • Isosorbide Mononitrate ER  120 mg Oral Daily   • acetaminophen  1,000 mg Oral Q6H   • atorvastatin  40 mg Oral Nightly   • cetirizine  10 mg Oral Daily   • gabapentin  600 mg Oral BID   • multivitamin  1

## 2019-12-05 NOTE — OCCUPATIONAL THERAPY NOTE
OCCUPATIONAL THERAPY TREATMENT NOTE - INPATIENT     Room Number: 375/375-A  Session: 1  Number of Visits to Meet Established Goals: 2    Presenting Problem: (left L4-5 microdiscectomy)    History related to current admission: Admitted for elective left L4- coleman 5   • COLONOSCOPY     • COLONOSCOPY N/A 9/14/2017    Performed by Manolo Conklin MD at [facility]  50/30/0425    Umbilical hernia repair;    • HYSTERECTOMY  01/01/981    d/t fibroids   • LUMBAR MICRODISCECTOMY 1 LEVEL N/A 12/2 brushing teeth?: None  -   Eating meals?: None    AM-PAC Score:  Score: 23  Approx Degree of Impairment: 15.86%  Standardized Score (AM-PAC Scale): 51.12  CMS Modifier (G-Code): CI    FUNCTIONAL TRANSFER ASSESSMENT  Supine to Sit : Modified independent  Si training;Equipment eval/education; Compensatory technique education  Rehab Potential : Good  Frequency (Obs): Daily       OT Goals:   ADL GOALS   Patient will perform Lower Body Dressing with supervision-MET  Patient will perform Toileting with supervision-

## 2019-12-05 NOTE — PLAN OF CARE
Patient to remain on clear liquid diet at this time per Dr. Yossi Andujar as patient without flatus today. (patient didn not consume full liquid tray). Writer educated patient on use of IS, fall and DVT prevention, voids. Denies calf pain.  States pain controll

## 2019-12-05 NOTE — PROGRESS NOTES
[facility]  Neurosurgery Progress Note    Wan Bolaños Patient Status:  Inpatient    4/15/1950 MRN [id number]   [facility] 3SW-A Attending Dalila Ahumada, MD   Hosp Day # 2 PCP Pam Day MD     Chief Complaint:  Lumbar radicul

## 2019-12-06 VITALS
WEIGHT: 155.44 LBS | SYSTOLIC BLOOD PRESSURE: 128 MMHG | DIASTOLIC BLOOD PRESSURE: 68 MMHG | OXYGEN SATURATION: 95 % | TEMPERATURE: 98 F | BODY MASS INDEX: 27.54 KG/M2 | HEIGHT: 63 IN | HEART RATE: 76 BPM | RESPIRATION RATE: 16 BRPM

## 2019-12-06 PROCEDURE — 99233 SBSQ HOSP IP/OBS HIGH 50: CPT | Performed by: HOSPITALIST

## 2019-12-06 RX ORDER — CYCLOBENZAPRINE HCL 10 MG
10 TABLET ORAL 3 TIMES DAILY PRN
Qty: 30 TABLET | Refills: 0 | Status: SHIPPED | OUTPATIENT
Start: 2019-12-06 | End: 2019-12-13

## 2019-12-06 RX ORDER — METOPROLOL TARTRATE 50 MG/1
25 TABLET, FILM COATED ORAL 2 TIMES DAILY
Refills: 0 | Status: SHIPPED | COMMUNITY
Start: 2019-12-06 | End: 2020-07-07 | Stop reason: DRUGHIGH

## 2019-12-06 NOTE — PLAN OF CARE
Pt discharge education completed with pt and dgtr. All questions addressed. Pt verbalized understanding. All pt belongings packed and sent with pt. Discharged home via personal car.

## 2019-12-06 NOTE — PLAN OF CARE
Pt pain managed with Tylenol and Flexeril. VSS. Ambulating well. Voiding without difficulty. OK to DC from all services. Plan: home today. Will monitor.

## 2019-12-06 NOTE — PLAN OF CARE
Pt tolerated full liquid diet. Has had 2 BMs this shift. Denies nausea. Advanced to soft diet per order. Will monitor.

## 2019-12-06 NOTE — PLAN OF CARE
Patient tolerating a clear liquid diet. Bowel sounds present. Abdomen still distended. No tenderness. Had 2 BM's this evening. Up ambulating around unit several times this evening. Taking tylenol and flexeril for pain.  Back incision with dermabond clean dr

## 2019-12-06 NOTE — PROGRESS NOTES
[facility]  Neurosurgery Progress Note  2019    Kyleigh Ozuna Patient Status:  Inpatient    4/15/1950 MRN [id number]   [facility] 3SW-A Attending Zara Clancy MD   Hosp Day # 3 PCP Gabrielle House MD     SUBJECTIVE:  Monet Brown

## 2019-12-09 ENCOUNTER — PATIENT OUTREACH (OUTPATIENT)
Dept: CASE MANAGEMENT | Age: 69
End: 2019-12-09

## 2019-12-09 DIAGNOSIS — M51.16 LUMBAR DISC HERNIATION WITH RADICULOPATHY: ICD-10-CM

## 2019-12-09 DIAGNOSIS — Z02.9 ENCOUNTERS FOR UNSPECIFIED ADMINISTRATIVE PURPOSE: ICD-10-CM

## 2019-12-09 PROCEDURE — 1111F DSCHRG MED/CURRENT MED MERGE: CPT

## 2019-12-09 NOTE — PROGRESS NOTES
Initial Post Discharge Follow Up   Discharge Date: 12/6/19  Contact Date: 12/9/2019    Consent Verification:  Assessment Completed With: Patient  HIPAA Verified?   Yes    Discharge Dx:     Post-operative ileus  S/P microdiscectomy d/t lumbar disc herniat reading of 117 this morning. The pt did confirm taking medications as prescribed. The patient did resume Plavix on Saturday as directed. • Do you have any pain since discharge?  yes   If Yes:  o Where: soreness around the incision site.     Rating on pain by mouth 2 (two) times daily. • NIFEdipine ER osmotic release 60 MG Oral Tablet 24 Hr Take 60 mg by mouth daily. • Omeprazole 40 MG Oral Capsule Delayed Release Take 40 mg by mouth daily.      • metFORMIN HCl 1000 MG Oral Tab Take 0.5 tablets (500 m having issues with chronic constipation. Referrals/orders at D/C:  Home Health/Services ordered at D/C? No; the pt reports this was not necessary. DME ordered at D/C? Yes   What? Incentive spirometer   Have you received your (DME)?    yes; the patient address for you?    o Comments: No     GENERAL:   Were you able to participate in any of the following educational opportunities:   Pre op class in person yes   Discharge video  yes         Needs post D/C:   Now that you are home, are there any needs or co rate the care you received while in the hospital?  excellent      Interventions by NCM:  Reviewed all discharge instructions with the patient with a focus on constipation and pain management/diet, post-op restructions and fall precautions.  All medications

## 2019-12-10 NOTE — DISCHARGE SUMMARY
[facility]  Discharge Summary    Sarina Love Patient Status:  Inpatient    4/15/1950 MRN [id number]   [facility] 3SW-A Attending No att. providers found   Hosp Day # 3 PCP Leticia Oakes MD     Date of Admission: 2019    Date found to have hypoactive bowel sounds. A KUB was ordered which showed possible ileus. Her discharge was held. She had increased bowel regimen however was unable to discharge until she had a BM.   She was able to move her bowels and on 12/6 she discharged 500 mg by mouth every 6 (six) hours as needed for Pain., Historical    PEG 3350 Oral Powd Pack  Take 17 g by mouth nightly.  , Historical    Multiple Vitamin (MULTI-VITAMIN) Oral Tab  Take 1 Tab by mouth daily. , Historical    Ondansetron HCl (ZOFRAN) 4 mg

## 2019-12-12 ENCOUNTER — OFFICE VISIT (OUTPATIENT)
Dept: INTERNAL MEDICINE CLINIC | Facility: CLINIC | Age: 69
End: 2019-12-12
Payer: MEDICARE

## 2019-12-12 VITALS
WEIGHT: 156 LBS | HEART RATE: 75 BPM | TEMPERATURE: 98 F | BODY MASS INDEX: 27.64 KG/M2 | RESPIRATION RATE: 16 BRPM | OXYGEN SATURATION: 98 % | DIASTOLIC BLOOD PRESSURE: 70 MMHG | HEIGHT: 63 IN | SYSTOLIC BLOOD PRESSURE: 130 MMHG

## 2019-12-12 DIAGNOSIS — K56.7 POSTOPERATIVE ILEUS (HCC): Primary | ICD-10-CM

## 2019-12-12 DIAGNOSIS — I10 ESSENTIAL HYPERTENSION: ICD-10-CM

## 2019-12-12 DIAGNOSIS — E11.9 TYPE 2 DIABETES MELLITUS WITHOUT COMPLICATION, WITHOUT LONG-TERM CURRENT USE OF INSULIN (HCC): ICD-10-CM

## 2019-12-12 DIAGNOSIS — K91.89 POSTOPERATIVE ILEUS (HCC): Primary | ICD-10-CM

## 2019-12-12 PROCEDURE — 99495 TRANSJ CARE MGMT MOD F2F 14D: CPT | Performed by: FAMILY MEDICINE

## 2019-12-12 RX ORDER — LISINOPRIL AND HYDROCHLOROTHIAZIDE 12.5; 1 MG/1; MG/1
1 TABLET ORAL DAILY
Qty: 30 TABLET | Refills: 0 | Status: SHIPPED | OUTPATIENT
Start: 2019-12-12 | End: 2019-12-19 | Stop reason: DRUGHIGH

## 2019-12-12 NOTE — PROGRESS NOTES
HPI:    Joshua Bonds is a 77year old female here today for hospital follow up.    She was discharged from Inpatient hospital, [facility] to Home   Admission Date: 12/2/19   Discharge Date: 12/6/19  Hospital Discharge Diagnoses (since 11/12/2019) got 160 once     Allergies:  She is allergic to penicillins; advil [ibuprofen]; aspirin; codeine sulfate; demerol; naproxen; norvasc [amlodipine besylate]; nsaids; tramadol; vicodin [hydrocodone-acetaminophen]; and zetia [ezetimibe].     Current Meds:  Cycl unspecified as to episode of care, Acute bronchitis (04/11/2011), Back problem, Cataract, Chest pain, unspecified (12/05), Deep vein thrombosis ([facility] [address] [facility] Diverticulosis of large intestine, Esophageal reflux, Essential hypertension, benign (4/2 developed, well nourished, in no apparent distress  SKIN: no rashes  NECK: supple, no adenopathy  LUNGS: clear to auscultation  CARDIO: RRR without murmur  EXTREMITIES: no edema      ASSESSMENT/ PLAN:  (K91.89,  K56.7) Postoperative ileus (HCC)  (primary e

## 2019-12-13 ENCOUNTER — OFFICE VISIT (OUTPATIENT)
Dept: SURGERY | Facility: CLINIC | Age: 69
End: 2019-12-13
Payer: MEDICARE

## 2019-12-13 VITALS — DIASTOLIC BLOOD PRESSURE: 70 MMHG | HEART RATE: 76 BPM | SYSTOLIC BLOOD PRESSURE: 132 MMHG

## 2019-12-13 DIAGNOSIS — M48.061 SPINAL STENOSIS OF LUMBAR REGION, UNSPECIFIED WHETHER NEUROGENIC CLAUDICATION PRESENT: ICD-10-CM

## 2019-12-13 DIAGNOSIS — M51.16 LUMBAR DISC HERNIATION WITH RADICULOPATHY: Primary | ICD-10-CM

## 2019-12-13 PROCEDURE — 99024 POSTOP FOLLOW-UP VISIT: CPT | Performed by: PHYSICIAN ASSISTANT

## 2019-12-13 RX ORDER — CYCLOBENZAPRINE HCL 10 MG
10 TABLET ORAL 3 TIMES DAILY PRN
Qty: 60 TABLET | Refills: 2 | Status: SHIPPED | OUTPATIENT
Start: 2019-12-13 | End: 2020-09-03 | Stop reason: ALTCHOICE

## 2019-12-13 NOTE — PROGRESS NOTES
Neurosurgery Clinic Visit  2019    Will Hill PCP:  Lieutenant Brian MD    [birth year] MRN [id number]     HISTORY OF PRESENT ILLNESS:  Will Hill is a(n) 77year old female who is here 2 weeks s/p lumbar microdiscectomy.   She has had a lo

## 2019-12-13 NOTE — PROGRESS NOTES
Pt is here for post op  LEFT LUMBAR 4 - LUMBAR 5 MICRODISCECTOMY AND ALL INDICATED PROCEDURES 12/2/19

## 2019-12-17 ENCOUNTER — TELEPHONE (OUTPATIENT)
Dept: SURGERY | Facility: CLINIC | Age: 69
End: 2019-12-17

## 2019-12-17 DIAGNOSIS — M48.061 SPINAL STENOSIS OF LUMBAR REGION, UNSPECIFIED WHETHER NEUROGENIC CLAUDICATION PRESENT: ICD-10-CM

## 2019-12-17 DIAGNOSIS — M51.16 LUMBAR DISC HERNIATION WITH RADICULOPATHY: Primary | ICD-10-CM

## 2019-12-17 NOTE — TELEPHONE ENCOUNTER
cyclobenzaprine 10 MG Oral Tab Approved    Approved  CaseId:[id number];Status:Approved; Review Type:Prior Auth; Coverage Start Date:11/17/2019; Coverage End Date:12/16/2020    PA Case ID: [id number]   Key #:[id number]

## 2019-12-19 ENCOUNTER — TELEPHONE (OUTPATIENT)
Dept: INTERNAL MEDICINE CLINIC | Facility: CLINIC | Age: 69
End: 2019-12-19

## 2019-12-19 RX ORDER — LISINOPRIL AND HYDROCHLOROTHIAZIDE 25; 20 MG/1; MG/1
1 TABLET ORAL DAILY
Refills: 0 | COMMUNITY
Start: 2019-12-19 | End: 2020-01-06

## 2019-12-19 NOTE — TELEPHONE ENCOUNTER
BP readings Started Hector Baker on 12/12/19    Readings starting 13 thru today:  12/13 148/73-63  12/14 126/68-68  12/15 149/75-64 139/72-71  12/16 119/68-71  12/17 127/62-69  12/18 141/68-72  12/19 464/28-67 004/86-54

## 2019-12-30 NOTE — TELEPHONE ENCOUNTER
Started lisinopril 20-25.     At home readings:    12/20 before med - 145/73 HR - 68  10:50 pm (after lisinopril 20-25) - 140-72 HR - 75    12/21/19 6:03 p.m. 127/68 HR - 70  12/22/19 9:11 a.m. 126/69 HR - 65  12/23/19 no reading  12/24/19 7:32 a.m. 131/62

## 2020-01-02 ENCOUNTER — OFFICE VISIT (OUTPATIENT)
Dept: NEUROLOGY | Facility: CLINIC | Age: 70
End: 2020-01-02
Payer: MEDICARE

## 2020-01-02 VITALS
BODY MASS INDEX: 27.64 KG/M2 | WEIGHT: 156 LBS | SYSTOLIC BLOOD PRESSURE: 118 MMHG | HEART RATE: 64 BPM | DIASTOLIC BLOOD PRESSURE: 68 MMHG | OXYGEN SATURATION: 94 % | HEIGHT: 63 IN | RESPIRATION RATE: 14 BRPM

## 2020-01-02 DIAGNOSIS — I67.1 UNRUPTURED CEREBRAL ANEURYSM: Primary | ICD-10-CM

## 2020-01-02 DIAGNOSIS — Z86.73 HISTORY OF STROKE WITHOUT RESIDUAL DEFICITS: ICD-10-CM

## 2020-01-02 DIAGNOSIS — G56.01 CARPAL TUNNEL SYNDROME ON RIGHT: ICD-10-CM

## 2020-01-02 PROBLEM — Z01.818 PRE-OP EVALUATION: Status: ACTIVE | Noted: 2019-11-20

## 2020-01-02 PROBLEM — E11.9 TYPE 2 DIABETES MELLITUS WITHOUT COMPLICATION (HCC): Status: ACTIVE | Noted: 2017-01-11

## 2020-01-02 PROCEDURE — 99213 OFFICE O/P EST LOW 20 MIN: CPT | Performed by: OTHER

## 2020-01-02 NOTE — PROGRESS NOTES
[facility] Progress Note    HPI  Patient presents with:  Headache: 6 month f/u  Carpal Tunnel Syndrome: PATY    In summary of prior visits:   \"     Patient presents in follow-up after recently discharged 4/12/17, following admission for She otherwise denies weakness in hands or dropping objects from hands but has some pain over the R wrist and sometimes in the left hand - wearing wrist braces at night; she remains on gabapentin 600 mg bid and denies significant side effects.         Pa Performed by Cherry Rubio MD at [facility] 10/9/2019    Performed by Cherry Rubio MD at [facility] ENDOSCOPY   • SHOULDER SURG [address] UNLISTED     • SPINE SURGERY PROCEDURE UNLISTED      bulging discs in lower back   • TRANSFO Comment:Tabs Feels faint  Norvasc [Amlodipine*    NAUSEA AND VOMITING    Comment:tabs  Nsaids                  NAUSEA ONLY, DIZZINESS  Tramadol                NAUSEA AND VOMITING, DIZZINESS  Vicodin [Hydrocodon*    NAUSEA ONLY, DIZZINESS    Comment:Tabs •  acetaminophen 500 MG Oral Tab, Take 500 mg by mouth every 6 (six) hours as needed for Pain., Disp: , Rfl:   •  PEG 3350 Oral Powd Pack, Take 17 g by mouth nightly.  , Disp: , Rfl:   •  NITROSTAT 0.4 MG Sublingual SL Tab, Place 0.4 mg under the tongue ev 12.0 - 16.0 g/dL 10.9 (L)     Hematocrit      35.0 - 48.0 % 33.4 (L)     Platelet Count      200.9 - 450.0 10(3)uL 222.0     MCV      80.0 - 100.0 fL 89.3     MCH      26.0 - 34.0 pg 29.1     MCHC      31.0 - 37.0 g/dL 32.6     RDW      11.0 - 15.0 % 1 4.0 - 11.0 x10(3) uL 5.1   RBC      3.80 - 5.30 x10(6)uL 4.33   Hemoglobin      12.0 - 16.0 g/dL 12.7   Hematocrit      35.0 - 48.0 % 38.0   Platelet Count      905.7 - 450.0 10(3)uL 299.0   MCV      80.0 - 100.0 fL 87.8   MCH      26.0 - 34.0 pg 29.3 INTRACRANIAL:  There is no evidence of acute ischemia or infarction.   There is scattered subcortical high signal white matter lesions seen in the centrum semiovale and periventricular white matter similar to the prior examination of 4/24/2018 consistent Chronic microvascular disease involving both cerebral hemispheres unchanged from 4/24/2018. Noted is a old 6 mm left posterior lateral thalamic lacunar infarct. 2.9 x 2.1 mm left cavernous carotid aneurysm with short neck.   No other evidence of aneury             Median Palm - Ulnar Palm   1.15         Motor NCS      Nerve / Sites Muscle Latency Amplitude Amp % Duration Area Segments Distance Lat Diff Velocity       ms mV % ms mVms   cm ms m/s   R Median - APB      Wrist APB 4.43 4.2 100 7.55 13.4 Wrist R. Gastrocnemius (Medial head) Tibial S1-S2 N None None None None N N N N   R. Extensor hallucis longus Deep peroneal (Fibular) L5-S1 N None None None None 1+ 1+ N Reduced               Summary:     1.  Right sural sensory response was borderline due to bor This study is abnormal.  There is electrophysiologic evidence consistent with a Right median nerve entrapment across the wrist, with demyelinating features.   In addition, this is suggestive of but not diagnostic for a mild, chronic R lower lumbar radiculop    parameters are consistent with abnormal left ventricular relaxation -     grade 1 diastolic dysfunction. 2. Mitral valve: Systolic bowing without prolapse. There was trivial to mild     regurgitation.   3. Left atrium: The left atrial volume was mildly Otherwise, patient has been doing well on her current dose of Plavix 75 mg daily. She recently had L4/5 lumbar microdiscectomy 12/2/2019, during which she was off Plavix both pre-and postoperatively, without complications of TIA or ischemic stroke.   Sh

## 2020-01-04 DIAGNOSIS — Z86.73 HISTORY OF STROKE WITHIN LAST YEAR: ICD-10-CM

## 2020-01-06 ENCOUNTER — OFFICE VISIT (OUTPATIENT)
Dept: INTERNAL MEDICINE CLINIC | Facility: CLINIC | Age: 70
End: 2020-01-06
Payer: MEDICARE

## 2020-01-06 VITALS
TEMPERATURE: 98 F | WEIGHT: 158 LBS | DIASTOLIC BLOOD PRESSURE: 60 MMHG | HEIGHT: 63 IN | RESPIRATION RATE: 16 BRPM | HEART RATE: 74 BPM | OXYGEN SATURATION: 94 % | SYSTOLIC BLOOD PRESSURE: 106 MMHG | BODY MASS INDEX: 28 KG/M2

## 2020-01-06 DIAGNOSIS — I10 ESSENTIAL HYPERTENSION: Primary | ICD-10-CM

## 2020-01-06 DIAGNOSIS — K59.00 CONSTIPATION, UNSPECIFIED CONSTIPATION TYPE: ICD-10-CM

## 2020-01-06 PROCEDURE — 99214 OFFICE O/P EST MOD 30 MIN: CPT | Performed by: FAMILY MEDICINE

## 2020-01-06 RX ORDER — LISINOPRIL AND HYDROCHLOROTHIAZIDE 25; 20 MG/1; MG/1
1 TABLET ORAL DAILY
Qty: 90 TABLET | Refills: 1 | Status: SHIPPED | OUTPATIENT
Start: 2020-01-06 | End: 2020-09-30

## 2020-01-06 RX ORDER — ATORVASTATIN CALCIUM 40 MG/1
TABLET, FILM COATED ORAL
Qty: 90 TABLET | Refills: 2 | Status: SHIPPED | OUTPATIENT
Start: 2020-01-06 | End: 2020-09-30

## 2020-01-06 NOTE — TELEPHONE ENCOUNTER
Medication: Atorvastatin 40 MG     Date of last refill: 07/15/2019 (#90/1)  [place]     Last office visit: 1/2/2020  Due back to clinic per last office note: 6 months  Date next office visit scheduled:    Future Appointm Lipitor 40 mg daily (could not tolerate ASA).       (I67.1) Unruptured cerebral aneurysm  (primary encounter diagnosis)  Plan: as noted above      (Z86.73) History of stroke without residual deficits  Plan: as noted above      (G56.01) Carpal tunnel syndro

## 2020-01-06 NOTE — PROGRESS NOTES
HPI:    Patient ID: Ivan Miguel is a 77year old female. HPI  Ivan Miguel is a 77year old female. HPI:   Patient presents for recheck of her hypertension/follow up from last visit    .  Pt has been taking medications as instructed, no me total) by mouth 2 (two) times daily. 0   • Ranolazine  MG Oral Tablet 12 Hr Take 500 mg by mouth daily. • Isosorbide Mononitrate  MG Oral Tablet 24 Hr Take 120 mg by mouth daily.      • gabapentin 300 MG Oral Cap Take 600 mg by mouth 2 (t gonadal right vein 11/09   • PONV (postoperative nausea and vomiting)     nausea,pain results in cardiac vasospasms   • Sleep apnea    • Stroke [facility])     r/o 4/11/17   • Unspecified gastritis and gastroduodenitis without mention of hemorrhage 2004   • rarely    Drug use: No        REVIEW OF SYSTEMS:   GENERAL HEALTH: feels well otherwise  RESPIRATORY: denies shortness of breath with exertion  CARDIOVASCULAR: denies chest pain on exertion  NEURO: denies headaches    EXAM:   /60   Pulse 74   Temp 97 2 (two) times daily with meals. 120 tablet 0   • Omega-3-acid Ethyl Esters 1 g Oral Cap Take 1 g by mouth daily. • Clopidogrel Bisulfate 75 MG Oral Tab Take 1 tablet (75 mg total) by mouth daily.  90 tablet 1   • mometasone 0.1 % External Ointment Apply

## 2020-01-08 DIAGNOSIS — E11.8 TYPE 2 DIABETES MELLITUS WITH COMPLICATION (HCC): ICD-10-CM

## 2020-01-09 NOTE — TELEPHONE ENCOUNTER
Metformin HCL 1000 mg Oral Tab     Failed Protocol    Diabetic Medication Protocol Failed1/8 1:32 PM   Microalbumin procedure in past 12 months or taking ACE/ARB     Last OV: 1/6/2020  Last refill date: 11/22/2019     #/refills: #120 w/ 0 refills   When pt

## 2020-01-14 ENCOUNTER — OFFICE VISIT (OUTPATIENT)
Dept: SURGERY | Facility: CLINIC | Age: 70
End: 2020-01-14
Payer: MEDICARE

## 2020-01-14 VITALS — SYSTOLIC BLOOD PRESSURE: 126 MMHG | HEART RATE: 70 BPM | DIASTOLIC BLOOD PRESSURE: 68 MMHG

## 2020-01-14 DIAGNOSIS — M51.16 LUMBAR DISC HERNIATION WITH RADICULOPATHY: Primary | ICD-10-CM

## 2020-01-14 PROCEDURE — 99024 POSTOP FOLLOW-UP VISIT: CPT | Performed by: PHYSICIAN ASSISTANT

## 2020-01-14 NOTE — PROGRESS NOTES
Pt is here for post op  LEFT LUMBAR 4 - LUMBAR 5 MICRODISCECTOMY AND ALL INDICATED PROCEDURES 12/2/19        Pt states that she has been doing well since LOV.

## 2020-01-14 NOTE — PROGRESS NOTES
Neurosurgery Clinic Visit  2020    Yusra Guevara PCP:  Deanna Haynes MD    [birth year] MRN [id number]     HISTORY OF PRESENT ILLNESS:  Yusra Guevara is a(n) 77year old female who is here 6 weeks s/p lumbar microdiscectomy.   She has had a lot

## 2020-01-22 ENCOUNTER — OFFICE VISIT (OUTPATIENT)
Dept: PHYSICAL THERAPY | Age: 70
End: 2020-01-22
Attending: PHYSICIAN ASSISTANT
Payer: MEDICARE

## 2020-01-22 DIAGNOSIS — M51.16 LUMBAR DISC HERNIATION WITH RADICULOPATHY: ICD-10-CM

## 2020-01-22 PROCEDURE — 97140 MANUAL THERAPY 1/> REGIONS: CPT

## 2020-01-22 PROCEDURE — 97161 PT EVAL LOW COMPLEX 20 MIN: CPT

## 2020-01-22 NOTE — PROGRESS NOTES
SPINE EVALUATION:   Referring Physician: BRONSON Ramos  Diagnosis:  S/p Left L4-L5 Microdiscectomy on 12/02/19   Date of Service: 1/22/2020     PATIENT SUMMARY   Maureen Alves is a 77year old female who presents to therapy today s/p L4-L5 L microd with primary c/o decreased flexibility and strength since onset of LBP last spring; signficant improvement in pain since microdiscectomy.  The results of the objective tests and measures show decreased hip strength L>R, decreased lumbar AROM, decreased lumb 12, Women: 11        Balance: SLS R 1s, L 3s    Today’s Treatment and Response:   Pt education was provided on exam findings, treatment diagnosis, treatment plan, expectations, and prognosis.  Pt was also provided recommendations for possible soreness after contact me at Dept: [phone]    Sincerely,  Electronically signed by therapist: Rosa Patton, PT    [de-identified] certification required: Yes  I certify the need for these services furnished under this plan of treatment and while under my care.     X

## 2020-01-23 ENCOUNTER — OFFICE VISIT (OUTPATIENT)
Dept: PHYSICAL THERAPY | Age: 70
End: 2020-01-23
Attending: PHYSICIAN ASSISTANT
Payer: MEDICARE

## 2020-01-23 PROCEDURE — 97110 THERAPEUTIC EXERCISES: CPT

## 2020-01-23 PROCEDURE — 97112 NEUROMUSCULAR REEDUCATION: CPT

## 2020-01-23 NOTE — PROGRESS NOTES
Dx:  S/p Left L4-L5 Microdiscectomy on 12/02/19           Insurance (Authorized # of Visits):  Medicare           Authorizing Physician: Dr. Jessica Calles  Next MD visit: 2/25/20  Fall Risk: standard         Precautions: none             Subjective: Pt reports fátima Neuro Re-ed  hooklying TA 5s x10  -with march x20  -with BKFO x20  -isometric hip ADD 5s x15  -isometric hip ABD YTB 5s x15  Seated on SB  -TA static x30s  -with march 2x20 (low range; SBA)  -YTB rows x20  Multifidi punches standing RTB x15 ea       Manu

## 2020-01-24 ENCOUNTER — APPOINTMENT (OUTPATIENT)
Dept: PHYSICAL THERAPY | Age: 70
End: 2020-01-24
Attending: PHYSICIAN ASSISTANT
Payer: MEDICARE

## 2020-01-27 ENCOUNTER — OFFICE VISIT (OUTPATIENT)
Dept: PHYSICAL THERAPY | Age: 70
End: 2020-01-27
Attending: PHYSICIAN ASSISTANT
Payer: MEDICARE

## 2020-01-27 PROCEDURE — 97110 THERAPEUTIC EXERCISES: CPT

## 2020-01-27 PROCEDURE — 97140 MANUAL THERAPY 1/> REGIONS: CPT

## 2020-01-27 PROCEDURE — 97112 NEUROMUSCULAR REEDUCATION: CPT

## 2020-01-27 NOTE — PROGRESS NOTES
Dx:  S/p Left L4-L5 Microdiscectomy on 12/02/19           Insurance (Authorized # of Visits):  Medicare           Authorizing Physician: Dr. Jessica Calles  Next MD visit: 2/25/20  Fall Risk: standard         Precautions: none             Subjective: Pt reports she x5min  LTR x15   Bridges 2x10 Therex  NuStep L4 x5min  LTR x15   Bridges 2x10      Neuro Re-ed  hooklying TA 5s x10  -with march x20  -with BKFO x20  -isometric hip ADD 5s x15  -isometric hip ABD YTB 5s x15  Seated on SB  -TA static x30s  -with march 2x20

## 2020-01-29 ENCOUNTER — OFFICE VISIT (OUTPATIENT)
Dept: PHYSICAL THERAPY | Age: 70
End: 2020-01-29
Attending: PHYSICIAN ASSISTANT
Payer: MEDICARE

## 2020-01-29 PROCEDURE — 97112 NEUROMUSCULAR REEDUCATION: CPT

## 2020-01-29 PROCEDURE — 97110 THERAPEUTIC EXERCISES: CPT

## 2020-01-29 NOTE — PROGRESS NOTES
Dx:  S/p Left L4-L5 Microdiscectomy on 12/02/19           Insurance (Authorized # of Visits):  Medicare           Authorizing Physician: Dr. Nolan Morgan  Next MD visit: 2/25/20  Fall Risk: standard         Precautions: none             Subjective: Pt reports she increase ease with bending forward to don shoes  -progress  · Pt will have decreased paraspinal mm tension to tolerate standing >60 minutes for cooking and cleaning -progress  · Pt will demonstrate improved core strength to walk several blocks without diff tband ABD    Charges: Neuro re-ed x1, therex x2   Total Timed Treatment: 45 min  Total Treatment Time: 45 min

## 2020-01-30 ENCOUNTER — APPOINTMENT (OUTPATIENT)
Dept: PHYSICAL THERAPY | Age: 70
End: 2020-01-30
Attending: PHYSICIAN ASSISTANT
Payer: MEDICARE

## 2020-01-31 ENCOUNTER — TELEPHONE (OUTPATIENT)
Dept: INTERNAL MEDICINE CLINIC | Facility: CLINIC | Age: 70
End: 2020-01-31

## 2020-01-31 DIAGNOSIS — E11.9 TYPE 2 DIABETES MELLITUS WITHOUT COMPLICATION, UNSPECIFIED WHETHER LONG TERM INSULIN USE (HCC): Primary | ICD-10-CM

## 2020-01-31 NOTE — TELEPHONE ENCOUNTER
Patient calling in asking if she needs a referral for her annual retinal dilated eye exam, with Dr Guero Spears.

## 2020-02-01 NOTE — TELEPHONE ENCOUNTER
Last OV: 1/6/2020 with Dr. Nataliia Mckeon  Last refill date: 11/2/19     #/refills: #90, 0 refills  When pt was asked to return for OV: 6 months  Upcoming appt: 7/8/2020 with Dr. Nataliia Mckeon  Last labs December 2019

## 2020-02-02 RX ORDER — NIFEDIPINE 60 MG/1
TABLET, EXTENDED RELEASE ORAL
Qty: 90 TABLET | Refills: 1 | Status: SHIPPED | OUTPATIENT
Start: 2020-02-02 | End: 2020-04-30

## 2020-02-03 ENCOUNTER — OFFICE VISIT (OUTPATIENT)
Dept: PHYSICAL THERAPY | Age: 70
End: 2020-02-03
Attending: PHYSICIAN ASSISTANT
Payer: MEDICARE

## 2020-02-03 PROCEDURE — 97140 MANUAL THERAPY 1/> REGIONS: CPT

## 2020-02-03 PROCEDURE — 97110 THERAPEUTIC EXERCISES: CPT

## 2020-02-03 PROCEDURE — 97112 NEUROMUSCULAR REEDUCATION: CPT

## 2020-02-03 NOTE — PROGRESS NOTES
Dx:  S/p Left L4-L5 Microdiscectomy on 12/02/19           Insurance (Authorized # of Visits):  Medicare           Authorizing Physician: Dr. Jessica Calles  Next MD visit: 2/25/20  Fall Risk: standard         Precautions: none             Subjective: Felt good afte with comprehensive HEP to maintain progress achieved in PT -progress    Plan: review lift mechanics; did not do today due to increased R sided soreness with standing activities  Date: 1/23/2020  TX#: 2/8 Date: 1/27/2020                TX#: 3/8 Date: 1/29/2 lumbar gapping mobilizations Gr II-III 3x30s ea:   STM scar massage, lumbar paraspinals, and lumbosacral fascia x6 min            HEP: hooklying TA: with march and BKFO, bridges, Hooklying pelvic stability with ball squeeze and tband ABD    Charges: Neuro

## 2020-02-05 ENCOUNTER — OFFICE VISIT (OUTPATIENT)
Dept: PHYSICAL THERAPY | Age: 70
End: 2020-02-05
Attending: PHYSICIAN ASSISTANT
Payer: MEDICARE

## 2020-02-05 PROCEDURE — 97140 MANUAL THERAPY 1/> REGIONS: CPT

## 2020-02-05 PROCEDURE — 97110 THERAPEUTIC EXERCISES: CPT

## 2020-02-05 PROCEDURE — 97112 NEUROMUSCULAR REEDUCATION: CPT

## 2020-02-05 RX ORDER — GABAPENTIN 300 MG/1
CAPSULE ORAL
Qty: 120 CAPSULE | Refills: 5 | Status: SHIPPED | OUTPATIENT
Start: 2020-02-05 | End: 2020-07-07

## 2020-02-05 RX ORDER — ISOSORBIDE MONONITRATE 120 MG/1
TABLET, EXTENDED RELEASE ORAL
Qty: 90 TABLET | Refills: 0 | Status: SHIPPED | OUTPATIENT
Start: 2020-02-05 | End: 2020-05-08

## 2020-02-05 NOTE — TELEPHONE ENCOUNTER
Medication: Gabapentin 300 MG      Date of last refill: 08/08/2019 with 5 addt refills        Last office visit: 1/2/2020  Due back to clinic per last office note: 6 months  Date next office visit scheduled:           Future Appointments   Date Time Provid (could not tolerate ASA).       (I67.1) Unruptured cerebral aneurysm  (primary encounter diagnosis)  Plan: as noted above      (Z86.73) History of stroke without residual deficits  Plan: as noted above      (G56.01) Carpal tunnel syndrome on right  Plan: as

## 2020-02-05 NOTE — TELEPHONE ENCOUNTER
ISOSORBIDE MONONITRATE 120MG ER TAB    Last OV relevant to medication: 1/6/2020  Last refill date: 11/10/2019     #/refills: #90 w/ 0 refills   When pt was asked to return for OV: 6 months   Upcoming appt/reason: 7/8/2020

## 2020-02-05 NOTE — PROGRESS NOTES
Dx:  S/p Left L4-L5 Microdiscectomy on 12/02/19           Insurance (Authorized # of Visits):  Medicare           Authorizing Physician: Dr. Rashmi Watson MD visit: 2/25/20  Fall Risk: standard         Precautions: none             Subjective: Pt reports her x5min  LTR x15   Bridges 2x10 Therex  NuStep L4 x5min  LTR x15   Bridges 2x10 Therex  NuStep L1 x5min  LTR x15   Bridges 1x10  Sit>stands from plinth 2x10  Squats to   -cone (golfer's lift)   - 3# crate from floor to waist x10   -6# crate carry 30 f 3x30s ea:   STM scar massage, lumbar paraspinals, and lumbosacral fascia x6 min  Manual Therapy  LTR lumbar gapping mobilizations Gr II-III 3x30s ea:   STM scar massage, lumbar paraspinals, and lumbosacral fascia x6 min           HEP: hooklying TA: with ma

## 2020-02-06 ENCOUNTER — APPOINTMENT (OUTPATIENT)
Dept: PHYSICAL THERAPY | Age: 70
End: 2020-02-06
Attending: PHYSICIAN ASSISTANT
Payer: MEDICARE

## 2020-02-06 ENCOUNTER — TELEPHONE (OUTPATIENT)
Dept: INTERNAL MEDICINE CLINIC | Facility: CLINIC | Age: 70
End: 2020-02-06

## 2020-02-10 ENCOUNTER — OFFICE VISIT (OUTPATIENT)
Dept: PHYSICAL THERAPY | Age: 70
End: 2020-02-10
Attending: PHYSICIAN ASSISTANT
Payer: MEDICARE

## 2020-02-10 PROCEDURE — 97112 NEUROMUSCULAR REEDUCATION: CPT

## 2020-02-10 PROCEDURE — 97110 THERAPEUTIC EXERCISES: CPT

## 2020-02-10 PROCEDURE — 97140 MANUAL THERAPY 1/> REGIONS: CPT

## 2020-02-10 NOTE — PROGRESS NOTES
Dx:  S/p Left L4-L5 Microdiscectomy on 12/02/19           Insurance (Authorized # of Visits):  Medicare           Authorizing Physician: Dr. Aniyah Patel  Next MD visit: 2/25/20  Fall Risk: standard         Precautions: none             Subjective: Played her gra increase ease with bending forward to don shoes  -MET  · Pt will have decreased paraspinal mm tension to tolerate standing >60 minutes for cooking and cleaning -progress  · Pt will demonstrate improved core strength to walk several blocks without difficult TA  -with march 1x10  -dead bug 1x10 ea  -with BKFO 1x10  -isometric hip ADD 5s 2x10  -isometric hip ABD 5s 2x10  Multifidi punches standing RTB x10 ea  Step ups onto airex no UE x10 ea Neuro Re-ed  hooklying TA  -with march 1x10  -dead bug 1x12 ea  -with

## 2020-02-12 ENCOUNTER — OFFICE VISIT (OUTPATIENT)
Dept: PHYSICAL THERAPY | Age: 70
End: 2020-02-12
Attending: PHYSICIAN ASSISTANT
Payer: MEDICARE

## 2020-02-12 PROCEDURE — 97110 THERAPEUTIC EXERCISES: CPT

## 2020-02-12 PROCEDURE — 97140 MANUAL THERAPY 1/> REGIONS: CPT

## 2020-02-12 PROCEDURE — 97112 NEUROMUSCULAR REEDUCATION: CPT

## 2020-02-12 NOTE — PROGRESS NOTES
ProgressSummary  Pt has attended 8 visits in Physical Therapy.    Dx:  S/p Left L4-L5 Microdiscectomy on 12/02/19           Insurance (Authorized # of Visits):  Medicare           Authorizing Physician: Dr. Marimar Watson MD visit: 2/25/20  Fall Risk: Jayde James for cooking and cleaning -progress  · Pt will demonstrate improved core strength to walk several blocks without difficulty -progress  · Pt will be independent and compliant with comprehensive HEP to maintain progress achieved in PT -progress    FOTO: 61/10 Therex  NuStep L5 x5min  LTR x10  Bridges x15  Standing hip 3 way YTB 2 UE  -flex x10 ea  -abd x10 ea  -ext x10 ea  YTB side step no UE x2 laps //bars  Cable functional push 8# 1x10  -pull 1x10   Neuro Re-ed  hooklying TA  -with march 1x10  -dead bug 1x10

## 2020-02-13 ENCOUNTER — APPOINTMENT (OUTPATIENT)
Dept: PHYSICAL THERAPY | Age: 70
End: 2020-02-13
Attending: PHYSICIAN ASSISTANT
Payer: MEDICARE

## 2020-02-17 ENCOUNTER — OFFICE VISIT (OUTPATIENT)
Dept: PHYSICAL THERAPY | Age: 70
End: 2020-02-17
Attending: PHYSICIAN ASSISTANT
Payer: MEDICARE

## 2020-02-17 PROCEDURE — 97112 NEUROMUSCULAR REEDUCATION: CPT

## 2020-02-17 PROCEDURE — 97110 THERAPEUTIC EXERCISES: CPT

## 2020-02-17 NOTE — PROGRESS NOTES
Dx:  S/p Left L4-L5 Microdiscectomy on 12/02/19           Insurance (Authorized # of Visits):  Medicare           Authorizing Physician: Dr. Lizz Gomez  Next MD visit: 2/25/20  Fall Risk: standard         Precautions: none             Subjective Pt reports her -progress    FOTO: 61/100 (2/12/20)    Plan: progress hip and core training with band walks    Date: 1/29/2020             TX#: 4/8 Date: 2/3/2020              TX#: 5/8 Date: 2/5/2020  Tx#: 6/8 Date: 2/10/2020  Tx#: 7/8 Date: 2/12/2020  Tx#: 8/14 Date: 2/1 Re-ed  hooklying TA  -with march dead bug x15  -SLR x10 ea  Sit<>stands with   -isometric hip ABD  2x10  Standing TA with Cable Rows 3# 2x10  -cable march x20 Neuro Re-ed  hooklying TA  -with march dead bug x15  -SLR x10 ea  Standing TA with Cable Rows 3#

## 2020-02-19 ENCOUNTER — OFFICE VISIT (OUTPATIENT)
Dept: PHYSICAL THERAPY | Age: 70
End: 2020-02-19
Attending: PHYSICIAN ASSISTANT
Payer: MEDICARE

## 2020-02-19 ENCOUNTER — TELEPHONE (OUTPATIENT)
Dept: INTERNAL MEDICINE CLINIC | Facility: CLINIC | Age: 70
End: 2020-02-19

## 2020-02-19 PROCEDURE — 97110 THERAPEUTIC EXERCISES: CPT

## 2020-02-19 PROCEDURE — 97112 NEUROMUSCULAR REEDUCATION: CPT

## 2020-02-19 NOTE — TELEPHONE ENCOUNTER
Patient calling in, stated when she woke up this morning and lost her voice completely. She can hardly get words to come out. Pt unsure of cause of loss of voice. Pt did take some OTC medicine to help, but not any better. Please call pt to discuss.

## 2020-02-19 NOTE — TELEPHONE ENCOUNTER
Advised pt to rest voice ,push fluids , if sxs worsen or persist appt .  Ant difficulty with throat swelling swallowing difficulties Er eval

## 2020-02-19 NOTE — PROGRESS NOTES
Dx:  S/p Left L4-L5 Microdiscectomy on 12/02/19           Insurance (Authorized # of Visits):  Medicare           Authorizing Physician: Dr. Heavenly Blackwood  Next MD visit: 2/25/20  Fall Risk: standard         Precautions: none             Subjective Pt reports that -progress    FOTO: 61/100 (2/12/20)    Plan: reassess standing and walking tolerance  Date: 1/29/2020             TX#: 4/8 Date: 2/3/2020              TX#: 5/8 Date: 2/5/2020  Tx#: 6/8 Date: 2/10/2020  Tx#: 7/8 Date: 2/12/2020  Tx#: 8/14 Date: 2/17/2020  T Re-ed  hooklying TA  -with march 1x20  -with BKFO 1x15 ea  -SLR x15 ea  Sit<>stands with   -isometric hip ADD  x10  -isometric hip ABD  x10  airex stance TA with  -YTB rows 2x10  -OH>low reach x10  -ROT x6 ea  -march x15 (low)   Neuro Re-ed  hooklying TA

## 2020-02-20 ENCOUNTER — APPOINTMENT (OUTPATIENT)
Dept: PHYSICAL THERAPY | Age: 70
End: 2020-02-20
Attending: PHYSICIAN ASSISTANT
Payer: MEDICARE

## 2020-02-20 ENCOUNTER — OFFICE VISIT (OUTPATIENT)
Dept: INTERNAL MEDICINE CLINIC | Facility: CLINIC | Age: 70
End: 2020-02-20
Payer: MEDICARE

## 2020-02-20 VITALS
RESPIRATION RATE: 16 BRPM | SYSTOLIC BLOOD PRESSURE: 122 MMHG | DIASTOLIC BLOOD PRESSURE: 64 MMHG | BODY MASS INDEX: 28 KG/M2 | TEMPERATURE: 98 F | OXYGEN SATURATION: 99 % | WEIGHT: 158 LBS | HEIGHT: 63 IN | HEART RATE: 80 BPM

## 2020-02-20 DIAGNOSIS — R49.0 HOARSENESS OF VOICE: Primary | ICD-10-CM

## 2020-02-20 PROCEDURE — 99213 OFFICE O/P EST LOW 20 MIN: CPT | Performed by: FAMILY MEDICINE

## 2020-02-20 NOTE — PROGRESS NOTES
HPI:    Patient ID: Malu Bolivar is a 77year old female.     HPI  Woke up yesterday w hoarseness    Maybe a slight discomfort in the throat    Not sick really   No f/c  No runny nose    No PND     Slight nasal congestion  Whitish   The night before it Powd Pack Take 17 g by mouth nightly. • NITROSTAT 0.4 MG Sublingual SL Tab Place 0.4 mg under the tongue every 5 (five) minutes as needed. 5   • Multiple Vitamin (MULTI-VITAMIN) Oral Tab Take 1 Tab by mouth daily.        Allergies:  Penicillins Imaging & Referrals:  None       #[id number]

## 2020-02-24 ENCOUNTER — APPOINTMENT (OUTPATIENT)
Dept: PHYSICAL THERAPY | Age: 70
End: 2020-02-24
Attending: PHYSICIAN ASSISTANT
Payer: MEDICARE

## 2020-02-25 ENCOUNTER — OFFICE VISIT (OUTPATIENT)
Dept: SURGERY | Facility: CLINIC | Age: 70
End: 2020-02-25
Payer: MEDICARE

## 2020-02-25 VITALS — HEART RATE: 76 BPM | DIASTOLIC BLOOD PRESSURE: 78 MMHG | SYSTOLIC BLOOD PRESSURE: 124 MMHG

## 2020-02-25 DIAGNOSIS — M48.061 SPINAL STENOSIS OF LUMBAR REGION, UNSPECIFIED WHETHER NEUROGENIC CLAUDICATION PRESENT: ICD-10-CM

## 2020-02-25 DIAGNOSIS — M51.16 LUMBAR DISC HERNIATION WITH RADICULOPATHY: Primary | ICD-10-CM

## 2020-02-25 PROCEDURE — 99024 POSTOP FOLLOW-UP VISIT: CPT | Performed by: PHYSICIAN ASSISTANT

## 2020-02-25 NOTE — PROGRESS NOTES
Neurosurgery Clinic Visit  2020    Belkys Lal PCP:  Jairo Garcia MD    77[birth year] MRN [id number]     HISTORY OF PRESENT ILLNESS:  Belkys Lal is a(n) 77year old female who is here 13 weeks s/p lumbar microdiscectomy.  She has had a lo

## 2020-02-25 NOTE — PROGRESS NOTES
Pt is here for follow up     LEFT LUMBAR 4 - LUMBAR 5 MICRODISCECTOMY AND ALL INDICATED PROCEDURES 12/2/19      PT: Pt states that she has been going to PT 2 times weekly. Pt states that she feels like PT has been helping her.

## 2020-03-02 ENCOUNTER — TELEPHONE (OUTPATIENT)
Dept: INTERNAL MEDICINE CLINIC | Facility: CLINIC | Age: 70
End: 2020-03-02

## 2020-03-02 DIAGNOSIS — R49.0 HOARSE: Primary | ICD-10-CM

## 2020-03-02 NOTE — TELEPHONE ENCOUNTER
Pt would like a call back from nurse was seen two weeks ago and was asked to call in if did not get better pt states that pcp suggested he might send her to ENT

## 2020-03-04 RX ORDER — BLOOD SUGAR DIAGNOSTIC
STRIP MISCELLANEOUS
Qty: 100 STRIP | Refills: 0 | Status: SHIPPED | OUTPATIENT
Start: 2020-03-04 | End: 2020-05-29

## 2020-03-05 ENCOUNTER — OFFICE VISIT (OUTPATIENT)
Dept: PHYSICAL THERAPY | Age: 70
End: 2020-03-05
Attending: PHYSICIAN ASSISTANT
Payer: MEDICARE

## 2020-03-05 PROCEDURE — 97112 NEUROMUSCULAR REEDUCATION: CPT

## 2020-03-05 PROCEDURE — 97110 THERAPEUTIC EXERCISES: CPT

## 2020-03-05 NOTE — PROGRESS NOTES
Dx:  S/p Left L4-L5 Microdiscectomy on 12/02/19           Insurance (Authorized # of Visits):  Medicare           Authorizing Physician: Dr. Giselle García  Next MD visit: 2/25/20  Fall Risk: standard         Precautions: none           15 min late  Subjective Foll forward to don shoes  -MET  · Pt will have decreased paraspinal mm tension to tolerate standing >60 minutes for cooking and cleaning -MET  · Pt will demonstrate improved core strength to walk several blocks without difficulty -MET  · Pt will be independent 2x10  -OH>low reach x10  -ROT x6 ea  -march x15 (low)   Neuro Re-ed  hooklying TA  -with march dead bug x15  -SLR x10 ea  Sit<>stands with   -isometric hip ABD  2x10  Standing TA with Cable Rows 3# 2x10  -cable march x20 Neuro Re-ed  hooklying TA  -with ma

## 2020-03-10 ENCOUNTER — OFFICE VISIT (OUTPATIENT)
Dept: PHYSICAL THERAPY | Age: 70
End: 2020-03-10
Attending: PHYSICIAN ASSISTANT
Payer: MEDICARE

## 2020-03-10 DIAGNOSIS — E11.8 TYPE 2 DIABETES MELLITUS WITH COMPLICATION (HCC): ICD-10-CM

## 2020-03-10 PROCEDURE — 97112 NEUROMUSCULAR REEDUCATION: CPT

## 2020-03-10 PROCEDURE — 97140 MANUAL THERAPY 1/> REGIONS: CPT

## 2020-03-10 PROCEDURE — 97110 THERAPEUTIC EXERCISES: CPT

## 2020-03-10 NOTE — PROGRESS NOTES
Dx:  S/p Left L4-L5 Microdiscectomy on 12/02/19           Insurance (Authorized # of Visits):  Medicare           Authorizing Physician: Dr. Zeeshan Lima  Next MD visit: 2/25/20  Fall Risk: standard         Precautions: none             Subjective Went to watch/p forward to don shoes  -MET  · Pt will have decreased paraspinal mm tension to tolerate standing >60 minutes for cooking and cleaning -MET  · Pt will demonstrate improved core strength to walk several blocks without difficulty -MET  · Pt will be independent x10 ea Neuro Re-ed  Seated on SB TA with  -alt UE raise x15  -march 3x15  -Small ball OH raise x10  -Lumbar ROT x10  Neuro Re-ed  Seated on SB TA with  -march x20  -Small ball OH raise x10  -Lumbar ROT x10  Neuro Re-ed  BOSU lunges holding 8# med ball x20

## 2020-03-12 ENCOUNTER — OFFICE VISIT (OUTPATIENT)
Dept: PHYSICAL THERAPY | Age: 70
End: 2020-03-12
Attending: PHYSICIAN ASSISTANT
Payer: MEDICARE

## 2020-03-12 PROCEDURE — 97110 THERAPEUTIC EXERCISES: CPT

## 2020-03-12 PROCEDURE — 97112 NEUROMUSCULAR REEDUCATION: CPT

## 2020-03-12 PROCEDURE — 97140 MANUAL THERAPY 1/> REGIONS: CPT

## 2020-03-12 NOTE — PROGRESS NOTES
Dx:  S/p Left L4-L5 Microdiscectomy on 12/02/19           Insurance (Authorized # of Visits):  Medicare           Authorizing Physician: Dr. Keli Madsen  Next MD visit: 2/25/20  Fall Risk: standard         Precautions: none             Subjective Adjusted the se way YTB 2 UE  -flex x10 ea  -abd x10 ea  -ext x10 ea  YTB side step no UE x2 laps //bars  Cable functional push 8# 1x10  -pull 1x10 Therex  NuStep L5 x5min  LTR x10  YTB 2 UE  -flex x12 ea  -abd x12 ea  -ext x12 ea  Cable functional push 8# 1x10  -pull 1x1 >knee standing crunch 2x10 (fair>good)  hooklying TA with march x20  -2 abs x10   Manual Therapy  STM lumbar paraspinals, and lumbosacral fascia L>R x8 min  Manual Therapy  STM lumbar paraspinals, and lumbosacral fascia L>R x6 min  Manual Therapy  LTR lumb

## 2020-03-17 ENCOUNTER — APPOINTMENT (OUTPATIENT)
Dept: PHYSICAL THERAPY | Age: 70
End: 2020-03-17
Attending: PHYSICIAN ASSISTANT
Payer: MEDICARE

## 2020-03-18 ENCOUNTER — APPOINTMENT (OUTPATIENT)
Dept: PHYSICAL THERAPY | Age: 70
End: 2020-03-18
Payer: MEDICARE

## 2020-03-24 ENCOUNTER — APPOINTMENT (OUTPATIENT)
Dept: PHYSICAL THERAPY | Age: 70
End: 2020-03-24
Payer: MEDICARE

## 2020-03-25 ENCOUNTER — APPOINTMENT (OUTPATIENT)
Dept: PHYSICAL THERAPY | Age: 70
End: 2020-03-25
Payer: MEDICARE

## 2020-04-23 RX ORDER — RANOLAZINE 500 MG/1
TABLET, EXTENDED RELEASE ORAL
Qty: 90 TABLET | Refills: 3 | Status: SHIPPED | OUTPATIENT
Start: 2020-04-23

## 2020-04-28 ENCOUNTER — V-VISIT (OUTPATIENT)
Dept: CARDIOLOGY | Age: 70
End: 2020-04-28

## 2020-04-28 VITALS
BODY MASS INDEX: 27.64 KG/M2 | HEIGHT: 63 IN | WEIGHT: 156 LBS | DIASTOLIC BLOOD PRESSURE: 60 MMHG | HEART RATE: 64 BPM | SYSTOLIC BLOOD PRESSURE: 109 MMHG

## 2020-04-28 DIAGNOSIS — E78.00 PURE HYPERCHOLESTEROLEMIA: Primary | ICD-10-CM

## 2020-04-28 DIAGNOSIS — I20.89 SYNDROME X (CARDIAC): Chronic | ICD-10-CM

## 2020-04-28 DIAGNOSIS — E11.69 TYPE 2 DIABETES MELLITUS WITH OTHER SPECIFIED COMPLICATION, WITHOUT LONG-TERM CURRENT USE OF INSULIN (CMD): ICD-10-CM

## 2020-04-28 DIAGNOSIS — I10 HYPERTENSION, BENIGN: ICD-10-CM

## 2020-04-28 PROCEDURE — 99214 OFFICE O/P EST MOD 30 MIN: CPT | Performed by: INTERNAL MEDICINE

## 2020-04-28 ASSESSMENT — PATIENT HEALTH QUESTIONNAIRE - PHQ9
SUM OF ALL RESPONSES TO PHQ9 QUESTIONS 1 AND 2: 0
2. FEELING DOWN, DEPRESSED OR HOPELESS: NOT AT ALL
2. FEELING DOWN, DEPRESSED OR HOPELESS: NOT AT ALL
1. LITTLE INTEREST OR PLEASURE IN DOING THINGS: NOT AT ALL
1. LITTLE INTEREST OR PLEASURE IN DOING THINGS: NOT AT ALL

## 2020-04-30 RX ORDER — NIFEDIPINE 60 MG/1
TABLET, EXTENDED RELEASE ORAL
Qty: 90 TABLET | Refills: 1 | Status: SHIPPED | OUTPATIENT
Start: 2020-04-30 | End: 2021-02-17

## 2020-04-30 NOTE — TELEPHONE ENCOUNTER
NIFEDIPINE 60MG ER (XL/OS) TABLETS  Protocol passed     Last OV relevant to medication: 1/6/2020  Last refill date: 2/2/2020     #/refills: #90, 1 refill  When pt was asked to return for OV: 6 months   Upcoming appt/reason: 7/8/2020  Recent labs: 12/5/19

## 2020-05-08 DIAGNOSIS — E11.8 TYPE 2 DIABETES MELLITUS WITH COMPLICATION (HCC): ICD-10-CM

## 2020-05-08 RX ORDER — ISOSORBIDE MONONITRATE 120 MG/1
TABLET, EXTENDED RELEASE ORAL
Qty: 90 TABLET | Refills: 0 | Status: SHIPPED | OUTPATIENT
Start: 2020-05-08 | End: 2020-08-07

## 2020-05-08 NOTE — TELEPHONE ENCOUNTER
Metformin HCL 1000 mg last filled 3/11/20 #120    Isosorbide mononitrate ER 120mg last filled 2/5/20 #90    LOV 1/6/2020    Last labs   Ref Range & Units 12/2/19  2:05 PM   HgbA1C  <5.7 % 6.4High      Last Microalb 12/12/18

## 2020-05-18 DIAGNOSIS — Z86.73 HISTORY OF STROKE WITHOUT RESIDUAL DEFICITS: ICD-10-CM

## 2020-05-18 RX ORDER — CLOPIDOGREL BISULFATE 75 MG/1
75 TABLET ORAL DAILY
Qty: 90 TABLET | Refills: 1 | Status: SHIPPED | OUTPATIENT
Start: 2020-05-18 | End: 2020-11-13

## 2020-05-18 NOTE — TELEPHONE ENCOUNTER
Medication: clopidogrel    Date of last refill: 8/1/19  [place]    Last office visit: 1/2/2020  Due back to clinic per last office note:  6 months  Date next office visit scheduled:    Future Appointments   Date Time

## 2020-05-29 RX ORDER — BLOOD SUGAR DIAGNOSTIC
STRIP MISCELLANEOUS
Qty: 100 STRIP | Refills: 0 | Status: SHIPPED | OUTPATIENT
Start: 2020-05-29 | End: 2020-08-27

## 2020-06-16 ENCOUNTER — LAB ENCOUNTER (OUTPATIENT)
Dept: LAB | Age: 70
End: 2020-06-16
Attending: INTERNAL MEDICINE
Payer: MEDICARE

## 2020-06-16 DIAGNOSIS — E11.69 DIABETES MELLITUS ASSOCIATED WITH HORMONAL ETIOLOGY (HCC): ICD-10-CM

## 2020-06-16 DIAGNOSIS — I10 ESSENTIAL HYPERTENSION, MALIGNANT: ICD-10-CM

## 2020-06-16 DIAGNOSIS — E78.00 PURE HYPERCHOLESTEROLEMIA: Primary | ICD-10-CM

## 2020-06-16 DIAGNOSIS — I20.8 ANGINA DECUBITUS (HCC): ICD-10-CM

## 2020-06-16 LAB
ALBUMIN SERPL-MCNC: 3.4 G/DL
ALBUMIN/GLOB SERPL: 1 {RATIO}
ALP SERPL-CCNC: 109 U/L
ALT SERPL-CCNC: 23 UNITS/L
ANION GAP SERPL CALC-SCNC: 5 MMOL/L
AST SERPL-CCNC: 16 UNITS/L
BILIRUB SERPL-MCNC: 0.4 MG/DL
BUN SERPL-MCNC: 21 MG/DL
BUN/CREAT SERPL: 24.1
CALCIUM SERPL-MCNC: 9.4 MG/DL
CHLORIDE SERPL-SCNC: 105 MMOL/L
CHOLEST SERPL-MCNC: 126 MG/DL
CO2 SERPL-SCNC: 29 MMOL/L
CREAT SERPL-MCNC: 0.87 MG/DL
EST. AVERAGE GLUCOSE BLD GHB EST-MCNC: 143 MG/DL
GLOBULIN SER-MCNC: 3.5 G/DL
GLUCOSE SERPL-MCNC: 129 MG/DL
HBA1C MFR BLD: 6.6 %
HDLC SERPL-MCNC: 46 MG/DL
LDLC SERPL CALC-MCNC: 51 MG/DL
LENGTH OF FAST TIME PATIENT: YES H
LENGTH OF FAST TIME PATIENT: YES H
NONHDLC SERPL-MCNC: 80 MG/DL
POTASSIUM SERPL-SCNC: 3.7 MMOL/L
PROT SERPL-MCNC: 6.9 G/DL
SODIUM SERPL-SCNC: 139 MMOL/L
TRIGL SERPL-MCNC: 143 MG/DL
VLDLC SERPL CALC-MCNC: 29 MG/DL

## 2020-06-16 PROCEDURE — 36415 COLL VENOUS BLD VENIPUNCTURE: CPT

## 2020-06-16 PROCEDURE — 83036 HEMOGLOBIN GLYCOSYLATED A1C: CPT

## 2020-06-16 PROCEDURE — 80053 COMPREHEN METABOLIC PANEL: CPT

## 2020-06-16 PROCEDURE — 80061 LIPID PANEL: CPT

## 2020-06-17 ENCOUNTER — TELEPHONE (OUTPATIENT)
Dept: INTERNAL MEDICINE CLINIC | Facility: CLINIC | Age: 70
End: 2020-06-17

## 2020-06-17 ENCOUNTER — TELEPHONE (OUTPATIENT)
Dept: CARDIOLOGY | Age: 70
End: 2020-06-17

## 2020-06-17 ENCOUNTER — CLINICAL ABSTRACT (OUTPATIENT)
Dept: CARDIOLOGY | Age: 70
End: 2020-06-17

## 2020-06-17 NOTE — TELEPHONE ENCOUNTER
Patient called stating that she had lab work completed yesterday for Dr. Stefany Mensah. She received a call for her results and was advised to call PCP because her A1C is up.  Please advise

## 2020-06-17 NOTE — TELEPHONE ENCOUNTER
Any advise in the a1c level? Pt was advised by Dr Gem Dotson to call pcp for further recommendations.

## 2020-06-19 ENCOUNTER — TELEPHONE (OUTPATIENT)
Dept: INTERNAL MEDICINE CLINIC | Facility: CLINIC | Age: 70
End: 2020-06-19

## 2020-06-19 DIAGNOSIS — E11.8 TYPE 2 DIABETES MELLITUS WITH COMPLICATION (HCC): ICD-10-CM

## 2020-06-19 NOTE — TELEPHONE ENCOUNTER
Pt requested to update medication list for Metformin. Per pt was recommended to reduce Metformin from 1000mg BID down to 500mg BID. Per ov w Dr Magdalena Cueva dated 11/22/19:  \"Will resume at 500mg BID. \"    Pt reassure taking Metformin 500mg BID.  Med list upd

## 2020-07-07 ENCOUNTER — OFFICE VISIT (OUTPATIENT)
Dept: NEUROLOGY | Facility: CLINIC | Age: 70
End: 2020-07-07
Payer: MEDICARE

## 2020-07-07 VITALS
WEIGHT: 158 LBS | HEIGHT: 63 IN | RESPIRATION RATE: 16 BRPM | BODY MASS INDEX: 28 KG/M2 | DIASTOLIC BLOOD PRESSURE: 72 MMHG | SYSTOLIC BLOOD PRESSURE: 122 MMHG | HEART RATE: 78 BPM | TEMPERATURE: 97 F

## 2020-07-07 DIAGNOSIS — R20.0 NUMBNESS AND TINGLING OF LEFT HAND: ICD-10-CM

## 2020-07-07 DIAGNOSIS — Z86.73 HISTORY OF STROKE WITHOUT RESIDUAL DEFICITS: ICD-10-CM

## 2020-07-07 DIAGNOSIS — R29.898 LEFT HAND WEAKNESS: Primary | ICD-10-CM

## 2020-07-07 DIAGNOSIS — R20.2 NUMBNESS AND TINGLING OF LEFT HAND: ICD-10-CM

## 2020-07-07 DIAGNOSIS — I67.1 UNRUPTURED CEREBRAL ANEURYSM: ICD-10-CM

## 2020-07-07 DIAGNOSIS — G56.01 CARPAL TUNNEL SYNDROME ON RIGHT: ICD-10-CM

## 2020-07-07 PROCEDURE — 99214 OFFICE O/P EST MOD 30 MIN: CPT | Performed by: OTHER

## 2020-07-07 RX ORDER — ECHINACEA 400 MG
CAPSULE ORAL
COMMUNITY

## 2020-07-07 RX ORDER — GABAPENTIN 300 MG/1
CAPSULE ORAL
Qty: 150 CAPSULE | Refills: 5 | Status: SHIPPED | OUTPATIENT
Start: 2020-07-07 | End: 2021-03-22

## 2020-07-07 NOTE — PROGRESS NOTES
[facility] Progress Note    HPI  Patient presents with:  Stroke:  Follow up  Carpal Tunnel Syndrome: Follow up    In summary of prior visits:   \"     Patient presents in follow-up after recently discharged 4/12/17, following admission f • Abnormal maternal glucose tolerance, complicating pregnancy, childbirth, or the puerperium, unspecified as to episode of care    • Acute bronchitis 04/11/2011   • Back problem    • Cataract    • Chest pain, unspecified 12/05    admit 12/05   • Deep vein • TRANSFORAMINAL LUMBAR EPIDURAL STEROID INJECTION SINGLE LEVEL Left 8/12/2019    Performed by Cherry Rubio MD at [facility] ENDOSCOPY   • TRANSFORAMINAL LUMBAR EPIDURAL STEROID INJECTION SINGLE LEVEL Left 7/22/2019    Performed by Cherry Rubio MD at [facility] ENDOSCOPY Able to take tylenol  Zetia [Ezetimibe]       NAUSEA AND VOMITING    Comment:Tabs,Muscle spasm      Current Outpatient Medications:   •  Black Elderberry,Berry-Flower, 575 MG Oral Cap, Take by mouth., Disp: , Rfl:   •  gabapentin 300 MG Oral Cap •  NITROSTAT 0.4 MG Sublingual SL Tab, Place 0.4 mg under the tongue every 5 (five) minutes as needed. , Disp: , Rfl: 5  •  Multiple Vitamin (MULTI-VITAMIN) Oral Tab, Take 1 Tab by mouth daily. , Disp: , Rfl:   •  metFORMIN HCl 500 MG Oral Tab, Take 1 tabl Hemoglobin      12.0 - 16.0 g/dL 10.9 (L)     Hematocrit      35.0 - 48.0 % 33.4 (L)     Platelet Count      371.3 - 450.0 10(3)uL 222.0     MCV      80.0 - 100.0 fL 89.3     MCH      26.0 - 34.0 pg 29.1     MCHC      31.0 - 37.0 g/dL 32.6     RDW      11. 4.0 - 11.0 x10(3) uL 5.1   RBC      3.80 - 5.30 x10(6)uL 4.33   Hemoglobin      12.0 - 16.0 g/dL 12.7   Hematocrit      35.0 - 48.0 % 38.0   Platelet Count      342.8 - 450.0 10(3)uL 299.0   MCV      80.0 - 100.0 fL 87.8   MCH      26.0 - 34.0 pg 29.3 INTRACRANIAL:  There is no evidence of acute ischemia or infarction.   There is scattered subcortical high signal white matter lesions seen in the centrum semiovale and periventricular white matter similar to the prior examination of 4/24/2018 consistent Chronic microvascular disease involving both cerebral hemispheres unchanged from 4/24/2018. Noted is a old 6 mm left posterior lateral thalamic lacunar infarct. 2.9 x 2.1 mm left cavernous carotid aneurysm with short neck.   No other evidence of aneury             Median Palm - Ulnar Palm   1.15         Motor NCS      Nerve / Sites Muscle Latency Amplitude Amp % Duration Area Segments Distance Lat Diff Velocity       ms mV % ms mVms   cm ms m/s   R Median - APB      Wrist APB 4.43 4.2 100 7.55 13.4 Wrist R. Gastrocnemius (Medial head) Tibial S1-S2 N None None None None N N N N   R. Extensor hallucis longus Deep peroneal (Fibular) L5-S1 N None None None None 1+ 1+ N Reduced               Summary:     1.  Right sural sensory response was borderline due to bor This study is abnormal.  There is electrophysiologic evidence consistent with a Right median nerve entrapment across the wrist, with demyelinating features.   In addition, this is suggestive of but not diagnostic for a mild, chronic R lower lumbar radiculop    parameters are consistent with abnormal left ventricular relaxation -     grade 1 diastolic dysfunction. 2. Mitral valve: Systolic bowing without prolapse. There was trivial to mild     regurgitation.   3. Left atrium: The left atrial volume was mildly In terms of stroke symptoms, she is currently doing well on her dose of Plavix 75 mg daily and Lipitor 40 mg daily (could not tolerate ASA).   Previously, she was doing well in terms of her carpal tunnel syndrome, which was mainly noted on the right hand

## 2020-07-08 ENCOUNTER — OFFICE VISIT (OUTPATIENT)
Dept: INTERNAL MEDICINE CLINIC | Facility: CLINIC | Age: 70
End: 2020-07-08
Payer: MEDICARE

## 2020-07-08 VITALS
WEIGHT: 154.5 LBS | HEART RATE: 60 BPM | HEIGHT: 62.75 IN | RESPIRATION RATE: 16 BRPM | SYSTOLIC BLOOD PRESSURE: 108 MMHG | TEMPERATURE: 98 F | BODY MASS INDEX: 27.72 KG/M2 | DIASTOLIC BLOOD PRESSURE: 52 MMHG

## 2020-07-08 DIAGNOSIS — Z86.73 H/O: CVA (CEREBROVASCULAR ACCIDENT): ICD-10-CM

## 2020-07-08 DIAGNOSIS — M54.16 LUMBAR RADICULOPATHY: ICD-10-CM

## 2020-07-08 DIAGNOSIS — IMO0002 OSTEOARTHROSIS INVOLVING LOWER LEG: ICD-10-CM

## 2020-07-08 DIAGNOSIS — E78.00 PURE HYPERCHOLESTEROLEMIA: ICD-10-CM

## 2020-07-08 DIAGNOSIS — E11.9 TYPE 2 DIABETES MELLITUS WITHOUT COMPLICATION, WITHOUT LONG-TERM CURRENT USE OF INSULIN (HCC): ICD-10-CM

## 2020-07-08 DIAGNOSIS — K29.70 GASTRITIS AND GASTRODUODENITIS: ICD-10-CM

## 2020-07-08 DIAGNOSIS — Z12.31 VISIT FOR SCREENING MAMMOGRAM: ICD-10-CM

## 2020-07-08 DIAGNOSIS — R80.9 PROTEINURIA, UNSPECIFIED TYPE: ICD-10-CM

## 2020-07-08 DIAGNOSIS — I70.0 ATHEROSCLEROSIS OF AORTA (HCC): ICD-10-CM

## 2020-07-08 DIAGNOSIS — I10 ESSENTIAL HYPERTENSION: ICD-10-CM

## 2020-07-08 DIAGNOSIS — Z00.00 MEDICARE ANNUAL WELLNESS VISIT, SUBSEQUENT: Primary | ICD-10-CM

## 2020-07-08 DIAGNOSIS — K21.00 GASTROESOPHAGEAL REFLUX DISEASE WITH ESOPHAGITIS: ICD-10-CM

## 2020-07-08 DIAGNOSIS — I20.1 PRINZMETAL ANGINA (HCC): ICD-10-CM

## 2020-07-08 DIAGNOSIS — M77.8 ENTHESOPATHY OF WRIST AND CARPUS: ICD-10-CM

## 2020-07-08 DIAGNOSIS — K29.90 GASTRITIS AND GASTRODUODENITIS: ICD-10-CM

## 2020-07-08 DIAGNOSIS — G47.33 OBSTRUCTIVE SLEEP APNEA (ADULT) (PEDIATRIC): ICD-10-CM

## 2020-07-08 DIAGNOSIS — Z00.00 ENCOUNTER FOR ANNUAL HEALTH EXAMINATION: ICD-10-CM

## 2020-07-08 PROBLEM — M79.605 LEFT LEG PAIN: Status: RESOLVED | Noted: 2019-05-16 | Resolved: 2020-07-08

## 2020-07-08 PROBLEM — Z01.818 PRE-OP EVALUATION: Status: RESOLVED | Noted: 2019-11-20 | Resolved: 2020-07-08

## 2020-07-08 PROCEDURE — 99214 OFFICE O/P EST MOD 30 MIN: CPT | Performed by: FAMILY MEDICINE

## 2020-07-08 PROCEDURE — G0439 PPPS, SUBSEQ VISIT: HCPCS | Performed by: FAMILY MEDICINE

## 2020-07-08 NOTE — PROGRESS NOTES
HPI:   Angel Pardo is a 72year old female who presents for a Medicare Subsequent Annual Wellness visit (Pt already had Initial Annual Wellness).       Annual Physical due on 07/08/2021        Fall/Risk Assessment   She has been screened for Falls and Osteoarthrosis involving lower leg     H/O: CVA (cerebrovascular accident)     Atherosclerosis of aorta (HCC)     Enthesopathy of wrist and carpus     Lumbar radiculopathy    Wt Readings from Last 3 Encounters:  07/08/20 : 154 lb 8 oz (70.1 kg)  07/07/20 : total) by mouth 3 (three) times daily as needed for Muscle spasms. Ranolazine  MG Oral Tablet 12 Hr, Take 500 mg by mouth daily. Omeprazole 40 MG Oral Capsule Delayed Release, Take 40 mg by mouth daily.   Omega-3-acid Ethyl Esters 1 g Oral Cap, Ta Breast Cancer in her paternal grandmother; Cancer in her father, paternal grandmother, and another family member; Diabetes in her father. SOCIAL HISTORY:   She  reports that she has never smoked.  She has never used smokeless tobacco. She reports current Skin: no rashes   Lymph nodes: Cervical, supraclavicular nodes normal   Neurologic: Normal       Vaccination History     Immunization History   Administered Date(s) Administered   • >=3 YRS FLUZONE OR FLUARIX QUAD PRESERVE FREE SINGLE DOSE (41362) FLU CL accident)  Plan: no recent issues    (I70.0) Atherosclerosis of aorta ([address]  Plan: follow    (G47.33) Obstructive sleep apnea (adult) (pediatric)  Plan: off CPAP     Gave her bloody noses     (E11.9) Type 2 diabetes mellitus without complication, without l (H)     GLUCOSE (mg/dL)   Date Value   01/18/2016 137 (H)          Cardiovascular Disease Screening     LDL Annually LDL Cholesterol (mg/dL)   Date Value   06/16/2020 51     LDL-CHOLESTEROL (mg/dL (calc))   Date Value   01/18/2016 67        ANABELLE Villafana w/ Deanne Factor VIII or IX concentrates   Clients of institutions for the mentally retarded   Persons who live in the same house as a HepB virus carrier   Homosexual men   Illicit injectable drug abusers     Tetanus Toxoid  Only covered with a cut with metal- TD an

## 2020-07-08 NOTE — PATIENT INSTRUCTIONS
Pancho Fagan's SCREENING SCHEDULE   Tests on this list are recommended by your physician but may not be covered, or covered at this frequency, by your insurer. Please check with your insurance carrier before scheduling to verify coverage.    Kendra Carey results found for this or any previous visit.  Limited to patients who meet one of the following criteria:   • Men who are 73-68 years old and have smoked more than 100 cigarettes in their lifetime   • Anyone with a family history    Colorectal Cancer [address] Mammogram due on 05/21/2020 Please get this Mammogram regularly   Immunizations      Influenza  Covered Annually Orders placed or performed in visit on 10/24/16   • FLU VAC NO PRSV 4 STACEY 3 YRS+   Orders placed or performed in visit on 11/05/15   • FLU V it's website for anyone to review and print using their home computer and printer. (the forms are also available in [address])  [email or web address]  This link also has information from the [facility] regarding Advance Directives.

## 2020-07-12 DIAGNOSIS — E11.8 TYPE 2 DIABETES MELLITUS WITH COMPLICATION (HCC): ICD-10-CM

## 2020-07-16 ENCOUNTER — PROCEDURE VISIT (OUTPATIENT)
Dept: NEUROLOGY | Facility: CLINIC | Age: 70
End: 2020-07-16
Payer: MEDICARE

## 2020-07-16 VITALS — TEMPERATURE: 97 F

## 2020-07-16 DIAGNOSIS — R29.898 LEFT HAND WEAKNESS: Primary | ICD-10-CM

## 2020-07-16 DIAGNOSIS — R20.0 NUMBNESS AND TINGLING OF LEFT HAND: ICD-10-CM

## 2020-07-16 DIAGNOSIS — G56.01 CARPAL TUNNEL SYNDROME ON RIGHT: ICD-10-CM

## 2020-07-16 DIAGNOSIS — R20.2 NUMBNESS AND TINGLING OF LEFT HAND: ICD-10-CM

## 2020-07-16 PROCEDURE — 95886 MUSC TEST DONE W/N TEST COMP: CPT | Performed by: OTHER

## 2020-07-16 PROCEDURE — 95912 NRV CNDJ TEST 11-12 STUDIES: CPT | Performed by: OTHER

## 2020-07-16 NOTE — PROCEDURES
[facility]  [address] [place], [address] [facility]  Ph: [phone]  FAX: [phone]        Full Name: Sebas Banerjee Gender: Female  Patient ID: [id number] YOB: 1950      Visit Date: 7/16/2020 11:35 Ulnar Palm Wrist 1.35 1.88 3.1 3.0 Ulnar Palm - Wrist 8  59         Median Palm - Ulnar Palm  1.15    L Median, Ulnar - Transcarpal comparison      Median Palm Wrist NR NR NR NR Median Palm - Wrist 8  NR      Ulnar Palm Wrist 1.35 1.77 26.2 36.7 Ulnar P R. Triceps brachii Radial C6-C8 N None None None None N N N N   R. Extensor digitorum communis Radial C7-C8 N None None None None N N N N   R. Abductor pollicis brevis Median Z5-Q9 N None None None None N N N N   R.  First dorsal interosseous Ulnar C8-T1 N This is an abnormal study.   There is electrophysiologic evidence consistent with bilateral median nerve entrapment across the wrist, with the left more severely affected, with primarily demyelinating findings, with absent sensory response and reduced CMAP,

## 2020-07-27 ENCOUNTER — HOSPITAL ENCOUNTER (OUTPATIENT)
Dept: MAMMOGRAPHY | Age: 70
Discharge: HOME | End: 2020-07-27
Attending: FAMILY MEDICINE
Payer: MEDICARE

## 2020-07-27 ENCOUNTER — TELEPHONE (OUTPATIENT)
Dept: NEUROLOGY | Facility: CLINIC | Age: 70
End: 2020-07-27

## 2020-07-27 DIAGNOSIS — Z12.31 VISIT FOR SCREENING MAMMOGRAM: ICD-10-CM

## 2020-07-27 PROCEDURE — 77067 SCR MAMMO BI INCL CAD: CPT | Performed by: FAMILY MEDICINE

## 2020-07-27 PROCEDURE — 77063 BREAST TOMOSYNTHESIS BI: CPT | Performed by: FAMILY MEDICINE

## 2020-07-27 NOTE — TELEPHONE ENCOUNTER
Missingames message sent to patient with the below results.     ----- Message from Alvaro Curiel MD sent at 7/26/2020  2:09 AM CDT -----  Results noted, bilateral carpal tunnel, Left worse than R

## 2020-08-07 RX ORDER — ISOSORBIDE MONONITRATE 120 MG/1
TABLET, EXTENDED RELEASE ORAL
Qty: 90 TABLET | Refills: 0 | Status: SHIPPED | OUTPATIENT
Start: 2020-08-07 | End: 2020-11-04

## 2020-08-07 NOTE — TELEPHONE ENCOUNTER
LOV: 7/8/2020 with Dr. Aaron Copeland  RTC: no follow-up on file  Last Relevant Labs: 6/16/2020   Filled: 5/8/2020  #90 with 0 refills    Future Appointments   Date Time Provider Marisa Hollis   8/7/2020  1:10 PM Wayne Noriega MD [address] [facility]
73

## 2020-08-14 RX ORDER — LANCETS
EACH MISCELLANEOUS
Qty: 100 EACH | Refills: 3 | Status: SHIPPED | OUTPATIENT
Start: 2020-08-14 | End: 2021-07-01

## 2020-08-26 ENCOUNTER — PATIENT MESSAGE (OUTPATIENT)
Dept: SURGERY | Facility: CLINIC | Age: 70
End: 2020-08-26

## 2020-08-26 NOTE — TELEPHONE ENCOUNTER
Will send message to provider to place imaging  For Pt to obtain    Pt was recommended from the LOV 9/18/19 Dr Nieto Persons to follow up in the office to  Review imaging

## 2020-08-26 NOTE — TELEPHONE ENCOUNTER
From: Sarina Love  To:  Iva Márquez MD  Sent: 8/26/2020 1:43 PM CDT  Subject: Visit Follow-up Question    I believe I am suppose to have a one year follow up for the aneurysm that was found on the left side out side the brain to make sure it's not

## 2020-08-27 RX ORDER — BLOOD SUGAR DIAGNOSTIC
STRIP MISCELLANEOUS
Qty: 100 STRIP | Refills: 0 | Status: SHIPPED | OUTPATIENT
Start: 2020-08-27 | End: 2020-11-21

## 2020-08-31 ENCOUNTER — TELEPHONE (OUTPATIENT)
Dept: SURGERY | Facility: CLINIC | Age: 70
End: 2020-08-31

## 2020-08-31 DIAGNOSIS — I67.1 LEFT CAVERNOUS CAROTID ANEURYSM: Primary | ICD-10-CM

## 2020-08-31 NOTE — TELEPHONE ENCOUNTER
Please see TE 8/26/2020 for further information. Pt was notified via  [employer] of imaging and follow up appointment.

## 2020-08-31 NOTE — TELEPHONE ENCOUNTER
Please have patient scheduled for follow up in 4-6 weeks with Dr. Aixa Guadalupe to review results.      Thanks

## 2020-09-03 ENCOUNTER — OFFICE VISIT (OUTPATIENT)
Dept: INTERNAL MEDICINE CLINIC | Facility: CLINIC | Age: 70
End: 2020-09-03
Payer: MEDICARE

## 2020-09-03 VITALS
HEIGHT: 62.75 IN | RESPIRATION RATE: 14 BRPM | DIASTOLIC BLOOD PRESSURE: 58 MMHG | HEART RATE: 78 BPM | BODY MASS INDEX: 27.81 KG/M2 | WEIGHT: 155 LBS | SYSTOLIC BLOOD PRESSURE: 116 MMHG | TEMPERATURE: 99 F

## 2020-09-03 DIAGNOSIS — N89.8 VAGINAL DISCHARGE: Primary | ICD-10-CM

## 2020-09-03 PROCEDURE — 87660 TRICHOMONAS VAGIN DIR PROBE: CPT | Performed by: FAMILY MEDICINE

## 2020-09-03 PROCEDURE — 99213 OFFICE O/P EST LOW 20 MIN: CPT | Performed by: FAMILY MEDICINE

## 2020-09-03 PROCEDURE — 87480 CANDIDA DNA DIR PROBE: CPT | Performed by: FAMILY MEDICINE

## 2020-09-03 PROCEDURE — 87510 GARDNER VAG DNA DIR PROBE: CPT | Performed by: FAMILY MEDICINE

## 2020-09-03 RX ORDER — NYSTATIN 100000 U/G
1 CREAM TOPICAL 3 TIMES DAILY PRN
Qty: 30 G | Refills: 1 | Status: SHIPPED | OUTPATIENT
Start: 2020-09-03 | End: 2020-09-17 | Stop reason: ALTCHOICE

## 2020-09-03 RX ORDER — FLUCONAZOLE 150 MG/1
TABLET ORAL
Qty: 7 TABLET | Refills: 0 | Status: SHIPPED | OUTPATIENT
Start: 2020-09-03 | End: 2020-09-17 | Stop reason: ALTCHOICE

## 2020-09-03 RX ORDER — NITROGLYCERIN 0.4 MG/1
0.4 TABLET SUBLINGUAL EVERY 5 MIN PRN
Refills: 5 | Status: CANCELLED | OUTPATIENT
Start: 2020-09-03

## 2020-09-03 NOTE — PROGRESS NOTES
HPI:   Belkys Lal is a 72year old female   Patient presents with c/o vaginal discharge for the past 2 months off and on. She denies any pelvic pain or dyspareunia. Pt complains of itching externally. Patient reports the vaginal discharge is whitish mometasone 0.1 % External Ointment Apply topically as needed. • cetirizine 10 MG Oral Tab Take 10 mg by mouth daily. • acetaminophen 500 MG Oral Tab Take 500 mg by mouth every 6 (six) hours as needed for Pain.      • PEG 3350 Oral Powd Pack Take 17 LUMBAR MICRODISCECTOMY 1 LEVEL N/A 12/2/2019    Performed by Adelia Smith MD at [address]   • OTHER      Spinal Steroid Injection    • SACROILIAC JOINT INJECTION RIGHT OR LEFT Right 10/23/2019    Performed by Cony Looney MD at [facility] ENDOSCOPY   • SE pleasant pt, overweught weight.   SKIN: no rashes,no suspicious lesions  LUNGS: clear to auscultation, no W/R/R  CARDIO: RRR without murmur  GI: BS normoactive x 4, soft, NT, no masses, no HSM or CVAT. no suprapubic tenderness  : External genitalia with r

## 2020-09-09 ENCOUNTER — HOSPITAL ENCOUNTER (OUTPATIENT)
Dept: MRI IMAGING | Facility: HOSPITAL | Age: 70
Discharge: HOME | End: 2020-09-09
Attending: NURSE PRACTITIONER
Payer: MEDICARE

## 2020-09-09 DIAGNOSIS — I67.1 LEFT CAVERNOUS CAROTID ANEURYSM: ICD-10-CM

## 2020-09-09 PROCEDURE — 70544 MR ANGIOGRAPHY HEAD W/O DYE: CPT | Performed by: NURSE PRACTITIONER

## 2020-09-17 ENCOUNTER — TELEPHONE (OUTPATIENT)
Dept: SURGERY | Facility: CLINIC | Age: 70
End: 2020-09-17

## 2020-09-17 ENCOUNTER — OFFICE VISIT (OUTPATIENT)
Dept: SURGERY | Facility: CLINIC | Age: 70
End: 2020-09-17
Payer: MEDICARE

## 2020-09-17 VITALS
WEIGHT: 155 LBS | HEART RATE: 64 BPM | BODY MASS INDEX: 27.46 KG/M2 | DIASTOLIC BLOOD PRESSURE: 70 MMHG | HEIGHT: 63 IN | SYSTOLIC BLOOD PRESSURE: 110 MMHG

## 2020-09-17 DIAGNOSIS — I67.1 LEFT CAVERNOUS CAROTID ANEURYSM: Primary | ICD-10-CM

## 2020-09-17 PROCEDURE — 99213 OFFICE O/P EST LOW 20 MIN: CPT | Performed by: RADIOLOGY

## 2020-09-17 NOTE — TELEPHONE ENCOUNTER
Please place reminder for non contrast MRA brain in 3 years for left cavernous carotid aneurysm. Patient will need follow up with DR. Hari Valenzuela to review results

## 2020-09-17 NOTE — TELEPHONE ENCOUNTER
Patient reminder placed for non contrast MRA brain in 3 years for left cavernous carotid aneurysm.     Patient will need follow up with DR. Jabari Emanuel to review results

## 2020-09-17 NOTE — PROGRESS NOTES
Pt is here for imaging results of MRA brain completed 9-9. Over labor day had severe head pain on left back of hear and into her ear two times lasting 2-3 minutes. Was off balance that weekend, tripping over things. She feels this is getting worse.     Re

## 2020-09-17 NOTE — PROGRESS NOTES
[facility]  Interventional Neuroradiology Progress Note    Nazia Lenz Patient Status:  No patient class for patient encounter    4/15/1950 MRN [id number]   Location [address], [facility] PCP MD Mahi Suazo Cranial Nerves: VFF, PERRL 3mm brisk, EOMI, no nystagmus, facial sensation intact, face symmetric, tongue midline, shoulder shrug equal, remainder CN intact  Motor: No drift, no focal arm or leg weakness  Sensory: Intact to light touch  Coordination: FTN i •  cetirizine 10 MG Oral Tab, Take 10 mg by mouth daily. , Disp: , Rfl:   •  acetaminophen 500 MG Oral Tab, Take 500 mg by mouth every 6 (six) hours as needed for Pain., Disp: , Rfl:   •  PEG 3350 Oral Powd Pack, Take 17 g by mouth nightly.  , Disp: , Rfl:

## 2020-09-23 NOTE — TELEPHONE ENCOUNTER
LOV: 9/3/2020 with Kayla Perkins NP  RTC: no follow-up on file  Last Relevant Labs: 6/16/2020   Filled: 6/21/2020   #180 with 0 refills    No future appointments.

## 2020-09-29 DIAGNOSIS — Z86.73 HISTORY OF STROKE WITHIN LAST YEAR: ICD-10-CM

## 2020-09-30 RX ORDER — ATORVASTATIN CALCIUM 40 MG/1
TABLET, FILM COATED ORAL
Qty: 90 TABLET | Refills: 2 | Status: SHIPPED | OUTPATIENT
Start: 2020-09-30 | End: 2021-01-21

## 2020-09-30 RX ORDER — LISINOPRIL AND HYDROCHLOROTHIAZIDE 25; 20 MG/1; MG/1
1 TABLET ORAL DAILY
Qty: 90 TABLET | Refills: 0 | Status: SHIPPED | OUTPATIENT
Start: 2020-09-30 | End: 2021-01-21

## 2020-09-30 NOTE — TELEPHONE ENCOUNTER
Medication: Atorvastatin 40 mg    Date of last refill: 01/06/20 with 2 addt refills  [place]     Last office visit: 7/7/2020 [facility] & 07/17/20 [facility]  Due back to clinic per last office note:  No appts noted  Date next office

## 2020-10-26 ENCOUNTER — TELEPHONE (OUTPATIENT)
Dept: CARDIOLOGY | Age: 70
End: 2020-10-26

## 2020-10-26 DIAGNOSIS — R00.2 PALPITATIONS: ICD-10-CM

## 2020-10-26 DIAGNOSIS — I10 HYPERTENSION, BENIGN: Primary | ICD-10-CM

## 2020-10-29 ENCOUNTER — TELEPHONE (OUTPATIENT)
Dept: INTERNAL MEDICINE CLINIC | Facility: CLINIC | Age: 70
End: 2020-10-29

## 2020-10-29 NOTE — TELEPHONE ENCOUNTER
Patient had irregular heart beat on bp reading in Sept and October on two dates; patient called cardiologist Dr Michelle Carney today and she states his office is ordering a Heart at home monitor for her to go to [facility] and p/u today.   Does she need any prior authorizat

## 2020-11-03 ENCOUNTER — HOSPITAL ENCOUNTER (OUTPATIENT)
Dept: CV DIAGNOSTICS | Age: 70
Discharge: HOME | End: 2020-11-03
Attending: INTERNAL MEDICINE
Payer: MEDICARE

## 2020-11-03 DIAGNOSIS — I10 HYPERTENSION, BENIGN: ICD-10-CM

## 2020-11-03 DIAGNOSIS — R00.2 PALPITATIONS: ICD-10-CM

## 2020-11-03 PROCEDURE — 93225 XTRNL ECG REC<48 HRS REC: CPT | Performed by: INTERNAL MEDICINE

## 2020-11-03 PROCEDURE — 93226 XTRNL ECG REC<48 HR SCAN A/R: CPT | Performed by: INTERNAL MEDICINE

## 2020-11-03 PROCEDURE — 93227 XTRNL ECG REC<48 HR R&I: CPT | Performed by: INTERNAL MEDICINE

## 2020-11-04 RX ORDER — ISOSORBIDE MONONITRATE 120 MG/1
TABLET, EXTENDED RELEASE ORAL
Qty: 90 TABLET | Refills: 0 | Status: SHIPPED | OUTPATIENT
Start: 2020-11-04 | End: 2021-01-21

## 2020-11-04 NOTE — TELEPHONE ENCOUNTER
Isosorbide 120 mg  Last OV relevant to medication: 7-8-20  Last refill date: 8-7-20 #/refills: 0  When pt was asked to return for OV: none  Upcoming appt/reason: none  Recent labs: none

## 2020-11-09 ENCOUNTER — APPOINTMENT (OUTPATIENT)
Dept: LAB | Facility: HOSPITAL | Age: 70
End: 2020-11-09
Attending: ORTHOPAEDIC SURGERY
Payer: MEDICARE

## 2020-11-09 DIAGNOSIS — G56.03 CARPAL TUNNEL SYNDROME, BILATERAL: ICD-10-CM

## 2020-11-12 ENCOUNTER — HOSPITAL ENCOUNTER (OUTPATIENT)
Facility: HOSPITAL | Age: 70
Setting detail: HOSPITAL OUTPATIENT SURGERY
Discharge: HOME | End: 2020-11-12
Attending: ORTHOPAEDIC SURGERY | Admitting: ORTHOPAEDIC SURGERY
Payer: MEDICARE

## 2020-11-12 VITALS
WEIGHT: 157.19 LBS | HEART RATE: 56 BPM | BODY MASS INDEX: 27.85 KG/M2 | SYSTOLIC BLOOD PRESSURE: 130 MMHG | RESPIRATION RATE: 16 BRPM | TEMPERATURE: 98 F | HEIGHT: 63 IN | DIASTOLIC BLOOD PRESSURE: 67 MMHG | OXYGEN SATURATION: 97 %

## 2020-11-12 DIAGNOSIS — G56.03 CARPAL TUNNEL SYNDROME, BILATERAL: Primary | ICD-10-CM

## 2020-11-12 PROCEDURE — 82962 GLUCOSE BLOOD TEST: CPT

## 2020-11-12 PROCEDURE — 01N50ZZ RELEASE MEDIAN NERVE, OPEN APPROACH: ICD-10-PCS | Performed by: ORTHOPAEDIC SURGERY

## 2020-11-12 RX ORDER — LIDOCAINE HYDROCHLORIDE AND EPINEPHRINE 10; 10 MG/ML; UG/ML
INJECTION, SOLUTION INFILTRATION; PERINEURAL AS NEEDED
Status: DISCONTINUED | OUTPATIENT
Start: 2020-11-12 | End: 2020-11-12 | Stop reason: HOSPADM

## 2020-11-12 NOTE — H&P
Progress Notes       HPI:    Patient ID: Delicia Campbell is a 72year old female.     She comes a for follow-up of bilateral hands. She notes that the cortisone junctions helped only minimally.   She continues to have numbness and tingling in the hands a Take 40 mg by mouth daily.  Takes Sunday, Monday, Wednesday and Friday        • Omega-3-acid Ethyl Esters 1 g Oral Cap Take 1 g by mouth daily.       • mometasone 0.1 % External Ointment Apply topically as needed.       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     Meds This Visit:  Requested Prescriptions        No prescriptions requested or ordered

## 2020-11-12 NOTE — BRIEF OP NOTE
Pre-Operative Diagnosis: Carpal tunnel syndrome, bilateral [G56.03]     Post-Operative Diagnosis: Carpal tunnel syndrome, bilateral [G56.03]      Procedure Performed:   Procedure(s):  LEFT CARPAL TUNNEL RELEASE    Surgeon(s) and Role:     Vasquez Paul,

## 2020-11-13 DIAGNOSIS — Z86.73 HISTORY OF STROKE WITHOUT RESIDUAL DEFICITS: ICD-10-CM

## 2020-11-13 RX ORDER — CLOPIDOGREL BISULFATE 75 MG/1
TABLET ORAL
Qty: 90 TABLET | Refills: 1 | Status: SHIPPED | OUTPATIENT
Start: 2020-11-13 | End: 2021-01-21

## 2020-11-13 NOTE — OPERATIVE REPORT
[facility]    PATIENT'S NAME: Anival Fontenot   ATTENDING PHYSICIAN: Yoel Duong M.D. OPERATING PHYSICIAN: Yoel Duong M.D.    PATIENT ACCOUNT#:   [de-identified]    LOCATION:  OR  OR [place] ROOMS [address] #:   [id number]       DATE Full-thickness skin flaps were raised. The palmar aponeurosis was divided longitudinally, and the underlying transverse carpal ligament was identified. The distal fibers were divided with a 15-blade.   The guide for the Integra carpal tunnel knife was the

## 2020-11-21 RX ORDER — BLOOD SUGAR DIAGNOSTIC
STRIP MISCELLANEOUS
Qty: 100 STRIP | Refills: 0 | Status: SHIPPED | OUTPATIENT
Start: 2020-11-21 | End: 2021-07-01

## 2020-11-24 ENCOUNTER — TELEPHONE (OUTPATIENT)
Dept: CARDIOLOGY | Age: 70
End: 2020-11-24

## 2020-12-07 NOTE — TELEPHONE ENCOUNTER
Medication(s) to Refill:   Requested Prescriptions     Pending Prescriptions Disp Refills   • METOPROLOL TARTRATE 25 MG Oral Tab [Pharmacy Med Name: METOPROLOL TARTRATE 25MG TABLETS] 180 tablet 0     Sig: TAKE 1 TABLET BY MOUTH TWICE DAILY       LOV: 7.8.2

## 2021-01-07 ENCOUNTER — TELEPHONE (OUTPATIENT)
Dept: INTERNAL MEDICINE CLINIC | Facility: CLINIC | Age: 71
End: 2021-01-07

## 2021-01-07 ENCOUNTER — APPOINTMENT (OUTPATIENT)
Dept: CT IMAGING | Age: 71
End: 2021-01-07
Attending: EMERGENCY MEDICINE
Payer: MEDICARE

## 2021-01-07 ENCOUNTER — HOSPITAL ENCOUNTER (OUTPATIENT)
Age: 71
Discharge: HOME | End: 2021-01-07
Attending: EMERGENCY MEDICINE
Payer: MEDICARE

## 2021-01-07 VITALS
HEART RATE: 67 BPM | TEMPERATURE: 99 F | SYSTOLIC BLOOD PRESSURE: 156 MMHG | DIASTOLIC BLOOD PRESSURE: 88 MMHG | WEIGHT: 152 LBS | OXYGEN SATURATION: 98 % | BODY MASS INDEX: 26.93 KG/M2 | HEIGHT: 63 IN | RESPIRATION RATE: 18 BRPM

## 2021-01-07 DIAGNOSIS — R42 VERTIGO: Primary | ICD-10-CM

## 2021-01-07 LAB
#MXD IC: 0.4 X10ˆ3/UL (ref 0.1–1)
CREAT BLD-MCNC: 0.9 MG/DL (ref 0.55–1.02)
GLUCOSE BLD-MCNC: 119 MG/DL (ref 70–99)
HCT VFR BLD AUTO: 39.9 %
HGB BLD-MCNC: 13 G/DL
ISTAT BUN: 20 MG/DL (ref 7–18)
ISTAT CHLORIDE: 101 MMOL/L (ref 98–112)
ISTAT HEMATOCRIT: 39 %
ISTAT IONIZED CALCIUM FOR CHEM 8: 1.22 MMOL/L (ref 1.12–1.32)
ISTAT POTASSIUM: 3.9 MMOL/L (ref 3.6–5.1)
ISTAT SODIUM: 139 MMOL/L (ref 136–145)
ISTAT TCO2: 29 MMOL/L (ref 21–32)
LYMPHOCYTES # BLD AUTO: 2 X10ˆ3/UL (ref 1–4)
LYMPHOCYTES NFR BLD AUTO: 37.4 %
MCH RBC QN AUTO: 28.8 PG (ref 26–34)
MCHC RBC AUTO-ENTMCNC: 32.6 G/DL (ref 31–37)
MCV RBC AUTO: 88.5 FL (ref 80–100)
MIXED CELL %: 8.4 %
NEUTROPHILS # BLD AUTO: 2.9 X10ˆ3/UL (ref 1.5–7.7)
NEUTROPHILS NFR BLD AUTO: 54.2 %
PLATELET # BLD AUTO: 355 X10ˆ3/UL (ref 150–450)
RBC # BLD AUTO: 4.51 X10ˆ6/UL
TROPONIN I BLD-MCNC: <0.02 NG/ML
WBC # BLD AUTO: 5.3 X10ˆ3/UL (ref 4–11)

## 2021-01-07 PROCEDURE — 99214 OFFICE O/P EST MOD 30 MIN: CPT

## 2021-01-07 PROCEDURE — 70450 CT HEAD/BRAIN W/O DYE: CPT | Performed by: EMERGENCY MEDICINE

## 2021-01-07 PROCEDURE — 93005 ELECTROCARDIOGRAM TRACING: CPT

## 2021-01-07 PROCEDURE — 93010 ELECTROCARDIOGRAM REPORT: CPT

## 2021-01-07 PROCEDURE — 99215 OFFICE O/P EST HI 40 MIN: CPT

## 2021-01-07 PROCEDURE — 85025 COMPLETE CBC W/AUTO DIFF WBC: CPT | Performed by: EMERGENCY MEDICINE

## 2021-01-07 PROCEDURE — 80047 BASIC METABLC PNL IONIZED CA: CPT

## 2021-01-07 PROCEDURE — 84484 ASSAY OF TROPONIN QUANT: CPT

## 2021-01-07 RX ORDER — MECLIZINE HCL 12.5 MG/1
25 TABLET ORAL ONCE
Status: COMPLETED | OUTPATIENT
Start: 2021-01-07 | End: 2021-01-07

## 2021-01-07 RX ORDER — MECLIZINE HYDROCHLORIDE 25 MG/1
25 TABLET ORAL 3 TIMES DAILY PRN
Qty: 21 TABLET | Refills: 0 | Status: SHIPPED | OUTPATIENT
Start: 2021-01-07 | End: 2021-01-14

## 2021-01-07 NOTE — TELEPHONE ENCOUNTER
Carpal tunnel release surgery on 1/5/21 - pt woke up this morning feeling dizzy. Dizziness is experienced when she is sitting, standing, walking and bending over but subsides when she is laying down.   Pt has had vertigo in the past and isnt sure if these s

## 2021-01-07 NOTE — TELEPHONE ENCOUNTER
Spoke with patient advised should proceed to IC for evaluation. Patient verbalized understanding and agreeable to POC.

## 2021-01-07 NOTE — TELEPHONE ENCOUNTER
Spoke with patient stating she had Carpal tunnel surgery on 1/5/2021, this morning she woke up feeling dizzy when sitting, standing, walking or bending over but subsides when laying down. Patient indicates does not feel nauseas but stomach feel \"queasy\".

## 2021-01-08 LAB
ATRIAL RATE: 78 BPM
P AXIS: 73 DEGREES
P-R INTERVAL: 154 MS
Q-T INTERVAL: 400 MS
QRS DURATION: 82 MS
QTC CALCULATION (BEZET): 456 MS
R AXIS: 77 DEGREES
T AXIS: 90 DEGREES
VENTRICULAR RATE: 78 BPM

## 2021-01-08 NOTE — ED PROVIDER NOTES
Patient Seen in: [facility]      History   Patient presents with:  Dizziness  Arrythmia/Palpitations    Stated Complaint: dizziness post procedure on Tuesday     HPI/Subjective:   HPI    29-year-old woman, history of diabetes, hypertensio mention of hemorrhage    • Visual impairment     glasses              Past Surgical History:   Procedure Laterality Date   • BACK SURGERY      Spinal diskectomy lumbar   •   1987   • CARDIAC CATHERTERIZATION (PBP)      status normal reso systems are as noted in HPI. Constitutional and vital signs reviewed. All other systems reviewed and negative except as noted above.     Physical Exam     ED Triage Vitals [01/07/21 1823]   /85   Pulse 79   Resp 20   Temp 98.5 °F (36.9 °C)   Tem scanning is performed through the brain. Dose reduction techniques were used. Dose information is transmitted to the  [facility] Rad[facility]) NRDR ([address]) which includes the Dose Index Registry.   PATIENT STATED HISTORY (25 mg total) by mouth 3 (three) times daily as needed.   Qty: 21 tablet Refills: 0

## 2021-01-08 NOTE — ED INITIAL ASSESSMENT (HPI)
Pt woke up with extreme dizziness, moving side to side makes it worse; felt queasy ; had a lot of pain to R wrist; c/o intermittent palpitations; no fever/vomiting/diarrhea/sob/chest pain/weakness    Had carpal tunnel release - local anesthesia    Glucose

## 2021-01-20 DIAGNOSIS — Z86.73 HISTORY OF STROKE WITHOUT RESIDUAL DEFICITS: ICD-10-CM

## 2021-01-20 DIAGNOSIS — Z86.73 HISTORY OF STROKE WITHIN LAST YEAR: ICD-10-CM

## 2021-01-20 NOTE — TELEPHONE ENCOUNTER
Patient requesting prescriptions to be changed to different pharmacy:  Omeprazole 40 MG Oral Capsule Delayed Release  METOPROLOL TARTRATE 25 MG Oral Tab  Ranolazine  MG Oral Tablet 12 Hr  LISINOPRIL-HYDROCHLOROTHIAZIDE 20-25 MG Oral Tab  ATORVASTATIN

## 2021-01-21 RX ORDER — ATORVASTATIN CALCIUM 40 MG/1
40 TABLET, FILM COATED ORAL EVERY EVENING
Qty: 90 TABLET | Refills: 1 | Status: SHIPPED | OUTPATIENT
Start: 2021-01-21 | End: 2021-04-29

## 2021-01-21 RX ORDER — CLOPIDOGREL BISULFATE 75 MG/1
75 TABLET ORAL DAILY
Qty: 90 TABLET | Refills: 0 | Status: SHIPPED | OUTPATIENT
Start: 2021-01-21 | End: 2021-07-13

## 2021-01-21 RX ORDER — LISINOPRIL AND HYDROCHLOROTHIAZIDE 25; 20 MG/1; MG/1
1 TABLET ORAL DAILY
Qty: 90 TABLET | Refills: 0 | Status: SHIPPED | OUTPATIENT
Start: 2021-01-21 | End: 2021-04-16

## 2021-01-21 RX ORDER — OMEPRAZOLE 40 MG/1
40 CAPSULE, DELAYED RELEASE ORAL DAILY
Qty: 90 CAPSULE | Refills: 0 | Status: SHIPPED | OUTPATIENT
Start: 2021-01-21 | End: 2021-04-16

## 2021-01-21 RX ORDER — ISOSORBIDE MONONITRATE 120 MG/1
120 TABLET, EXTENDED RELEASE ORAL DAILY
Qty: 90 TABLET | Refills: 0 | Status: SHIPPED | OUTPATIENT
Start: 2021-01-21 | End: 2021-04-16

## 2021-01-21 RX ORDER — RANOLAZINE 500 MG/1
500 TABLET, EXTENDED RELEASE ORAL DAILY
Qty: 90 TABLET | Refills: 0 | Status: SHIPPED | OUTPATIENT
Start: 2021-01-21 | End: 2021-04-16

## 2021-01-21 NOTE — TELEPHONE ENCOUNTER
LOV: 9/3/2020 with Jeny Galeas NP  RTC: no follow-up on file  Last Relevant Labs: 2020     Filled: Atorvastatin 40 mg--- 2020   #90 with 2 refills to [address] [place] [address], #[id number] in [place], [place]    Clopidogrel Bisulfate 75 mg--- 2020

## 2021-02-01 ENCOUNTER — TELEPHONE (OUTPATIENT)
Dept: INTERNAL MEDICINE CLINIC | Facility: CLINIC | Age: 71
End: 2021-02-01

## 2021-02-01 DIAGNOSIS — Z23 NEED FOR VACCINATION: ICD-10-CM

## 2021-02-01 NOTE — TELEPHONE ENCOUNTER
Pt is eligible to receive vaccine, order in Epic. Pt has read that vaccine can cause blood clots. Pt has known hx of blood clots. Does Dr. Magdalena Cueva rec patient to go ahead with vaccine at this time ?

## 2021-02-03 NOTE — TELEPHONE ENCOUNTER
Spoke with patient per TO okay to receive vaccine. Patient verbalized understanding and agreeable to POC.

## 2021-02-04 ENCOUNTER — IMMUNIZATION (OUTPATIENT)
Dept: LAB | Age: 71
End: 2021-02-04
Attending: HOSPITALIST
Payer: MEDICARE

## 2021-02-04 DIAGNOSIS — Z23 NEED FOR VACCINATION: Primary | ICD-10-CM

## 2021-02-04 PROCEDURE — 0001A SARSCOV2 VAC 30MCG/0.3ML IM: CPT

## 2021-02-17 RX ORDER — NIFEDIPINE 60 MG/1
TABLET, EXTENDED RELEASE ORAL
Qty: 90 TABLET | Refills: 1 | Status: SHIPPED | OUTPATIENT
Start: 2021-02-17 | End: 2021-08-09

## 2021-02-17 RX ORDER — ISOSORBIDE MONONITRATE 120 MG/1
TABLET, EXTENDED RELEASE ORAL
Qty: 90 TABLET | Refills: 0 | OUTPATIENT
Start: 2021-02-17

## 2021-02-17 RX ORDER — BLOOD SUGAR DIAGNOSTIC
STRIP MISCELLANEOUS
Qty: 100 STRIP | Refills: 0 | OUTPATIENT
Start: 2021-02-17

## 2021-02-17 NOTE — TELEPHONE ENCOUNTER
Called pt to verify pharmacy   Pt states that she uses the [facility] pharmacy #[id number]  Pt does not need test strips at this moment   Isosorbide 120mg was just recently filled     Medication(s) to Refill:   Requested Prescriptions     Pending Prescriptions Disp Ref before entering the building to check in and get next steps. You can expect to spend approximately 30 minutes at the vaccination site. Part of this time is spent making sure you don’t have any medical symptoms.     Rickey Schneider Emergency Use Authorization

## 2021-02-25 ENCOUNTER — IMMUNIZATION (OUTPATIENT)
Dept: LAB | Age: 71
End: 2021-02-25
Attending: HOSPITALIST
Payer: MEDICARE

## 2021-02-25 DIAGNOSIS — Z23 NEED FOR VACCINATION: Primary | ICD-10-CM

## 2021-02-25 PROCEDURE — 0002A SARSCOV2 VAC 30MCG/0.3ML IM: CPT

## 2021-03-10 ENCOUNTER — TELEPHONE (OUTPATIENT)
Dept: INTERNAL MEDICINE CLINIC | Facility: CLINIC | Age: 71
End: 2021-03-10

## 2021-03-10 NOTE — TELEPHONE ENCOUNTER
Pt calling BP is in mid 140's and has had dizziness spells. Recently diagnosed with Vertigo. Tried to get a hold of nurse but no response. Please call patient to advise.

## 2021-03-10 NOTE — TELEPHONE ENCOUNTER
Pt states for the last month she noticed BP has been elevated. Pt only reports systolic readings ranging from 132-150's/? Also c/o positional dizziness, HA associated with dizziness and bilateral ankle/pedal edema (which comes/goes).      Denies ches

## 2021-03-12 ENCOUNTER — OFFICE VISIT (OUTPATIENT)
Dept: INTERNAL MEDICINE CLINIC | Facility: CLINIC | Age: 71
End: 2021-03-12
Payer: MEDICARE

## 2021-03-12 VITALS
HEIGHT: 63 IN | SYSTOLIC BLOOD PRESSURE: 118 MMHG | TEMPERATURE: 98 F | HEART RATE: 66 BPM | WEIGHT: 151 LBS | RESPIRATION RATE: 16 BRPM | BODY MASS INDEX: 26.75 KG/M2 | DIASTOLIC BLOOD PRESSURE: 62 MMHG | OXYGEN SATURATION: 97 %

## 2021-03-12 DIAGNOSIS — I10 ESSENTIAL HYPERTENSION: Primary | ICD-10-CM

## 2021-03-12 DIAGNOSIS — R42 VERTIGO: ICD-10-CM

## 2021-03-12 DIAGNOSIS — E11.9 TYPE 2 DIABETES MELLITUS WITHOUT COMPLICATION, WITHOUT LONG-TERM CURRENT USE OF INSULIN (HCC): ICD-10-CM

## 2021-03-12 DIAGNOSIS — M17.11 ARTHRITIS OF RIGHT KNEE: ICD-10-CM

## 2021-03-12 DIAGNOSIS — E78.00 PURE HYPERCHOLESTEROLEMIA: ICD-10-CM

## 2021-03-12 PROCEDURE — 99214 OFFICE O/P EST MOD 30 MIN: CPT | Performed by: FAMILY MEDICINE

## 2021-03-12 NOTE — PROGRESS NOTES
Angel Pardo is a 72year old female.   HPI:   Here to go over her BP  Feels that then BP is running on the high side , running from the 130-150 range for the top number    Is taking all the meds as directed   Not eating out    No salt use      occ HAs 10 mg by mouth daily. • acetaminophen 500 MG Oral Tab Take 500 mg by mouth every 6 (six) hours as needed for Pain. • PEG 3350 Oral Powd Pack Take 17 g by mouth nightly.        • NITROSTAT 0.4 MG Sublingual SL Tab Place 0.4 mg under the tongue every 97.7 °F (36.5 °C) (Temporal)   Resp 16   Ht 5' 3\" (1.6 m)   Wt 151 lb (68.5 kg)   SpO2 97%   BMI 26.75 kg/m²   GENERAL: well developed, well nourished,in no apparent distress  SKIN: no rashes  NECK: supple,no adenopathy  LUNGS: clear to auscultation  CARD

## 2021-03-19 ENCOUNTER — TELEPHONE (OUTPATIENT)
Dept: INTERNAL MEDICINE CLINIC | Facility: CLINIC | Age: 71
End: 2021-03-19

## 2021-03-19 ENCOUNTER — TELEPHONE (OUTPATIENT)
Dept: ORTHOPEDICS CLINIC | Facility: CLINIC | Age: 71
End: 2021-03-19

## 2021-03-19 DIAGNOSIS — M25.561 RIGHT KNEE PAIN, UNSPECIFIED CHRONICITY: Primary | ICD-10-CM

## 2021-03-19 DIAGNOSIS — R42 VERTIGO: Primary | ICD-10-CM

## 2021-03-19 NOTE — TELEPHONE ENCOUNTER
New patient referred by PCP for Rt knee pain ,Patient has history of arthritis. Patient had an x-ray on rt knee two years ago, nothing recent. If imaging is needed please place Rx and notify patient before appt on 3/22. Thank you.

## 2021-03-22 ENCOUNTER — OFFICE VISIT (OUTPATIENT)
Dept: ORTHOPEDICS CLINIC | Facility: CLINIC | Age: 71
End: 2021-03-22
Payer: MEDICARE

## 2021-03-22 ENCOUNTER — HOSPITAL ENCOUNTER (OUTPATIENT)
Dept: GENERAL RADIOLOGY | Age: 71
Discharge: HOME | End: 2021-03-22
Attending: ORTHOPAEDIC SURGERY
Payer: MEDICARE

## 2021-03-22 VITALS — HEIGHT: 62.5 IN | WEIGHT: 151.63 LBS | HEART RATE: 60 BPM | BODY MASS INDEX: 27.21 KG/M2 | OXYGEN SATURATION: 98 %

## 2021-03-22 DIAGNOSIS — M17.11 PRIMARY OSTEOARTHRITIS OF RIGHT KNEE: Primary | ICD-10-CM

## 2021-03-22 DIAGNOSIS — M25.561 RIGHT KNEE PAIN, UNSPECIFIED CHRONICITY: ICD-10-CM

## 2021-03-22 PROCEDURE — 20610 DRAIN/INJ JOINT/BURSA W/O US: CPT | Performed by: ORTHOPAEDIC SURGERY

## 2021-03-22 PROCEDURE — 99203 OFFICE O/P NEW LOW 30 MIN: CPT | Performed by: ORTHOPAEDIC SURGERY

## 2021-03-22 PROCEDURE — 73564 X-RAY EXAM KNEE 4 OR MORE: CPT | Performed by: ORTHOPAEDIC SURGERY

## 2021-03-22 RX ORDER — TRIAMCINOLONE ACETONIDE 40 MG/ML
40 INJECTION, SUSPENSION INTRA-ARTICULAR; INTRAMUSCULAR ONCE
Status: COMPLETED | OUTPATIENT
Start: 2021-03-22 | End: 2021-03-22

## 2021-03-22 RX ADMIN — TRIAMCINOLONE ACETONIDE 40 MG: 40 INJECTION, SUSPENSION INTRA-ARTICULAR; INTRAMUSCULAR at 16:09:00

## 2021-03-22 NOTE — H&P
[facility] New Patient Note    CC: Patient presents with:  Knee Pain: right knee pain       HPI: This 72year old female presents today with complaints of bilateral knee pain right worse than left. Patient states that this is been ongoing for years. impairment     glasses     Past Surgical History:   Procedure Laterality Date   • BACK SURGERY      Spinal diskectomy lumbar   •   1987   • CARDIAC CATHERTERIZATION (PBP)      status normal resolved on 2007   • CATARACT     • CHOLECYSTE Take 1 tablet (75 mg total) by mouth daily. 90 tablet 0   • Isosorbide Mononitrate  MG Oral Tablet 24 Hr Take 1 tablet (120 mg total) by mouth daily. 90 tablet 0   • Lisinopril-hydroCHLOROthiazide 20-25 MG Oral Tab Take 1 tablet by mouth daily.  90 ta spasm  Family History   Problem Relation Age of Onset   • Cancer Father          at age 46 Leukemia   • Diabetes Father    • Breast Cancer Paternal Grandmother    • Cancer Paternal Grandmother         bone cancer   • Cancer Other      Social Histor female with symptomatic radiographically mild right knee osteoarthritis    Plan: I discussed the etiology, natural history, and management options for symptomatic knee osteoarthritis.   I discussed nonsurgical treatments to include anti-inflammatory medicat

## 2021-03-22 NOTE — PATIENT INSTRUCTIONS
What Is Arthritis? Arthritis is a disease that affects the joints. Joints are the parts where bones meet and move. It can affect any joint in your body. There are many types of arthritis.  They include:   · Osteoarthritis  · Rheumatoid arthritis  · Gout  · The joint's range of motion can become limited. Symptoms  Arthritis can affect any joint. Weight-bearing joints, such as the hips and knees, are often affected. Common symptoms are joint pain and stiffness.  Pain and stiffness may get worse with inactivi pounds. Losing one single pound takes multiple pounds of pressure off the joints. Losing 10 pounds can take 50 pounds of pressure off the joints. The tips below may help:  · Start a weight-loss program with the help of your healthcare provider.   · Ask your exercise part of your life. Talk with your healthcare provider about what is safe for you. Make sure you:  · Choose exercises that improve joint motion and make your muscles stronger. Learn how to do exercises correctly and safely.  Consider talking with a Isometric exercises are done by tightening the muscles without moving the joint. This may be a good way to strengthen the muscles around a stiff joint. · Avoid deep flexion or deep squatting (do not bend the knee more than 90 degrees).   · Focus on closed- flexibility activities such as yoga and edgardo chi may improve pain and joint motion.  You might try:  · Yoga, including chair yoga, helps to keep your joints strong and flexible  · Edgardo Chi, an ancient type of exercise with slow, gentle movements  · Water exer your wrists or other joints  · A brace to support a weak knee joint  · Orthotics for toe and foot involvement    Medical and surgical treatments  Discuss medical treatments with your healthcare provider to help reduce your pain and improve joint mobility. The cartilage is smoothed. Any pieces of cartilage that have broken off are removed. · Total joint replacement. The entire joint is taken out and replaced with a manmade joint using metal, ceramic, or plastic.  This is most often done with the knee or hip

## 2021-04-16 RX ORDER — ISOSORBIDE MONONITRATE 120 MG/1
TABLET, EXTENDED RELEASE ORAL
Qty: 90 TABLET | Refills: 0 | Status: SHIPPED | OUTPATIENT
Start: 2021-04-16 | End: 2021-07-22

## 2021-04-16 RX ORDER — RANOLAZINE 500 MG/1
TABLET, EXTENDED RELEASE ORAL
Qty: 90 TABLET | Refills: 0 | Status: SHIPPED | OUTPATIENT
Start: 2021-04-16 | End: 2021-06-17

## 2021-04-16 RX ORDER — LISINOPRIL AND HYDROCHLOROTHIAZIDE 25; 20 MG/1; MG/1
TABLET ORAL
Qty: 90 TABLET | Refills: 0 | Status: SHIPPED | OUTPATIENT
Start: 2021-04-16 | End: 2021-07-22

## 2021-04-16 RX ORDER — OMEPRAZOLE 40 MG/1
40 CAPSULE, DELAYED RELEASE ORAL DAILY
Qty: 52 CAPSULE | Refills: 0 | Status: SHIPPED | OUTPATIENT
Start: 2021-04-16 | End: 2021-07-22

## 2021-04-16 NOTE — TELEPHONE ENCOUNTER
Name from pharmacy: METOPROLOL TARTRATE 25 MG TAB          Will file in chart as: METOPROLOL TARTRATE 25 MG Oral Tab    Sig: TAKE 1 TABLET BY MOUTH TWICE A DAY    Disp:  180 tablet    Refills:  0 (Pharmacy requested: Not specified)    Start: 4/15/2021    C EVERY DAY    Disp:  90 tablet    Refills:  0 (Pharmacy requested: Not specified)    Start: 4/15/2021    Class: Normal    Non-formulary    Last ordered: 2 months ago by Domenic Degroot MD Last refill: 1/21/2021    Rx #: [id number]    Hypertension Medications Pro

## 2021-04-19 ENCOUNTER — OFFICE VISIT (OUTPATIENT)
Dept: PHYSICAL THERAPY | Age: 71
End: 2021-04-19
Attending: NURSE PRACTITIONER
Payer: MEDICARE

## 2021-04-19 DIAGNOSIS — R42 VERTIGO: ICD-10-CM

## 2021-04-19 PROCEDURE — 97163 PT EVAL HIGH COMPLEX 45 MIN: CPT

## 2021-04-19 PROCEDURE — 95992 CANALITH REPOSITIONING PROC: CPT

## 2021-04-19 NOTE — PROGRESS NOTES
VESTIBULAR EVALUATION:   Referring Physician: Dr. Basilio Yoo  Diagnosis: Vertigo (R42)        Date of Service: 4/19/2021     PATIENT SUMMARY   Maureen Alves \"Elif\" is a 80year old female who presents to therapy today with reports of dizziness that Abnormal maternal glucose tolerance, complicating pregnancy, childbirth, or the puerperium, unspecified as to episode of care, Acute bronchitis (04/11/2011), Back problem, Cataract, Chest pain, unspecified (12/05), [facility] Diabetes ([facility]. Nystagmus: room light: (-) ;  fixation blocked: N/A  Smooth Pursuit: Negative  Saccades: Negative  Gaze Evoked Nystagmus:  room light: Negative; fixation blocked: Not Tested  Head Thrust: R  Negative.   L  Negative    Positional Testing:   Dmitry-Hallpike: R T balance and maintaining a straight line. (I) stand eyes closed with narrow base of support and head movement for shower safety. Able to tolerate grocery shopping/errands 30 mins with dizziness complaints under 2/10.    Pt to report ability to turn over i

## 2021-04-20 ENCOUNTER — LAB ENCOUNTER (OUTPATIENT)
Dept: LAB | Age: 71
End: 2021-04-20
Attending: FAMILY MEDICINE
Payer: MEDICARE

## 2021-04-20 DIAGNOSIS — E11.9 TYPE 2 DIABETES MELLITUS WITHOUT COMPLICATION, WITHOUT LONG-TERM CURRENT USE OF INSULIN (HCC): ICD-10-CM

## 2021-04-20 DIAGNOSIS — I10 ESSENTIAL HYPERTENSION: ICD-10-CM

## 2021-04-20 DIAGNOSIS — E78.00 PURE HYPERCHOLESTEROLEMIA: ICD-10-CM

## 2021-04-20 PROCEDURE — 82570 ASSAY OF URINE CREATININE: CPT

## 2021-04-20 PROCEDURE — 36415 COLL VENOUS BLD VENIPUNCTURE: CPT

## 2021-04-20 PROCEDURE — 80061 LIPID PANEL: CPT

## 2021-04-20 PROCEDURE — 85025 COMPLETE CBC W/AUTO DIFF WBC: CPT

## 2021-04-20 PROCEDURE — 83036 HEMOGLOBIN GLYCOSYLATED A1C: CPT

## 2021-04-20 PROCEDURE — 82043 UR ALBUMIN QUANTITATIVE: CPT

## 2021-04-20 PROCEDURE — 80053 COMPREHEN METABOLIC PANEL: CPT

## 2021-04-21 NOTE — H&P
[facility]  Interventional Neuroradiology Clinic Note        Neurology  [facility]    Ishmael Whitmore MD  Primary Care      Date of Service: 9/18/2019    Dear Ileana Dow,     We had the pleasure of seeing Abigail Camacho in the Int weakness/paresthesias, hearing loss, vertigo, tinnitus, hoarseness, dysphagia, or alternations in taste. The patient denies upper/lower extremity weakness or paresthesias.   The patient denies dizziness, imbalance, falls, difficulty with ambulation, coordi Spinal Steroid Injection    • SHOULDER SURG PROC UNLISTED     • SPINE SURGERY PROCEDURE UNLISTED      bulging discs in lower back   • TRANSFORAMINAL LUMBAR EPIDURAL STEROID INJECTION SINGLE LEVEL Left 8/12/2019    Performed by Gary Rockwell MD at [facility] ENDOSCO CAPSULE(40 MG) BY MOUTH DAILY, Disp: 90 capsule, Rfl: 3  •  LISINOPRIL-HYDROCHLOROTHIAZIDE 20-25 MG Oral Tab, TAKE 1 TABLET BY MOUTH EVERY DAY, Disp: 90 tablet, Rfl: 0  •  metoprolol Tartrate 25 MG Oral Tab, Take by mouth.  50 mg in the AM and 25 mg at nigh rate and rhythm. SKIN: Warm, dry, no rashes    NEUROLOGICAL:  This patient is alert and orientated x 3. Speech fluent. Comprehension intact. Pupils equally round and reactive to light. 3+ brisk bilaterally. EOMs intact. Visual fields are full.   Face i rupture risk if > 12 mm is size based on the ISUIA data.  Large cavernous aneurysms may present with mass effect/cranial neuropathy, thromboembolic complications/stroke, sphenoid erosion with life threatening epistaxis, rupture into the cavernous sinus resu Dental

## 2021-04-28 ENCOUNTER — OFFICE VISIT (OUTPATIENT)
Dept: PHYSICAL THERAPY | Age: 71
End: 2021-04-28
Attending: NURSE PRACTITIONER
Payer: MEDICARE

## 2021-04-28 PROCEDURE — 97112 NEUROMUSCULAR REEDUCATION: CPT

## 2021-04-28 NOTE — PROGRESS NOTES
Dx: Vertigo (R42)            Insurance (Authorized # of Visits):  6           Authorizing Physician: Dr. Preet Bourgeois Next MD visit: none scheduled  Fall Risk: standard         Precautions: PMhx of vasospasms and hx stroke, aneurysm, HTN, DM rehab and BPPV process education, answering pt questions. L Epley x1 no nystagmus  Corner balance/HEP instructed  -semi tandem --> tandem progression  -add head turns 10x  -EO/EC trials.                              HEP: corner balance with support as ne

## 2021-04-29 DIAGNOSIS — Z86.73 HISTORY OF STROKE WITHIN LAST YEAR: ICD-10-CM

## 2021-04-29 RX ORDER — ATORVASTATIN CALCIUM 40 MG/1
TABLET, FILM COATED ORAL
Qty: 90 TABLET | Refills: 1 | Status: SHIPPED | OUTPATIENT
Start: 2021-04-29 | End: 2022-01-18

## 2021-04-29 NOTE — TELEPHONE ENCOUNTER
Medication(s) to Refill:   Requested Prescriptions     Pending Prescriptions Disp Refills   • ATORVASTATIN 40 MG Oral Tab [Pharmacy Med Name: ATORVASTATIN 40 MG TABLET] 90 tablet 1     Sig: TAKE 1 TABLET BY MOUTH EVERY DAY IN THE EVENING       LOV: 3/12/21 5/19/2021  1:30 PM  [facility] PT NEURO TX [[id number]] 45 min [employer] in [address]    Patient Instructions:         Location Instructions: Your appointment will be at the the [facility] located at [address] [place] [address].

## 2021-05-03 ENCOUNTER — TELEPHONE (OUTPATIENT)
Dept: PHYSICAL THERAPY | Age: 71
End: 2021-05-03

## 2021-05-03 ENCOUNTER — TELEPHONE (OUTPATIENT)
Dept: CARDIOLOGY | Age: 71
End: 2021-05-03

## 2021-05-04 NOTE — TELEPHONE ENCOUNTER
Pt called stating she woke up this morning and the dizziness returned. Was feeling fine yesterday. Took the meclizine without relief. She plans to discuss with cardiologist at her appt Wed. Hx of vertigo. No new or worsening symptoms.  States she is f

## 2021-05-05 ENCOUNTER — TELEPHONE (OUTPATIENT)
Dept: CARDIOLOGY | Age: 71
End: 2021-05-05

## 2021-05-05 ENCOUNTER — OFFICE VISIT (OUTPATIENT)
Dept: PHYSICAL THERAPY | Age: 71
End: 2021-05-05
Attending: NURSE PRACTITIONER
Payer: MEDICARE

## 2021-05-05 ENCOUNTER — OFFICE VISIT (OUTPATIENT)
Dept: CARDIOLOGY | Age: 71
End: 2021-05-05

## 2021-05-05 VITALS
HEIGHT: 63 IN | BODY MASS INDEX: 26.75 KG/M2 | WEIGHT: 151 LBS | SYSTOLIC BLOOD PRESSURE: 140 MMHG | HEART RATE: 64 BPM | DIASTOLIC BLOOD PRESSURE: 64 MMHG

## 2021-05-05 DIAGNOSIS — R42 DIZZINESS: ICD-10-CM

## 2021-05-05 DIAGNOSIS — I20.89 SYNDROME X (CARDIAC): Chronic | ICD-10-CM

## 2021-05-05 DIAGNOSIS — E78.00 PURE HYPERCHOLESTEROLEMIA: Primary | ICD-10-CM

## 2021-05-05 DIAGNOSIS — I10 HYPERTENSION, BENIGN: ICD-10-CM

## 2021-05-05 DIAGNOSIS — E11.9 TYPE 2 DIABETES MELLITUS WITHOUT COMPLICATION, WITHOUT LONG-TERM CURRENT USE OF INSULIN (CMD): ICD-10-CM

## 2021-05-05 PROCEDURE — 97110 THERAPEUTIC EXERCISES: CPT

## 2021-05-05 PROCEDURE — 99215 OFFICE O/P EST HI 40 MIN: CPT | Performed by: INTERNAL MEDICINE

## 2021-05-05 ASSESSMENT — ENCOUNTER SYMPTOMS
WEIGHT GAIN: 0
ALLERGIC/IMMUNOLOGIC COMMENTS: NO NEW FOOD ALLERGIES
WEIGHT LOSS: 0
HEMOPTYSIS: 0
SUSPICIOUS LESIONS: 0
FEVER: 0
HEMATOCHEZIA: 0
COUGH: 0
CHILLS: 0
BRUISES/BLEEDS EASILY: 0
DIZZINESS: 1

## 2021-05-05 ASSESSMENT — PATIENT HEALTH QUESTIONNAIRE - PHQ9
CLINICAL INTERPRETATION OF PHQ2 SCORE: NO FURTHER SCREENING NEEDED
SUM OF ALL RESPONSES TO PHQ9 QUESTIONS 1 AND 2: 0
2. FEELING DOWN, DEPRESSED OR HOPELESS: NOT AT ALL
1. LITTLE INTEREST OR PLEASURE IN DOING THINGS: NOT AT ALL
SUM OF ALL RESPONSES TO PHQ9 QUESTIONS 1 AND 2: 0
CLINICAL INTERPRETATION OF PHQ9 SCORE: NO FURTHER SCREENING NEEDED

## 2021-05-05 NOTE — PROGRESS NOTES
Dx: Vertigo (R42)            Insurance (Authorized # of Visits):  6           Authorizing Physician: Dr. Preet Bourgeois Next MD visit: none scheduled  Fall Risk: standard         Precautions: PMhx of vasospasms and hx stroke, aneurysm, HTN, DM balance. Ambulate 100 with head turns without loss of balance and maintaining a straight line. (I) stand eyes closed with narrow base of support and head movement for shower safety.   Able to tolerate grocery shopping/errands 30 mins with dizziness compl

## 2021-05-07 ENCOUNTER — HOSPITAL ENCOUNTER (OUTPATIENT)
Dept: CARDIOLOGY CLINIC | Facility: HOSPITAL | Age: 71
Discharge: HOME | End: 2021-05-07
Attending: INTERNAL MEDICINE
Payer: MEDICARE

## 2021-05-07 ENCOUNTER — TELEPHONE (OUTPATIENT)
Dept: PHYSICAL THERAPY | Age: 71
End: 2021-05-07

## 2021-05-07 DIAGNOSIS — E11.9 TYPE 2 DIABETES MELLITUS WITHOUT COMPLICATION, WITHOUT LONG-TERM CURRENT USE OF INSULIN (HCC): ICD-10-CM

## 2021-05-07 DIAGNOSIS — I10 BENIGN HYPERTENSION: ICD-10-CM

## 2021-05-07 DIAGNOSIS — I20.8 SYNDROME X (CARDIAC) (HCC): ICD-10-CM

## 2021-05-07 DIAGNOSIS — R42 DIZZINESS: ICD-10-CM

## 2021-05-07 DIAGNOSIS — E78.00 PURE HYPERCHOLESTEROLEMIA: ICD-10-CM

## 2021-05-07 PROCEDURE — 93880 EXTRACRANIAL BILAT STUDY: CPT | Performed by: INTERNAL MEDICINE

## 2021-05-07 NOTE — TELEPHONE ENCOUNTER
After discussion with PCP called pt to provide ENT referral Dr. Oneyda Romo, Dr Marie Mcclure, [facility] for further assessment of nystagmus and episode of hearing loss/ringing. Pt also states her vertigo is getting better.

## 2021-05-12 ENCOUNTER — APPOINTMENT (OUTPATIENT)
Dept: PHYSICAL THERAPY | Age: 71
End: 2021-05-12
Attending: NURSE PRACTITIONER
Payer: MEDICARE

## 2021-05-19 ENCOUNTER — OFFICE VISIT (OUTPATIENT)
Dept: PHYSICAL THERAPY | Age: 71
End: 2021-05-19
Attending: NURSE PRACTITIONER
Payer: MEDICARE

## 2021-05-19 PROCEDURE — 97112 NEUROMUSCULAR REEDUCATION: CPT

## 2021-05-19 PROCEDURE — 97110 THERAPEUTIC EXERCISES: CPT

## 2021-05-19 NOTE — PROGRESS NOTES
Dx: Vertigo (R42)            Insurance (Authorized # of Visits):  6           Authorizing Physician: Dr. Juan A Segundo Next MD visit: none scheduled  Fall Risk: standard         Precautions: PMhx of vasospasms and hx stroke, aneurysm, HTN, DM        Prog discuss dizziness, hoarseness, ear popping and symptoms. Expect continued progress with HEP. Pt to call if symptoms return or fail to improve. Otherwise will plan discharge to HEP.       Patient/Family/Caregiver was advised of these findings, precaution

## 2021-05-26 ENCOUNTER — APPOINTMENT (OUTPATIENT)
Dept: PHYSICAL THERAPY | Age: 71
End: 2021-05-26
Attending: NURSE PRACTITIONER
Payer: MEDICARE

## 2021-06-02 ENCOUNTER — APPOINTMENT (OUTPATIENT)
Dept: PHYSICAL THERAPY | Age: 71
End: 2021-06-02
Attending: NURSE PRACTITIONER
Payer: MEDICARE

## 2021-06-09 ENCOUNTER — APPOINTMENT (OUTPATIENT)
Dept: PHYSICAL THERAPY | Age: 71
End: 2021-06-09
Attending: NURSE PRACTITIONER
Payer: MEDICARE

## 2021-06-10 ENCOUNTER — ORDER TRANSCRIPTION (OUTPATIENT)
Dept: ADMINISTRATIVE | Facility: HOSPITAL | Age: 71
End: 2021-06-10

## 2021-06-10 ENCOUNTER — TELEPHONE (OUTPATIENT)
Dept: CARDIOLOGY | Age: 71
End: 2021-06-10

## 2021-06-10 DIAGNOSIS — R00.2 PALPITATIONS: ICD-10-CM

## 2021-06-10 DIAGNOSIS — Z11.59 ENCOUNTER FOR SCREENING FOR OTHER VIRAL DISEASES: ICD-10-CM

## 2021-06-10 DIAGNOSIS — I10 HYPERTENSION, BENIGN: ICD-10-CM

## 2021-06-10 DIAGNOSIS — E78.00 PURE HYPERCHOLESTEROLEMIA: Primary | ICD-10-CM

## 2021-06-10 DIAGNOSIS — E11.9 TYPE 2 DIABETES MELLITUS WITHOUT COMPLICATION, WITHOUT LONG-TERM CURRENT USE OF INSULIN (CMD): ICD-10-CM

## 2021-06-10 DIAGNOSIS — Z01.818 PREPROCEDURAL EXAMINATION: Primary | ICD-10-CM

## 2021-06-16 ENCOUNTER — APPOINTMENT (OUTPATIENT)
Dept: PHYSICAL THERAPY | Age: 71
End: 2021-06-16
Attending: NURSE PRACTITIONER
Payer: MEDICARE

## 2021-06-17 ENCOUNTER — TELEPHONE (OUTPATIENT)
Dept: INTERNAL MEDICINE CLINIC | Facility: CLINIC | Age: 71
End: 2021-06-17

## 2021-06-17 RX ORDER — RANOLAZINE 500 MG/1
TABLET, EXTENDED RELEASE ORAL
Qty: 90 TABLET | Refills: 0 | Status: SHIPPED | OUTPATIENT
Start: 2021-06-17 | End: 2021-12-08

## 2021-06-17 NOTE — TELEPHONE ENCOUNTER
Name from pharmacy: RANOLAZINE  MG TABLET          Will file in chart as: RANOLAZINE  MG Oral Tablet 12 Hr    Sig: TAKE 1 TABLET BY MOUTH EVERY DAY    Disp:  90 tablet    Refills:  0 (Pharmacy requested: Not specified)    Start: 6/17/2021    Cl

## 2021-06-17 NOTE — TELEPHONE ENCOUNTER
Patient called and stated that she has had her covid vaccine and now magnets and cellphones stick to her arm where she received her vaccine. Patient is worried there is metal in her arm or some sort of magnet from the vaccine.  Please advise patient of appr

## 2021-06-17 NOTE — TELEPHONE ENCOUNTER
Spoke with patient,    Completed both covid vaccines in February 2021 to the left arm. Patient states she was on social media and watched videos exhibiting magnets and cell phones being able to stick to the arm that one receives the covid vaccine in.

## 2021-06-23 ENCOUNTER — APPOINTMENT (OUTPATIENT)
Dept: PHYSICAL THERAPY | Age: 71
End: 2021-06-23
Attending: NURSE PRACTITIONER
Payer: MEDICARE

## 2021-07-01 RX ORDER — LANCETS
1 EACH MISCELLANEOUS DAILY
Qty: 100 EACH | Refills: 3 | Status: SHIPPED | OUTPATIENT
Start: 2021-07-01

## 2021-07-01 RX ORDER — BLOOD SUGAR DIAGNOSTIC
STRIP MISCELLANEOUS
Qty: 100 STRIP | Refills: 0 | Status: SHIPPED | OUTPATIENT
Start: 2021-07-01 | End: 2021-10-04

## 2021-07-01 NOTE — TELEPHONE ENCOUNTER
Patient requesting a refill. Patient stated these prescriptions need to be sent over as a completely new prescription or else medicare will not cover them. Please advise if this is not correct.      CONTOUR NEXT TEST In Vitro Strip    MICROLET LANCETS Does

## 2021-07-13 DIAGNOSIS — Z86.73 HISTORY OF STROKE WITHOUT RESIDUAL DEFICITS: ICD-10-CM

## 2021-07-13 RX ORDER — CLOPIDOGREL BISULFATE 75 MG/1
TABLET ORAL
Qty: 90 TABLET | Refills: 0 | Status: SHIPPED | OUTPATIENT
Start: 2021-07-13

## 2021-07-15 ENCOUNTER — OFFICE VISIT (OUTPATIENT)
Dept: INTERNAL MEDICINE CLINIC | Facility: CLINIC | Age: 71
End: 2021-07-15
Payer: MEDICARE

## 2021-07-15 VITALS
TEMPERATURE: 99 F | HEIGHT: 62.75 IN | RESPIRATION RATE: 12 BRPM | BODY MASS INDEX: 26.82 KG/M2 | DIASTOLIC BLOOD PRESSURE: 56 MMHG | HEART RATE: 62 BPM | OXYGEN SATURATION: 99 % | SYSTOLIC BLOOD PRESSURE: 116 MMHG | WEIGHT: 149.5 LBS

## 2021-07-15 DIAGNOSIS — K21.00 GASTROESOPHAGEAL REFLUX DISEASE WITH ESOPHAGITIS WITHOUT HEMORRHAGE: ICD-10-CM

## 2021-07-15 DIAGNOSIS — E11.9 TYPE 2 DIABETES MELLITUS WITHOUT COMPLICATION, WITHOUT LONG-TERM CURRENT USE OF INSULIN (HCC): ICD-10-CM

## 2021-07-15 DIAGNOSIS — Z12.31 VISIT FOR SCREENING MAMMOGRAM: ICD-10-CM

## 2021-07-15 DIAGNOSIS — I10 ESSENTIAL HYPERTENSION: ICD-10-CM

## 2021-07-15 DIAGNOSIS — E78.00 PURE HYPERCHOLESTEROLEMIA: ICD-10-CM

## 2021-07-15 DIAGNOSIS — M54.16 LUMBAR RADICULOPATHY: ICD-10-CM

## 2021-07-15 DIAGNOSIS — Z00.00 ENCOUNTER FOR ANNUAL HEALTH EXAMINATION: ICD-10-CM

## 2021-07-15 DIAGNOSIS — Z86.73 H/O: CVA (CEREBROVASCULAR ACCIDENT): ICD-10-CM

## 2021-07-15 DIAGNOSIS — M77.8 ENTHESOPATHY OF WRIST AND CARPUS: ICD-10-CM

## 2021-07-15 DIAGNOSIS — I70.0 ATHEROSCLEROSIS OF AORTA (HCC): Primary | ICD-10-CM

## 2021-07-15 DIAGNOSIS — I20.1 PRINZMETAL ANGINA (HCC): ICD-10-CM

## 2021-07-15 DIAGNOSIS — I65.23 BILATERAL CAROTID ARTERY STENOSIS: ICD-10-CM

## 2021-07-15 DIAGNOSIS — R80.9 PROTEINURIA, UNSPECIFIED TYPE: ICD-10-CM

## 2021-07-15 DIAGNOSIS — K29.70 GASTRITIS AND GASTRODUODENITIS: ICD-10-CM

## 2021-07-15 DIAGNOSIS — K29.90 GASTRITIS AND GASTRODUODENITIS: ICD-10-CM

## 2021-07-15 DIAGNOSIS — IMO0002 OSTEOARTHROSIS INVOLVING LOWER LEG: ICD-10-CM

## 2021-07-15 DIAGNOSIS — G47.33 OBSTRUCTIVE SLEEP APNEA (ADULT) (PEDIATRIC): ICD-10-CM

## 2021-07-15 PROCEDURE — G0439 PPPS, SUBSEQ VISIT: HCPCS | Performed by: FAMILY MEDICINE

## 2021-07-15 PROCEDURE — 99214 OFFICE O/P EST MOD 30 MIN: CPT | Performed by: FAMILY MEDICINE

## 2021-07-15 RX ORDER — NYSTATIN 100000 U/G
CREAM TOPICAL
COMMUNITY
Start: 2021-04-06

## 2021-07-15 RX ORDER — CHLORAL HYDRATE 500 MG
1000 CAPSULE ORAL DAILY
COMMUNITY

## 2021-07-15 RX ORDER — NITROGLYCERIN 0.4 MG/1
0.4 TABLET SUBLINGUAL EVERY 5 MIN PRN
Qty: 30 TABLET | Refills: 5 | Status: SHIPPED | OUTPATIENT
Start: 2021-07-15

## 2021-07-15 NOTE — PATIENT INSTRUCTIONS
Deb Fagan's SCREENING SCHEDULE   Tests on this list are recommended by your physician but may not be covered, or covered at this frequency, by your insurer. Please check with your insurance carrier before scheduling to verify coverage.    PREVEN 05/09/2018      No recommendations at this time   Pap and Pelvic    Pap   Covered every 2 years for women at normal risk;  Annually if at high risk -  No recommendations at this time    Chlamydia Annually if high risk -  No recommendations at this time   Sc Lab Results   Component Value Date    CREATSERUM 0.83 04/20/2021         BUN Annually Lab Results   Component Value Date    BUN 20 (H) 04/20/2021       Drug Serum Conc Annually No results found for: DIGOXIN, DIG, VALP              Recommended Websites for

## 2021-07-15 NOTE — PROGRESS NOTES
HPI:   Debbie Stoner is a 80year old female who presents for a Medicare Subsequent Annual Wellness visit (Pt already had Initial Annual Wellness).       Annual Physical due on 07/15/2022        Fall/Risk Assessment   She has been screened for Falls and CVA (cerebrovascular accident)     Atherosclerosis of aorta (HCC)     Enthesopathy of wrist and carpus     Lumbar radiculopathy    Wt Readings from Last 3 Encounters:  07/15/21 : 149 lb 8 oz (67.8 kg)  03/22/21 : 151 lb 9.6 oz (68.8 kg)  03/12/21 : 151 lb DAY  LISINOPRIL-HYDROCHLOROTHIAZIDE 20-25 MG Oral Tab, TAKE 1 TABLET BY MOUTH EVERY DAY  NIFEDIPINE ER OSMOTIC RELEASE 60 MG Oral Tablet 24 Hr, TAKE 1 TABLET BY MOUTH EVERY DAY  metFORMIN HCl 500 MG Oral Tab, Take 1 tablet (500 mg total) by mouth 2 (two) t Breast Cancer in her paternal grandmother; Cancer in her father, paternal grandmother, and another family member; Diabetes in her father. SOCIAL HISTORY:   She  reports that she has never smoked.  She has never used smokeless tobacco. She reports current normal, no murmur, rub, or gallop   Abdomen:   Soft, non-tender, bowel sounds active all four quadrants,  no masses, no organomegaly   Pelvic: Deferred   Extremities: no edema   Pulses: 2+ and symmetric   Skin: no rashes    Lymph nodes: Cervical, supraclav Essential hypertension  Plan: stable BP   CPM    (Z86.73) H/O: CVA (cerebrovascular accident)  Plan: no acute issues at present       (E78.00) Pure hypercholesterolemia  Plan: discussed labs       (I20.1) Prinzmetal angina ([facility] [address]  Plan: on meds        (G47. Please check with your insurance carrier before scheduling to verify coverage.    PREVENTATIVE SERVICES FREQUENCY &  COVERAGE DETAILS LAST COMPLETION DATE   Diabetes Screening    Fasting Blood Sugar /  Glucose    One screening every 12 months if never reggie at this time    Chlamydia Annually if high risk -  No recommendations at this time   Screening Mammogram    Mammogram     Recommend annually for all female patients aged 36 and older    One baseline mammogram covered for patients aged 32-38 07/27/2020    M Annually No results found for: DIGOXIN, DIG, VALP

## 2021-07-16 ENCOUNTER — LAB ENCOUNTER (OUTPATIENT)
Dept: LAB | Age: 71
End: 2021-07-16
Attending: INTERNAL MEDICINE
Payer: MEDICARE

## 2021-07-16 DIAGNOSIS — Z01.818 PREPROCEDURAL EXAMINATION: ICD-10-CM

## 2021-07-16 DIAGNOSIS — Z11.59 ENCOUNTER FOR SCREENING FOR OTHER VIRAL DISEASES: ICD-10-CM

## 2021-07-17 LAB — SARS-COV-2 RNA RESP QL NAA+PROBE: NOT DETECTED

## 2021-07-19 ENCOUNTER — HOSPITAL ENCOUNTER (OUTPATIENT)
Dept: CV DIAGNOSTICS | Facility: HOSPITAL | Age: 71
Discharge: HOME | End: 2021-07-19
Attending: INTERNAL MEDICINE
Payer: MEDICARE

## 2021-07-19 DIAGNOSIS — E11.9 DM TYPE 2 (DIABETES MELLITUS, TYPE 2) (HCC): ICD-10-CM

## 2021-07-19 DIAGNOSIS — R00.2 PALPITATIONS: ICD-10-CM

## 2021-07-19 DIAGNOSIS — I10 HYPERTENSION: ICD-10-CM

## 2021-07-19 DIAGNOSIS — E78.00 PURE HYPERCHOLESTEROLEMIA: ICD-10-CM

## 2021-07-19 PROCEDURE — 93018 CV STRESS TEST I&R ONLY: CPT | Performed by: INTERNAL MEDICINE

## 2021-07-19 PROCEDURE — 93017 CV STRESS TEST TRACING ONLY: CPT | Performed by: INTERNAL MEDICINE

## 2021-07-19 PROCEDURE — 93350 STRESS TTE ONLY: CPT | Performed by: INTERNAL MEDICINE

## 2021-07-22 DIAGNOSIS — I10 ESSENTIAL HYPERTENSION: Primary | ICD-10-CM

## 2021-07-22 RX ORDER — OMEPRAZOLE 40 MG/1
40 CAPSULE, DELAYED RELEASE ORAL DAILY
Qty: 52 CAPSULE | Refills: 0 | Status: SHIPPED | OUTPATIENT
Start: 2021-07-22 | End: 2021-11-22

## 2021-07-22 RX ORDER — LISINOPRIL AND HYDROCHLOROTHIAZIDE 25; 20 MG/1; MG/1
TABLET ORAL
Qty: 90 TABLET | Refills: 0 | Status: SHIPPED | OUTPATIENT
Start: 2021-07-22 | End: 2021-10-25

## 2021-07-22 RX ORDER — ISOSORBIDE MONONITRATE 120 MG/1
TABLET, EXTENDED RELEASE ORAL
Qty: 90 TABLET | Refills: 0 | Status: SHIPPED | OUTPATIENT
Start: 2021-07-22 | End: 2021-10-25

## 2021-07-22 NOTE — TELEPHONE ENCOUNTER
Isosorbide  mg  Filled 4-16-21  Qty 90  0 refills  No upcoming appt. LOV 7-15-21    Lisinopril-hydrochlorothiazide 20-25 mg  Filled 4-16-21  Qty 90  0 refills  No upcoming appt.    LOV 7-15-21    Metoprolol 25 mg  Filled 4-16-21  Qty 180  0 refills

## 2021-07-30 ENCOUNTER — OFFICE VISIT (OUTPATIENT)
Dept: INTERNAL MEDICINE CLINIC | Facility: CLINIC | Age: 71
End: 2021-07-30
Payer: MEDICARE

## 2021-07-30 ENCOUNTER — HOSPITAL ENCOUNTER (OUTPATIENT)
Dept: MAMMOGRAPHY | Age: 71
Discharge: HOME | End: 2021-07-30
Attending: FAMILY MEDICINE
Payer: MEDICARE

## 2021-07-30 ENCOUNTER — HOSPITAL ENCOUNTER (OUTPATIENT)
Dept: GENERAL RADIOLOGY | Age: 71
Discharge: HOME | End: 2021-07-30
Attending: FAMILY MEDICINE
Payer: MEDICARE

## 2021-07-30 VITALS
SYSTOLIC BLOOD PRESSURE: 140 MMHG | HEART RATE: 74 BPM | TEMPERATURE: 98 F | RESPIRATION RATE: 16 BRPM | DIASTOLIC BLOOD PRESSURE: 68 MMHG | WEIGHT: 149 LBS | BODY MASS INDEX: 26.74 KG/M2 | HEIGHT: 62.75 IN

## 2021-07-30 DIAGNOSIS — M25.531 RIGHT WRIST PAIN: ICD-10-CM

## 2021-07-30 DIAGNOSIS — M25.531 RIGHT WRIST PAIN: Primary | ICD-10-CM

## 2021-07-30 DIAGNOSIS — Z12.31 VISIT FOR SCREENING MAMMOGRAM: ICD-10-CM

## 2021-07-30 PROCEDURE — 77067 SCR MAMMO BI INCL CAD: CPT | Performed by: FAMILY MEDICINE

## 2021-07-30 PROCEDURE — 77063 BREAST TOMOSYNTHESIS BI: CPT | Performed by: FAMILY MEDICINE

## 2021-07-30 PROCEDURE — 99213 OFFICE O/P EST LOW 20 MIN: CPT | Performed by: FAMILY MEDICINE

## 2021-07-30 PROCEDURE — 73110 X-RAY EXAM OF WRIST: CPT | Performed by: FAMILY MEDICINE

## 2021-07-30 NOTE — PROGRESS NOTES
Jose Manuel Donnelly is a 80year old female.   HPI:   Here for sore R wrist   Was lifting a heavy bag 3 week ago and may have hurt it then   Not sure    Noticed the bone is sticking out which she feels is new in the R wrist area     Also the R thumb snaps w m mouth 2 (two) times daily with meals. 180 tablet 1   • Black Elderberry,Berry-Flower, 575 MG Oral Cap Take by mouth. • cetirizine 10 MG Oral Tab Take 10 mg by mouth daily.      • acetaminophen 500 MG Oral Tab Take 500 mg by mouth every 6 (six) hours as lb (67.6 kg)   BMI 26.60 kg/m²   GENERAL: well developed, well nourished,in no apparent distress  SKIN: subtle bruise left lateral knee   Otherwise ok other location  R wrist: protuberance at ulnar styoid process  Mild disc top palpation   Wrist flex less

## 2021-08-02 ENCOUNTER — TELEPHONE (OUTPATIENT)
Dept: ORTHOPEDICS CLINIC | Facility: CLINIC | Age: 71
End: 2021-08-02

## 2021-08-02 NOTE — TELEPHONE ENCOUNTER
CONCLUSION:     1. Findings are most consistent with a triquetral fracture with adjacent soft tissue swelling, correlate clinically.      Please advise

## 2021-08-02 NOTE — TELEPHONE ENCOUNTER
Brannon Montero MD  [facility] Clinical Pool 9 minutes ago (2:23 PM)     Can see next week sometime        Attempted to call patient to set appt for next week. Pt did not answer, and reached . [facility] for appt with Dr. Ceferino Carlisle.

## 2021-08-02 NOTE — TELEPHONE ENCOUNTER
Pt called back and appt set for next week, per Joelle Bonilla MD. Time/date/location confirmed. Pt notified to rest, ice, and elevate the wrist in the meantime til the appt date. Pt verbalized understanding. No further questions at this time.

## 2021-08-02 NOTE — TELEPHONE ENCOUNTER
Patient called requesting to be seen for rt wrist pain , patient had x-ray done and findings is a triquetral fracture with adjacent soft tissue swelling. X-ray can be viewed in Epic. Please advise when patient can be seen. No future appointments.

## 2021-08-03 ENCOUNTER — TELEPHONE (OUTPATIENT)
Dept: ORTHOPEDICS CLINIC | Facility: CLINIC | Age: 71
End: 2021-08-03

## 2021-08-03 ENCOUNTER — TELEPHONE (OUTPATIENT)
Dept: INTERNAL MEDICINE CLINIC | Facility: CLINIC | Age: 71
End: 2021-08-03

## 2021-08-03 NOTE — TELEPHONE ENCOUNTER
· Pt is calling asking if she is able to be seen sooner than Monday. · She states she is in a lot of pain to the point she doesn't know if she can wait that long. · Please advise.     Future Appointments   Date Time Provider Marisa Hollis   8/9/2021 1

## 2021-08-03 NOTE — TELEPHONE ENCOUNTER
Pt called wanting to know why she was referred to orthopedic doctor Yumiko Salazar, instead of someone with [facility]. Pt stated she has been to [facility] in the past and got surgery with Gabby Betancourt.     Pt said  cannot get her in until Monday of

## 2021-08-04 NOTE — TELEPHONE ENCOUNTER
Spoke with patient notified okay to see Dr. Sarai Bonilla with [facility]. Patient verbalized understanding and agreeable to POC.

## 2021-08-05 ENCOUNTER — TELEPHONE (OUTPATIENT)
Dept: INTERNAL MEDICINE CLINIC | Facility: CLINIC | Age: 71
End: 2021-08-05

## 2021-08-09 ENCOUNTER — OFFICE VISIT (OUTPATIENT)
Dept: ORTHOPEDICS CLINIC | Facility: CLINIC | Age: 71
End: 2021-08-09
Payer: MEDICARE

## 2021-08-09 VITALS — OXYGEN SATURATION: 99 % | HEART RATE: 60 BPM

## 2021-08-09 DIAGNOSIS — M65.311 TRIGGER FINGER OF RIGHT THUMB: Primary | ICD-10-CM

## 2021-08-09 PROCEDURE — 25630 CLTX CARPL FX W/O MNPJ EA B1: CPT | Performed by: ORTHOPAEDIC SURGERY

## 2021-08-09 PROCEDURE — 99214 OFFICE O/P EST MOD 30 MIN: CPT | Performed by: ORTHOPAEDIC SURGERY

## 2021-08-09 PROCEDURE — 20550 NJX 1 TENDON SHEATH/LIGAMENT: CPT | Performed by: ORTHOPAEDIC SURGERY

## 2021-08-09 RX ORDER — NIFEDIPINE 60 MG/1
TABLET, FILM COATED, EXTENDED RELEASE ORAL
Qty: 90 TABLET | Refills: 0 | Status: SHIPPED | OUTPATIENT
Start: 2021-08-09 | End: 2021-11-01

## 2021-08-09 RX ORDER — TRIAMCINOLONE ACETONIDE 40 MG/ML
40 INJECTION, SUSPENSION INTRA-ARTICULAR; INTRAMUSCULAR ONCE
Status: COMPLETED | OUTPATIENT
Start: 2021-08-09 | End: 2021-08-09

## 2021-08-09 RX ADMIN — TRIAMCINOLONE ACETONIDE 40 MG: 40 INJECTION, SUSPENSION INTRA-ARTICULAR; INTRAMUSCULAR at 12:50:00

## 2021-08-09 NOTE — PROCEDURES
Trigger Finger Injection:    Written consent was obtained. Skin was prepped with ChloraPrep. 1 mL of 40 mg of Kenalog and 1 mL of 1% lidocaine was injected into subcutaneous tissue overlying the A1 pulley of right thumb. Patient tolerated the procedure.

## 2021-08-09 NOTE — TELEPHONE ENCOUNTER
Name from pharmacy: NIFEDIPINE ER 60 MG TABLET          Will file in chart as: NIFEDIPINE ER 60 MG Oral Tablet 24 Hr    Sig: TAKE 1 TABLET BY MOUTH EVERY DAY    Disp:  90 tablet    Refills:  1    Start: 8/7/2021    Class: Normal    Non-formulary    Last or

## 2021-08-09 NOTE — H&P
Clinic Note [facility]     Assessment/Plan:  80year old female    1. Right dorsal triquetral fracture–age indeterminant–wrist brace use for the next 2 weeks as much as possible.   2. Right ECU tendinitis–wrist brace, if her symptoms are not signific • Acute bronchitis 04/11/2011   • Back problem    • Cataract    • Chest pain, unspecified 12/05    admit 12/05   • Deep vein thrombosis ([address]     during pregnancy right leg   • Diabetes [facility])    • Diverticulosis of large intestine     unsure if divertiu MOUTH DAILY.  TAKES SUNDAY, MONDAY, WEDNESDAY AND FRIDAY 52 capsule 0   • LISINOPRIL-HYDROCHLOROTHIAZIDE 20-25 MG Oral Tab TAKE 1 TABLET BY MOUTH EVERY DAY 90 tablet 0   • ISOSORBIDE MONONITRATE  MG Oral Tablet 24 Hr TAKE 1 TABLET BY MOUTH EVERY DAY 9 NAUSEA AND VOMITING, DIZZINESS  Vicodin [Hydrocodon*    NAUSEA ONLY, DIZZINESS    Comment:  Zetia [Ezetimibe]       NAUSEA AND VOMITING    Comment:Tabs,Muscle spasm  Family History   Problem Relation Age of Onset   • Cancer Father          at age 11

## 2021-08-31 ENCOUNTER — OFFICE VISIT (OUTPATIENT)
Dept: ORTHOPEDICS CLINIC | Facility: CLINIC | Age: 71
End: 2021-08-31
Payer: MEDICARE

## 2021-08-31 VITALS — HEART RATE: 62 BPM | OXYGEN SATURATION: 100 %

## 2021-08-31 DIAGNOSIS — M77.8 WRIST TENDONITIS: Primary | ICD-10-CM

## 2021-08-31 PROCEDURE — 99024 POSTOP FOLLOW-UP VISIT: CPT | Performed by: ORTHOPAEDIC SURGERY

## 2021-08-31 NOTE — PROGRESS NOTES
Clinic Note [facility]     Assessment/Plan:  80year old female    1. Right dorsal triquetral fracture–age indeterminant–resolved   2. Right ECU tendinitis–improved, continue wrist brace, will defer CSI given improvement in symptoms.  FU in 4 weeks i unspecified 12/05    admit 12/05   • Deep vein thrombosis (HCC)     during pregnancy right leg   • Diabetes ([facility] [address]    • Diverticulosis of large intestine     unsure if divertiulitis or diverticulosis   • Esophageal reflux    • Essential hypertension, benign LISINOPRIL-HYDROCHLOROTHIAZIDE 20-25 MG Oral Tab TAKE 1 TABLET BY MOUTH EVERY DAY 90 tablet 0   • ISOSORBIDE MONONITRATE  MG Oral Tablet 24 Hr TAKE 1 TABLET BY MOUTH EVERY DAY 90 tablet 0   • omega-3 fatty acids 1000 MG Oral Cap Take 1,000 mg by mout DIZZINESS    Comment:  Zetia [Ezetimibe]       NAUSEA AND VOMITING    Comment:Tabs,Muscle spasm  Family History   Problem Relation Age of Onset   • Cancer Father          at age 46 Leukemia   • Diabetes Father    • Breast Cancer Paternal Grandmothe

## 2021-09-28 ENCOUNTER — HOSPITAL ENCOUNTER (OUTPATIENT)
Dept: GENERAL RADIOLOGY | Age: 71
Discharge: HOME | End: 2021-09-28
Attending: FAMILY MEDICINE
Payer: MEDICARE

## 2021-09-28 ENCOUNTER — TELEPHONE (OUTPATIENT)
Dept: ORTHOPEDICS CLINIC | Facility: CLINIC | Age: 71
End: 2021-09-28

## 2021-09-28 ENCOUNTER — TELEMEDICINE (OUTPATIENT)
Dept: INTERNAL MEDICINE CLINIC | Facility: CLINIC | Age: 71
End: 2021-09-28

## 2021-09-28 ENCOUNTER — LAB ENCOUNTER (OUTPATIENT)
Dept: LAB | Age: 71
End: 2021-09-28
Attending: FAMILY MEDICINE
Payer: MEDICARE

## 2021-09-28 DIAGNOSIS — R05.9 COUGH: ICD-10-CM

## 2021-09-28 DIAGNOSIS — J22 ACUTE LOWER RESPIRATORY INFECTION: ICD-10-CM

## 2021-09-28 DIAGNOSIS — Z20.822 SUSPECTED COVID-19 VIRUS INFECTION: Primary | ICD-10-CM

## 2021-09-28 DIAGNOSIS — Z20.822 SUSPECTED COVID-19 VIRUS INFECTION: ICD-10-CM

## 2021-09-28 PROCEDURE — 99213 OFFICE O/P EST LOW 20 MIN: CPT | Performed by: FAMILY MEDICINE

## 2021-09-28 PROCEDURE — 71046 X-RAY EXAM CHEST 2 VIEWS: CPT | Performed by: FAMILY MEDICINE

## 2021-09-28 RX ORDER — AZITHROMYCIN 250 MG/1
TABLET, FILM COATED ORAL
Qty: 6 TABLET | Refills: 0 | Status: SHIPPED | OUTPATIENT
Start: 2021-09-28 | End: 2021-10-03

## 2021-09-28 NOTE — PROGRESS NOTES
Virtual Video Check-In     This visit is conducted using Telemedicine with live, interactive video and audio.     Soumya Bowen, who has verified his/her identification by name and , verbally consents to a Virtual/Telephone Check-In visit on / VOMITING    Comment:Tabs,Muscle spasm    Current Outpatient Medications   Medication Sig Dispense Refill   • NIFEDIPINE ER 60 MG Oral Tablet 24 Hr TAKE 1 TABLET BY MOUTH EVERY DAY 90 tablet 0   • METFORMIN  MG Oral Tab TAKE 1 TABLET BY MOUTH TWICE A (MULTI-VITAMIN) Oral Tab Take 1 Tab by mouth daily.          Past Medical History:   Diagnosis Date   • Abnormal maternal glucose tolerance, complicating pregnancy, childbirth, or the puerperium, unspecified as to episode of care    • Acute bronchitis 04/11 Drug use: No      Family History   Problem Relation Age of Onset   • Cancer Father          at age 46 Leukemia   • Diabetes Father    • Breast Cancer Paternal Grandmother    • Cancer Paternal Grandmother         bone cancer   • Cancer Other emergency room. - SARS-COV-2 BY PCR (YING); Future    2. Cough    - XR CHEST PA + LAT CHEST (CPT=71046); Future    3.  Acute lower respiratory infection  Patient to increase fluids, rest; I also instructed them to use a cool-mist vaporizer and prop the

## 2021-09-28 NOTE — TELEPHONE ENCOUNTER
Patient had an appointment today had to cancel since she is feeling sick. Patient would like to know if she can begin to take the brace off on her rt wrist. Patient states that she is not in pain , feels like she can do okay without the brace.  Please advis

## 2021-09-30 ENCOUNTER — TELEPHONE (OUTPATIENT)
Dept: INTERNAL MEDICINE CLINIC | Facility: CLINIC | Age: 71
End: 2021-09-30

## 2021-09-30 NOTE — TELEPHONE ENCOUNTER
Spoke with pt notified it is possible, but RSV is viral so it is symptomatic treatment, so the way she is being tx would not change, patient advised if symptoms worsen should go to ER. Patient verbalized understanding and agreeable to POC.

## 2021-09-30 NOTE — TELEPHONE ENCOUNTER
Pt called stating she spoke with a nurse regarding her chest x-ray. Pt would like a call back to go over it again because she forgot some of the information that was discussed.

## 2021-09-30 NOTE — TELEPHONE ENCOUNTER
It is possible. But RSV is viral so it is symptomatic treatment. So they way she is being tx would not change. Of course if her symptoms worsen she she go to the ER to be assessed.

## 2021-09-30 NOTE — TELEPHONE ENCOUNTER
Spoke with patient discussed again chest xray test results. Patient stating her grandson and granddaughter tested positive for RSV about 2 weeks ago, can it be possible she has this?

## 2021-10-02 DIAGNOSIS — E11.9 TYPE 2 DIABETES MELLITUS WITHOUT COMPLICATION, WITHOUT LONG-TERM CURRENT USE OF INSULIN (HCC): Primary | ICD-10-CM

## 2021-10-04 RX ORDER — BLOOD SUGAR DIAGNOSTIC
STRIP MISCELLANEOUS
Qty: 100 STRIP | Refills: 0 | Status: SHIPPED | OUTPATIENT
Start: 2021-10-04 | End: 2021-12-30

## 2021-10-04 NOTE — TELEPHONE ENCOUNTER
Name from pharmacy: [address] [place] [address]          Will file in chart as: CONTOUR NEXT TEST In Vitro Strip    Sig: USE TO TEST ONCE DAILY AS DIRECTED *E11.9*    Disp:  100 strip    Refills:  0 (Pharmacy requested: Not specified)    Start: 10/2/2021    Cl

## 2021-10-23 DIAGNOSIS — I10 ESSENTIAL HYPERTENSION: ICD-10-CM

## 2021-10-25 RX ORDER — ISOSORBIDE MONONITRATE 120 MG/1
TABLET, EXTENDED RELEASE ORAL
Qty: 90 TABLET | Refills: 0 | Status: SHIPPED | OUTPATIENT
Start: 2021-10-25 | End: 2022-01-17

## 2021-10-25 RX ORDER — LISINOPRIL AND HYDROCHLOROTHIAZIDE 25; 20 MG/1; MG/1
TABLET ORAL
Qty: 90 TABLET | Refills: 0 | Status: SHIPPED | OUTPATIENT
Start: 2021-10-25 | End: 2022-01-17

## 2021-10-25 NOTE — TELEPHONE ENCOUNTER
Name from pharmacy: LISINOPRIL-HCTZ 20-25 MG TAB          Will file in chart as: LISINOPRIL-HYDROCHLOROTHIAZIDE 20-25 MG Oral Tab    Sig: TAKE 1 TABLET BY MOUTH EVERY DAY    Disp:  90 tablet    Refills:  0 (Pharmacy requested: Not specified)    Start: 10/2

## 2021-11-01 RX ORDER — NIFEDIPINE 60 MG/1
TABLET, FILM COATED, EXTENDED RELEASE ORAL
Qty: 90 TABLET | Refills: 0 | Status: SHIPPED | OUTPATIENT
Start: 2021-11-01 | End: 2022-01-17

## 2021-11-01 NOTE — TELEPHONE ENCOUNTER
Name from pharmacy: NIFEDIPINE ER 60 MG TABLET          Will file in chart as: NIFEDIPINE ER 60 MG Oral Tablet 24 Hr    Sig: TAKE 1 TABLET BY MOUTH EVERY DAY    Disp:  90 tablet    Refills:  0 (Pharmacy requested: Not specified)    Start: 11/1/2021    Allen

## 2021-11-16 ENCOUNTER — OFFICE VISIT (OUTPATIENT)
Dept: ORTHOPEDICS CLINIC | Facility: CLINIC | Age: 71
End: 2021-11-16
Payer: MEDICARE

## 2021-11-16 VITALS — HEART RATE: 60 BPM | OXYGEN SATURATION: 98 %

## 2021-11-16 DIAGNOSIS — M65.80 OTHER SYNOVITIS AND TENOSYNOVITIS, UNSPECIFIED SITE: ICD-10-CM

## 2021-11-16 DIAGNOSIS — S69.81XA TFCC (TRIANGULAR FIBROCARTILAGE COMPLEX) INJURY, RIGHT, INITIAL ENCOUNTER: ICD-10-CM

## 2021-11-16 DIAGNOSIS — M77.8 WRIST TENDONITIS: Primary | ICD-10-CM

## 2021-11-16 PROCEDURE — 20550 NJX 1 TENDON SHEATH/LIGAMENT: CPT | Performed by: ORTHOPAEDIC SURGERY

## 2021-11-16 PROCEDURE — 99213 OFFICE O/P EST LOW 20 MIN: CPT | Performed by: ORTHOPAEDIC SURGERY

## 2021-11-16 PROCEDURE — 20605 DRAIN/INJ JOINT/BURSA W/O US: CPT | Performed by: ORTHOPAEDIC SURGERY

## 2021-11-16 RX ORDER — TRIAMCINOLONE ACETONIDE 40 MG/ML
40 INJECTION, SUSPENSION INTRA-ARTICULAR; INTRAMUSCULAR ONCE
Status: COMPLETED | OUTPATIENT
Start: 2021-11-16 | End: 2021-11-16

## 2021-11-16 RX ADMIN — TRIAMCINOLONE ACETONIDE 40 MG: 40 INJECTION, SUSPENSION INTRA-ARTICULAR; INTRAMUSCULAR at 13:25:00

## 2021-11-16 NOTE — PROCEDURES
Written consent was obtained. Skin was prepped with ChloraPrep. 1mL mixture of 1 mL of 40 mg of Kenalog and 1 mL of 1% lidocaine was injected into the right ECU tendon sheath. Patient tolerated the procedure. No complications were encountered.   Band-Aid

## 2021-11-16 NOTE — PROGRESS NOTES
Clinic Note [facility]     Assessment/Plan:  80year old female    1. Right dorsal triquetral fracture–age indeterminant–resolved   2.  Right ECU tendinitis/snapping – not improved, proceeded with a corticosteroid injection which she tolerated withou the dorsal triquetrum has resolved. However she continues to have ECU tendinitis and possibly a TFCC injury.   There is some popping/locking sensation that she has could be from a TFCC tear versus snapping ECU    Past Medical History:   Diagnosis Date   • Medication Sig Dispense Refill   • NIFEDIPINE ER 60 MG Oral Tablet 24 Hr TAKE 1 TABLET BY MOUTH EVERY DAY 90 tablet 0   • METFORMIN 500 MG Oral Tab TAKE 1 TABLET BY MOUTH TWICE A DAY WITH MEALS 180 tablet 0   • LISINOPRIL-HYDROCHLOROTHIAZIDE 20-25 MG Ora RASH, SWELLING  Advil [Ibuprofen]       NAUSEA AND VOMITING, DIZZINESS  Aspirin                 NAUSEA AND VOMITING, DIZZINESS  Codeine Sulfate         NAUSEA AND VOMITING  Demerol                 NAUSEA ONLY, DIZZINESS  Naproxen

## 2021-11-22 RX ORDER — OMEPRAZOLE 40 MG/1
40 CAPSULE, DELAYED RELEASE ORAL DAILY
Qty: 52 CAPSULE | Refills: 0 | Status: SHIPPED | OUTPATIENT
Start: 2021-11-22 | End: 2022-01-17

## 2021-12-08 RX ORDER — RANOLAZINE 500 MG/1
TABLET, EXTENDED RELEASE ORAL
Qty: 90 TABLET | Refills: 0 | Status: SHIPPED | OUTPATIENT
Start: 2021-12-08 | End: 2022-01-17

## 2021-12-08 NOTE — TELEPHONE ENCOUNTER
Name from pharmacy: RANOLAZINE  MG TABLET          Will file in chart as: RANOLAZINE 500 MG Oral Tablet 12 Hr    Sig: TAKE 1 TABLET BY MOUTH EVERY DAY    Disp:  90 tablet    Refills:  0 (Pharmacy requested: Not specified)    Start: 12/8/2021    Class

## 2021-12-09 NOTE — PROGRESS NOTES
Reviewed incision care, showering and restrictions before beginning discharge education video for patient. Negative

## 2021-12-30 DIAGNOSIS — E11.9 TYPE 2 DIABETES MELLITUS WITHOUT COMPLICATION, WITHOUT LONG-TERM CURRENT USE OF INSULIN (HCC): ICD-10-CM

## 2021-12-30 RX ORDER — BLOOD SUGAR DIAGNOSTIC
STRIP MISCELLANEOUS
Qty: 100 STRIP | Refills: 0 | Status: SHIPPED | OUTPATIENT
Start: 2021-12-30 | End: 2022-01-17

## 2022-01-16 DIAGNOSIS — I10 ESSENTIAL HYPERTENSION: ICD-10-CM

## 2022-01-17 DIAGNOSIS — Z86.73 HISTORY OF STROKE WITHIN LAST YEAR: ICD-10-CM

## 2022-01-17 DIAGNOSIS — E11.9 TYPE 2 DIABETES MELLITUS WITHOUT COMPLICATION, WITHOUT LONG-TERM CURRENT USE OF INSULIN (HCC): ICD-10-CM

## 2022-01-17 RX ORDER — ISOSORBIDE MONONITRATE 120 MG/1
TABLET, EXTENDED RELEASE ORAL
Qty: 90 TABLET | Refills: 0 | Status: SHIPPED | OUTPATIENT
Start: 2022-01-17

## 2022-01-17 RX ORDER — RANOLAZINE 500 MG/1
TABLET, EXTENDED RELEASE ORAL
Qty: 90 TABLET | Refills: 0 | Status: SHIPPED | OUTPATIENT
Start: 2022-01-17

## 2022-01-17 RX ORDER — NIFEDIPINE 60 MG/1
TABLET, FILM COATED, EXTENDED RELEASE ORAL
Qty: 90 TABLET | Refills: 0 | Status: SHIPPED | OUTPATIENT
Start: 2022-01-17

## 2022-01-17 RX ORDER — BLOOD SUGAR DIAGNOSTIC
STRIP MISCELLANEOUS
Qty: 100 STRIP | Refills: 0 | Status: SHIPPED | OUTPATIENT
Start: 2022-01-17

## 2022-01-17 RX ORDER — LISINOPRIL AND HYDROCHLOROTHIAZIDE 25; 20 MG/1; MG/1
TABLET ORAL
Qty: 90 TABLET | Refills: 0 | Status: SHIPPED | OUTPATIENT
Start: 2022-01-17

## 2022-01-17 RX ORDER — OMEPRAZOLE 40 MG/1
40 CAPSULE, DELAYED RELEASE ORAL DAILY
Qty: 52 CAPSULE | Refills: 0 | Status: SHIPPED | OUTPATIENT
Start: 2022-01-17

## 2022-01-17 NOTE — TELEPHONE ENCOUNTER
Name from pharmacy: LISINOPRIL-HCTZ 20-25 MG TAB          Will file in chart as: LISINOPRIL-HYDROCHLOROTHIAZIDE 20-25 MG Oral Tab    Sig: TAKE 1 TABLET BY MOUTH EVERY DAY    Disp:  90 tablet    Refills:  0 (Pharmacy requested: Not specified)    Start: 1/16

## 2022-01-17 NOTE — TELEPHONE ENCOUNTER
Name from pharmacy: [address] [place] [address]          Will file in chart as: CONTOUR NEXT TEST In Vitro Strip    Sig: USE TO TEST ONCE DAILY AS DIRECTED.     Disp:  100 strip    Refills:  0 (Pharmacy requested: Not specified)    Start: 1/17/2022    Class: No month ago by Niurka Albrecht MD Last refill: 12/8/2021    Rx #: [id number]        Name from pharmacy: NIFEDIPINE ER 60 MG TABLET         Will file in chart as: NIFEDIPINE ER 60 MG Oral Tablet 24 Hr    Sig: TAKE 1 TABLET BY MOUTH EVERY DAY    Disp:  90 tablet

## 2022-01-18 RX ORDER — ATORVASTATIN CALCIUM 40 MG/1
TABLET, FILM COATED ORAL
Qty: 90 TABLET | Refills: 0 | Status: SHIPPED | OUTPATIENT
Start: 2022-01-18

## 2022-02-07 RX ORDER — LISINOPRIL AND HYDROCHLOROTHIAZIDE 25; 20 MG/1; MG/1
TABLET ORAL
Qty: 90 TABLET | Refills: 0 | OUTPATIENT
Start: 2022-02-07

## 2022-02-07 RX ORDER — CLOPIDOGREL BISULFATE 75 MG/1
TABLET ORAL
Qty: 90 TABLET | Refills: 0 | OUTPATIENT
Start: 2022-02-07

## 2022-02-07 RX ORDER — OMEPRAZOLE 40 MG/1
40 CAPSULE, DELAYED RELEASE ORAL DAILY
Qty: 52 CAPSULE | Refills: 0 | OUTPATIENT
Start: 2022-02-07

## 2022-02-07 RX ORDER — NIFEDIPINE 60 MG/1
TABLET, FILM COATED, EXTENDED RELEASE ORAL
Qty: 90 TABLET | Refills: 0 | OUTPATIENT
Start: 2022-02-07

## 2022-02-07 RX ORDER — ISOSORBIDE MONONITRATE 120 MG/1
TABLET, EXTENDED RELEASE ORAL
Qty: 90 TABLET | Refills: 0 | OUTPATIENT
Start: 2022-02-07

## 2022-03-01 NOTE — TELEPHONE ENCOUNTER
Medication: Gabapentin    Date of last refill: 8/30/2018 (#60/0)  [place]    Last office visit: 8/30/2018  Due back to clinic per last office note:  RTC in 3 months, due back in Nov 2018    Date ne 170.18

## 2022-03-05 ENCOUNTER — HOSPITAL ENCOUNTER (OUTPATIENT)
Dept: GENERAL RADIOLOGY | Age: 72
Discharge: HOME | End: 2022-03-05
Attending: FAMILY MEDICINE
Payer: MEDICARE

## 2022-03-05 ENCOUNTER — OFFICE VISIT (OUTPATIENT)
Dept: INTERNAL MEDICINE CLINIC | Facility: CLINIC | Age: 72
End: 2022-03-05
Payer: MEDICARE

## 2022-03-05 VITALS
HEART RATE: 76 BPM | WEIGHT: 159 LBS | TEMPERATURE: 98 F | SYSTOLIC BLOOD PRESSURE: 120 MMHG | RESPIRATION RATE: 16 BRPM | BODY MASS INDEX: 27.82 KG/M2 | DIASTOLIC BLOOD PRESSURE: 48 MMHG | HEIGHT: 63.39 IN

## 2022-03-05 DIAGNOSIS — M25.562 ACUTE PAIN OF LEFT KNEE: ICD-10-CM

## 2022-03-05 DIAGNOSIS — M25.572 ACUTE LEFT ANKLE PAIN: Primary | ICD-10-CM

## 2022-03-05 DIAGNOSIS — M25.572 ACUTE LEFT ANKLE PAIN: ICD-10-CM

## 2022-03-05 PROCEDURE — 99213 OFFICE O/P EST LOW 20 MIN: CPT | Performed by: FAMILY MEDICINE

## 2022-03-05 PROCEDURE — 73560 X-RAY EXAM OF KNEE 1 OR 2: CPT | Performed by: FAMILY MEDICINE

## 2022-03-05 PROCEDURE — 73610 X-RAY EXAM OF ANKLE: CPT | Performed by: FAMILY MEDICINE

## 2022-03-14 RX ORDER — NIFEDIPINE 60 MG/1
TABLET, FILM COATED, EXTENDED RELEASE ORAL
Qty: 90 TABLET | Refills: 0 | Status: SHIPPED | OUTPATIENT
Start: 2022-03-14

## 2022-03-14 RX ORDER — CLOPIDOGREL BISULFATE 75 MG/1
TABLET ORAL
Qty: 90 TABLET | Refills: 0 | Status: SHIPPED | OUTPATIENT
Start: 2022-03-14

## 2022-03-14 RX ORDER — ISOSORBIDE MONONITRATE 120 MG/1
TABLET, EXTENDED RELEASE ORAL
Qty: 90 TABLET | Refills: 0 | Status: SHIPPED | OUTPATIENT
Start: 2022-03-14

## 2022-03-14 RX ORDER — ATORVASTATIN CALCIUM 40 MG/1
TABLET, FILM COATED ORAL
Qty: 90 TABLET | Refills: 0 | Status: SHIPPED | OUTPATIENT
Start: 2022-03-14

## 2022-03-14 RX ORDER — LISINOPRIL AND HYDROCHLOROTHIAZIDE 25; 20 MG/1; MG/1
TABLET ORAL
Qty: 90 TABLET | Refills: 0 | Status: SHIPPED | OUTPATIENT
Start: 2022-03-14

## 2022-03-14 RX ORDER — OMEPRAZOLE 40 MG/1
40 CAPSULE, DELAYED RELEASE ORAL DAILY
Qty: 52 CAPSULE | Refills: 0 | Status: SHIPPED | OUTPATIENT
Start: 2022-03-14

## 2022-05-04 ENCOUNTER — APPOINTMENT (OUTPATIENT)
Dept: CARDIOLOGY | Age: 72
End: 2022-05-04

## 2022-05-25 ENCOUNTER — LAB ENCOUNTER (OUTPATIENT)
Dept: LAB | Age: 72
End: 2022-05-25
Attending: INTERNAL MEDICINE
Payer: MEDICARE

## 2022-05-25 DIAGNOSIS — E11.9 TYPE 2 DIABETES MELLITUS WITHOUT COMPLICATION (HCC): ICD-10-CM

## 2022-05-25 DIAGNOSIS — E78.00 PURE HYPERCHOLESTEROLEMIA: Primary | ICD-10-CM

## 2022-05-25 LAB
ALBUMIN SERPL-MCNC: 3.7 G/DL (ref 3.4–5)
ALBUMIN/GLOB SERPL: 1.2 {RATIO} (ref 1–2)
ALP LIVER SERPL-CCNC: 93 U/L
ALT SERPL-CCNC: 28 U/L
ANION GAP SERPL CALC-SCNC: 7 MMOL/L (ref 0–18)
AST SERPL-CCNC: 17 U/L (ref 15–37)
BILIRUB SERPL-MCNC: 0.5 MG/DL (ref 0.1–2)
BUN BLD-MCNC: 21 MG/DL (ref 7–18)
CALCIUM BLD-MCNC: 9.8 MG/DL (ref 8.5–10.1)
CHLORIDE SERPL-SCNC: 105 MMOL/L (ref 98–112)
CHOLEST SERPL-MCNC: 119 MG/DL (ref ?–200)
CO2 SERPL-SCNC: 27 MMOL/L (ref 21–32)
CREAT BLD-MCNC: 0.84 MG/DL
EST. AVERAGE GLUCOSE BLD GHB EST-MCNC: 148 MG/DL (ref 68–126)
FASTING PATIENT LIPID ANSWER: YES
FASTING STATUS PATIENT QL REPORTED: YES
GLOBULIN PLAS-MCNC: 3.2 G/DL (ref 2.8–4.4)
GLUCOSE BLD-MCNC: 121 MG/DL (ref 70–99)
HBA1C MFR BLD: 6.8 % (ref ?–5.7)
HDLC SERPL-MCNC: 54 MG/DL (ref 40–59)
LDLC SERPL CALC-MCNC: 43 MG/DL (ref ?–100)
NONHDLC SERPL-MCNC: 65 MG/DL (ref ?–130)
OSMOLALITY SERPL CALC.SUM OF ELEC: 292 MOSM/KG (ref 275–295)
POTASSIUM SERPL-SCNC: 4 MMOL/L (ref 3.5–5.1)
PROT SERPL-MCNC: 6.9 G/DL (ref 6.4–8.2)
SODIUM SERPL-SCNC: 139 MMOL/L (ref 136–145)
TRIGL SERPL-MCNC: 122 MG/DL (ref 30–149)
VLDLC SERPL CALC-MCNC: 17 MG/DL (ref 0–30)

## 2022-05-25 PROCEDURE — 80061 LIPID PANEL: CPT

## 2022-05-25 PROCEDURE — 83036 HEMOGLOBIN GLYCOSYLATED A1C: CPT

## 2022-05-25 PROCEDURE — 36415 COLL VENOUS BLD VENIPUNCTURE: CPT

## 2022-05-25 PROCEDURE — 80053 COMPREHEN METABOLIC PANEL: CPT

## 2022-06-12 DIAGNOSIS — I10 ESSENTIAL HYPERTENSION: ICD-10-CM

## 2022-06-12 DIAGNOSIS — Z86.73 HISTORY OF STROKE WITHOUT RESIDUAL DEFICITS: ICD-10-CM

## 2022-06-12 DIAGNOSIS — Z86.73 HISTORY OF STROKE WITHIN LAST YEAR: ICD-10-CM

## 2022-06-13 RX ORDER — CLOPIDOGREL BISULFATE 75 MG/1
TABLET ORAL
Qty: 90 TABLET | Refills: 0 | Status: SHIPPED | OUTPATIENT
Start: 2022-06-13

## 2022-06-13 RX ORDER — NIFEDIPINE 60 MG/1
TABLET, FILM COATED, EXTENDED RELEASE ORAL
Qty: 90 TABLET | Refills: 0 | Status: SHIPPED | OUTPATIENT
Start: 2022-06-13

## 2022-06-13 RX ORDER — OMEPRAZOLE 40 MG/1
CAPSULE, DELAYED RELEASE ORAL
Qty: 52 CAPSULE | Refills: 0 | Status: SHIPPED | OUTPATIENT
Start: 2022-06-13

## 2022-06-13 RX ORDER — LISINOPRIL AND HYDROCHLOROTHIAZIDE 25; 20 MG/1; MG/1
TABLET ORAL
Qty: 90 TABLET | Refills: 0 | Status: SHIPPED | OUTPATIENT
Start: 2022-06-13

## 2022-06-13 RX ORDER — RANOLAZINE 500 MG/1
TABLET, EXTENDED RELEASE ORAL
Qty: 90 TABLET | Refills: 0 | Status: SHIPPED | OUTPATIENT
Start: 2022-06-13

## 2022-06-13 RX ORDER — ATORVASTATIN CALCIUM 40 MG/1
TABLET, FILM COATED ORAL
Qty: 90 TABLET | Refills: 0 | Status: SHIPPED | OUTPATIENT
Start: 2022-06-13

## 2022-06-13 RX ORDER — ISOSORBIDE MONONITRATE 120 MG/1
TABLET, EXTENDED RELEASE ORAL
Qty: 90 TABLET | Refills: 0 | Status: SHIPPED | OUTPATIENT
Start: 2022-06-13

## 2022-06-15 ENCOUNTER — OFFICE VISIT (OUTPATIENT)
Dept: INTERNAL MEDICINE CLINIC | Facility: CLINIC | Age: 72
End: 2022-06-15
Payer: MEDICARE

## 2022-06-15 VITALS
TEMPERATURE: 98 F | SYSTOLIC BLOOD PRESSURE: 128 MMHG | WEIGHT: 158 LBS | BODY MASS INDEX: 28 KG/M2 | DIASTOLIC BLOOD PRESSURE: 70 MMHG | HEART RATE: 68 BPM | RESPIRATION RATE: 17 BRPM | HEIGHT: 63 IN | OXYGEN SATURATION: 98 %

## 2022-06-15 DIAGNOSIS — L60.8 DEFORMITY OF TOENAIL: ICD-10-CM

## 2022-06-15 DIAGNOSIS — M54.50 ACUTE LEFT-SIDED LOW BACK PAIN WITHOUT SCIATICA: Primary | ICD-10-CM

## 2022-06-15 PROCEDURE — 99214 OFFICE O/P EST MOD 30 MIN: CPT | Performed by: FAMILY MEDICINE

## 2022-06-15 PROCEDURE — 1125F AMNT PAIN NOTED PAIN PRSNT: CPT | Performed by: FAMILY MEDICINE

## 2022-06-15 RX ORDER — PREDNISONE 10 MG/1
TABLET ORAL
Qty: 20 TABLET | Refills: 0 | Status: SHIPPED | OUTPATIENT
Start: 2022-06-15

## 2022-06-16 RX ORDER — LANCETS
EACH MISCELLANEOUS
Qty: 100 EACH | Refills: 1 | Status: SHIPPED | OUTPATIENT
Start: 2022-06-16

## 2022-06-22 ENCOUNTER — TELEPHONE (OUTPATIENT)
Dept: INTERNAL MEDICINE CLINIC | Facility: CLINIC | Age: 72
End: 2022-06-22

## 2022-06-22 NOTE — TELEPHONE ENCOUNTER
Pt called stating she was prescribed prednisone for her back, leg, and knee pain. Pt states today she is taking her last pill and the prednisone has helped. She feels much better and can walk completely fine after standing up. Pt states she has slight pain at night in the front of her left knee but other than that she feels much better.

## 2022-08-09 ENCOUNTER — OFFICE VISIT (OUTPATIENT)
Dept: PODIATRY CLINIC | Facility: CLINIC | Age: 72
End: 2022-08-09
Payer: MEDICARE

## 2022-08-09 DIAGNOSIS — E11.9 TYPE 2 DIABETES MELLITUS WITHOUT COMPLICATION, WITHOUT LONG-TERM CURRENT USE OF INSULIN (HCC): ICD-10-CM

## 2022-08-09 DIAGNOSIS — M20.41 HAMMER TOES OF BOTH FEET: ICD-10-CM

## 2022-08-09 DIAGNOSIS — B35.1 ONYCHOMYCOSIS: Primary | ICD-10-CM

## 2022-08-09 DIAGNOSIS — M79.675 PAIN OF TOE OF LEFT FOOT: ICD-10-CM

## 2022-08-09 DIAGNOSIS — L60.8 INCURVATED NAIL: ICD-10-CM

## 2022-08-09 DIAGNOSIS — M54.16 LUMBAR RADICULOPATHY: ICD-10-CM

## 2022-08-09 DIAGNOSIS — M20.42 HAMMER TOES OF BOTH FEET: ICD-10-CM

## 2022-08-09 PROCEDURE — 1126F AMNT PAIN NOTED NONE PRSNT: CPT | Performed by: STUDENT IN AN ORGANIZED HEALTH CARE EDUCATION/TRAINING PROGRAM

## 2022-08-09 PROCEDURE — 99203 OFFICE O/P NEW LOW 30 MIN: CPT | Performed by: STUDENT IN AN ORGANIZED HEALTH CARE EDUCATION/TRAINING PROGRAM

## 2022-08-11 NOTE — PATIENT INSTRUCTIONS
-Discussed importance of proper pedal hygiene, regular foot checks, and tight glucose control.  -Patient to avoid walking barefoot. Ambulate with supportive diabetic shoes and inserts.  -Patient to monitor for acute signs of infection and seek immediate medical attention if any signs of infection or other concerns arise. -RTC 2 months for routine nail care or sooner if other concerns arise.

## 2022-08-17 ENCOUNTER — OFFICE VISIT (OUTPATIENT)
Dept: INTERNAL MEDICINE CLINIC | Facility: CLINIC | Age: 72
End: 2022-08-17
Payer: MEDICARE

## 2022-08-17 ENCOUNTER — LAB ENCOUNTER (OUTPATIENT)
Dept: LAB | Age: 72
End: 2022-08-17
Attending: FAMILY MEDICINE
Payer: MEDICARE

## 2022-08-17 VITALS
OXYGEN SATURATION: 99 % | BODY MASS INDEX: 28.42 KG/M2 | HEART RATE: 62 BPM | DIASTOLIC BLOOD PRESSURE: 64 MMHG | HEIGHT: 62.75 IN | SYSTOLIC BLOOD PRESSURE: 130 MMHG | RESPIRATION RATE: 12 BRPM | TEMPERATURE: 98 F | WEIGHT: 158.38 LBS

## 2022-08-17 DIAGNOSIS — K29.70 GASTRITIS AND GASTRODUODENITIS: ICD-10-CM

## 2022-08-17 DIAGNOSIS — E78.00 PURE HYPERCHOLESTEROLEMIA: ICD-10-CM

## 2022-08-17 DIAGNOSIS — M79.605 LEFT LEG PAIN: ICD-10-CM

## 2022-08-17 DIAGNOSIS — M54.16 LUMBAR RADICULOPATHY: ICD-10-CM

## 2022-08-17 DIAGNOSIS — G47.33 OBSTRUCTIVE SLEEP APNEA (ADULT) (PEDIATRIC): ICD-10-CM

## 2022-08-17 DIAGNOSIS — K29.90 GASTRITIS AND GASTRODUODENITIS: ICD-10-CM

## 2022-08-17 DIAGNOSIS — E11.9 TYPE 2 DIABETES MELLITUS WITHOUT COMPLICATION, WITHOUT LONG-TERM CURRENT USE OF INSULIN (HCC): ICD-10-CM

## 2022-08-17 DIAGNOSIS — I70.0 ATHEROSCLEROSIS OF AORTA (HCC): ICD-10-CM

## 2022-08-17 DIAGNOSIS — I10 ESSENTIAL HYPERTENSION: ICD-10-CM

## 2022-08-17 DIAGNOSIS — Z86.73 H/O: CVA (CEREBROVASCULAR ACCIDENT): ICD-10-CM

## 2022-08-17 DIAGNOSIS — Z00.00 ENCOUNTER FOR ANNUAL HEALTH EXAMINATION: ICD-10-CM

## 2022-08-17 DIAGNOSIS — R80.9 PROTEINURIA, UNSPECIFIED TYPE: ICD-10-CM

## 2022-08-17 DIAGNOSIS — K21.00 GASTROESOPHAGEAL REFLUX DISEASE WITH ESOPHAGITIS WITHOUT HEMORRHAGE: ICD-10-CM

## 2022-08-17 DIAGNOSIS — Z12.31 VISIT FOR SCREENING MAMMOGRAM: ICD-10-CM

## 2022-08-17 DIAGNOSIS — I65.23 BILATERAL CAROTID ARTERY STENOSIS: ICD-10-CM

## 2022-08-17 DIAGNOSIS — I20.1 PRINZMETAL ANGINA (HCC): Primary | ICD-10-CM

## 2022-08-17 PROBLEM — M77.8 ENTHESOPATHY OF WRIST AND CARPUS: Status: RESOLVED | Noted: 2018-05-14 | Resolved: 2022-08-17

## 2022-08-17 LAB
BASOPHILS # BLD AUTO: 0.02 X10(3) UL (ref 0–0.2)
BASOPHILS NFR BLD AUTO: 0.4 %
BILIRUB UR QL STRIP.AUTO: NEGATIVE
CLARITY UR REFRACT.AUTO: CLEAR
COLOR UR AUTO: YELLOW
EOSINOPHIL # BLD AUTO: 0.11 X10(3) UL (ref 0–0.7)
EOSINOPHIL NFR BLD AUTO: 2.4 %
ERYTHROCYTE [DISTWIDTH] IN BLOOD BY AUTOMATED COUNT: 13.6 %
GLUCOSE UR STRIP.AUTO-MCNC: NEGATIVE MG/DL
HCT VFR BLD AUTO: 40.5 %
HGB BLD-MCNC: 12.7 G/DL
IMM GRANULOCYTES # BLD AUTO: 0.01 X10(3) UL (ref 0–1)
IMM GRANULOCYTES NFR BLD: 0.2 %
KETONES UR STRIP.AUTO-MCNC: NEGATIVE MG/DL
LEUKOCYTE ESTERASE UR QL STRIP.AUTO: NEGATIVE
LYMPHOCYTES # BLD AUTO: 1.53 X10(3) UL (ref 1–4)
LYMPHOCYTES NFR BLD AUTO: 33.2 %
MCH RBC QN AUTO: 28.6 PG (ref 26–34)
MCHC RBC AUTO-ENTMCNC: 31.4 G/DL (ref 31–37)
MCV RBC AUTO: 91.2 FL
MONOCYTES # BLD AUTO: 0.43 X10(3) UL (ref 0.1–1)
MONOCYTES NFR BLD AUTO: 9.3 %
NEUTROPHILS # BLD AUTO: 2.51 X10 (3) UL (ref 1.5–7.7)
NEUTROPHILS # BLD AUTO: 2.51 X10(3) UL (ref 1.5–7.7)
NEUTROPHILS NFR BLD AUTO: 54.5 %
NITRITE UR QL STRIP.AUTO: NEGATIVE
PH UR STRIP.AUTO: 6 [PH] (ref 5–8)
PLATELET # BLD AUTO: 335 10(3)UL (ref 150–450)
PROT UR STRIP.AUTO-MCNC: NEGATIVE MG/DL
RBC # BLD AUTO: 4.44 X10(6)UL
RBC UR QL AUTO: NEGATIVE
SP GR UR STRIP.AUTO: 1.01 (ref 1–1.03)
TSI SER-ACNC: 0.71 MIU/ML (ref 0.36–3.74)
UROBILINOGEN UR STRIP.AUTO-MCNC: 0.2 MG/DL
WBC # BLD AUTO: 4.6 X10(3) UL (ref 4–11)

## 2022-08-17 PROCEDURE — 84443 ASSAY THYROID STIM HORMONE: CPT

## 2022-08-17 PROCEDURE — 81003 URINALYSIS AUTO W/O SCOPE: CPT

## 2022-08-17 PROCEDURE — 1125F AMNT PAIN NOTED PAIN PRSNT: CPT | Performed by: FAMILY MEDICINE

## 2022-08-17 PROCEDURE — 36415 COLL VENOUS BLD VENIPUNCTURE: CPT

## 2022-08-17 PROCEDURE — 85025 COMPLETE CBC W/AUTO DIFF WBC: CPT

## 2022-08-17 PROCEDURE — G0439 PPPS, SUBSEQ VISIT: HCPCS | Performed by: FAMILY MEDICINE

## 2022-08-17 PROCEDURE — 99214 OFFICE O/P EST MOD 30 MIN: CPT | Performed by: FAMILY MEDICINE

## 2022-08-23 ENCOUNTER — TELEPHONE (OUTPATIENT)
Dept: ORTHOPEDICS CLINIC | Facility: CLINIC | Age: 72
End: 2022-08-23

## 2022-08-23 DIAGNOSIS — M25.562 LEFT KNEE PAIN, UNSPECIFIED CHRONICITY: Primary | ICD-10-CM

## 2022-08-23 NOTE — TELEPHONE ENCOUNTER
Please enter an [address] for a LEFT KNEE for  this patient's upcoming appointment, as the patient has not had any imaging completed. **PAIN RADIATES TO THE FRONT OF THE THIGH**     Patient was instructed to get X-rays before appt. PLEASE REPLY TO THIS MESSAGE when the order is put in EPIC so that I can schedule the Xray appt. Thank you, in advance!       Future Appointments   Date Time Provider Marisa Telma   9/28/2022 11:40 AM Kim Rangel MD [facility]

## 2022-08-24 ENCOUNTER — TELEPHONE (OUTPATIENT)
Dept: INTERNAL MEDICINE CLINIC | Facility: CLINIC | Age: 72
End: 2022-08-24

## 2022-08-24 ENCOUNTER — HOSPITAL ENCOUNTER (OUTPATIENT)
Dept: MAMMOGRAPHY | Age: 72
Discharge: HOME | End: 2022-08-24
Attending: FAMILY MEDICINE
Payer: MEDICARE

## 2022-08-24 DIAGNOSIS — Z12.31 VISIT FOR SCREENING MAMMOGRAM: ICD-10-CM

## 2022-08-24 PROCEDURE — 77063 BREAST TOMOSYNTHESIS BI: CPT | Performed by: FAMILY MEDICINE

## 2022-08-24 PROCEDURE — 77067 SCR MAMMO BI INCL CAD: CPT | Performed by: FAMILY MEDICINE

## 2022-08-24 NOTE — TELEPHONE ENCOUNTER
Incoming fax    Patients DM eye exam done on 8/1/2022    No DR detected    Epic updated and paperwork placed in TO in basket for review

## 2022-08-30 ENCOUNTER — HOSPITAL ENCOUNTER (OUTPATIENT)
Dept: MAMMOGRAPHY | Age: 72
Discharge: HOME | End: 2022-08-30
Attending: FAMILY MEDICINE
Payer: MEDICARE

## 2022-08-30 DIAGNOSIS — R92.2 INCONCLUSIVE MAMMOGRAM: ICD-10-CM

## 2022-08-30 PROCEDURE — 77065 DX MAMMO INCL CAD UNI: CPT | Performed by: FAMILY MEDICINE

## 2022-08-30 PROCEDURE — 77061 BREAST TOMOSYNTHESIS UNI: CPT | Performed by: FAMILY MEDICINE

## 2022-09-09 RX ORDER — RANOLAZINE 500 MG/1
TABLET, EXTENDED RELEASE ORAL
Qty: 90 TABLET | Refills: 0 | Status: SHIPPED | OUTPATIENT
Start: 2022-09-09

## 2022-09-15 ENCOUNTER — TELEPHONE (OUTPATIENT)
Dept: INTERNAL MEDICINE CLINIC | Facility: CLINIC | Age: 72
End: 2022-09-15

## 2022-09-15 NOTE — TELEPHONE ENCOUNTER
Pt stated she receive 2 letters in the mail just a couple days after getting mammogram done. Per pt, letters stated mammograms are not always accurate and needed further testing. Advised pt she did receive further testing and results were normal. Pt continues to be concerned for receiving these letters and stated she also noticed pain in L breast since then. Pt wants to make sure there if Dr. Samson Bo needs her to complete further testing. Please advise.

## 2022-09-15 NOTE — TELEPHONE ENCOUNTER
Spoke to patient she is concerned over letters she received in the mail about mammogram not being completely accurate. It was explained to patient as no test can guarantee accuracy. Relayed TO's message below. Read parts of mammogram that were of concern and the new read. Addressed left breast pain could be soreness due to additional testing. Patient does state she currently doesn't have breast pain. Talked about self breast examination, to check for lumps, dimpling, discharge. If any additional concerns were to arise to contact our office. Patient verbalized understanding.

## 2022-09-28 ENCOUNTER — OFFICE VISIT (OUTPATIENT)
Dept: ORTHOPEDICS CLINIC | Facility: CLINIC | Age: 72
End: 2022-09-28

## 2022-09-28 ENCOUNTER — HOSPITAL ENCOUNTER (OUTPATIENT)
Dept: GENERAL RADIOLOGY | Age: 72
Discharge: HOME | End: 2022-09-28
Attending: ORTHOPAEDIC SURGERY
Payer: MEDICARE

## 2022-09-28 VITALS — HEIGHT: 62.75 IN | BODY MASS INDEX: 28.53 KG/M2 | WEIGHT: 159 LBS

## 2022-09-28 DIAGNOSIS — M25.562 LEFT KNEE PAIN, UNSPECIFIED CHRONICITY: ICD-10-CM

## 2022-09-28 DIAGNOSIS — M17.12 PRIMARY OSTEOARTHRITIS OF LEFT KNEE: Primary | ICD-10-CM

## 2022-09-28 PROCEDURE — 20610 DRAIN/INJ JOINT/BURSA W/O US: CPT | Performed by: ORTHOPAEDIC SURGERY

## 2022-09-28 PROCEDURE — 73564 X-RAY EXAM KNEE 4 OR MORE: CPT | Performed by: ORTHOPAEDIC SURGERY

## 2022-09-28 PROCEDURE — 99213 OFFICE O/P EST LOW 20 MIN: CPT | Performed by: ORTHOPAEDIC SURGERY

## 2022-09-28 PROCEDURE — 1125F AMNT PAIN NOTED PAIN PRSNT: CPT | Performed by: ORTHOPAEDIC SURGERY

## 2022-09-28 RX ORDER — TRIAMCINOLONE ACETONIDE 40 MG/ML
40 INJECTION, SUSPENSION INTRA-ARTICULAR; INTRAMUSCULAR ONCE
Status: COMPLETED | OUTPATIENT
Start: 2022-09-28 | End: 2022-09-28

## 2022-09-28 RX ADMIN — TRIAMCINOLONE ACETONIDE 40 MG: 40 INJECTION, SUSPENSION INTRA-ARTICULAR; INTRAMUSCULAR at 12:03:00

## 2022-09-28 NOTE — PROCEDURES
After informed consent, the patient's left knee was marked, locally anesthetized with skin refrigerant, prepped with topical antiseptic, and injected with a mixture of 1mL 40mg/mL Kenalog, 2mL 1% lidocaine and 2mL 0.5% marcaine through the inferolateral portal.  A band-aid was applied. The patient tolerated the procedure well.     Valarie Gomez MD, [address] [facility]  Phone [phone]  Fax [phone]

## 2022-09-28 NOTE — PROGRESS NOTES
[facility] Progress Note    Subjective: Patient previously seen a year and a half ago for her right knee, now here today for left knee. She states that she has had pain in the left knee, although it was not as bad as that in the right knee a year and a half ago. Pain is mostly around the outside of the knee. She reports that it has been getting worse recently, she had a fall in January as well as another fall around her birthday and reports that she has had pain on the outside of the knee, points to the lateral joint line and proximal lateral tibia. She reports some numbness in this location as well. She has tried taking Tylenol, but but the pain persists. It is worse with weightbearing. It feels like a throbbing. She has not done injections for the left knee. She does report that the injection for the right knee that was done a year and a half ago helped tremendously. She is not doing exercises for the left knee specifically. Objective: Patient is pleasant, awake and alert. Ambulates with a nonantalgic gait. No coronal plane deformity to the knee. Minimal effusion to the left knee. She does have tenderness to palpation about the lateral joint line and lateral patellofemoral joint, reproduces pain complaint. She has full extension of the knee, flexion past 110 knee stable to varus and valgus stress      Imaging: Left knee x-rays personally viewed, independently interpreted and radiology report read. Demonstrates mild osteoarthritis with osteophytic lipping of the lateral tibial plateau and lateral patella, chondrocalcinosis most slightly within the lateral compartment      Assessment/Plan: 43-year-old female previously treated for right knee mild osteoarthritis, now with left knee symptomatic mild osteoarthritis. I discussed the etiology, natural history, and management options for symptomatic knee osteoarthritis.   I discussed nonsurgical treatments to include anti-inflammatory medications, injections, activity modification, weight loss, low impact exercise and possible therapy. She expressed understanding with this discussion. She had excellent relief from the steroid injection for the right knee and would like to repeat this for the left. Additionally she is interested in participating in physical therapy and a prescription has been provided. She will continue Tylenol as needed. We can repeat injection in 3 months or longer as long as pain relief lasts.     Tanya Lopez MD, [address] [facility]  Phone [phone]  Fax [phone]

## 2022-10-02 DIAGNOSIS — I10 ESSENTIAL HYPERTENSION: ICD-10-CM

## 2022-10-02 DIAGNOSIS — Z86.73 HISTORY OF STROKE WITHOUT RESIDUAL DEFICITS: ICD-10-CM

## 2022-10-03 RX ORDER — CLOPIDOGREL BISULFATE 75 MG/1
TABLET ORAL
Qty: 90 TABLET | Refills: 0 | Status: SHIPPED | OUTPATIENT
Start: 2022-10-03

## 2022-10-03 RX ORDER — OMEPRAZOLE 40 MG/1
CAPSULE, DELAYED RELEASE ORAL
Qty: 52 CAPSULE | Refills: 0 | Status: SHIPPED | OUTPATIENT
Start: 2022-10-03

## 2022-10-03 RX ORDER — NIFEDIPINE 60 MG/1
TABLET, FILM COATED, EXTENDED RELEASE ORAL
Qty: 90 TABLET | Refills: 0 | Status: SHIPPED | OUTPATIENT
Start: 2022-10-03

## 2022-10-03 RX ORDER — LISINOPRIL AND HYDROCHLOROTHIAZIDE 25; 20 MG/1; MG/1
TABLET ORAL
Qty: 90 TABLET | Refills: 0 | Status: SHIPPED | OUTPATIENT
Start: 2022-10-03

## 2022-10-03 RX ORDER — ISOSORBIDE MONONITRATE 120 MG/1
TABLET, EXTENDED RELEASE ORAL
Qty: 90 TABLET | Refills: 0 | Status: SHIPPED | OUTPATIENT
Start: 2022-10-03

## 2022-10-12 ENCOUNTER — OFFICE VISIT (OUTPATIENT)
Dept: PODIATRY CLINIC | Facility: CLINIC | Age: 72
End: 2022-10-12
Payer: MEDICARE

## 2022-10-12 DIAGNOSIS — M79.675 PAIN OF TOE OF LEFT FOOT: ICD-10-CM

## 2022-10-12 DIAGNOSIS — B35.1 ONYCHOMYCOSIS: ICD-10-CM

## 2022-10-12 DIAGNOSIS — M20.41 HAMMER TOES OF BOTH FEET: ICD-10-CM

## 2022-10-12 DIAGNOSIS — M20.42 HAMMER TOES OF BOTH FEET: ICD-10-CM

## 2022-10-12 DIAGNOSIS — E11.9 TYPE 2 DIABETES MELLITUS WITHOUT COMPLICATION, WITHOUT LONG-TERM CURRENT USE OF INSULIN (HCC): ICD-10-CM

## 2022-10-12 DIAGNOSIS — L60.8 INCURVATED NAIL: Primary | ICD-10-CM

## 2022-10-12 DIAGNOSIS — M54.16 LUMBAR RADICULOPATHY: ICD-10-CM

## 2022-10-12 PROCEDURE — 99213 OFFICE O/P EST LOW 20 MIN: CPT | Performed by: STUDENT IN AN ORGANIZED HEALTH CARE EDUCATION/TRAINING PROGRAM

## 2022-10-17 DIAGNOSIS — Z86.73 HISTORY OF STROKE WITHIN LAST YEAR: ICD-10-CM

## 2022-10-17 RX ORDER — ATORVASTATIN CALCIUM 40 MG/1
TABLET, FILM COATED ORAL
Qty: 90 TABLET | Refills: 0 | Status: SHIPPED | OUTPATIENT
Start: 2022-10-17

## 2022-10-17 NOTE — TELEPHONE ENCOUNTER
Atorvastatin 40 mg  Filled 6-13-22  Qty 90  0 refills  No upcoming appt.   LOV 8-17-22 TO  Labs: 5-25-22 Lipid

## 2022-10-31 ENCOUNTER — APPOINTMENT (OUTPATIENT)
Dept: PHYSICAL THERAPY | Age: 72
End: 2022-10-31
Attending: ORTHOPAEDIC SURGERY
Payer: MEDICARE

## 2022-11-03 ENCOUNTER — OFFICE VISIT (OUTPATIENT)
Dept: PHYSICAL THERAPY | Age: 72
End: 2022-11-03
Attending: ORTHOPAEDIC SURGERY
Payer: MEDICARE

## 2022-11-03 DIAGNOSIS — M17.12 PRIMARY OSTEOARTHRITIS OF LEFT KNEE: ICD-10-CM

## 2022-11-03 PROCEDURE — 97110 THERAPEUTIC EXERCISES: CPT

## 2022-11-03 PROCEDURE — 97161 PT EVAL LOW COMPLEX 20 MIN: CPT

## 2022-11-07 ENCOUNTER — OFFICE VISIT (OUTPATIENT)
Dept: PHYSICAL THERAPY | Age: 72
End: 2022-11-07
Attending: ORTHOPAEDIC SURGERY
Payer: MEDICARE

## 2022-11-07 DIAGNOSIS — M17.12 PRIMARY OSTEOARTHRITIS OF LEFT KNEE: Primary | ICD-10-CM

## 2022-11-07 PROCEDURE — 97110 THERAPEUTIC EXERCISES: CPT

## 2022-11-07 PROCEDURE — 97112 NEUROMUSCULAR REEDUCATION: CPT

## 2022-11-10 ENCOUNTER — APPOINTMENT (OUTPATIENT)
Dept: PHYSICAL THERAPY | Age: 72
End: 2022-11-10
Attending: ORTHOPAEDIC SURGERY
Payer: MEDICARE

## 2022-11-14 ENCOUNTER — OFFICE VISIT (OUTPATIENT)
Dept: PHYSICAL THERAPY | Age: 72
End: 2022-11-14
Attending: ORTHOPAEDIC SURGERY
Payer: MEDICARE

## 2022-11-14 DIAGNOSIS — M17.12 PRIMARY OSTEOARTHRITIS OF LEFT KNEE: Primary | ICD-10-CM

## 2022-11-14 PROCEDURE — 97110 THERAPEUTIC EXERCISES: CPT

## 2022-11-14 PROCEDURE — 97112 NEUROMUSCULAR REEDUCATION: CPT

## 2022-11-17 ENCOUNTER — OFFICE VISIT (OUTPATIENT)
Dept: PHYSICAL THERAPY | Age: 72
End: 2022-11-17
Attending: ORTHOPAEDIC SURGERY
Payer: MEDICARE

## 2022-11-17 DIAGNOSIS — M17.12 PRIMARY OSTEOARTHRITIS OF LEFT KNEE: Primary | ICD-10-CM

## 2022-11-17 PROCEDURE — 97112 NEUROMUSCULAR REEDUCATION: CPT

## 2022-11-17 PROCEDURE — 97110 THERAPEUTIC EXERCISES: CPT

## 2022-11-22 ENCOUNTER — OFFICE VISIT (OUTPATIENT)
Dept: PHYSICAL THERAPY | Age: 72
End: 2022-11-22
Attending: ORTHOPAEDIC SURGERY
Payer: MEDICARE

## 2022-11-22 DIAGNOSIS — M17.12 PRIMARY OSTEOARTHRITIS OF LEFT KNEE: Primary | ICD-10-CM

## 2022-11-22 PROCEDURE — 97110 THERAPEUTIC EXERCISES: CPT

## 2022-11-22 PROCEDURE — 97112 NEUROMUSCULAR REEDUCATION: CPT

## 2022-11-26 ENCOUNTER — HOSPITAL ENCOUNTER (OUTPATIENT)
Age: 72
Discharge: HOME | End: 2022-11-26
Attending: EMERGENCY MEDICINE
Payer: MEDICARE

## 2022-11-26 ENCOUNTER — APPOINTMENT (OUTPATIENT)
Dept: GENERAL RADIOLOGY | Age: 72
End: 2022-11-26
Attending: EMERGENCY MEDICINE
Payer: MEDICARE

## 2022-11-26 VITALS
WEIGHT: 156 LBS | TEMPERATURE: 98 F | SYSTOLIC BLOOD PRESSURE: 160 MMHG | DIASTOLIC BLOOD PRESSURE: 65 MMHG | RESPIRATION RATE: 18 BRPM | BODY MASS INDEX: 28 KG/M2 | HEART RATE: 88 BPM | OXYGEN SATURATION: 97 %

## 2022-11-26 DIAGNOSIS — J04.0 ACUTE LARYNGITIS: Primary | ICD-10-CM

## 2022-11-26 DIAGNOSIS — J18.9 COMMUNITY ACQUIRED PNEUMONIA OF LEFT LUNG, UNSPECIFIED PART OF LUNG: ICD-10-CM

## 2022-11-26 LAB
S PYO AG THROAT QL: NEGATIVE
SARS-COV-2 RNA RESP QL NAA+PROBE: NOT DETECTED

## 2022-11-26 PROCEDURE — 94640 AIRWAY INHALATION TREATMENT: CPT

## 2022-11-26 PROCEDURE — 87880 STREP A ASSAY W/OPTIC: CPT

## 2022-11-26 PROCEDURE — 99214 OFFICE O/P EST MOD 30 MIN: CPT

## 2022-11-26 PROCEDURE — 70360 X-RAY EXAM OF NECK: CPT | Performed by: EMERGENCY MEDICINE

## 2022-11-26 PROCEDURE — 71046 X-RAY EXAM CHEST 2 VIEWS: CPT | Performed by: EMERGENCY MEDICINE

## 2022-11-26 RX ORDER — LEVOFLOXACIN 500 MG/1
500 TABLET, FILM COATED ORAL DAILY
Qty: 10 TABLET | Refills: 0 | Status: SHIPPED | OUTPATIENT
Start: 2022-11-26 | End: 2022-12-06

## 2022-11-26 RX ORDER — METHYLPREDNISOLONE 4 MG/1
TABLET ORAL
Qty: 1 EACH | Refills: 0 | Status: SHIPPED | OUTPATIENT
Start: 2022-11-26

## 2022-11-26 RX ORDER — ALBUTEROL SULFATE 90 UG/1
2 AEROSOL, METERED RESPIRATORY (INHALATION) EVERY 4 HOURS PRN
Qty: 1 EACH | Refills: 0 | Status: SHIPPED | OUTPATIENT
Start: 2022-11-26 | End: 2022-12-26

## 2022-11-26 RX ORDER — IPRATROPIUM BROMIDE AND ALBUTEROL SULFATE 2.5; .5 MG/3ML; MG/3ML
3 SOLUTION RESPIRATORY (INHALATION) ONCE
Status: COMPLETED | OUTPATIENT
Start: 2022-11-26 | End: 2022-11-26

## 2022-11-26 NOTE — DISCHARGE INSTRUCTIONS
Follow-up with your primary care doctor. Call to make a follow-up appointment  Use albuterol inhaler 2 puffs every 4 hours  Take Medrol Dosepak as directed  Take Levaquin 500 mg a day for 10 days.   Return to the emergency department if you have any worsening symptoms or new concern

## 2022-11-28 ENCOUNTER — TELEPHONE (OUTPATIENT)
Dept: INTERNAL MEDICINE CLINIC | Facility: CLINIC | Age: 72
End: 2022-11-28

## 2022-11-28 NOTE — TELEPHONE ENCOUNTER
Pt scheduled an appt to follow up from the . Pt was diagnosed with acute laryngitis and pneumonia. Pt is scheduled for tomorrow as a VV but would like to know if she can be seen in the office. Ok to switch appt to in office ?     Future Appointments   Date Time Provider Marisa Hollis   11/29/2022  4:30 PM PRICILA Boyer [facility]

## 2022-11-29 ENCOUNTER — OFFICE VISIT (OUTPATIENT)
Dept: INTERNAL MEDICINE CLINIC | Facility: CLINIC | Age: 72
End: 2022-11-29
Payer: MEDICARE

## 2022-11-29 VITALS
RESPIRATION RATE: 16 BRPM | OXYGEN SATURATION: 98 % | HEART RATE: 73 BPM | BODY MASS INDEX: 28.71 KG/M2 | WEIGHT: 156 LBS | DIASTOLIC BLOOD PRESSURE: 72 MMHG | HEIGHT: 62 IN | SYSTOLIC BLOOD PRESSURE: 130 MMHG | TEMPERATURE: 99 F

## 2022-11-29 DIAGNOSIS — J18.9 PNEUMONIA OF LEFT LUNG DUE TO INFECTIOUS ORGANISM, UNSPECIFIED PART OF LUNG: Primary | ICD-10-CM

## 2022-11-29 DIAGNOSIS — J01.80 ACUTE NON-RECURRENT SINUSITIS OF OTHER SINUS: ICD-10-CM

## 2022-11-29 PROCEDURE — 99213 OFFICE O/P EST LOW 20 MIN: CPT | Performed by: FAMILY MEDICINE

## 2022-12-01 ENCOUNTER — APPOINTMENT (OUTPATIENT)
Dept: PHYSICAL THERAPY | Age: 72
End: 2022-12-01
Attending: ORTHOPAEDIC SURGERY
Payer: MEDICARE

## 2022-12-06 ENCOUNTER — OFFICE VISIT (OUTPATIENT)
Dept: PHYSICAL THERAPY | Age: 72
End: 2022-12-06
Attending: ORTHOPAEDIC SURGERY
Payer: MEDICARE

## 2022-12-06 DIAGNOSIS — M17.12 PRIMARY OSTEOARTHRITIS OF LEFT KNEE: Primary | ICD-10-CM

## 2022-12-06 PROCEDURE — 97112 NEUROMUSCULAR REEDUCATION: CPT

## 2022-12-06 PROCEDURE — 97110 THERAPEUTIC EXERCISES: CPT

## 2022-12-06 RX ORDER — RANOLAZINE 500 MG/1
TABLET, EXTENDED RELEASE ORAL
Qty: 90 TABLET | Refills: 0 | Status: SHIPPED | OUTPATIENT
Start: 2022-12-06

## 2022-12-08 ENCOUNTER — APPOINTMENT (OUTPATIENT)
Dept: PHYSICAL THERAPY | Age: 72
End: 2022-12-08
Attending: ORTHOPAEDIC SURGERY
Payer: MEDICARE

## 2022-12-13 RX ORDER — LANCETS
EACH MISCELLANEOUS
Qty: 100 EACH | Refills: 1 | Status: SHIPPED | OUTPATIENT
Start: 2022-12-13

## 2022-12-14 ENCOUNTER — OFFICE VISIT (OUTPATIENT)
Dept: PODIATRY CLINIC | Facility: CLINIC | Age: 72
End: 2022-12-14
Payer: MEDICARE

## 2022-12-14 DIAGNOSIS — M79.675 PAIN OF TOE OF LEFT FOOT: ICD-10-CM

## 2022-12-14 DIAGNOSIS — B35.1 ONYCHOMYCOSIS: ICD-10-CM

## 2022-12-14 DIAGNOSIS — M20.42 HAMMER TOES OF BOTH FEET: ICD-10-CM

## 2022-12-14 DIAGNOSIS — L60.8 INCURVATED NAIL: Primary | ICD-10-CM

## 2022-12-14 DIAGNOSIS — M20.41 HAMMER TOES OF BOTH FEET: ICD-10-CM

## 2022-12-14 DIAGNOSIS — E11.9 TYPE 2 DIABETES MELLITUS WITHOUT COMPLICATION, WITHOUT LONG-TERM CURRENT USE OF INSULIN (HCC): ICD-10-CM

## 2022-12-14 DIAGNOSIS — M54.16 LUMBAR RADICULOPATHY: ICD-10-CM

## 2022-12-14 PROCEDURE — 1126F AMNT PAIN NOTED NONE PRSNT: CPT | Performed by: STUDENT IN AN ORGANIZED HEALTH CARE EDUCATION/TRAINING PROGRAM

## 2022-12-14 PROCEDURE — 99213 OFFICE O/P EST LOW 20 MIN: CPT | Performed by: STUDENT IN AN ORGANIZED HEALTH CARE EDUCATION/TRAINING PROGRAM

## 2022-12-14 NOTE — PATIENT INSTRUCTIONS
-Discussed importance of proper pedal hygiene, regular foot checks.  -Patient to avoid walking barefoot. Ambulate with supportive shoes with wide toebox.  -Patient to monitor for acute signs of infection and seek immediate medical attention if any signs of infection or other concerns arise.

## 2022-12-26 DIAGNOSIS — I10 ESSENTIAL HYPERTENSION: ICD-10-CM

## 2022-12-26 RX ORDER — OMEPRAZOLE 40 MG/1
CAPSULE, DELAYED RELEASE ORAL
Qty: 52 CAPSULE | Refills: 0 | Status: SHIPPED | OUTPATIENT
Start: 2022-12-26

## 2022-12-27 RX ORDER — LISINOPRIL AND HYDROCHLOROTHIAZIDE 25; 20 MG/1; MG/1
TABLET ORAL
Qty: 90 TABLET | Refills: 0 | Status: SHIPPED | OUTPATIENT
Start: 2022-12-27

## 2022-12-30 ENCOUNTER — OFFICE VISIT (OUTPATIENT)
Dept: PHYSICAL THERAPY | Age: 72
End: 2022-12-30
Attending: ORTHOPAEDIC SURGERY
Payer: MEDICARE

## 2022-12-30 DIAGNOSIS — M17.12 PRIMARY OSTEOARTHRITIS OF LEFT KNEE: Primary | ICD-10-CM

## 2022-12-30 PROCEDURE — 97110 THERAPEUTIC EXERCISES: CPT

## 2023-01-02 RX ORDER — ISOSORBIDE MONONITRATE 120 MG/1
TABLET, EXTENDED RELEASE ORAL
Qty: 90 TABLET | Refills: 0 | Status: SHIPPED | OUTPATIENT
Start: 2023-01-02

## 2023-01-07 DIAGNOSIS — I10 ESSENTIAL HYPERTENSION: ICD-10-CM

## 2023-01-12 DIAGNOSIS — Z86.73 HISTORY OF STROKE WITHOUT RESIDUAL DEFICITS: ICD-10-CM

## 2023-01-12 RX ORDER — NIFEDIPINE 60 MG/1
TABLET, FILM COATED, EXTENDED RELEASE ORAL
Qty: 90 TABLET | Refills: 0 | Status: SHIPPED | OUTPATIENT
Start: 2023-01-12

## 2023-01-12 RX ORDER — CLOPIDOGREL BISULFATE 75 MG/1
TABLET ORAL
Qty: 90 TABLET | Refills: 0 | Status: SHIPPED | OUTPATIENT
Start: 2023-01-12

## 2023-01-12 NOTE — TELEPHONE ENCOUNTER
Nifedipine ER 60 mg  Filled 10-3-22  Qty 90  0 refills  No upcoming appt. LOV 8-17-22 TO    Clopidogrel 75 mg  Filled 10-3-22  Qty 90  0 refills  No upcoming appt.   LOV 8-17-22 TO

## 2023-01-15 DIAGNOSIS — Z86.73 HISTORY OF STROKE WITHIN LAST YEAR: ICD-10-CM

## 2023-01-15 RX ORDER — ATORVASTATIN CALCIUM 40 MG/1
TABLET, FILM COATED ORAL
Qty: 90 TABLET | Refills: 0 | Status: SHIPPED | OUTPATIENT
Start: 2023-01-15

## 2023-02-08 ENCOUNTER — OFFICE VISIT (OUTPATIENT)
Dept: INTERNAL MEDICINE CLINIC | Facility: CLINIC | Age: 73
End: 2023-02-08
Payer: MEDICARE

## 2023-02-08 VITALS
OXYGEN SATURATION: 98 % | HEIGHT: 62 IN | WEIGHT: 154.19 LBS | DIASTOLIC BLOOD PRESSURE: 60 MMHG | BODY MASS INDEX: 28.37 KG/M2 | SYSTOLIC BLOOD PRESSURE: 112 MMHG | TEMPERATURE: 98 F | HEART RATE: 65 BPM

## 2023-02-08 DIAGNOSIS — M25.511 CHRONIC RIGHT SHOULDER PAIN: Primary | ICD-10-CM

## 2023-02-08 DIAGNOSIS — G89.29 CHRONIC RIGHT SHOULDER PAIN: Primary | ICD-10-CM

## 2023-02-08 PROCEDURE — 99213 OFFICE O/P EST LOW 20 MIN: CPT | Performed by: FAMILY MEDICINE

## 2023-02-08 RX ORDER — CARVEDILOL 25 MG/1
TABLET, FILM COATED ORAL DAILY
COMMUNITY
Start: 2022-12-30

## 2023-02-13 ENCOUNTER — TELEPHONE (OUTPATIENT)
Dept: PHYSICAL THERAPY | Facility: HOSPITAL | Age: 73
End: 2023-02-13

## 2023-02-14 ENCOUNTER — OFFICE VISIT (OUTPATIENT)
Dept: PODIATRY CLINIC | Facility: CLINIC | Age: 73
End: 2023-02-14

## 2023-02-14 ENCOUNTER — OFFICE VISIT (OUTPATIENT)
Dept: PHYSICAL THERAPY | Age: 73
End: 2023-02-14
Attending: FAMILY MEDICINE
Payer: MEDICARE

## 2023-02-14 DIAGNOSIS — L60.8 INCURVATED NAIL: ICD-10-CM

## 2023-02-14 DIAGNOSIS — M20.41 HAMMER TOES OF BOTH FEET: ICD-10-CM

## 2023-02-14 DIAGNOSIS — E11.9 TYPE 2 DIABETES MELLITUS WITHOUT COMPLICATION, WITHOUT LONG-TERM CURRENT USE OF INSULIN (HCC): ICD-10-CM

## 2023-02-14 DIAGNOSIS — M79.675 PAIN OF TOE OF LEFT FOOT: ICD-10-CM

## 2023-02-14 DIAGNOSIS — B35.1 ONYCHOMYCOSIS: Primary | ICD-10-CM

## 2023-02-14 DIAGNOSIS — M54.16 LUMBAR RADICULOPATHY: ICD-10-CM

## 2023-02-14 DIAGNOSIS — G89.29 CHRONIC RIGHT SHOULDER PAIN: ICD-10-CM

## 2023-02-14 DIAGNOSIS — M20.42 HAMMER TOES OF BOTH FEET: ICD-10-CM

## 2023-02-14 DIAGNOSIS — M25.511 CHRONIC RIGHT SHOULDER PAIN: ICD-10-CM

## 2023-02-14 PROCEDURE — 97110 THERAPEUTIC EXERCISES: CPT | Performed by: PHYSICAL THERAPIST

## 2023-02-14 PROCEDURE — 99213 OFFICE O/P EST LOW 20 MIN: CPT | Performed by: STUDENT IN AN ORGANIZED HEALTH CARE EDUCATION/TRAINING PROGRAM

## 2023-02-14 PROCEDURE — 97161 PT EVAL LOW COMPLEX 20 MIN: CPT | Performed by: PHYSICAL THERAPIST

## 2023-02-15 ENCOUNTER — APPOINTMENT (OUTPATIENT)
Dept: PHYSICAL THERAPY | Age: 73
End: 2023-02-15
Attending: FAMILY MEDICINE
Payer: MEDICARE

## 2023-02-20 ENCOUNTER — APPOINTMENT (OUTPATIENT)
Dept: PHYSICAL THERAPY | Age: 73
End: 2023-02-20
Attending: FAMILY MEDICINE
Payer: MEDICARE

## 2023-02-20 ENCOUNTER — TELEPHONE (OUTPATIENT)
Dept: PHYSICAL THERAPY | Facility: HOSPITAL | Age: 73
End: 2023-02-20

## 2023-02-21 ENCOUNTER — OFFICE VISIT (OUTPATIENT)
Dept: PHYSICAL THERAPY | Age: 73
End: 2023-02-21
Attending: FAMILY MEDICINE
Payer: MEDICARE

## 2023-02-21 PROCEDURE — 97110 THERAPEUTIC EXERCISES: CPT | Performed by: PHYSICAL THERAPIST

## 2023-02-21 NOTE — PROGRESS NOTES
Dx: Chronic right shoulder pain (M25.511,G89.29)             Authorized # of Visits:  8  Fall Risk: standard         Precautions: n/a             Subjective:   Patient states she is less cautious with her shoulder when rolling in bed. She is able to perform wall exercise with no pain   Current Pain Ratin/10  Objective:   HEP additions as noted. Green band for resisted biceps   No right shoulder pain with MMT flex/abd/IR/ER 5/5 strength. Mild pain with resisted right biceps   Right shoulder AROM WFL      Assessment:   Patient tolerated exercise progression well. She did report slight increase in shoulder soreness after treatment     Plan:   PT 2x/wk progressive exercise as tolerated      Date: 2023  Tx#:  Date: Tx#: 3/ Date: Tx#: 4/ Date: Tx#: 5/ Date: Tx#: 6/ Date: Tx#: 7/ Date: Tx#: 8/   TherEx TherEx TherEx TherEx TherEx TherEx TherEx   UBE 6 minutes          PROM right shoulder, rhythmic stab right shoulder in supine at 90 20 seconds x 3 each          Horizontal abduction supine red band 10 reps, 10 reps x 2 sitting (HEP)         Triceps red band 10 reps x 2 (HEP)         Green band biceps 10 reps x 2 (HEP)                                                 Charges:  TherEx 3        Total Timed Treatment: 40 min  Total Treatment Time: 40 min

## 2023-02-23 ENCOUNTER — OFFICE VISIT (OUTPATIENT)
Dept: PHYSICAL THERAPY | Age: 73
End: 2023-02-23
Attending: FAMILY MEDICINE
Payer: MEDICARE

## 2023-02-23 PROCEDURE — 97110 THERAPEUTIC EXERCISES: CPT | Performed by: PHYSICAL THERAPIST

## 2023-02-23 NOTE — PROGRESS NOTES
Dx: Chronic right shoulder pain (M25.511,G89.29)             Authorized # of Visits:  8  Fall Risk: standard         Precautions: n/a             Subjective:     Current Pain Ratin/10  Objective:   Treatment as noted  Reviewed/performed HEP    Decreased right shoulder ER strength in SL, mild pain     Assessment:   Patient is progressing towards therapy goals. Plan:   PT 2x/wk progressive exercise as tolerated      Date: 2023  Tx#:  Date: 2023  Tx#: 3/8 Date: Tx#: 4/ Date: Tx#: / Date: Tx#: 6/ Date: Tx#: 7/ Date: Tx#: 8/   TherEx TherEx TherEx TherEx TherEx TherEx TherEx   UBE 6 minutes  UBE 6 minutes         PROM right shoulder, rhythmic stab right shoulder in supine at 90 20 seconds x 3 each  Horizontal abduction red band 10 reps each sitting and standing         Horizontal abduction supine red band 10 reps, 10 reps x 2 sitting (HEP) Bicep curl green band 13 reps         Triceps red band 10 reps x 2 (HEP) SL right shoulder/scap PROM        Green band biceps 10 reps x 2 (HEP) SL assisted right shoulder ER 10 reps x 2          Supine right shoulder PROM, rhythmic stab at 90 flexion 20 seconds x 2          IR/ER isometrics 20 reps each          Seated row 10 reps x 3 green band                      Charges:  TherEx 3        Total Timed Treatment: 45 min  Total Treatment Time: 45 min

## 2023-02-27 ENCOUNTER — OFFICE VISIT (OUTPATIENT)
Dept: PHYSICAL THERAPY | Age: 73
End: 2023-02-27
Attending: FAMILY MEDICINE
Payer: MEDICARE

## 2023-02-27 PROCEDURE — 97110 THERAPEUTIC EXERCISES: CPT | Performed by: PHYSICAL THERAPIST

## 2023-02-27 NOTE — PROGRESS NOTES
Dx: Chronic right shoulder pain (M25.511,G89.29)             Authorized # of Visits:  8  Fall Risk: standard         Precautions: n/a             Subjective:     Current Pain Ratin/10  Objective:   Mild right shoulder pain with active scap plane abduction   HEP additions as noted   Bilat hold overhead lift 4 pounds - mild pain     Assessment:   Patient making progress towards therapy goals. Pain with scap plane abduction is primary problem. With AAROM, no pain     Plan:   PT 2x/wk progressive exercise as tolerated      Date: 2023  Tx#:  Date: 2023  Tx#: 3/8 Date: 2023  Tx#:  Date: Tx#: / Date: Tx#: 6/ Date: Tx#: / Date: Tx#: 8/   TherEx TherEx TherEx TherEx TherEx TherEx TherEx   UBE 6 minutes  UBE 6 minutes  UBE 6 minutes       PROM right shoulder, rhythmic stab right shoulder in supine at 90 20 seconds x 3 each  Horizontal abduction red band 10 reps each sitting and standing  Bilat pulldown cable  standing 36 pounds 7 reps, 48 pounds 10 reps x 2       Horizontal abduction supine red band 10 reps, 10 reps x 2 sitting (HEP) Bicep curl green band 13 reps  Seated cable row 48 pounds 10 reps x 3        Triceps red band 10 reps x 2 (HEP) SL right shoulder/scap PROM SL right shoulder/scap PROM       Green band biceps 10 reps x 2 (HEP) SL assisted right shoulder ER 10 reps x 2  SL right shoulder abduction at 90 rhythmic stab 30 seconds x 2         Supine right shoulder PROM, rhythmic stab at 90 flexion 20 seconds x 2  Supine right shoulder at 90 flex, rhythmic stab 30 seconds x 3         IR/ER isometrics 20 reps each  Wall push ups 10 reps x 2 (HEP)        Seated row 10 reps x 3 green band  Scap plane abduction with cane 10 reps x 2 (HEP)         Scap plane abduction on wall, painful            Charges:  TherEx 3        Total Timed Treatment: 45 min  Total Treatment Time: 45 min

## 2023-03-01 ENCOUNTER — OFFICE VISIT (OUTPATIENT)
Dept: PHYSICAL THERAPY | Age: 73
End: 2023-03-01
Attending: FAMILY MEDICINE
Payer: MEDICARE

## 2023-03-01 PROCEDURE — 97110 THERAPEUTIC EXERCISES: CPT | Performed by: PHYSICAL THERAPIST

## 2023-03-01 NOTE — PROGRESS NOTES
Dx: Chronic right shoulder pain (M25.511,G89.29)             Authorized # of Visits:  8  Fall Risk: standard         Precautions: n/a             Subjective:   Patient states she is rolling to her right shoulder more. Pain is coming and going, better overall   Current Pain Ratin/10  Objective:   Scap plane abduction remains painful. Reviewed base position with ER - verbal cues for correction  Patient instructed to hold exercise for 3 days to determine if any change with no exercise     Assessment:   Patient presented with no right shoulder pain. With resistance exercise, there was pain at times. Recommended she hold her exercises for 3 days as she is improving and had pain only with scap abduction, D2 flexion and resisted ER base position. She will monitor for any change without the exercise. Plan:   PT 2x/wk progressive exercise as tolerated      Date: 2023  Tx#:  Date: 2023  Tx#: 3/8 Date: 2023  Tx#:  Date: 3/1/2023  Tx#:  Date: Tx#: 6/ Date: Tx#: / Date:    Tx#: 8/   TherEx TherEx TherEx TherEx TherEx TherEx TherEx   UBE 6 minutes  UBE 6 minutes  UBE 6 minutes UBE 6 minutes       PROM right shoulder, rhythmic stab right shoulder in supine at 90 20 seconds x 3 each  Horizontal abduction red band 10 reps each sitting and standing  Bilat pulldown cable  standing 36 pounds 7 reps, 48 pounds 10 reps x 2 Thoracic mobility transverse plane bilat UE  at shoulder height 10 reps each       Horizontal abduction supine red band 10 reps, 10 reps x 2 sitting (HEP) Bicep curl green band 13 reps  Seated cable row 48 pounds 10 reps x 3  Cane Apley's IR 20 reps      Triceps red band 10 reps x 2 (HEP) SL right shoulder/scap PROM SL right shoulder/scap PROM D2 flexion 10 reps each       Green band biceps 10 reps x 2 (HEP) SL assisted right shoulder ER 10 reps x 2  SL right shoulder abduction at 90 rhythmic stab 30 seconds x 2  Bilat pulldown 36 pounds 10 reps x 3        Supine right shoulder PROM, rhythmic stab at 90 flexion 20 seconds x 2  Supine right shoulder at 90 flex, rhythmic stab 30 seconds x 3  Base position ER red band 10 reps x 3                    IR/ER isometrics 20 reps each  Wall push ups 10 reps x 2 (HEP) Base position IR red band 10 reps x 3 right shoulder        Seated row 10 reps x 3 green band  Scap plane abduction with cane 10 reps x 2 (HEP) Right shoulder PROM in supine/SL, supine rhythmic stab at 90 flexion distal hand placement 20 seconds x 2         Scap plane abduction on wall, painful            Charges:  TherEx 3        Total Timed Treatment: 45 min  Total Treatment Time: 45 min

## 2023-03-06 RX ORDER — RANOLAZINE 500 MG/1
TABLET, EXTENDED RELEASE ORAL
Qty: 90 TABLET | Refills: 0 | Status: SHIPPED | OUTPATIENT
Start: 2023-03-06

## 2023-03-07 ENCOUNTER — HOSPITAL ENCOUNTER (OUTPATIENT)
Dept: GENERAL RADIOLOGY | Age: 73
Discharge: HOME | End: 2023-03-07
Attending: FAMILY MEDICINE
Payer: MEDICARE

## 2023-03-07 ENCOUNTER — OFFICE VISIT (OUTPATIENT)
Dept: INTERNAL MEDICINE CLINIC | Facility: CLINIC | Age: 73
End: 2023-03-07
Payer: MEDICARE

## 2023-03-07 ENCOUNTER — OFFICE VISIT (OUTPATIENT)
Dept: PHYSICAL THERAPY | Age: 73
End: 2023-03-07
Attending: FAMILY MEDICINE
Payer: MEDICARE

## 2023-03-07 VITALS
HEART RATE: 73 BPM | BODY MASS INDEX: 28.71 KG/M2 | HEIGHT: 62 IN | SYSTOLIC BLOOD PRESSURE: 120 MMHG | DIASTOLIC BLOOD PRESSURE: 60 MMHG | WEIGHT: 156 LBS | TEMPERATURE: 98 F | OXYGEN SATURATION: 95 %

## 2023-03-07 DIAGNOSIS — M25.561 RIGHT KNEE PAIN, UNSPECIFIED CHRONICITY: Primary | ICD-10-CM

## 2023-03-07 DIAGNOSIS — M25.561 RIGHT KNEE PAIN, UNSPECIFIED CHRONICITY: ICD-10-CM

## 2023-03-07 DIAGNOSIS — Z01.89 ENCOUNTER FOR LOWER EXTREMITY COMPARISON IMAGING STUDY: ICD-10-CM

## 2023-03-07 PROCEDURE — 73564 X-RAY EXAM KNEE 4 OR MORE: CPT | Performed by: FAMILY MEDICINE

## 2023-03-07 PROCEDURE — 99213 OFFICE O/P EST LOW 20 MIN: CPT | Performed by: FAMILY MEDICINE

## 2023-03-07 PROCEDURE — 73560 X-RAY EXAM OF KNEE 1 OR 2: CPT | Performed by: FAMILY MEDICINE

## 2023-03-07 PROCEDURE — 97110 THERAPEUTIC EXERCISES: CPT | Performed by: PHYSICAL THERAPIST

## 2023-03-07 RX ORDER — PREDNISONE 10 MG/1
TABLET ORAL
Qty: 20 TABLET | Refills: 0 | Status: SHIPPED | OUTPATIENT
Start: 2023-03-07

## 2023-03-08 ENCOUNTER — TELEPHONE (OUTPATIENT)
Dept: ORTHOPEDICS CLINIC | Facility: CLINIC | Age: 73
End: 2023-03-08

## 2023-03-21 ENCOUNTER — TELEMEDICINE (OUTPATIENT)
Dept: INTERNAL MEDICINE CLINIC | Facility: CLINIC | Age: 73
End: 2023-03-21
Payer: MEDICARE

## 2023-03-21 DIAGNOSIS — H92.02 LEFT EAR PAIN: ICD-10-CM

## 2023-03-21 DIAGNOSIS — J01.90 ACUTE NON-RECURRENT SINUSITIS, UNSPECIFIED LOCATION: Primary | ICD-10-CM

## 2023-03-21 PROCEDURE — 99213 OFFICE O/P EST LOW 20 MIN: CPT | Performed by: FAMILY MEDICINE

## 2023-03-21 RX ORDER — AZITHROMYCIN 250 MG/1
TABLET, FILM COATED ORAL
Qty: 6 TABLET | Refills: 0 | Status: SHIPPED | OUTPATIENT
Start: 2023-03-21 | End: 2023-03-26

## 2023-03-24 DIAGNOSIS — I10 ESSENTIAL HYPERTENSION: ICD-10-CM

## 2023-03-24 RX ORDER — OMEPRAZOLE 40 MG/1
CAPSULE, DELAYED RELEASE ORAL
Qty: 52 CAPSULE | Refills: 0 | Status: SHIPPED | OUTPATIENT
Start: 2023-03-24

## 2023-03-24 RX ORDER — LISINOPRIL AND HYDROCHLOROTHIAZIDE 25; 20 MG/1; MG/1
TABLET ORAL
Qty: 90 TABLET | Refills: 0 | Status: SHIPPED | OUTPATIENT
Start: 2023-03-24

## 2023-03-31 DIAGNOSIS — E11.9 TYPE 2 DIABETES MELLITUS WITHOUT COMPLICATIONS (HCC): ICD-10-CM

## 2023-03-31 RX ORDER — ISOSORBIDE MONONITRATE 120 MG/1
TABLET, EXTENDED RELEASE ORAL
Qty: 90 TABLET | Refills: 0 | Status: SHIPPED | OUTPATIENT
Start: 2023-03-31

## 2023-03-31 RX ORDER — CARVEDILOL 25 MG/1
TABLET, FILM COATED ORAL
Qty: 100 STRIP | Refills: 0 | Status: SHIPPED | OUTPATIENT
Start: 2023-03-31

## 2023-04-05 DIAGNOSIS — I10 ESSENTIAL HYPERTENSION: ICD-10-CM

## 2023-04-10 DIAGNOSIS — Z86.73 HISTORY OF STROKE WITHOUT RESIDUAL DEFICITS: ICD-10-CM

## 2023-04-10 RX ORDER — NIFEDIPINE 60 MG/1
TABLET, FILM COATED, EXTENDED RELEASE ORAL
Qty: 90 TABLET | Refills: 0 | Status: SHIPPED | OUTPATIENT
Start: 2023-04-10

## 2023-04-10 RX ORDER — CLOPIDOGREL BISULFATE 75 MG/1
TABLET ORAL
Qty: 90 TABLET | Refills: 0 | Status: SHIPPED | OUTPATIENT
Start: 2023-04-10

## 2023-04-14 DIAGNOSIS — Z86.73 HISTORY OF STROKE WITHIN LAST YEAR: ICD-10-CM

## 2023-04-14 RX ORDER — ATORVASTATIN CALCIUM 40 MG/1
TABLET, FILM COATED ORAL
Qty: 90 TABLET | Refills: 0 | Status: SHIPPED | OUTPATIENT
Start: 2023-04-14

## 2023-04-18 ENCOUNTER — OFFICE VISIT (OUTPATIENT)
Dept: PODIATRY CLINIC | Facility: CLINIC | Age: 73
End: 2023-04-18

## 2023-04-18 DIAGNOSIS — M20.42 HAMMER TOES OF BOTH FEET: ICD-10-CM

## 2023-04-18 DIAGNOSIS — M54.16 LUMBAR RADICULOPATHY: ICD-10-CM

## 2023-04-18 DIAGNOSIS — L60.8 INCURVATED NAIL: ICD-10-CM

## 2023-04-18 DIAGNOSIS — M79.675 PAIN OF TOE OF LEFT FOOT: ICD-10-CM

## 2023-04-18 DIAGNOSIS — B35.1 ONYCHOMYCOSIS: Primary | ICD-10-CM

## 2023-04-18 DIAGNOSIS — M20.41 HAMMER TOES OF BOTH FEET: ICD-10-CM

## 2023-04-18 DIAGNOSIS — E11.9 TYPE 2 DIABETES MELLITUS WITHOUT COMPLICATION, WITHOUT LONG-TERM CURRENT USE OF INSULIN (HCC): ICD-10-CM

## 2023-04-18 PROCEDURE — 1126F AMNT PAIN NOTED NONE PRSNT: CPT | Performed by: STUDENT IN AN ORGANIZED HEALTH CARE EDUCATION/TRAINING PROGRAM

## 2023-04-18 PROCEDURE — 99213 OFFICE O/P EST LOW 20 MIN: CPT | Performed by: STUDENT IN AN ORGANIZED HEALTH CARE EDUCATION/TRAINING PROGRAM

## 2023-04-20 NOTE — PATIENT INSTRUCTIONS
-Ambulate with supportive shoes and avoid walking barefoot. Check feet daily. Follow-up in 2 months for routine foot care or sooner if other concerns arise.

## 2023-04-24 ENCOUNTER — OFFICE VISIT (OUTPATIENT)
Dept: ORTHOPEDICS CLINIC | Facility: CLINIC | Age: 73
End: 2023-04-24
Payer: MEDICARE

## 2023-04-24 VITALS — OXYGEN SATURATION: 98 % | WEIGHT: 155.81 LBS | HEART RATE: 64 BPM | HEIGHT: 62.5 IN | BODY MASS INDEX: 27.95 KG/M2

## 2023-04-24 DIAGNOSIS — M17.11 PRIMARY OSTEOARTHRITIS OF RIGHT KNEE: Primary | ICD-10-CM

## 2023-04-24 RX ORDER — TRIAMCINOLONE ACETONIDE 40 MG/ML
40 INJECTION, SUSPENSION INTRA-ARTICULAR; INTRAMUSCULAR ONCE
Status: COMPLETED | OUTPATIENT
Start: 2023-04-24 | End: 2023-04-24

## 2023-04-24 RX ADMIN — TRIAMCINOLONE ACETONIDE 40 MG: 40 INJECTION, SUSPENSION INTRA-ARTICULAR; INTRAMUSCULAR at 15:20:00

## 2023-04-24 NOTE — PROGRESS NOTES
[facility] Progress Note    Subjective: Patient returns for right knee. She was last evaluated for the right knee about 2 years ago at which time steroid injection was performed. She states that she has had excellent relief of her pain up until about 6 weeks ago. She notes the onset of a new pain in the knee, worse than before, feels pain around the lateral aspect of the knee joint as well as in the front of her knee. She feels like there is pressure in her knee, it pops and clicks, she has difficulty getting going after standing from a seated position and also has difficulty getting up from the floor. She has been inhibited in being able to get on the floor with her grandchild. She denies any radiation of the pain. She does feel that there is swelling with the knee. Objective: Patient is very pleasant. Right knee demonstrates full extension, mild effusion. The knee is stable to varus and valgus stress, Jamal's with some clicking appears anterior/near patellofemoral joint      Imaging: X-rays of the right knee from March personally viewed, independently interpreted and radiology report read. Demonstrates largely maintained joint space, mild degenerative changes with osteophyte/lipping lateral tibial plateau      Assessment/Plan: 73-year-old female with acute onset of right knee pain. Patient was previously treated for knee osteoarthritis with steroid injection. We did discuss possibility of repeating injection, versus obtaining advanced imaging especially in light of the acute onset with mechanical symptoms and more debilitating pain. She feels that the injection helped tremendously in the past, she would like to consider repeating this today. If she does not get sufficient relief in the next few weeks, she will MyChart message me and we can consider advanced imaging with MRI.     Myrick Gottron, MD, [address] [facility]  Phone [phone]  Fax [phone]

## 2023-04-24 NOTE — PROCEDURES
After informed consent, the patient's right knee was marked, locally anesthetized with skin refrigerant, prepped with topical antiseptic, and injected with a mixture of 1mL 40mg/mL Kenalog, 2mL 1% lidocaine and 2mL 0.5% marcaine through the inferolateral portal.  A band-aid was applied. The patient tolerated the procedure well.     Cas Rojas MD, [address] [place] [facility]  Phone [phone]  Fax [phone]

## 2023-05-04 ENCOUNTER — OFFICE VISIT (OUTPATIENT)
Dept: INTERNAL MEDICINE CLINIC | Facility: CLINIC | Age: 73
End: 2023-05-04
Payer: MEDICARE

## 2023-05-04 VITALS
RESPIRATION RATE: 16 BRPM | HEIGHT: 62 IN | TEMPERATURE: 98 F | DIASTOLIC BLOOD PRESSURE: 62 MMHG | BODY MASS INDEX: 28.37 KG/M2 | WEIGHT: 154.19 LBS | OXYGEN SATURATION: 97 % | SYSTOLIC BLOOD PRESSURE: 112 MMHG | HEART RATE: 66 BPM

## 2023-05-04 DIAGNOSIS — J30.9 ALLERGIC RHINITIS, UNSPECIFIED SEASONALITY, UNSPECIFIED TRIGGER: ICD-10-CM

## 2023-05-04 DIAGNOSIS — R51.9 NONINTRACTABLE HEADACHE, UNSPECIFIED CHRONICITY PATTERN, UNSPECIFIED HEADACHE TYPE: ICD-10-CM

## 2023-05-04 DIAGNOSIS — H65.02 NON-RECURRENT ACUTE SEROUS OTITIS MEDIA OF LEFT EAR: Primary | ICD-10-CM

## 2023-05-04 PROCEDURE — 99213 OFFICE O/P EST LOW 20 MIN: CPT | Performed by: FAMILY MEDICINE

## 2023-05-04 RX ORDER — AZITHROMYCIN 250 MG/1
TABLET, FILM COATED ORAL
Qty: 6 TABLET | Refills: 0 | Status: SHIPPED | OUTPATIENT
Start: 2023-05-04 | End: 2023-05-09

## 2023-05-16 ENCOUNTER — TELEPHONE (OUTPATIENT)
Dept: INTERNAL MEDICINE CLINIC | Facility: CLINIC | Age: 73
End: 2023-05-16

## 2023-05-16 DIAGNOSIS — R19.7 DIARRHEA, UNSPECIFIED TYPE: Primary | ICD-10-CM

## 2023-05-16 NOTE — TELEPHONE ENCOUNTER
Patient called in stating that over the weekend she had a fever and diarrhea. Pt no longer has fever, did state that anything she eats does make her stomach upset. Pt would like to know what can she take since she diabetic. Patient was last seen 5/4/23. Please advise.

## 2023-05-16 NOTE — TELEPHONE ENCOUNTER
SM- please advise, further recommendations? (Stool specimens. Arvind Ordaz )    Call placed to patient for further triage. Patient reports having episodes of vomiting, diarrhea and fever starting past Saturday (5/13). Vomiting/fever stopped Sunday, but diarrhea has persistent. Reports today having full liquid diarrhea episodes every hour. Denies seeing any blood with diarrhea- but has had \"blood on tissue with wiping\", reports \"my hemorrhoids are swollen and sticking out now\". Has been using A&D ointment to sooth- with some relief. Has been taking fluids and eating bland foods, but both continue to trigger diarrhea episode. Denies any symptoms of dehydration. Blood sugar reading was 99 this morning. Discuss trying OTC imodium to help decrease frequency of episodes    Reviewed with patient need to proceed to immediate care if develops any signs/symptoms of dehydration, notices any blood in stool and or develops any abdominal pain.

## 2023-05-16 NOTE — TELEPHONE ENCOUNTER
Spoke with patient via phone. Informed of 's recommendations. Patient verbalized understanding and will have someone stop into lab to  collection container. Denies further questions.

## 2023-05-17 ENCOUNTER — LAB ENCOUNTER (OUTPATIENT)
Dept: LAB | Age: 73
End: 2023-05-17
Attending: FAMILY MEDICINE
Payer: MEDICARE

## 2023-05-17 DIAGNOSIS — R19.7 DIARRHEA, UNSPECIFIED TYPE: ICD-10-CM

## 2023-05-17 PROCEDURE — 87427 SHIGA-LIKE TOXIN AG IA: CPT

## 2023-05-17 PROCEDURE — 87045 FECES CULTURE AEROBIC BACT: CPT

## 2023-05-17 PROCEDURE — 87046 STOOL CULTR AEROBIC BACT EA: CPT

## 2023-05-17 PROCEDURE — 87209 SMEAR COMPLEX STAIN: CPT

## 2023-05-17 PROCEDURE — 87493 C DIFF AMPLIFIED PROBE: CPT

## 2023-05-17 PROCEDURE — 87177 OVA AND PARASITES SMEARS: CPT

## 2023-05-18 LAB — C DIFF TOX B STL QL: NEGATIVE

## 2023-06-02 RX ORDER — RANOLAZINE 500 MG/1
TABLET, EXTENDED RELEASE ORAL
Qty: 90 TABLET | Refills: 0 | Status: SHIPPED | OUTPATIENT
Start: 2023-06-02

## 2023-06-08 RX ORDER — LANCETS
EACH MISCELLANEOUS
Qty: 100 EACH | Refills: 1 | Status: SHIPPED | OUTPATIENT
Start: 2023-06-08

## 2023-06-12 ENCOUNTER — LAB ENCOUNTER (OUTPATIENT)
Dept: LAB | Age: 73
End: 2023-06-12
Attending: INTERNAL MEDICINE
Payer: MEDICARE

## 2023-06-12 DIAGNOSIS — E11.9 DIABETES MELLITUS (HCC): Primary | ICD-10-CM

## 2023-06-12 DIAGNOSIS — E78.00 PURE HYPERCHOLESTEROLEMIA: ICD-10-CM

## 2023-06-12 DIAGNOSIS — I10 ESSENTIAL HYPERTENSION, MALIGNANT: ICD-10-CM

## 2023-06-12 LAB
ALBUMIN SERPL-MCNC: 3.4 G/DL (ref 3.4–5)
ALBUMIN/GLOB SERPL: 1 {RATIO} (ref 1–2)
ALP LIVER SERPL-CCNC: 91 U/L
ALT SERPL-CCNC: 31 U/L
ANION GAP SERPL CALC-SCNC: 2 MMOL/L (ref 0–18)
AST SERPL-CCNC: 14 U/L (ref 15–37)
BILIRUB SERPL-MCNC: 0.6 MG/DL (ref 0.1–2)
BUN BLD-MCNC: 12 MG/DL (ref 7–18)
CALCIUM BLD-MCNC: 9.7 MG/DL (ref 8.5–10.1)
CHLORIDE SERPL-SCNC: 106 MMOL/L (ref 98–112)
CHOLEST SERPL-MCNC: 113 MG/DL (ref ?–200)
CO2 SERPL-SCNC: 30 MMOL/L (ref 21–32)
CREAT BLD-MCNC: 0.85 MG/DL
EST. AVERAGE GLUCOSE BLD GHB EST-MCNC: 146 MG/DL (ref 68–126)
FASTING PATIENT LIPID ANSWER: YES
FASTING STATUS PATIENT QL REPORTED: YES
GFR SERPLBLD BASED ON 1.73 SQ M-ARVRAT: 73 ML/MIN/1.73M2 (ref 60–?)
GLOBULIN PLAS-MCNC: 3.5 G/DL (ref 2.8–4.4)
GLUCOSE BLD-MCNC: 117 MG/DL (ref 70–99)
HBA1C MFR BLD: 6.7 % (ref ?–5.7)
HDLC SERPL-MCNC: 53 MG/DL (ref 40–59)
LDLC SERPL CALC-MCNC: 43 MG/DL (ref ?–100)
NONHDLC SERPL-MCNC: 60 MG/DL (ref ?–130)
OSMOLALITY SERPL CALC.SUM OF ELEC: 287 MOSM/KG (ref 275–295)
POTASSIUM SERPL-SCNC: 3.8 MMOL/L (ref 3.5–5.1)
PROT SERPL-MCNC: 6.9 G/DL (ref 6.4–8.2)
SODIUM SERPL-SCNC: 138 MMOL/L (ref 136–145)
TRIGL SERPL-MCNC: 85 MG/DL (ref 30–149)
VLDLC SERPL CALC-MCNC: 12 MG/DL (ref 0–30)

## 2023-06-12 PROCEDURE — 80053 COMPREHEN METABOLIC PANEL: CPT

## 2023-06-12 PROCEDURE — 83036 HEMOGLOBIN GLYCOSYLATED A1C: CPT

## 2023-06-12 PROCEDURE — 36415 COLL VENOUS BLD VENIPUNCTURE: CPT

## 2023-06-12 PROCEDURE — 80061 LIPID PANEL: CPT

## 2023-06-17 RX ORDER — ISOSORBIDE MONONITRATE 120 MG/1
TABLET, EXTENDED RELEASE ORAL
Qty: 90 TABLET | Refills: 0 | Status: SHIPPED | OUTPATIENT
Start: 2023-06-17

## 2023-06-20 ENCOUNTER — OFFICE VISIT (OUTPATIENT)
Dept: PODIATRY CLINIC | Facility: CLINIC | Age: 73
End: 2023-06-20

## 2023-06-20 DIAGNOSIS — M20.41 HAMMER TOES OF BOTH FEET: ICD-10-CM

## 2023-06-20 DIAGNOSIS — E11.9 TYPE 2 DIABETES MELLITUS WITHOUT COMPLICATION, WITHOUT LONG-TERM CURRENT USE OF INSULIN (HCC): ICD-10-CM

## 2023-06-20 DIAGNOSIS — M54.16 LUMBAR RADICULOPATHY: ICD-10-CM

## 2023-06-20 DIAGNOSIS — L60.8 INCURVATED NAIL: ICD-10-CM

## 2023-06-20 DIAGNOSIS — B35.1 ONYCHOMYCOSIS: Primary | ICD-10-CM

## 2023-06-20 DIAGNOSIS — M20.42 HAMMER TOES OF BOTH FEET: ICD-10-CM

## 2023-06-20 DIAGNOSIS — M79.675 PAIN OF TOE OF LEFT FOOT: ICD-10-CM

## 2023-06-20 PROCEDURE — 99213 OFFICE O/P EST LOW 20 MIN: CPT | Performed by: STUDENT IN AN ORGANIZED HEALTH CARE EDUCATION/TRAINING PROGRAM

## 2023-06-20 PROCEDURE — 1125F AMNT PAIN NOTED PAIN PRSNT: CPT | Performed by: STUDENT IN AN ORGANIZED HEALTH CARE EDUCATION/TRAINING PROGRAM

## 2023-06-20 NOTE — PATIENT INSTRUCTIONS
Check feet daily. Ambulate supportive shoes and avoid walking barefoot. Follow-up in 2 to 3 months or sooner if other concerns arise.

## 2023-06-24 DIAGNOSIS — I10 ESSENTIAL HYPERTENSION: ICD-10-CM

## 2023-06-26 RX ORDER — OMEPRAZOLE 40 MG/1
40 CAPSULE, DELAYED RELEASE ORAL
Qty: 52 CAPSULE | Refills: 0 | Status: SHIPPED | OUTPATIENT
Start: 2023-06-27

## 2023-06-26 RX ORDER — LISINOPRIL AND HYDROCHLOROTHIAZIDE 25; 20 MG/1; MG/1
1 TABLET ORAL DAILY
Qty: 90 TABLET | Refills: 0 | Status: SHIPPED | OUTPATIENT
Start: 2023-06-26

## 2023-06-26 NOTE — TELEPHONE ENCOUNTER
Requesting   Name from pharmacy: LISINOPRIL-HCTZ 20-25 MG TAB          Will file in chart as: LISINOPRIL-HYDROCHLOROTHIAZIDE 20-25 MG Oral Tab    Sig: TAKE 1 TABLET BY MOUTH EVERY DAY    Disp: 90 tablet    Refills: 0 (Pharmacy requested: Not specified)    Start: 6/24/2023    Class: Normal    Non-formulary For: Essential hypertension    Last ordered: 3 months ago by Ashleigh Gaston MD Last refill: 3/24/2023    Rx #: [id number]    Hypertension Medications Protocol Tntlzb1406/24/2023 08:42 AM   Protocol Details CMP or BMP in past 12 months    Last serum creatinine< 2.0    Appointment in past 6 or next 3 months       [facility] [employer] [facility] [address] [facility] [place]         Will file in chart as: METFORMIN 500 MG Oral Tab    Sig: TAKE 1 TABLET BY MOUTH TWICE A DAY WITH MEALS    Disp: 180 tablet    Refills: 0 (Pharmacy requested: Not specified)    Start: 6/24/2023    Class: Normal    Non-formulary    Last ordered: 3 months ago by Ashleigh Gaston MD Last refill: 3/24/2023    Rx #: [id number]    Diabetic Medication Protocol Hwwsgb1906/24/2023 08:42 AM   Protocol Details Microalbumin procedure in past 12 months or taking ACE/ARB    HgBA1C procedure resulted in past 6 months    Last HgBA1C < 7.5    Appointment in past 6 or next 3 months       Name from pharmacy: OMEPRAZOLE DR 40 MG CAPSULE         Will file in chart as: OMEPRAZOLE 40 MG Oral Capsule Delayed Release    Sig: TAKE 1 CAPSULE BY MOUTH DAILY.  TAKES SUNDAY, MONDAY, WEDNESDAY AND FRIDAY    Disp: 52 capsule    Refills: 0 (Pharmacy requested: Not specified)    Start: 6/24/2023    Class: Normal    Non-formulary    Last ordered: 3 months ago by Ashleigh Gaston MD Last refill: 3/24/2023    Rx #: [id number]     LOV: 5/4/2023  RTC: None noted   Last Relevant Labs: 6/12/2023  Filled: 3/24/2023 #90 with 0 refills    Future Appointments   Date Time Provider Marisa Hollis   8/22/2023  1:30 PM Kip Orozco DPM Eleuterio Bream

## 2023-06-30 DIAGNOSIS — E11.9 TYPE 2 DIABETES MELLITUS WITHOUT COMPLICATIONS (HCC): ICD-10-CM

## 2023-06-30 RX ORDER — CARVEDILOL 25 MG/1
TABLET, FILM COATED ORAL
Qty: 100 STRIP | Refills: 0 | Status: SHIPPED | OUTPATIENT
Start: 2023-06-30 | End: 2023-06-30

## 2023-06-30 RX ORDER — CARVEDILOL 25 MG/1
TABLET, FILM COATED ORAL
Qty: 100 STRIP | Refills: 2 | Status: SHIPPED | OUTPATIENT
Start: 2023-06-30

## 2023-07-04 DIAGNOSIS — I10 ESSENTIAL HYPERTENSION: ICD-10-CM

## 2023-07-05 NOTE — TELEPHONE ENCOUNTER
Requesting    Name from pharmacy: METOPROLOL TARTRATE 25 MG TAB         Will file in chart as: METOPROLOL TARTRATE 25 MG Oral Tab    Sig: TAKE 1 TABLET BY MOUTH TWICE A DAY    Disp: 180 tablet    Refills: 0 (Pharmacy requested: Not specified)    Start: 7/4/2023    Class: Normal    Non-formulary For: Essential hypertension    Last ordered: 3 months ago by Fabiana Hancock MD Last refill: 4/6/2023    Rx #: [id number]    Hypertension Medications Protocol Kbswtw1807/04/2023 12:10 AM   Protocol Details CMP or BMP in past 12 months    Last serum creatinine< 2.0    Appointment in past 6 or next 3 months           LOV: 5/4/2023  RTC: none noted   Last Relevant Labs: 6/12/2023  Filled: 4/6/2023 #180 with 0 refills    Future Appointments   Date Time Provider Marisa Hollis   8/22/2023  1:30 PM Sarai Orozco DPM Felisa Myers

## 2023-07-07 ENCOUNTER — PATIENT OUTREACH (OUTPATIENT)
Dept: INTERNAL MEDICINE CLINIC | Facility: CLINIC | Age: 73
End: 2023-07-07

## 2023-07-09 DIAGNOSIS — Z86.73 HISTORY OF STROKE WITHOUT RESIDUAL DEFICITS: ICD-10-CM

## 2023-07-10 RX ORDER — CLOPIDOGREL BISULFATE 75 MG/1
75 TABLET ORAL DAILY
Qty: 90 TABLET | Refills: 0 | Status: SHIPPED | OUTPATIENT
Start: 2023-07-10

## 2023-07-10 RX ORDER — NIFEDIPINE 60 MG/1
60 TABLET, FILM COATED, EXTENDED RELEASE ORAL DAILY
Qty: 90 TABLET | Refills: 0 | Status: SHIPPED | OUTPATIENT
Start: 2023-07-10

## 2023-07-10 NOTE — TELEPHONE ENCOUNTER
Requesting    Name from pharmacy: CLOPIDOGREL 75 MG TABLET         Will file in chart as: CLOPIDOGREL 75 MG Oral Tab    Sig: TAKE 1 TABLET BY MOUTH EVERY DAY    Disp: 90 tablet    Refills: 0 (Pharmacy requested: Not specified)    Start: 7/9/2023    Class: Normal    Non-formulary For: History of stroke without residual deficits    Last ordered: 3 months ago by Amado Burris MD Last refill: 4/10/2023    Rx #: [id number]        Name from pharmacy: NIFEDIPINE ER 60 MG TABLET         Will file in chart as: NIFEDIPINE ER 60 MG Oral Tablet 24 Hr    Sig: TAKE 1 TABLET BY MOUTH EVERY DAY    Disp: 90 tablet    Refills: 0 (Pharmacy requested: Not specified)    Start: 7/9/2023    Class: Normal    Non-formulary    Last ordered: 3 months ago by Amado Burris MD Last refill: 4/10/2023    Rx #: [id number]    Hypertension Medications Protocol Ambdok6507/09/2023 09:03 AM   Protocol Details CMP or BMP in past 12 months    Last serum creatinine< 2.0    Appointment in past 6 or next 3 months        LOV: 5/4/2023  RTC: None noted   Last Relevant Labs: 6/12/2023  Filled: 4/10/2023 #90 with 0 refills    Future Appointments   Date Time Provider Marisa Hollis   8/22/2023  1:30 PM Samuel Orozco DPM Launa Franklin

## 2023-07-15 DIAGNOSIS — Z86.73 HISTORY OF STROKE WITHIN LAST YEAR: ICD-10-CM

## 2023-07-17 ENCOUNTER — OFFICE VISIT (OUTPATIENT)
Dept: INTERNAL MEDICINE CLINIC | Facility: CLINIC | Age: 73
End: 2023-07-17
Payer: MEDICARE

## 2023-07-17 ENCOUNTER — HOSPITAL ENCOUNTER (OUTPATIENT)
Dept: GENERAL RADIOLOGY | Age: 73
Discharge: HOME | End: 2023-07-17
Attending: FAMILY MEDICINE
Payer: MEDICARE

## 2023-07-17 VITALS
SYSTOLIC BLOOD PRESSURE: 126 MMHG | RESPIRATION RATE: 16 BRPM | OXYGEN SATURATION: 96 % | BODY MASS INDEX: 27.94 KG/M2 | DIASTOLIC BLOOD PRESSURE: 68 MMHG | HEART RATE: 62 BPM | WEIGHT: 151.81 LBS | HEIGHT: 62 IN

## 2023-07-17 DIAGNOSIS — M25.531 RIGHT WRIST PAIN: ICD-10-CM

## 2023-07-17 DIAGNOSIS — M79.621 PAIN OF RIGHT UPPER ARM: Primary | ICD-10-CM

## 2023-07-17 PROCEDURE — 1125F AMNT PAIN NOTED PAIN PRSNT: CPT | Performed by: FAMILY MEDICINE

## 2023-07-17 PROCEDURE — 99213 OFFICE O/P EST LOW 20 MIN: CPT | Performed by: FAMILY MEDICINE

## 2023-07-17 PROCEDURE — 73110 X-RAY EXAM OF WRIST: CPT | Performed by: FAMILY MEDICINE

## 2023-07-17 RX ORDER — ATORVASTATIN CALCIUM 40 MG/1
TABLET, FILM COATED ORAL
Qty: 90 TABLET | Refills: 0 | Status: SHIPPED | OUTPATIENT
Start: 2023-07-17

## 2023-07-17 NOTE — TELEPHONE ENCOUNTER
Atorvastatin 40 mg  Filled 4-14-23  Qty 90  0 refills  No upcoming appt.   LOV 8-17-22 TO  Labs 6-12-23 Lipid

## 2023-08-17 ENCOUNTER — LAB ENCOUNTER (OUTPATIENT)
Dept: LAB | Age: 73
End: 2023-08-17
Attending: FAMILY MEDICINE
Payer: MEDICARE

## 2023-08-17 ENCOUNTER — OFFICE VISIT (OUTPATIENT)
Dept: INTERNAL MEDICINE CLINIC | Facility: CLINIC | Age: 73
End: 2023-08-17
Payer: MEDICARE

## 2023-08-17 VITALS
HEART RATE: 60 BPM | BODY MASS INDEX: 27.97 KG/M2 | WEIGHT: 152 LBS | HEIGHT: 62 IN | SYSTOLIC BLOOD PRESSURE: 106 MMHG | DIASTOLIC BLOOD PRESSURE: 50 MMHG | OXYGEN SATURATION: 94 % | TEMPERATURE: 97 F | RESPIRATION RATE: 16 BRPM

## 2023-08-17 DIAGNOSIS — I10 ESSENTIAL HYPERTENSION: ICD-10-CM

## 2023-08-17 DIAGNOSIS — I70.0 ATHEROSCLEROSIS OF AORTA (HCC): Primary | ICD-10-CM

## 2023-08-17 DIAGNOSIS — K21.00 GASTROESOPHAGEAL REFLUX DISEASE WITH ESOPHAGITIS WITHOUT HEMORRHAGE: ICD-10-CM

## 2023-08-17 DIAGNOSIS — I20.1 PRINZMETAL ANGINA (HCC): ICD-10-CM

## 2023-08-17 DIAGNOSIS — Z12.31 VISIT FOR SCREENING MAMMOGRAM: ICD-10-CM

## 2023-08-17 DIAGNOSIS — M54.16 LUMBAR RADICULOPATHY: ICD-10-CM

## 2023-08-17 DIAGNOSIS — Z00.00 ENCOUNTER FOR ANNUAL HEALTH EXAMINATION: ICD-10-CM

## 2023-08-17 DIAGNOSIS — I65.23 BILATERAL CAROTID ARTERY STENOSIS: ICD-10-CM

## 2023-08-17 DIAGNOSIS — K29.90 GASTRITIS AND GASTRODUODENITIS: ICD-10-CM

## 2023-08-17 DIAGNOSIS — E11.9 TYPE 2 DIABETES MELLITUS WITHOUT COMPLICATION, WITHOUT LONG-TERM CURRENT USE OF INSULIN (HCC): ICD-10-CM

## 2023-08-17 DIAGNOSIS — K29.70 GASTRITIS AND GASTRODUODENITIS: ICD-10-CM

## 2023-08-17 DIAGNOSIS — R80.9 PROTEINURIA, UNSPECIFIED TYPE: ICD-10-CM

## 2023-08-17 DIAGNOSIS — E78.00 PURE HYPERCHOLESTEROLEMIA: ICD-10-CM

## 2023-08-17 DIAGNOSIS — G47.33 OBSTRUCTIVE SLEEP APNEA (ADULT) (PEDIATRIC): ICD-10-CM

## 2023-08-17 DIAGNOSIS — Z86.73 H/O: CVA (CEREBROVASCULAR ACCIDENT): ICD-10-CM

## 2023-08-17 LAB
BASOPHILS # BLD AUTO: 0.02 X10(3) UL (ref 0–0.2)
BASOPHILS NFR BLD AUTO: 0.4 %
BILIRUB UR QL STRIP.AUTO: NEGATIVE
CLARITY UR REFRACT.AUTO: CLEAR
CREAT UR-SCNC: 25.3 MG/DL
EOSINOPHIL # BLD AUTO: 0.07 X10(3) UL (ref 0–0.7)
EOSINOPHIL NFR BLD AUTO: 1.4 %
ERYTHROCYTE [DISTWIDTH] IN BLOOD BY AUTOMATED COUNT: 13.5 %
GLUCOSE UR STRIP.AUTO-MCNC: NORMAL MG/DL
HCT VFR BLD AUTO: 39.9 %
HGB BLD-MCNC: 13 G/DL
IMM GRANULOCYTES # BLD AUTO: 0.01 X10(3) UL (ref 0–1)
IMM GRANULOCYTES NFR BLD: 0.2 %
KETONES UR STRIP.AUTO-MCNC: NEGATIVE MG/DL
LEUKOCYTE ESTERASE UR QL STRIP.AUTO: NEGATIVE
LYMPHOCYTES # BLD AUTO: 1.46 X10(3) UL (ref 1–4)
LYMPHOCYTES NFR BLD AUTO: 28.2 %
MCH RBC QN AUTO: 29 PG (ref 26–34)
MCHC RBC AUTO-ENTMCNC: 32.6 G/DL (ref 31–37)
MCV RBC AUTO: 89.1 FL
MICROALBUMIN UR-MCNC: <0.5 MG/DL
MONOCYTES # BLD AUTO: 0.63 X10(3) UL (ref 0.1–1)
MONOCYTES NFR BLD AUTO: 12.2 %
NEUTROPHILS # BLD AUTO: 2.99 X10 (3) UL (ref 1.5–7.7)
NEUTROPHILS # BLD AUTO: 2.99 X10(3) UL (ref 1.5–7.7)
NEUTROPHILS NFR BLD AUTO: 57.6 %
NITRITE UR QL STRIP.AUTO: NEGATIVE
PH UR STRIP.AUTO: 5.5 [PH] (ref 5–8)
PLATELET # BLD AUTO: 313 10(3)UL (ref 150–450)
PROT UR STRIP.AUTO-MCNC: NEGATIVE MG/DL
RBC # BLD AUTO: 4.48 X10(6)UL
RBC UR QL AUTO: NEGATIVE
SP GR UR STRIP.AUTO: 1 (ref 1–1.03)
TSI SER-ACNC: 1.17 MIU/ML (ref 0.36–3.74)
UROBILINOGEN UR STRIP.AUTO-MCNC: NORMAL MG/DL
WBC # BLD AUTO: 5.2 X10(3) UL (ref 4–11)

## 2023-08-17 PROCEDURE — 36415 COLL VENOUS BLD VENIPUNCTURE: CPT

## 2023-08-17 PROCEDURE — 82043 UR ALBUMIN QUANTITATIVE: CPT

## 2023-08-17 PROCEDURE — 84443 ASSAY THYROID STIM HORMONE: CPT

## 2023-08-17 PROCEDURE — 82570 ASSAY OF URINE CREATININE: CPT

## 2023-08-17 PROCEDURE — G0439 PPPS, SUBSEQ VISIT: HCPCS | Performed by: FAMILY MEDICINE

## 2023-08-17 PROCEDURE — 85025 COMPLETE CBC W/AUTO DIFF WBC: CPT

## 2023-08-17 PROCEDURE — 1126F AMNT PAIN NOTED NONE PRSNT: CPT | Performed by: FAMILY MEDICINE

## 2023-08-17 PROCEDURE — 81003 URINALYSIS AUTO W/O SCOPE: CPT

## 2023-08-17 RX ORDER — OMEPRAZOLE 40 MG/1
40 CAPSULE, DELAYED RELEASE ORAL
Qty: 52 CAPSULE | Refills: 0 | Status: SHIPPED | OUTPATIENT
Start: 2023-08-17

## 2023-08-21 ENCOUNTER — TELEPHONE (OUTPATIENT)
Dept: INTERNAL MEDICINE CLINIC | Facility: CLINIC | Age: 73
End: 2023-08-21

## 2023-08-22 ENCOUNTER — OFFICE VISIT (OUTPATIENT)
Dept: PODIATRY CLINIC | Facility: CLINIC | Age: 73
End: 2023-08-22

## 2023-08-22 DIAGNOSIS — B35.1 ONYCHOMYCOSIS: Primary | ICD-10-CM

## 2023-08-22 DIAGNOSIS — L60.8 INCURVATED NAIL: ICD-10-CM

## 2023-08-22 DIAGNOSIS — E11.9 TYPE 2 DIABETES MELLITUS WITHOUT COMPLICATION, WITHOUT LONG-TERM CURRENT USE OF INSULIN (HCC): ICD-10-CM

## 2023-08-22 DIAGNOSIS — M79.675 PAIN OF TOE OF LEFT FOOT: ICD-10-CM

## 2023-08-22 DIAGNOSIS — M54.16 LUMBAR RADICULOPATHY: ICD-10-CM

## 2023-08-22 DIAGNOSIS — M20.42 HAMMER TOES OF BOTH FEET: ICD-10-CM

## 2023-08-22 DIAGNOSIS — M20.41 HAMMER TOES OF BOTH FEET: ICD-10-CM

## 2023-08-22 PROCEDURE — 99213 OFFICE O/P EST LOW 20 MIN: CPT | Performed by: STUDENT IN AN ORGANIZED HEALTH CARE EDUCATION/TRAINING PROGRAM

## 2023-08-22 NOTE — PROGRESS NOTES
[facility]  Progress Note    Phillip Bunn is a 68year old female. Patient presents with:  Diabetic Foot Care: 2 months f/u -  yesterday afternoon. HPI:     Patient is a pleasant 68year old female with PMHx of diabetes and lumbar radiculopathy who RTC for diabetic foot exam. Patient complains of elongated and thickened nails she has difficulty trimming on her own that occasinoally become incurvated and painful. Her lateral border of right hallux is mildly incurvated and painful. Her blood sugars were 121 mg/dL when checked yesterday afternoon. Her last A1c was 6.7% on 6/12/23. PMHx, medications, and allergies were reviewed. Allergies: Penicillins, Advil [Ibuprofen], Aspirin, Codeine Sulfate, Demerol, Naproxen, Norvasc [Amlodipine Besylate], Nsaids, Tramadol, Vicodin [Hydrocodone-Acetaminophen], and Zetia [Ezetimibe]   Current Outpatient Medications   Medication Sig Dispense Refill    metoprolol tartrate 25 MG Oral Tab Take 1 tablet (25 mg total) by mouth 2 (two) times daily. 180 tablet 0    Omeprazole 40 MG Oral Capsule Delayed Release Take 1 capsule (40 mg total) by mouth 4 (four) times a week. WASHBURN/MO/WE/FR 52 capsule 0    ATORVASTATIN 40 MG Oral Tab TAKE 1 TABLET BY MOUTH EVERY DAY IN THE EVENING 90 tablet 0    clopidogrel 75 MG Oral Tab Take 1 tablet (75 mg total) by mouth daily. 90 tablet 0    NIFEdipine ER 60 MG Oral Tablet 24 Hr Take 1 tablet (60 mg total) by mouth daily. 90 tablet 0    Glucose Blood (CONTOUR TEST) In Vitro Strip USE TO TEST ONCE DAILY AS DIRECTED. 100 strip 2    lisinopril-hydroCHLOROthiazide 20-25 MG Oral Tab Take 1 tablet by mouth daily. 90 tablet 0    metFORMIN 500 MG Oral Tab Take 1 tablet (500 mg total) by mouth 2 (two) times daily with meals.  180 tablet 0    ISOSORBIDE MONONITRATE  MG Oral Tablet 24 Hr TAKE 1 TABLET BY MOUTH EVERY DAY 90 tablet 0    MICROLET LANCETS Does not apply Misc USE TO TEST ONCE DAILY *DX E11.9* 100 each 1 RANOLAZINE  MG Oral Tablet 12 Hr TAKE 1 TABLET BY MOUTH EVERY DAY 90 tablet 0    omega-3 fatty acids 1000 MG Oral Cap Take 1,000 mg by mouth daily. NON FORMULARY Take 1 tablet by mouth daily. LACTATE PILLS      nitroGLYCERIN (NITROSTAT) 0.4 MG Sublingual SL Tab Place 1 tablet (0.4 mg total) under the tongue every 5 (five) minutes as needed. 30 tablet 5    Black Elderberry,Berry-Flower, 575 MG Oral Cap Take by mouth.      mometasone 0.1 % External Ointment Apply topically as needed. cetirizine 10 MG Oral Tab Take 1 tablet (10 mg total) by mouth daily. acetaminophen 500 MG Oral Tab Take 1 tablet (500 mg total) by mouth every 6 (six) hours as needed for Pain. PEG 3350 Oral Powd Pack Take 17 g by mouth nightly. Multiple Vitamin (MULTI-VITAMIN) Oral Tab Take 1 tablet by mouth daily.         Past Medical History:   Diagnosis Date    Abnormal maternal glucose tolerance, complicating pregnancy, childbirth, or the puerperium, unspecified as to episode of care     Acute bronchitis 2011    Back problem     Cataract     Chest pain, unspecified     admit     Deep vein thrombosis ([address]     during pregnancy right leg    Diabetes ([address]     Diverticulosis of large intestine     unsure if divertiulitis or diverticulosis    Esophageal reflux     Essential hypertension, benign     Helicobacter pylori gastritis     High cholesterol     Incontinence     Palpitations     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     PONV (postoperative nausea and vomiting)     nausea,pain results in cardiac vasospasms    Sleep apnea     Stroke ([address]     r/o 17    Unspecified gastritis and gastroduodenitis without mention of hemorrhage 2004    Visual impairment     glasses      Past Surgical History:   Procedure Laterality Date    BACK SURGERY      Spinal diskectomy lumbar    at [facility]        1987    CARDIAC CATHERTERIZATION (PBP) status normal resolved on 2007    CATARACT      CHOLECYSTECTOMY  1999    COLONOSCOPY  2011    colon polyp removal  francesca cee 5    COLONOSCOPY      COLONOSCOPY N/A 2017    Procedure: COLONOSCOPY;  Surgeon: Ruth Goode MD;  Location: [address] [place] [address] ENDOSCOPY    HERNIA SURGERY      Umbilical hernia repair;     HYSTERECTOMY      d/t fibroids    LUMBAR DISCECTOMY Left 2019    L4-L5   Dr Alise Newman      Spinal Steroid Injection     SHOULDER SURG PROC UNLISTED      SPINE SURGERY PROCEDURE UNLISTED      bulging discs in lower back    UPPER GI ENDOSCOPY PERFORMED  2011      Family History   Problem Relation Age of Onset    Cancer Father          at age 46 Leukemia    Diabetes Father     Breast Cancer Paternal Grandmother     Cancer Paternal Grandmother         bone cancer    Cancer Other       Social History    Socioeconomic History      Marital status:     Tobacco Use      Smoking status: Never      Smokeless tobacco: Never    Vaping Use      Vaping Use: Never used    Substance and Sexual Activity      Alcohol use: Yes        Comment: very seldom      Drug use: No    Other Topics      Concerns:        Caffeine Concern: Yes          1 cup of coffee every morning and 1 glass of tea during dinner. Sometimes dark chocolate. Exercise: No          REVIEW OF SYSTEMS:     Today reviewed systems as documented below  GENERAL HEALTH: feels well otherwise  SKIN: denies any unusual skin lesions or rashes  RESPIRATORY: denies shortness of breath with exertion  CARDIOVASCULAR: denies chest pain on exertion  GI: denies abdominal pain and denies heartburn  NEURO: denies headaches  MUSCULO: history of lower back pain      EXAM:   There were no vitals taken for this visit. GENERAL: well developed, well nourished, in no apparent distress  EXTREMITIES:  1. Integument: Normal skin temperature and turgor. Nails x10 are elongated and thickened and incurvated.   Lateral border of right hallux nail mildly incurvated. No acute signs of infection noted. 2. Vascular: Dorsalis pedis two out of four bilateral and posterior tibial pulses two out of   four bilateral, capillary refill normal.   3. Musculoskeletal: All muscle groups are graded 5 out of 5 in the foot and ankle. Hammertoe deformities bilaterally. Adductovarus rotation of left fifth toe. Mild pain noted to lateral border of right hallux nail. 4. Neurological: Normal sharp dull sensation; reflexes normal. Protective sensation intact to digits via 10g monofilament. ASSESSMENT AND PLAN:   Diagnoses and all orders for this visit:    Onychomycosis    Incurvated nail    Hammer toes of both feet    Lumbar radiculopathy    Type 2 diabetes mellitus without complication, without long-term current use of insulin ([facility])    Pain of toe of left foot          Plan:     -Patient examined, chart history reviewed. -Discussed importance of proper pedal hygiene, regular foot checks, and tight glucose control.  -Sharply debrided nails x10 with a sterile nail nipper achieving a 20% reduction in thickness and length, without incident. Slant back procedure performed to areas of incurvated nails without incident. Nails further smoothed with dremel.  -Patient to ambulate with supportive shoes with wide toe box.  -Patient to check feet daily and follow up if any concerns arise.  -Educated patient on acute signs of infection advised patient to seek immediate medical attention if symptoms arise. The patient indicates understanding of these issues and agrees to the plan. Return in about 2 months (around 10/22/2023) for routine foot care.     Diann Yadav DPM

## 2023-08-28 RX ORDER — RANOLAZINE 500 MG/1
500 TABLET, EXTENDED RELEASE ORAL DAILY
Qty: 90 TABLET | Refills: 0 | Status: SHIPPED | OUTPATIENT
Start: 2023-08-28

## 2023-08-28 NOTE — TELEPHONE ENCOUNTER
Requesting         Name from pharmacy: RANOLAZINE  MG TABLET         Will file in chart as: RANOLAZINE  MG Oral Tablet 12 Hr    Sig: TAKE 1 TABLET BY MOUTH EVERY DAY    Disp: 90 tablet    Refills: 0 (Pharmacy requested: Not specified)    Start: 8/28/2023    Class: Normal    Non-formulary    Last ordered: 2 months ago by Vandana Carrero MD Last refill: 6/2/2023    Rx #: [id number]            LOV: 8/17/2023  RTC: none noted   Last Relevant Labs: n/a  Filled: 6/2/2023 #90 with 0 refills    Future Appointments   Date Time Provider Marisa Hollis   8/30/2023  2:00 PM PAWAN ELAINE RM1 PAWAN Jose   10/24/2023  2:15 PM Matthew Orozco DPM Fredie Pinal

## 2023-08-30 ENCOUNTER — HOSPITAL ENCOUNTER (OUTPATIENT)
Dept: MAMMOGRAPHY | Age: 73
Discharge: HOME | End: 2023-08-30
Attending: FAMILY MEDICINE
Payer: MEDICARE

## 2023-08-30 DIAGNOSIS — Z12.31 VISIT FOR SCREENING MAMMOGRAM: ICD-10-CM

## 2023-08-30 PROCEDURE — 77067 SCR MAMMO BI INCL CAD: CPT | Performed by: FAMILY MEDICINE

## 2023-08-30 PROCEDURE — 77063 BREAST TOMOSYNTHESIS BI: CPT | Performed by: FAMILY MEDICINE

## 2023-09-17 RX ORDER — ISOSORBIDE MONONITRATE 120 MG/1
120 TABLET, EXTENDED RELEASE ORAL DAILY
Qty: 90 TABLET | Refills: 0 | Status: SHIPPED | OUTPATIENT
Start: 2023-09-17

## 2023-09-20 ENCOUNTER — HOSPITAL ENCOUNTER (OUTPATIENT)
Age: 73
Discharge: HOME | End: 2023-09-20
Attending: EMERGENCY MEDICINE
Payer: MEDICARE

## 2023-09-20 ENCOUNTER — APPOINTMENT (OUTPATIENT)
Dept: CT IMAGING | Age: 73
End: 2023-09-20
Attending: EMERGENCY MEDICINE
Payer: MEDICARE

## 2023-09-20 ENCOUNTER — NURSE TRIAGE (OUTPATIENT)
Dept: INTERNAL MEDICINE CLINIC | Facility: CLINIC | Age: 73
End: 2023-09-20

## 2023-09-20 VITALS
OXYGEN SATURATION: 100 % | BODY MASS INDEX: 27.97 KG/M2 | HEART RATE: 72 BPM | DIASTOLIC BLOOD PRESSURE: 63 MMHG | WEIGHT: 152 LBS | TEMPERATURE: 98 F | HEIGHT: 62 IN | SYSTOLIC BLOOD PRESSURE: 153 MMHG | RESPIRATION RATE: 16 BRPM

## 2023-09-20 DIAGNOSIS — R51.9 SCALP PAIN: Primary | ICD-10-CM

## 2023-09-20 PROCEDURE — 70450 CT HEAD/BRAIN W/O DYE: CPT | Performed by: EMERGENCY MEDICINE

## 2023-09-20 PROCEDURE — 99214 OFFICE O/P EST MOD 30 MIN: CPT

## 2023-09-20 PROCEDURE — 99213 OFFICE O/P EST LOW 20 MIN: CPT

## 2023-09-20 NOTE — TELEPHONE ENCOUNTER
Patient called stating since last night around 2AM she has been having pain in left ear shooting to her head. She also states she has slight jaw pain with this as well. No fever.    Please advise    Contact patient at [phone]

## 2023-09-20 NOTE — TELEPHONE ENCOUNTER
Action Requested: Summary for Provider     []  Critical Lab, Recommendations Needed  [x] Need Additional Advice  []   FYI    []   Need Orders  [] Need Medications Sent to Pharmacy  []  Other     SUMMARY: TO please advise. Per protocol, pt to be seen in office today. Squeeze in or send to IC/ER? Pt stated she woke up in the middle of night with extreme left ear pain. Pt states it shoots to top of head where she feels like she can't touch her scalp. This morning couldn't brush hair because it hurt to touch it. No drainage from ear. Denies fever. Has allergies and noticed has a runny nose, but nothing new. Takes allergy medication daily. Left ear feels clogged. Pt did state she had an episode of chest tightness, but didn't think much of it because it went away and wasn't painful.        Reason for call: Sick Call (Left ear and head pain)  Onset: last night                   Reason for Disposition   Severe earache pain    Protocols used: Amanda Cardozo

## 2023-09-20 NOTE — ED INITIAL ASSESSMENT (HPI)
Pt woke up this am with shooting pain to the left ear that radiates to her jaw and her scalp/left side of head. Also has a headache.

## 2023-09-22 ENCOUNTER — OFFICE VISIT (OUTPATIENT)
Dept: INTERNAL MEDICINE CLINIC | Facility: CLINIC | Age: 73
End: 2023-09-22
Payer: MEDICARE

## 2023-09-22 VITALS
OXYGEN SATURATION: 96 % | HEIGHT: 62 IN | WEIGHT: 152.81 LBS | HEART RATE: 69 BPM | BODY MASS INDEX: 28.12 KG/M2 | TEMPERATURE: 98 F | DIASTOLIC BLOOD PRESSURE: 60 MMHG | SYSTOLIC BLOOD PRESSURE: 120 MMHG

## 2023-09-22 DIAGNOSIS — I73.9 SMALL VESSEL DISEASE (HCC): ICD-10-CM

## 2023-09-22 DIAGNOSIS — G50.0 TRIGEMINAL NEURALGIA: Primary | ICD-10-CM

## 2023-09-22 PROCEDURE — 99214 OFFICE O/P EST MOD 30 MIN: CPT | Performed by: FAMILY MEDICINE

## 2023-09-22 RX ORDER — NITROGLYCERIN 0.4 MG/1
0.4 TABLET SUBLINGUAL EVERY 5 MIN PRN
Qty: 30 TABLET | Refills: 5 | Status: CANCELLED | OUTPATIENT
Start: 2023-09-22

## 2023-09-22 RX ORDER — GABAPENTIN 100 MG/1
100 CAPSULE ORAL 2 TIMES DAILY
Qty: 60 CAPSULE | Refills: 0 | Status: SHIPPED | OUTPATIENT
Start: 2023-09-22

## 2023-09-24 DIAGNOSIS — I10 ESSENTIAL HYPERTENSION: ICD-10-CM

## 2023-09-25 RX ORDER — LISINOPRIL AND HYDROCHLOROTHIAZIDE 25; 20 MG/1; MG/1
1 TABLET ORAL DAILY
Qty: 90 TABLET | Refills: 0 | Status: SHIPPED | OUTPATIENT
Start: 2023-09-25

## 2023-09-25 NOTE — TELEPHONE ENCOUNTER
Requesting    Name from pharmacy: [facility]FORMIN  [facility] [place]         Will file in chart as: METFORMIN 500 MG Oral Tab    Sig: TAKE 1 TABLET BY MOUTH TWICE A DAY WITH MEALS    Disp: 180 tablet    Refills: 0 (Pharmacy requested: Not specified)    Start: 9/24/2023    Class: Normal    Non-formulary    Last ordered: 3 months ago (6/26/2023) by Ade Lew MD    Last refill: 6/26/2023    Rx #: [id number]    Diabetic Medication Protocol Pwnudv8009/24/2023 07:14 AM   Protocol Details HgBA1C procedure resulted in past 6 months    Last HgBA1C < 7.5    Microalbumin procedure in past 12 months or taking ACE/ARB    Appointment in past 6 or next 3 months       Name from pharmacy: [facility] [address] [place] [address] TAB         Will file in chart as: LISINOPRIL-HYDROCHLOROTHIAZIDE 20-25 MG Oral Tab    Sig: TAKE 1 TABLET BY MOUTH EVERY DAY    Disp: 90 tablet    Refills: 0 (Pharmacy requested: Not specified)    Start: 9/24/2023    Class: Normal    Non-formulary For: Essential hypertension    Last ordered: 3 months ago (6/26/2023) by Ade Lew MD    Last refill: 6/26/2023    Rx #: [id number]    Hypertension Medications Protocol Yxifjy3609/24/2023 07:14 AM   Protocol Details CMP or BMP in past 12 months    Last serum creatinine< 2.0    Appointment in past 6 or next 3 months        LOV: 9/22/2023  RTC: none noted   Last Relevant Labs: 6/12/2023  Filled: 6/26/202390 day supply with 0 refills    Future Appointments   Date Time Provider [facility]   9/29/2023 12:00 PM Rochell Cushing, MD EMG 8 EMG [place]   10/24/2023  2:15 PM Erica Orozco DPM [id number]   10/26/2023  1:40 PM Conor Hernandez MD [facility] EMG [facility]

## 2023-09-29 ENCOUNTER — OFFICE VISIT (OUTPATIENT)
Dept: INTERNAL MEDICINE CLINIC | Facility: CLINIC | Age: 73
End: 2023-09-29
Payer: MEDICARE

## 2023-09-29 VITALS
SYSTOLIC BLOOD PRESSURE: 134 MMHG | BODY MASS INDEX: 28.49 KG/M2 | OXYGEN SATURATION: 98 % | TEMPERATURE: 98 F | HEART RATE: 66 BPM | WEIGHT: 154.81 LBS | HEIGHT: 62 IN | DIASTOLIC BLOOD PRESSURE: 78 MMHG

## 2023-09-29 DIAGNOSIS — G50.0 TRIGEMINAL NEURALGIA: Primary | ICD-10-CM

## 2023-09-29 PROCEDURE — 99213 OFFICE O/P EST LOW 20 MIN: CPT | Performed by: FAMILY MEDICINE

## 2023-09-29 RX ORDER — GABAPENTIN 100 MG/1
100 CAPSULE ORAL 2 TIMES DAILY
Qty: 60 CAPSULE | Refills: 0 | Status: SHIPPED | OUTPATIENT
Start: 2023-09-29

## 2023-10-11 DIAGNOSIS — Z86.73 HISTORY OF STROKE WITHOUT RESIDUAL DEFICITS: ICD-10-CM

## 2023-10-11 RX ORDER — CLOPIDOGREL BISULFATE 75 MG/1
75 TABLET ORAL DAILY
Qty: 90 TABLET | Refills: 0 | Status: SHIPPED | OUTPATIENT
Start: 2023-10-11

## 2023-10-11 RX ORDER — NIFEDIPINE 60 MG/1
60 TABLET, FILM COATED, EXTENDED RELEASE ORAL DAILY
Qty: 90 TABLET | Refills: 0 | Status: SHIPPED | OUTPATIENT
Start: 2023-10-11

## 2023-10-11 NOTE — TELEPHONE ENCOUNTER
Requesting    Name from pharmacy: NIFEDIPINE ER 60 MG TABLET         Will file in chart as: NIFEDIPINE ER 60 MG Oral Tablet 24 Hr    Sig: TAKE 1 TABLET BY MOUTH EVERY DAY    Disp: 90 tablet    Refills: 0 (Pharmacy requested: Not specified)    Start: 10/11/2023    Class: Normal    Non-formulary    Last ordered: 3 months ago (7/10/2023) by Ade Lew MD    Last refill: 7/10/2023    Rx #: [id number]    Hypertension Medications Protocol Kzvcch53/11/2023 12:10 AM   Protocol Details CMP or BMP in past 12 months    Last serum creatinine< 2.0    Appointment in past 6 or next 3 months       Name from pharmacy: CLOPIDOGREL 75 MG TABLET         Will file in chart as: CLOPIDOGREL 75 MG Oral Tab    Sig: TAKE 1 TABLET BY MOUTH EVERY DAY    Disp: 90 tablet    Refills: 0 (Pharmacy requested: Not specified)    Start: 10/11/2023    Class: Normal    Non-formulary For: History of stroke without residual deficits    Last ordered: 3 months ago (7/10/2023) by Ade Lew MD    Last refill: 7/10/2023    Rx #: [id number]     LOV: 8/17/2023  RTC: 6 months   Last Relevant Labs: 6/12/2023  Filled: 7/10/2023 #90 with 0 refills    Future Appointments   Date Time Provider Marisa Hollis   10/24/2023  2:15 PM Erica Orozco DPM [id number]   10/26/2023  1:40 PM Conor Hernandez MD [facility]

## 2023-10-17 DIAGNOSIS — Z86.73 HISTORY OF STROKE WITHIN LAST YEAR: ICD-10-CM

## 2023-10-17 RX ORDER — ATORVASTATIN CALCIUM 40 MG/1
TABLET, FILM COATED ORAL
Qty: 90 TABLET | Refills: 2 | Status: SHIPPED | OUTPATIENT
Start: 2023-10-17

## 2023-10-17 NOTE — TELEPHONE ENCOUNTER
Atorvastatin 40 mg  Filled 7-17-23  Qty 90  0 refills  Future Appointments   Date Time Provider Marisa Hollis   10/24/2023  2:15 PM Kathy Valenzuela [id number]   10/26/2023  1:40 PM Jewels Barber MD [facility] 8-17-23 TO  Labs 6-12-23 Lipid

## 2023-10-26 ENCOUNTER — OFFICE VISIT (OUTPATIENT)
Dept: NEUROLOGY | Facility: CLINIC | Age: 73
End: 2023-10-26

## 2023-10-26 VITALS
DIASTOLIC BLOOD PRESSURE: 68 MMHG | RESPIRATION RATE: 16 BRPM | BODY MASS INDEX: 28.34 KG/M2 | HEIGHT: 62 IN | HEART RATE: 73 BPM | SYSTOLIC BLOOD PRESSURE: 138 MMHG | WEIGHT: 154 LBS

## 2023-10-26 DIAGNOSIS — I72.0 CAROTID ANEURYSM, LEFT (HCC): ICD-10-CM

## 2023-10-26 DIAGNOSIS — G50.0 TRIGEMINAL NEURALGIA SYNDROME: Primary | ICD-10-CM

## 2023-10-26 DIAGNOSIS — R51.9 SUDDEN ONSET UNILATERAL HEADACHE: ICD-10-CM

## 2023-10-26 PROCEDURE — 99204 OFFICE O/P NEW MOD 45 MIN: CPT | Performed by: OTHER

## 2023-11-07 ENCOUNTER — OFFICE VISIT (OUTPATIENT)
Dept: PODIATRY CLINIC | Facility: CLINIC | Age: 73
End: 2023-11-07
Payer: MEDICARE

## 2023-11-07 DIAGNOSIS — E11.9 TYPE 2 DIABETES MELLITUS WITHOUT COMPLICATION, WITHOUT LONG-TERM CURRENT USE OF INSULIN (HCC): ICD-10-CM

## 2023-11-07 DIAGNOSIS — L60.8 INCURVATED NAIL: ICD-10-CM

## 2023-11-07 DIAGNOSIS — M20.41 HAMMER TOES OF BOTH FEET: ICD-10-CM

## 2023-11-07 DIAGNOSIS — M20.42 HAMMER TOES OF BOTH FEET: ICD-10-CM

## 2023-11-07 DIAGNOSIS — M54.16 LUMBAR RADICULOPATHY: ICD-10-CM

## 2023-11-07 DIAGNOSIS — M79.675 PAIN OF TOE OF LEFT FOOT: ICD-10-CM

## 2023-11-07 DIAGNOSIS — B35.1 ONYCHOMYCOSIS: Primary | ICD-10-CM

## 2023-11-07 PROCEDURE — 1126F AMNT PAIN NOTED NONE PRSNT: CPT | Performed by: STUDENT IN AN ORGANIZED HEALTH CARE EDUCATION/TRAINING PROGRAM

## 2023-11-07 PROCEDURE — 99213 OFFICE O/P EST LOW 20 MIN: CPT | Performed by: STUDENT IN AN ORGANIZED HEALTH CARE EDUCATION/TRAINING PROGRAM

## 2023-11-07 NOTE — PROGRESS NOTES
[address] Podiatry  Progress Note    Jana Love is a 68year old female. Chief Complaint   Patient presents with    Follow - Up     Nail care and foot check- patient denies pain at this time- left foot- there is something underneath her left foot - feels rough - painful when walking barefooted. HPI:     Patient is a pleasant 68year old female with PMHx of diabetes and lumbar radiculopathy who RTC for diabetic foot exam. Patient complains of elongated and thickened nails she has difficulty trimming on her own that occasinoally become incurvated and painful. Her last A1c was 6.7% on 6/12/23. PMHx, medications, and allergies were reviewed. Allergies: Penicillins, Advil [ibuprofen], Aspirin, Codeine sulfate, Demerol, Naproxen, Norvasc [amlodipine besylate], Nsaids, Tramadol, Vicodin [hydrocodone-acetaminophen], and Zetia [ezetimibe]   Current Outpatient Medications   Medication Sig Dispense Refill    atorvastatin 40 MG Oral Tab TAKE 1 TABLET BY MOUTH EVERY DAY IN THE EVENING 90 tablet 2    NIFEdipine ER 60 MG Oral Tablet 24 Hr Take 1 tablet (60 mg total) by mouth daily. 90 tablet 0    clopidogrel 75 MG Oral Tab Take 1 tablet (75 mg total) by mouth daily. 90 tablet 0    gabapentin 100 MG Oral Cap Take 1 capsule (100 mg total) by mouth 2 (two) times daily. 60 capsule 0    METFORMIN 500 MG Oral Tab TAKE 1 TABLET BY MOUTH TWICE A DAY WITH MEALS 180 tablet 0    LISINOPRIL-HYDROCHLOROTHIAZIDE 20-25 MG Oral Tab TAKE 1 TABLET BY MOUTH EVERY DAY 90 tablet 0    isosorbide mononitrate  MG Oral Tablet 24 Hr Take 1 tablet (120 mg total) by mouth daily. 90 tablet 0    ranolazine  MG Oral Tablet 12 Hr Take 1 tablet (500 mg total) by mouth daily. 90 tablet 0    metoprolol tartrate 25 MG Oral Tab Take 1 tablet (25 mg total) by mouth 2 (two) times daily. 180 tablet 0    Omeprazole 40 MG Oral Capsule Delayed Release Take 1 capsule (40 mg total) by mouth 4 (four) times a week.  WASHBURN/MO/WE/FR 52 capsule 0    Glucose Blood (CONTOUR TEST) In Vitro Strip USE TO TEST ONCE DAILY AS DIRECTED. 100 strip 2    MICROLET LANCETS Does not apply Misc USE TO TEST ONCE DAILY *DX E11.9* 100 each 1    omega-3 fatty acids 1000 MG Oral Cap Take 1,000 mg by mouth daily. NON FORMULARY Take 1 tablet by mouth daily. LACTATE PILLS      nitroGLYCERIN (NITROSTAT) 0.4 MG Sublingual SL Tab Place 1 tablet (0.4 mg total) under the tongue every 5 (five) minutes as needed. 30 tablet 5    Black Elderberry,Berry-Flower, 575 MG Oral Cap Take by mouth.      mometasone 0.1 % External Ointment Apply topically as needed. cetirizine 10 MG Oral Tab Take 1 tablet (10 mg total) by mouth daily. acetaminophen 500 MG Oral Tab Take 1 tablet (500 mg total) by mouth every 6 (six) hours as needed for Pain. PEG 3350 Oral Powd Pack Take 17 g by mouth nightly. Multiple Vitamin (MULTI-VITAMIN) Oral Tab Take 1 tablet by mouth daily.         Past Medical History:   Diagnosis Date    Abnormal maternal glucose tolerance, complicating pregnancy, childbirth, or the puerperium, unspecified as to episode of care     Acute bronchitis 04/11/2011    Back problem     Cataract     Chest pain, unspecified 12/05    admit 12/05    Deep vein thrombosis ([address]     during pregnancy right leg    Diabetes ([address]     Diverticulosis of large intestine     unsure if divertiulitis or diverticulosis    Esophageal reflux     Essential hypertension, benign 4/56/8343    Helicobacter pylori gastritis 2004    High cholesterol     Incontinence     Palpitations     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 11/09    PONV (postoperative nausea and vomiting)     nausea,pain results in cardiac vasospasms    Sleep apnea     Stroke ([address]     r/o 4/11/17    Unspecified gastritis and gastroduodenitis without mention of hemorrhage 2004    Visual impairment     glasses      Past Surgical History:   Procedure Laterality Date    BACK SURGERY  1984 Spinal diskectomy lumbar    at [facility]        1987    CARDIAC CATHERTERIZATION (PBP)      status normal resolved on 2007    CATARACT      CHOLECYSTECTOMY  1999    COLONOSCOPY  2011    colon polyp removal  francesca cee 5    COLONOSCOPY      COLONOSCOPY N/A 2017    Procedure: COLONOSCOPY;  Surgeon: Bora Young MD;  Location: [address] [place] [address] ENDOSCOPY    HERNIA SURGERY  2582    Umbilical hernia repair;     HYSTERECTOMY      d/t fibroids    LUMBAR DISCECTOMY Left 2019    L4-L5   Dr Emma Menard      Spinal Steroid Injection     SHOULDER SURG PROC UNLISTED      SPINE SURGERY PROCEDURE UNLISTED      bulging discs in lower back    UPPER GI ENDOSCOPY PERFORMED  2011      Family History   Problem Relation Age of Onset    Cancer Father          at age 46 Leukemia    Diabetes Father     Breast Cancer Paternal Grandmother     Cancer Paternal Grandmother         bone cancer    Cancer Other       Social History     Socioeconomic History    Marital status:    Tobacco Use    Smoking status: Never     Passive exposure: Never    Smokeless tobacco: Never   Vaping Use    Vaping Use: Never used   Substance and Sexual Activity    Alcohol use: Yes     Comment: very seldom    Drug use: No   Other Topics Concern    Caffeine Concern Yes     Comment: 1 cup of coffee every morning and 1 glass of tea during dinner. Sometimes dark chocolate. Exercise No           REVIEW OF SYSTEMS:     Today reviewed systems as documented below  GENERAL HEALTH: feels well otherwise  SKIN: denies any unusual skin lesions or rashes  RESPIRATORY: denies shortness of breath with exertion  CARDIOVASCULAR: denies chest pain on exertion  GI: denies abdominal pain and denies heartburn  NEURO: denies headaches  MUSCULO: history of lower back pain      EXAM:   There were no vitals taken for this visit. GENERAL: well developed, well nourished, in no apparent distress  EXTREMITIES:  1.  Integument: Normal skin temperature and turgor. Nails x10 are elongated and thickened and incurvated. Lateral border of right hallux nail mildly incurvated. No acute signs of infection noted. HPK with nucleated core noted sub first met head of left foot. 2. Vascular: Dorsalis pedis two out of four bilateral and posterior tibial pulses two out of   four bilateral, capillary refill normal.   3. Musculoskeletal: All muscle groups are graded 5 out of 5 in the foot and ankle. Hammertoe deformities bilaterally. Adductovarus rotation of left fifth toe. Mild pain noted to lateral border of right hallux nail. 4. Neurological: Normal sharp dull sensation; reflexes normal. Protective sensation intact to digits via 10g monofilament. ASSESSMENT AND PLAN:   Diagnoses and all orders for this visit:    Onychomycosis    Incurvated nail    Hammer toes of both feet    Lumbar radiculopathy    Type 2 diabetes mellitus without complication, without long-term current use of insulin (HCC)    Pain of toe of left foot          Plan:     -Patient examined, chart history reviewed. -Discussed importance of proper pedal hygiene, regular foot checks, and tight glucose control.  -Sharply debrided nails x10 with a sterile nail nipper achieving a 20% reduction in thickness and length, without incident. Slant back procedure performed to areas of incurvated nails without incident. Nails further smoothed with dremel.  -Pared HPK x 1 with #15 blade to healthy tissue WOI. Can use urea cream to site between visits.  -Patient to ambulate with supportive shoes with wide toe box.  -Patient to check feet daily and follow up if any concerns arise.  -Educated patient on acute signs of infection advised patient to seek immediate medical attention if symptoms arise. The patient indicates understanding of these issues and agrees to the plan. RTC 2 months.     Ephraim Romero DPM

## 2023-11-20 ENCOUNTER — HOSPITAL ENCOUNTER (OUTPATIENT)
Dept: MRI IMAGING | Age: 73
Discharge: HOME | End: 2023-11-20
Attending: Other
Payer: MEDICARE

## 2023-11-20 DIAGNOSIS — R51.9 SUDDEN ONSET UNILATERAL HEADACHE: ICD-10-CM

## 2023-11-20 DIAGNOSIS — G50.0 TRIGEMINAL NEURALGIA SYNDROME: ICD-10-CM

## 2023-11-20 DIAGNOSIS — I72.0 CAROTID ANEURYSM, LEFT (HCC): ICD-10-CM

## 2023-11-20 PROCEDURE — 70546 MR ANGIOGRAPH HEAD W/O&W/DYE: CPT | Performed by: OTHER

## 2023-11-20 PROCEDURE — 70549 MR ANGIOGRAPH NECK W/O&W/DYE: CPT | Performed by: OTHER

## 2023-11-20 PROCEDURE — A9575 INJ GADOTERATE MEGLUMI 0.1ML: HCPCS | Performed by: OTHER

## 2023-11-20 PROCEDURE — 70553 MRI BRAIN STEM W/O & W/DYE: CPT | Performed by: OTHER

## 2023-11-20 RX ORDER — GABAPENTIN 100 MG/1
100 CAPSULE ORAL 2 TIMES DAILY
Qty: 60 CAPSULE | Refills: 0 | Status: SHIPPED | OUTPATIENT
Start: 2023-11-20

## 2023-11-20 RX ORDER — GADOTERATE MEGLUMINE 376.9 MG/ML
15 INJECTION INTRAVENOUS
Status: COMPLETED | OUTPATIENT
Start: 2023-11-20 | End: 2023-11-20

## 2023-11-20 RX ADMIN — GADOTERATE MEGLUMINE 15 ML: 376.9 INJECTION INTRAVENOUS at 18:24:00

## 2023-11-20 NOTE — TELEPHONE ENCOUNTER
Requesting   gabapentin 100 MG Oral Cap          Sig: Take 1 capsule (100 mg total) by mouth 2 (two) times daily.     Disp: 60 capsule    Refills: 0    Start: 11/20/2023    Class: Normal    Non-formulary    Last ordered: 1 month ago (9/29/2023) by Junior Castillo MD     LOV: 9/29/2023  RTC: 1 month   Last Relevant Labs: n/a   Filled: 9/29/2023 #60 with 0 refills    Future Appointments   Date Time Provider Marisa Hollis   11/20/2023  6:00 PM PF MRI RM1 (1.5T) PF MRI [place]   1/9/2024  2:15 PM Valentino Orozco DPM [id number]   1/29/2024  1:20 PM Brandon Weaver MD [facility] Mei Melchor

## 2023-11-21 ENCOUNTER — TELEPHONE (OUTPATIENT)
Dept: NEUROLOGY | Facility: CLINIC | Age: 73
End: 2023-11-21

## 2023-11-21 DIAGNOSIS — D33.3 VESTIBULAR SCHWANNOMA (HCC): ICD-10-CM

## 2023-11-21 DIAGNOSIS — I67.1 UNRUPTURED CEREBRAL ANEURYSM: ICD-10-CM

## 2023-11-21 NOTE — TELEPHONE ENCOUNTER
Pt stated she completed MRI Brain MRA Head and needs to discuss results and follow up treatment. Call pt to discuss and advise.

## 2023-11-21 NOTE — TELEPHONE ENCOUNTER
Called patient discussed results of imaging    As noted below in report    MRI BRAIN MRA HEAD+MRA NECK (ALL W+WO) (CPT=70553/34717/23899)    Result Date: 11/21/2023  CONCLUSION:   1. No acute intracranial abnormality identified. 2. There is a subtle nodular focus of enhancement in lateral aspect of the right internal auditory canal measuring up to 6 x 2 x 3 mm that is concerning for a this tubular schwannoma. Clinical correlation recommended. The inner ear structures have a normal signal and morphology. The vestibular aqueducts are nondilated. 3. A branch of the left superior cerebellar artery course along the superior and medial aspects of the cisternal segment of the left trigeminal nerve resulting in local deformity and slight lateral displacement. Clinical correlation recommended. 4. Mild chronic microvascular ischemic changes in the cerebral white matter. Old lacunar infarct in the left thalamus. 5. A stable saccular aneurysm is seen projecting laterally from cavernous segment of the left internal carotid artery measuring up to 3 mm. 6. There is a nonspecific focal area of vascular prominence noted projecting anteriorly from area of the origin of the left ophthalmic artery measuring up to 4 mm that is concerning for a small aneurysm versus infundibulum, with vascular tortuosity or artifact not entirely excluded. This could be further assessed with conventional cerebral angiography as clinically directed. 7. No evidence of occlusion, dissection, or flow-limiting stenosis in the cervical vertebral or carotid arteries. No evidence of hemodynamically significant carotid stenosis by NASCET criteria. Please see above for further details.   LOCATION:  [id number]   Dictated by (CST): Chip Elliott MD on 11/21/2023 at 6:53 AM     Finalized by (CST): Chip Elliott MD on 11/21/2023 at 7:03 AM          Advised concern for possible left ophthalmic aneurysm vs artifact of orientation in addition to known left carotid aneurysm which is stable. 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     In light of family history of aneurysm, will refer to Neuro-intervention / neurosurgery Dr João Ferrer to assess timing of repeat imaging, MRA vs CTA and/or conventional angiography to further characterize and monitor; additionally to give opinion on possible R vestibular schwannoma

## 2023-11-27 RX ORDER — RANOLAZINE 500 MG/1
500 TABLET, EXTENDED RELEASE ORAL DAILY
Qty: 90 TABLET | Refills: 0 | Status: SHIPPED | OUTPATIENT
Start: 2023-11-27

## 2023-11-27 NOTE — TELEPHONE ENCOUNTER
Requesting    Name from pharmacy: RANOLAZINE  MG TABLET         Will file in chart as: RANOLAZINE  MG Oral Tablet 12 Hr    Sig: Take 1 tablet (500 mg total) by mouth daily.     Disp: 90 tablet    Refills: 0 (Pharmacy requested: Not specified)    Start: 11/25/2023    Class: Normal    Non-formulary    Last ordered: 3 months ago (8/28/2023) by Niurka Albrecht MD    Last refill: 8/28/2023    Rx #: [id number]     LOV: 9/22/2023  RTC: none noted   Last Relevant Labs: n/a   Filled: 8/28/2023 #90 with 0 refills    Future Appointments   Date Time Provider Marisa Telma   1/9/2024  2:15 PM Cassidy Singh DPM [id number]   1/29/2024  1:20 PM Karthik Coates MD [facility] EMG [place]

## 2023-12-07 ENCOUNTER — OFFICE VISIT (OUTPATIENT)
Dept: SURGERY | Facility: CLINIC | Age: 73
End: 2023-12-07
Payer: MEDICARE

## 2023-12-07 VITALS
DIASTOLIC BLOOD PRESSURE: 58 MMHG | BODY MASS INDEX: 27.97 KG/M2 | SYSTOLIC BLOOD PRESSURE: 120 MMHG | HEART RATE: 68 BPM | WEIGHT: 152 LBS | HEIGHT: 62 IN

## 2023-12-07 DIAGNOSIS — I67.1 BRAIN ANEURYSM: Primary | ICD-10-CM

## 2023-12-07 DIAGNOSIS — D33.3 ACOUSTIC NEUROMA (HCC): ICD-10-CM

## 2023-12-07 PROBLEM — E78.00 PURE HYPERCHOLESTEROLEMIA: Status: ACTIVE | Noted: 2019-04-24

## 2023-12-07 PROBLEM — R42 DIZZINESS: Status: ACTIVE | Noted: 2021-05-05

## 2023-12-07 PROCEDURE — 99205 OFFICE O/P NEW HI 60 MIN: CPT | Performed by: NEUROLOGICAL SURGERY

## 2023-12-09 DIAGNOSIS — E11.9 TYPE 2 DIABETES MELLITUS WITHOUT COMPLICATION, WITHOUT LONG-TERM CURRENT USE OF INSULIN (HCC): Primary | ICD-10-CM

## 2023-12-11 RX ORDER — LANCETS
EACH MISCELLANEOUS
Qty: 100 EACH | Refills: 1 | Status: SHIPPED | OUTPATIENT
Start: 2023-12-11

## 2023-12-17 DIAGNOSIS — Z86.73 HISTORY OF STROKE WITHOUT RESIDUAL DEFICITS: ICD-10-CM

## 2023-12-18 RX ORDER — OMEPRAZOLE 40 MG/1
40 CAPSULE, DELAYED RELEASE ORAL DAILY
Qty: 52 CAPSULE | Refills: 0 | Status: SHIPPED | OUTPATIENT
Start: 2023-12-18

## 2023-12-18 RX ORDER — ISOSORBIDE MONONITRATE 120 MG/1
120 TABLET, EXTENDED RELEASE ORAL DAILY
Qty: 90 TABLET | Refills: 0 | Status: SHIPPED | OUTPATIENT
Start: 2023-12-18

## 2023-12-18 RX ORDER — CLOPIDOGREL BISULFATE 75 MG/1
75 TABLET ORAL DAILY
Qty: 90 TABLET | Refills: 0 | Status: SHIPPED | OUTPATIENT
Start: 2023-12-18

## 2023-12-18 NOTE — TELEPHONE ENCOUNTER
Requesting    Name from pharmacy: CLOPIDOGREL 75 MG TABLET         Will file in chart as: CLOPIDOGREL 75 MG Oral Tab    Sig: TAKE 1 TABLET BY MOUTH EVERY DAY    Disp: 90 tablet    Refills: 0 (Pharmacy requested: Not specified)    Start: 12/17/2023    Class: Normal    Non-formulary For: History of stroke without residual deficits    Last ordered: 2 months ago (10/11/2023) by Zandra Siemens, MD    Last refill: 10/11/2023     LOV: 9/22/2023  RTC: none noted   Last Relevant Labs: n/a   Filled: 10/11/2023 #90 with 0 refills    Future Appointments   Date Time Provider Marisa Hollis   1/9/2024  2:15 PM Frankie Jiménez DPM [id number]   1/29/2024  1:20 PM Debbie Calzada MD [employer] [facility]   6/5/2024 11:30 AM [address] MR RM4 (3T WIDE) [facility] [facility] MRI Fabiana Rupert   6/5/2024 12:15 PM [address] MR RM4 (3T WIDE) [address] [facility] MRI Colljoseph Rupert

## 2023-12-18 NOTE — TELEPHONE ENCOUNTER
Requesting    Name from pharmacy: OMEPRAZOLE DR 40 MG CAPSULE         Will file in chart as: OMEPRAZOLE 40 MG Oral Capsule Delayed Release    Sig: TAKE 1 CAPSULE BY MOUTH FOUR TIMES A WEEK ON SUNDAY, MONDAY, WEDNESDAY, AND FRIDAY    Disp: 52 capsule    Refills: 0 (Pharmacy requested: Not specified)    Start: 12/16/2023    Class: Normal    Non-formulary    Last ordered: 4 months ago (8/17/2023) by Hannah De Oliveira MD    Last refill: 9/15/2023    Rx #: [id number]        Name from pharmacy: [address] [place] [address]  [address] [place] [address]         Will file in chart as: ISOSORBIDE MONONITRATE  MG Oral Tablet 24 Hr    Sig: TAKE 1 TABLET BY MOUTH EVERY DAY    Disp: 90 tablet    Refills: 0 (Pharmacy requested: Not specified)    Start: 12/16/2023    Class: Normal    Non-formulary    Last ordered: 3 months ago (9/17/2023) by Hannah De Oliveira MD    Last refill: 9/17/2023     LOV: 9/22/2023  RTC: none noted   Last Relevant Labs: n/a   Filled:   Omeprazole-8/17/2023 #52 with 0 refills  Isosorbide-9/17/2023 #90 with 0 refills  Future Appointments   Date Time Provider Marisa Hollis   1/9/2024  2:15 PM Natasha Vu DPM [id number]   1/29/2024  1:20 PM Addy Zamorano MD [facility]   6/5/2024 11:30 AM [address] MR RM4 (3T WIDE) [address] MRI Tila Moreau   6/5/2024 12:15 PM [address] MR RM4 (3T WIDE) [address] [facility]

## 2023-12-20 DIAGNOSIS — I10 ESSENTIAL HYPERTENSION: ICD-10-CM

## 2023-12-20 NOTE — TELEPHONE ENCOUNTER
Requesting   Name from pharmacy: METOPROLOL TARTRATE 25 MG TAB          Will file in chart as: METOPROLOL TARTRATE 25 MG Oral Tab    Sig: TAKE 1 TABLET BY MOUTH TWICE A DAY    Disp: 180 tablet    Refills: 0 (Pharmacy requested: Not specified)    Start: 12/20/2023    Class: Normal    Non-formulary For: Essential hypertension    Last ordered: 4 months ago (8/17/2023) by Liz Cano MD    Last refill: 9/24/2023    Rx #: [id number]    Hypertension Medications Protocol Kjvefz0912/20/2023 12:05 AM   Protocol Details CMP or BMP in past 12 months    Last serum creatinine< 2.0    Appointment in past 6 or next 3 months        LOV: 9/29/2023  RTC: none noted   Last Relevant Labs: 6/12/2023  Filled: 8/17/2023 #180 with 0 refills    Future Appointments   Date Time Provider Marisa Hollis   1/9/2024  2:15 PM Latasha Morrow DPM [id number]   1/29/2024  1:20 PM Bárbara Canales MD [facility]   6/5/2024 11:30 AM [facility] MR RM4 (3T WIDE) [facility] MRI Marci Balesmarya   6/5/2024 12:15 PM [facility] MR RM4 (3T WIDE) [facility] MRI Marci Lawrence

## 2023-12-21 DIAGNOSIS — I10 ESSENTIAL HYPERTENSION: ICD-10-CM

## 2023-12-21 RX ORDER — GABAPENTIN 100 MG/1
100 CAPSULE ORAL 2 TIMES DAILY
Qty: 60 CAPSULE | Refills: 0 | Status: SHIPPED | OUTPATIENT
Start: 2023-12-21

## 2023-12-21 RX ORDER — LISINOPRIL AND HYDROCHLOROTHIAZIDE 25; 20 MG/1; MG/1
1 TABLET ORAL DAILY
Qty: 90 TABLET | Refills: 0 | Status: SHIPPED | OUTPATIENT
Start: 2023-12-21

## 2023-12-21 NOTE — TELEPHONE ENCOUNTER
Requesting    Name from pharmacy: GABAPENTIN 100 MG CAPSULE         Will file in chart as: GABAPENTIN 100 MG Oral Cap    Sig: TAKE 1 CAPSULE BY MOUTH TWICE A DAY    Disp: 60 capsule    Refills: 0 (Pharmacy requested: Not specified)    Start: 12/21/2023    Class: Normal    Non-formulary    Last ordered: 1 month ago (11/20/2023) by Tristan Junior MD    Last refill: 11/20/2023     LOV: 9/29/2023  RTC: none noted   Last Relevant Labs: n/a/  Filled: 11/20/2023 #60 with 0 refills    Future Appointments   Date Time Provider Marisa Hollis   1/9/2024  2:15 PM Mana Rodriguez DPM [id number]   1/29/2024  1:20 PM Avinash Ambriz MD [facility]   6/5/2024 11:30 AM [facility] MR RM4 (3T WIDE)  MRI Mulugeta [place]   6/5/2024 12:15 PM [facility] MR RM4 (3T WIDE) [facility] MRI [place]

## 2023-12-21 NOTE — TELEPHONE ENCOUNTER
Requesting   Name from pharmacy: METFORMIN  [address] [place]          Will file in chart as: METFORMIN 500 MG Oral Tab    Sig: TAKE 1 TABLET BY MOUTH TWICE A DAY WITH FOOD    Disp: 180 tablet    Refills: 0 (Pharmacy requested: Not specified)    Start: 12/21/2023    Class: Normal    Non-formulary    Last ordered: 2 months ago (9/25/2023) by Ade Lew MD    Last refill: 9/25/2023    Rx #: [id number]    Diabetic Medication Protocol [id number]/21/2023 12:08 AM   Protocol Details HgBA1C procedure resulted in past 6 months    Last HgBA1C < 7.5    Microalbumin procedure in past 12 months or taking ACE/ARB    Appointment in past 6 or next 3 months       Name from pharmacy: [facility] [address] [place] [address] TAB         Will file in chart as: LISINOPRIL-HYDROCHLOROTHIAZIDE 20-25 MG Oral Tab    Sig: TAKE 1 TABLET BY MOUTH EVERY DAY    Disp: 90 tablet    Refills: 0 (Pharmacy requested: Not specified)    Start: 12/21/2023    Class: Normal    Non-formulary For: Essential hypertension    Last ordered: 2 months ago (9/25/2023) by Ade Lew MD    Last refill: 9/25/2023    Rx #: [id number]    Hypertension Medications Protocol Mcsjap4812/21/2023 12:08 AM   Protocol Details CMP or BMP in past 12 months    Last serum creatinine< 2.0    Appointment in past 6 or next 3 months        LOV: 9/29/2023  RTC: none noted   Last Relevant Labs: 6/12/2023  Filled: 9/25/2023 #90 day supply with 0 refills    Future Appointments   Date Time Provider Marisa Hollis   1/9/2024  2:15 PM Kasia Baxter DPM [id number]   1/29/2024  1:20 PM Conor Hernandez MD [facility] EMG [place]   6/5/2024 11:30 AM [facility] MR RM4 (3T WIDE) [facility] MRI Talita Sos   6/5/2024 12:15 PM [facility] MR RM4 (3T WIDE) [address]

## 2024-01-04 ENCOUNTER — OFFICE VISIT (OUTPATIENT)
Dept: INTERNAL MEDICINE CLINIC | Facility: CLINIC | Age: 74
End: 2024-01-04
Payer: MEDICARE

## 2024-01-04 VITALS
TEMPERATURE: 98 F | OXYGEN SATURATION: 98 % | DIASTOLIC BLOOD PRESSURE: 60 MMHG | HEART RATE: 87 BPM | WEIGHT: 155.38 LBS | SYSTOLIC BLOOD PRESSURE: 134 MMHG | BODY MASS INDEX: 28.59 KG/M2 | HEIGHT: 62 IN

## 2024-01-04 DIAGNOSIS — J01.90 ACUTE NON-RECURRENT SINUSITIS, UNSPECIFIED LOCATION: Primary | ICD-10-CM

## 2024-01-04 PROCEDURE — 99213 OFFICE O/P EST LOW 20 MIN: CPT | Performed by: FAMILY MEDICINE

## 2024-01-04 RX ORDER — AZITHROMYCIN 250 MG/1
TABLET, FILM COATED ORAL
Qty: 6 TABLET | Refills: 0 | Status: SHIPPED | OUTPATIENT
Start: 2024-01-04 | End: 2024-01-08

## 2024-01-04 NOTE — PROGRESS NOTES
HPI:   Liss Fagan is a 73 year old female who presents for upper respiratory symptoms for  6  days. Patient reports runny nose   congestion and occ cough    no aches   no fevers    no SOB     used robitussion    not helping    +frontal pressure      neg COVID 2d ago     some raspiness in voice     no NVD     phlegm occ         Current Outpatient Medications   Medication Sig Dispense Refill    metFORMIN 500 MG Oral Tab Take 1 tablet (500 mg total) by mouth 2 (two) times daily with meals. 180 tablet 0    lisinopril-hydroCHLOROthiazide 20-25 MG Oral Tab Take 1 tablet by mouth daily. 90 tablet 0    gabapentin 100 MG Oral Cap Take 1 capsule (100 mg total) by mouth 2 (two) times daily. 60 capsule 0    METOPROLOL TARTRATE 25 MG Oral Tab TAKE 1 TABLET BY MOUTH TWICE A  tablet 0    Omeprazole 40 MG Oral Capsule Delayed Release Take 1 capsule (40 mg total) by mouth daily. 52 capsule 0    isosorbide mononitrate  MG Oral Tablet 24 Hr Take 1 tablet (120 mg total) by mouth daily. 90 tablet 0    clopidogrel 75 MG Oral Tab Take 1 tablet (75 mg total) by mouth daily. 90 tablet 0    Microlet Lancets Does not apply Misc USE TO TEST ONCE DAILY *DX E11.9* 100 each 1    ranolazine  MG Oral Tablet 12 Hr Take 1 tablet (500 mg total) by mouth daily. 90 tablet 0    atorvastatin 40 MG Oral Tab TAKE 1 TABLET BY MOUTH EVERY DAY IN THE EVENING 90 tablet 2    NIFEdipine ER 60 MG Oral Tablet 24 Hr Take 1 tablet (60 mg total) by mouth daily. 90 tablet 0    Glucose Blood (CONTOUR TEST) In Vitro Strip USE TO TEST ONCE DAILY AS DIRECTED. 100 strip 2    omega-3 fatty acids 1000 MG Oral Cap Take 1,000 mg by mouth daily.      NON FORMULARY Take 1 tablet by mouth daily. LACTATE PILLS      nitroGLYCERIN (NITROSTAT) 0.4 MG Sublingual SL Tab Place 1 tablet (0.4 mg total) under the tongue every 5 (five) minutes as needed. 30 tablet 5    Black Elderberry,Berry-Flower, 575 MG Oral Cap Take by mouth.      mometasone 0.1 % External  Ointment Apply topically as needed.      cetirizine 10 MG Oral Tab Take 1 tablet (10 mg total) by mouth daily.      acetaminophen 500 MG Oral Tab Take 1 tablet (500 mg total) by mouth every 6 (six) hours as needed for Pain.      PEG 3350 Oral Powd Pack Take 17 g by mouth nightly.      Multiple Vitamin (MULTI-VITAMIN) Oral Tab Take 1 tablet by mouth daily.        Past Medical History:   Diagnosis Date    Abnormal maternal glucose tolerance, complicating pregnancy, childbirth, or the puerperium, unspecified as to episode of care     Acute bronchitis 2011    Back problem     Cataract     Chest pain, unspecified     admit     Deep vein thrombosis (HCC)     during pregnancy right leg    Diabetes (HCC)     Diverticulosis of large intestine     unsure if divertiulitis or diverticulosis    Esophageal reflux     Essential hypertension, benign 2013    Helicobacter pylori gastritis 2004    High cholesterol     Incontinence     Palpitations     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     PONV (postoperative nausea and vomiting)     nausea,pain results in cardiac vasospasms    Sleep apnea     Stroke (HCC)     r/o 17    Unspecified gastritis and gastroduodenitis without mention of hemorrhage 2004    Visual impairment     glasses      Past Surgical History:   Procedure Laterality Date    BACK SURGERY      Spinal diskectomy lumbar    at [place]        1987    CARDIAC CATHERTERIZATION (PBP)      status normal resolved on 2007    CATARACT      CHOLECYSTECTOMY  1999    COLONOSCOPY  2011    colon polyp removal  francesca cee 5    COLONOSCOPY      COLONOSCOPY N/A 2017    Procedure: COLONOSCOPY;  Surgeon: Addison Castellano MD;  Location:  ENDOSCOPY    HERNIA SURGERY  1999    Umbilical hernia repair;     HYSTERECTOMY      d/t fibroids    LUMBAR DISCECTOMY Left 2019    L4-L5   [facility]    OTHER      Spinal Steroid  Injection     SHOULDER SURG PROC UNLISTED      SPINE SURGERY PROCEDURE UNLISTED      bulging discs in lower back    UPPER GI ENDOSCOPY PERFORMED  2011      Family History   Problem Relation Age of Onset    Cancer Father          at age 52 Leukemia    Diabetes Father     Breast Cancer Paternal Grandmother     Cancer Paternal Grandmother         bone cancer    Cancer Other       Social History     Socioeconomic History    Marital status:    Tobacco Use    Smoking status: Never     Passive exposure: Never    Smokeless tobacco: Never   Vaping Use    Vaping Use: Never used   Substance and Sexual Activity    Alcohol use: Not Currently     Comment: very seldom    Drug use: No   Other Topics Concern    Caffeine Concern Yes     Comment: 1 cup of coffee every morning and 1 glass of tea during dinner.  Sometimes dark chocolate.      Exercise No         REVIEW OF SYSTEMS:   GENERAL: feels well otherwise    EXAM:   /60 (BP Location: Left arm, Patient Position: Sitting, Cuff Size: large)   Pulse 87   Temp 98.2 °F (36.8 °C) (Temporal)   Ht 5' 2\" (1.575 m)   Wt 155 lb 6.4 oz (70.5 kg)   SpO2 98%   BMI 28.42 kg/m²   GENERAL: well developed, well nourished,in no apparent distress  SKIN: no rashes  EYES:conj clear  HEENT: atraumatic, normocephalic,ears and throat are clear    NECK: supple,no adenopathy,  LUNGS: clear to auscultation        ASSESSMENT AND PLAN:   Liss Fagan is a 73 year old female who presents with Sinusitis. PLAN: Z-osmin, take as directed.  The patient indicates understanding of these issues and agrees to the plan.  The patient is asked to return if sx's persist or worsen.

## 2024-01-07 RX ORDER — NIFEDIPINE 60 MG/1
60 TABLET, FILM COATED, EXTENDED RELEASE ORAL DAILY
Qty: 90 TABLET | Refills: 0 | Status: SHIPPED | OUTPATIENT
Start: 2024-01-07

## 2024-01-19 ENCOUNTER — OFFICE VISIT (OUTPATIENT)
Dept: PODIATRY CLINIC | Facility: CLINIC | Age: 74
End: 2024-01-19
Payer: MEDICARE

## 2024-01-19 DIAGNOSIS — M20.41 HAMMER TOES OF BOTH FEET: ICD-10-CM

## 2024-01-19 DIAGNOSIS — L60.8 INCURVATED NAIL: ICD-10-CM

## 2024-01-19 DIAGNOSIS — B35.1 ONYCHOMYCOSIS: Primary | ICD-10-CM

## 2024-01-19 DIAGNOSIS — M79.675 TOE PAIN, BILATERAL: ICD-10-CM

## 2024-01-19 DIAGNOSIS — M54.16 LUMBAR RADICULOPATHY: ICD-10-CM

## 2024-01-19 DIAGNOSIS — E11.9 TYPE 2 DIABETES MELLITUS WITHOUT COMPLICATION, WITHOUT LONG-TERM CURRENT USE OF INSULIN (HCC): ICD-10-CM

## 2024-01-19 DIAGNOSIS — M79.674 TOE PAIN, BILATERAL: ICD-10-CM

## 2024-01-19 DIAGNOSIS — M20.42 HAMMER TOES OF BOTH FEET: ICD-10-CM

## 2024-01-19 PROCEDURE — 1126F AMNT PAIN NOTED NONE PRSNT: CPT | Performed by: STUDENT IN AN ORGANIZED HEALTH CARE EDUCATION/TRAINING PROGRAM

## 2024-01-19 PROCEDURE — 99213 OFFICE O/P EST LOW 20 MIN: CPT | Performed by: STUDENT IN AN ORGANIZED HEALTH CARE EDUCATION/TRAINING PROGRAM

## 2024-01-19 NOTE — PROGRESS NOTES
[facility]  Progress Note    Liss Fagan is a 73 year old female.   Chief Complaint   Patient presents with    Toenail Care     Nail care and foot check- right great toe ingrown-          HPI:     Patient is a pleasant 73 year old female with PMHx of diabetes and lumbar radiculopathy who RTC for diabetic foot exam. Patient complains of elongated and thickened nails she has difficulty trimming on her own that occasinoally become incurvated and painful.  Her hallux nails especially become incurvated and painful.  Today her right great toe is a little bit sore.  Her last A1c was 6.7% on 6/12/23. PMHx, medications, and allergies were reviewed.      Allergies: Penicillins, Advil [ibuprofen], Aspirin, Codeine sulfate, Demerol, Naproxen, Norvasc [amlodipine besylate], Nsaids, Tramadol, Vicodin [hydrocodone-acetaminophen], and Zetia [ezetimibe]   Current Outpatient Medications   Medication Sig Dispense Refill    NIFEdipine ER 60 MG Oral Tablet 24 Hr Take 1 tablet (60 mg total) by mouth daily. 90 tablet 0    metFORMIN 500 MG Oral Tab Take 1 tablet (500 mg total) by mouth 2 (two) times daily with meals. 180 tablet 0    lisinopril-hydroCHLOROthiazide 20-25 MG Oral Tab Take 1 tablet by mouth daily. 90 tablet 0    gabapentin 100 MG Oral Cap Take 1 capsule (100 mg total) by mouth 2 (two) times daily. 60 capsule 0    METOPROLOL TARTRATE 25 MG Oral Tab TAKE 1 TABLET BY MOUTH TWICE A  tablet 0    Omeprazole 40 MG Oral Capsule Delayed Release Take 1 capsule (40 mg total) by mouth daily. 52 capsule 0    isosorbide mononitrate  MG Oral Tablet 24 Hr Take 1 tablet (120 mg total) by mouth daily. 90 tablet 0    clopidogrel 75 MG Oral Tab Take 1 tablet (75 mg total) by mouth daily. 90 tablet 0    Microlet Lancets Does not apply Misc USE TO TEST ONCE DAILY *DX E11.9* 100 each 1    ranolazine  MG Oral Tablet 12 Hr Take 1 tablet (500 mg total) by mouth daily. 90 tablet 0    atorvastatin 40 MG Oral Tab  TAKE 1 TABLET BY MOUTH EVERY DAY IN THE EVENING 90 tablet 2    Glucose Blood (CONTOUR TEST) In Vitro Strip USE TO TEST ONCE DAILY AS DIRECTED. 100 strip 2    omega-3 fatty acids 1000 MG Oral Cap Take 1,000 mg by mouth daily.      NON FORMULARY Take 1 tablet by mouth daily. LACTATE PILLS      nitroGLYCERIN (NITROSTAT) 0.4 MG Sublingual SL Tab Place 1 tablet (0.4 mg total) under the tongue every 5 (five) minutes as needed. 30 tablet 5    Black Elderberry,Berry-Flower, 575 MG Oral Cap Take by mouth.      mometasone 0.1 % External Ointment Apply topically as needed.      cetirizine 10 MG Oral Tab Take 1 tablet (10 mg total) by mouth daily.      acetaminophen 500 MG Oral Tab Take 1 tablet (500 mg total) by mouth every 6 (six) hours as needed for Pain.      PEG 3350 Oral Powd Pack Take 17 g by mouth nightly.      Multiple Vitamin (MULTI-VITAMIN) Oral Tab Take 1 tablet by mouth daily.        Past Medical History:   Diagnosis Date    Abnormal maternal glucose tolerance, complicating pregnancy, childbirth, or the puerperium, unspecified as to episode of care     Acute bronchitis 04/11/2011    Back problem     Cataract     Chest pain, unspecified 12/05    admit 12/05    Deep vein thrombosis (HCC)     during pregnancy right leg    Diabetes (HCC)     Diverticulosis of large intestine     unsure if divertiulitis or diverticulosis    Esophageal reflux     Essential hypertension, benign 4/24/2013    Helicobacter pylori gastritis 2004    High cholesterol     Incontinence     Palpitations     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 11/09    PONV (postoperative nausea and vomiting)     nausea,pain results in cardiac vasospasms    Sleep apnea     Stroke (HCC)     r/o 4/11/17    Unspecified gastritis and gastroduodenitis without mention of hemorrhage 2004    Visual impairment     glasses      Past Surgical History:   Procedure Laterality Date    BACK SURGERY  1984    Spinal diskectomy lumbar    at  [place]        1987    CARDIAC CATHERTERIZATION (PBP)      status normal resolved on 2007    CATARACT      CHOLECYSTECTOMY  1999    COLONOSCOPY  2011    colon polyp removal  francesca cee 5    COLONOSCOPY      COLONOSCOPY N/A 2017    Procedure: COLONOSCOPY;  Surgeon: Addison Castellano MD;  Location:  ENDOSCOPY    HERNIA SURGERY  1999    Umbilical hernia repair;     HYSTERECTOMY      d/t fibroids    LUMBAR DISCECTOMY Left 2019    L4-L5   [facility]    OTHER      Spinal Steroid Injection     SHOULDER SURG PROC UNLISTED      SPINE SURGERY PROCEDURE UNLISTED      bulging discs in lower back    UPPER GI ENDOSCOPY PERFORMED  2011      Family History   Problem Relation Age of Onset    Cancer Father          at age 52 Leukemia    Diabetes Father     Breast Cancer Paternal Grandmother     Cancer Paternal Grandmother         bone cancer    Cancer Other       Social History     Socioeconomic History    Marital status:    Tobacco Use    Smoking status: Never     Passive exposure: Never    Smokeless tobacco: Never   Vaping Use    Vaping Use: Never used   Substance and Sexual Activity    Alcohol use: Not Currently     Comment: very seldom    Drug use: No   Other Topics Concern    Caffeine Concern Yes     Comment: 1 cup of coffee every morning and 1 glass of tea during dinner.  Sometimes dark chocolate.      Exercise No           REVIEW OF SYSTEMS:     Today reviewed systems as documented below  GENERAL HEALTH: feels well otherwise  SKIN: denies any unusual skin lesions or rashes  RESPIRATORY: denies shortness of breath with exertion  CARDIOVASCULAR: denies chest pain on exertion  GI: denies abdominal pain and denies heartburn  NEURO: denies headaches  MUSCULO: history of lower back pain      EXAM:   There were no vitals taken for this visit.  GENERAL: well developed, well nourished, in no apparent distress  EXTREMITIES:  1. Integument: Normal skin  temperature and turgor.  Nails x10 are elongated and thickened and incurvated.  Lateral border of right hallux nail mildly incurvated.  No acute signs of infection noted.   2. Vascular: Dorsalis pedis two out of four bilateral and posterior tibial pulses two out of   four bilateral, capillary refill normal.   3. Musculoskeletal: All muscle groups are graded 5 out of 5 in the foot and ankle. Hammertoe deformities bilaterally.  Adductovarus rotation of left fifth toe.  Mild pain noted to lateral border of right hallux nail.   4. Neurological: Normal sharp dull sensation; reflexes normal. Protective sensation intact to digits via 10g monofilament.      ASSESSMENT AND PLAN:   Diagnoses and all orders for this visit:    Onychomycosis    Incurvated nail    Hammer toes of both feet    Lumbar radiculopathy    Type 2 diabetes mellitus without complication, without long-term current use of insulin (HCC)    Toe pain, bilateral            Plan:     -Patient examined, chart history reviewed.  -Discussed importance of proper pedal hygiene, regular foot checks, and tight glucose control.  -Sharply debrided nails x10 with a sterile nail nipper achieving a 20% reduction in thickness and length, without incident.  Slant back procedure performed to areas of incurvated nails without incident.  Nails further smoothed with dremel.  Pinpoint bleeding noted to hallux nail borders is dressed with bacitracin and Band-Aid.  If symptoms worsen or fail to improve to sites could consider matrixectomy procedure.  -Pared HPK x 1 with #15 blade to healthy tissue WOI. Can use urea cream to site between visits.  -Patient to ambulate with supportive shoes with wide toe box.  -Patient to check feet daily and follow up if any concerns arise.  -Educated patient on acute signs of infection advised patient to seek immediate medical attention if symptoms arise.    The patient indicates understanding of these issues and agrees to the plan.    RTC 2  months.    Georges Orozco, AMY

## 2024-01-24 ENCOUNTER — OFFICE VISIT (OUTPATIENT)
Dept: INTERNAL MEDICINE CLINIC | Facility: CLINIC | Age: 74
End: 2024-01-24
Payer: MEDICARE

## 2024-01-24 ENCOUNTER — HOSPITAL ENCOUNTER (OUTPATIENT)
Dept: ULTRASOUND IMAGING | Facility: HOSPITAL | Age: 74
Discharge: HOME | End: 2024-01-24
Attending: FAMILY MEDICINE
Payer: MEDICARE

## 2024-01-24 VITALS
OXYGEN SATURATION: 98 % | WEIGHT: 156.63 LBS | TEMPERATURE: 98 F | SYSTOLIC BLOOD PRESSURE: 132 MMHG | HEART RATE: 75 BPM | DIASTOLIC BLOOD PRESSURE: 60 MMHG | BODY MASS INDEX: 28.82 KG/M2 | HEIGHT: 62 IN

## 2024-01-24 DIAGNOSIS — M79.89 RIGHT LEG SWELLING: Primary | ICD-10-CM

## 2024-01-24 DIAGNOSIS — G50.0 TRIGEMINAL NEURALGIA: ICD-10-CM

## 2024-01-24 DIAGNOSIS — M79.89 RIGHT LEG SWELLING: ICD-10-CM

## 2024-01-24 PROBLEM — R42 DIZZINESS: Status: RESOLVED | Noted: 2021-05-05 | Resolved: 2024-01-24

## 2024-01-24 PROCEDURE — 99214 OFFICE O/P EST MOD 30 MIN: CPT | Performed by: FAMILY MEDICINE

## 2024-01-24 PROCEDURE — 93971 EXTREMITY STUDY: CPT | Performed by: FAMILY MEDICINE

## 2024-01-24 RX ORDER — GABAPENTIN 100 MG/1
100 CAPSULE ORAL 2 TIMES DAILY
Qty: 60 CAPSULE | Refills: 0 | Status: SHIPPED | OUTPATIENT
Start: 2024-01-24

## 2024-01-24 RX ORDER — FUROSEMIDE 20 MG/1
20 TABLET ORAL DAILY
Qty: 30 TABLET | Refills: 0 | Status: SHIPPED | OUTPATIENT
Start: 2024-01-24

## 2024-01-24 NOTE — PROGRESS NOTES
Liss Fagan is a 73 year old female.  HPI:   Here for R swelling for the last 2-3 weeks    The R knee does have arthritis and can swell but this is different    Can be sore in the back of the knee     on tylenol    not really getting better   no injury but may have twisted it once    Even the L foot can be swollen    Also wants to discuss the gabapentin, 2x a day     was on for 4 months for trig neur     the pain is gone , has been fine for 3 months now      would like to stop it if possible      Current Outpatient Medications   Medication Sig Dispense Refill    NIFEdipine ER 60 MG Oral Tablet 24 Hr Take 1 tablet (60 mg total) by mouth daily. 90 tablet 0    metFORMIN 500 MG Oral Tab Take 1 tablet (500 mg total) by mouth 2 (two) times daily with meals. 180 tablet 0    lisinopril-hydroCHLOROthiazide 20-25 MG Oral Tab Take 1 tablet by mouth daily. 90 tablet 0    gabapentin 100 MG Oral Cap Take 1 capsule (100 mg total) by mouth 2 (two) times daily. 60 capsule 0    METOPROLOL TARTRATE 25 MG Oral Tab TAKE 1 TABLET BY MOUTH TWICE A  tablet 0    Omeprazole 40 MG Oral Capsule Delayed Release Take 1 capsule (40 mg total) by mouth daily. 52 capsule 0    isosorbide mononitrate  MG Oral Tablet 24 Hr Take 1 tablet (120 mg total) by mouth daily. 90 tablet 0    clopidogrel 75 MG Oral Tab Take 1 tablet (75 mg total) by mouth daily. 90 tablet 0    Microlet Lancets Does not apply Misc USE TO TEST ONCE DAILY *DX E11.9* 100 each 1    ranolazine  MG Oral Tablet 12 Hr Take 1 tablet (500 mg total) by mouth daily. 90 tablet 0    atorvastatin 40 MG Oral Tab TAKE 1 TABLET BY MOUTH EVERY DAY IN THE EVENING 90 tablet 2    Glucose Blood (CONTOUR TEST) In Vitro Strip USE TO TEST ONCE DAILY AS DIRECTED. 100 strip 2    omega-3 fatty acids 1000 MG Oral Cap Take 1,000 mg by mouth daily.      NON FORMULARY Take 1 tablet by mouth daily. LACTATE PILLS      nitroGLYCERIN (NITROSTAT) 0.4 MG Sublingual SL Tab Place 1 tablet (0.4 mg  total) under the tongue every 5 (five) minutes as needed. 30 tablet 5    Black Elderberry,Berry-Flower, 575 MG Oral Cap Take by mouth.      mometasone 0.1 % External Ointment Apply topically as needed.      cetirizine 10 MG Oral Tab Take 1 tablet (10 mg total) by mouth daily.      acetaminophen 500 MG Oral Tab Take 1 tablet (500 mg total) by mouth every 6 (six) hours as needed for Pain.      PEG 3350 Oral Powd Pack Take 17 g by mouth nightly.      Multiple Vitamin (MULTI-VITAMIN) Oral Tab Take 1 tablet by mouth daily.        Past Medical History:   Diagnosis Date    Abnormal maternal glucose tolerance, complicating pregnancy, childbirth, or the puerperium, unspecified as to episode of care     Acute bronchitis 04/11/2011    Back problem     Cataract     Chest pain, unspecified 12/05    admit 12/05    Deep vein thrombosis (HCC)     during pregnancy right leg    Diabetes (HCC)     Diverticulosis of large intestine     unsure if divertiulitis or diverticulosis    Esophageal reflux     Essential hypertension, benign 4/24/2013    Helicobacter pylori gastritis 2004    High cholesterol     Incontinence     Palpitations     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 11/09    PONV (postoperative nausea and vomiting)     nausea,pain results in cardiac vasospasms    Sleep apnea     Stroke (HCC)     r/o 4/11/17    Unspecified gastritis and gastroduodenitis without mention of hemorrhage 2004    Visual impairment     glasses      Social History:  Social History     Socioeconomic History    Marital status:    Tobacco Use    Smoking status: Never     Passive exposure: Never    Smokeless tobacco: Never   Vaping Use    Vaping Use: Never used   Substance and Sexual Activity    Alcohol use: Not Currently     Comment: very seldom    Drug use: No   Other Topics Concern    Caffeine Concern Yes     Comment: 1 cup of coffee every morning and 1 glass of tea during dinner.  Sometimes dark chocolate.       Exercise No        REVIEW OF SYSTEMS:   GENERAL HEALTH: feels well otherwise       EXAM:   /60   Pulse 75   Temp 98.2 °F (36.8 °C) (Temporal)   Ht 5' 2\" (1.575 m)   Wt 156 lb 9.6 oz (71 kg)   SpO2 98%   BMI 28.64 kg/m²   GENERAL: well developed, well nourished,in no apparent distress  LEs:  both legs w some pitting edema but the R leg does seem larger than the L    is tender to palpation posterior knee and calf area      ASSESSMENT AND PLAN:   1. Right leg swelling  Will check STAT venous doppler on the R leg    proceed then     - US VENOUS DOPPLER LEG RIGHT - DIAG IMG (CPT=93971); Future    2. Trigeminal neuralgia  Discussed the neurontin      will lower to 100mg daily for 2 weeks    call then w update        The patient indicates understanding of these issues and agrees to the plan.  .

## 2024-01-25 ENCOUNTER — TELEPHONE (OUTPATIENT)
Dept: INTERNAL MEDICINE CLINIC | Facility: CLINIC | Age: 74
End: 2024-01-25

## 2024-01-25 NOTE — TELEPHONE ENCOUNTER
Pt stated she started the furosemide 20 mg this morning. She stated it does make her urinate.   Continuing to take her lisinopril-hydrochlorothiazide and is wondering what he'll do after the 7 days of furosemide.  Will he change the dosages or what?  Informed her he is waiting to see how you do on furosemide after 7 days, then he'll make decisions if continuing on furosemide or changing other dosages.      She said she'll call us next Thursday (7 days) with an update, then we'll give her further recs from TO. She v/u. She understands s/s of emegency.

## 2024-01-29 ENCOUNTER — OFFICE VISIT (OUTPATIENT)
Dept: NEUROLOGY | Facility: CLINIC | Age: 74
End: 2024-01-29
Payer: MEDICARE

## 2024-01-29 VITALS
BODY MASS INDEX: 28.71 KG/M2 | WEIGHT: 156 LBS | DIASTOLIC BLOOD PRESSURE: 67 MMHG | HEART RATE: 68 BPM | HEIGHT: 62 IN | SYSTOLIC BLOOD PRESSURE: 146 MMHG | RESPIRATION RATE: 16 BRPM

## 2024-01-29 DIAGNOSIS — D33.3 VESTIBULAR SCHWANNOMA (HCC): ICD-10-CM

## 2024-01-29 DIAGNOSIS — I72.0 CAROTID ANEURYSM, LEFT (HCC): Primary | ICD-10-CM

## 2024-01-29 DIAGNOSIS — G50.0 TRIGEMINAL NEURALGIA SYNDROME: ICD-10-CM

## 2024-01-29 PROCEDURE — 99213 OFFICE O/P EST LOW 20 MIN: CPT | Performed by: OTHER

## 2024-01-29 NOTE — PATIENT INSTRUCTIONS
Refill policies:    Allow 2-3 business days for refills; controlled substances may take longer.  Contact your pharmacy at least 5 days prior to running out of medication and have them send an electronic request or submit request through the “request refill” option in your [employer] account.  Refills are not addressed on weekends; covering physicians do not authorize routine medications on weekends.  No narcotics or controlled substances are refilled after noon on Fridays or by on call physicians.  By law, narcotics must be electronically prescribed.  A 30 day supply with no refills is the maximum allowed.  If your prescription is due for a refill, you may be due for a follow up appointment.  To best provide you care, patients receiving routine medications need to be seen at least once a year.  Patients receiving narcotic/controlled substance medications need to be seen at least once every 3 months.  In the event that your preferred pharmacy does not have the requested medication in stock (e.g. Backordered), it is your responsibility to find another pharmacy that has the requested medication available.  We will gladly send a new prescription to that pharmacy at your request.    Scheduling Tests:    If your physician has ordered radiology tests such as MRI or CT scans, please contact Central Scheduling at [phone] right away to schedule the test.  Once scheduled, the [facility] Centralized Referral Team will work with your insurance carrier to obtain pre-certification or prior authorization.  Depending on your insurance carrier, approval may take 3-10 days.  It is highly recommended patients assure they have received an authorization before having a test performed.  If test is done without insurance authorization, patient may be responsible for the entire amount billed.      Precertification and Prior Authorizations:  If your physician has recommended that you have a procedure or additional testing performed the [facility]  Centralized Referral Team will contact your insurance carrier to obtain pre-certification or prior authorization.    You are strongly encouraged to contact your insurance carrier to verify that your procedure/test has been approved and is a COVERED benefit.  Although the [facility] Centralized Referral Team does its due diligence, the insurance carrier gives the disclaimer that \"Although the procedure is authorized, this does not guarantee payment.\"    Ultimately the patient is responsible for payment.   Thank you for your understanding in this matter.  Paperwork Completion:  If you require FMLA or disability paperwork for your recovery, please make sure to either drop it off or have it faxed to our office at [phone]. Be sure the form has your name and date of birth on it.  The form will be faxed to our Forms Department and they will complete it for you.  There is a 25$ fee for all forms that need to be filled out.  Please be aware there is a 10-14 day turnaround time.  You will need to sign a release of information ([employer]) form if your paperwork does not come with one.  You may call the Forms Department with any questions at [phone].  Their fax number is [phone].

## 2024-01-29 NOTE — PROGRESS NOTES
[facility] Progress Note    HPI  Chief Complaint   Patient presents with    Neurologic Problem     3 month follow up on Trigeminal neuralgia    Results     MRI /MRA Brain results     In summary of prior visits:   \"     Patient presents in follow-up after recently discharged 4/12/17, following admission for acute R sided paresthesia with stroke in left basal ganglia .  Patient previously returned 4-13/17 for recurrence of her prior similar stroke symptoms approximately 9 AM, after she had woken up and felt tingling in the right side of the face.  She denied any associated headaches, nausea/emesis, vertigo, double vision, loss of vision, or focal weakness.  Symptoms were persistent throughout the day, but gradually improved.  Patient was evaluated in the ER and had MRI of the brain, which demonstrated possible acute on subacute infarct, but she was discharged on same dose of Plavix, and atorvastatin, as her recent stroke workup was completed. \"       Prior notes as per 7/7/2020.  Overall, since last visit, patient has been doing well in terms of stroke symptoms; she remains on Plavix and denies any new focal numbness/tingling/weakness, speech issues or vision changes; no new stroke or TIA symptoms.     She remains on gabapentin 600 mg bid and denies significant side effects.  However, since April she has been having worsening pain in the left hand.  She states this is worse in the night time even though she has been wearing her braces on her wrists at night.  She denies any weakness but states her symptoms limit her ability to do things in the left hand; worsens when she is gripping steering wheel.  Overall, R hand is not as severe.       Headaches have been stable and she previously had MRI/ MRA which showed a small left cavernous carotid aneurysm and was seen by neuro-interventional service with no intervention and monitoring yearly recommended.       Prior notes as per 10/26/2023.  Patient last  seen 7/7/2020.  She states she is here for a new issue. She states on 9/20/2023, she had new onset left ear pain, which would shoot down the jaw but also up to the top of the head.  She states if she would touch her head on the left side or touch her hair, she would have severe pain.  She still has been having some feeling like her hearing is muffled on the left side; she also states when she was trying to write.  When this first occurred, she also noted she was having difficulty with hand writing as well. She denied vision changes or double vision.        She was told she had trigeminal neuralgia and started on gabapentin by her PCP - currently up to 100 mg bid with some improvement.           Patient last seen 10/26/2203.  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 She was advised to see neurosurgery / neuro-interventional service (Dr. Yanes) for additional recommendations and was recommended to have serial imaging for surveillance rather than traditional angiogram.      She is still on gabapentin 100 mg bid but recently reduced to 100 mg daily by PCP with plan to stop taking if still not having left facial pain; this is resolved currently; she has some pain in R  leg below knee - DVT was normal.       Past Medical History:   Diagnosis Date    Abnormal maternal glucose tolerance, complicating pregnancy, childbirth, or the puerperium, unspecified as to episode of care     Acute bronchitis 04/11/2011    Back problem     Cataract     Chest pain, unspecified 12/05    admit 12/05    Deep vein thrombosis (HCC)     during pregnancy right leg    Diabetes (HCC)     Diverticulosis of large intestine     unsure if divertiulitis or diverticulosis    Esophageal reflux     Essential hypertension, benign 4/24/2013    Helicobacter pylori gastritis 2004    High cholesterol      Incontinence     Palpitations     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     PONV (postoperative nausea and vomiting)     nausea,pain results in cardiac vasospasms    Sleep apnea     Stroke (HCC)     r/o 17    Unspecified gastritis and gastroduodenitis without mention of hemorrhage 2004    Visual impairment     glasses     Past Surgical History:   Procedure Laterality Date    BACK SURGERY  1984    Spinal diskectomy lumbar    at [place]        1987    CARDIAC CATHERTERIZATION (PBP)      status normal resolved on 2007    CATARACT      CHOLECYSTECTOMY  1999    COLONOSCOPY  2011    colon polyp removal  ma  [facility] 5    COLONOSCOPY      COLONOSCOPY N/A 2017    Procedure: COLONOSCOPY;  Surgeon: Addison Castellano MD;  Location:  ENDOSCOPY    HERNIA SURGERY  1999    Umbilical hernia repair;     HYSTERECTOMY      d/t fibroids    LUMBAR DISCECTOMY Left 2019    L4-L5   [facility]    OTHER      Spinal Steroid Injection     SHOULDER SURG PROC UNLISTED      SPINE SURGERY PROCEDURE UNLISTED      bulging discs in lower back    UPPER GI ENDOSCOPY PERFORMED  2011     Family History   Problem Relation Age of Onset    Cancer Father          at age 52 Leukemia    Diabetes Father     Breast Cancer Paternal Grandmother     Cancer Paternal Grandmother         bone cancer    Cancer Other      Social History     Socioeconomic History    Marital status:    Tobacco Use    Smoking status: Never     Passive exposure: Never    Smokeless tobacco: Never   Vaping Use    Vaping Use: Never used   Substance and Sexual Activity    Alcohol use: Not Currently     Comment: very seldom    Drug use: No   Other Topics Concern    Caffeine Concern Yes     Comment: 1 cup of coffee every morning and 1 glass of tea during dinner.  Sometimes dark chocolate.      Exercise No       Allergies   Allergen Reactions    Penicillins RASH and SWELLING     Advil [Ibuprofen] NAUSEA AND VOMITING and DIZZINESS    Aspirin NAUSEA AND VOMITING and DIZZINESS    Codeine Sulfate NAUSEA AND VOMITING    Demerol NAUSEA ONLY and DIZZINESS    Naproxen DIZZINESS    Norvasc [Amlodipine Besylate] NAUSEA AND VOMITING    Nsaids NAUSEA ONLY and DIZZINESS    Tramadol NAUSEA AND VOMITING and DIZZINESS    Vicodin [Hydrocodone-Acetaminophen] NAUSEA ONLY and DIZZINESS           Zetia [Ezetimibe] NAUSEA AND VOMITING     Tabs,Muscle spasm          Current Outpatient Medications:     gabapentin 100 MG Oral Cap, Take 1 capsule (100 mg total) by mouth 2 (two) times daily., Disp: 60 capsule, Rfl: 0    furosemide 20 MG Oral Tab, Take 1 tablet (20 mg total) by mouth daily., Disp: 30 tablet, Rfl: 0    NIFEdipine ER 60 MG Oral Tablet 24 Hr, Take 1 tablet (60 mg total) by mouth daily., Disp: 90 tablet, Rfl: 0    metFORMIN 500 MG Oral Tab, Take 1 tablet (500 mg total) by mouth 2 (two) times daily with meals., Disp: 180 tablet, Rfl: 0    lisinopril-hydroCHLOROthiazide 20-25 MG Oral Tab, Take 1 tablet by mouth daily., Disp: 90 tablet, Rfl: 0    METOPROLOL TARTRATE 25 MG Oral Tab, TAKE 1 TABLET BY MOUTH TWICE A DAY, Disp: 180 tablet, Rfl: 0    Omeprazole 40 MG Oral Capsule Delayed Release, Take 1 capsule (40 mg total) by mouth daily., Disp: 52 capsule, Rfl: 0    isosorbide mononitrate  MG Oral Tablet 24 Hr, Take 1 tablet (120 mg total) by mouth daily., Disp: 90 tablet, Rfl: 0    clopidogrel 75 MG Oral Tab, Take 1 tablet (75 mg total) by mouth daily., Disp: 90 tablet, Rfl: 0    Microlet Lancets Does not apply Misc, USE TO TEST ONCE DAILY *DX E11.9*, Disp: 100 each, Rfl: 1    ranolazine  MG Oral Tablet 12 Hr, Take 1 tablet (500 mg total) by mouth daily., Disp: 90 tablet, Rfl: 0    atorvastatin 40 MG Oral Tab, TAKE 1 TABLET BY MOUTH EVERY DAY IN THE EVENING, Disp: 90 tablet, Rfl: 2    Glucose Blood (CONTOUR TEST) In Vitro Strip, USE TO TEST ONCE DAILY AS DIRECTED., Disp: 100 strip, Rfl: 2     omega-3 fatty acids 1000 MG Oral Cap, Take 1,000 mg by mouth daily., Disp: , Rfl:     NON FORMULARY, Take 1 tablet by mouth daily. LACTATE PILLS, Disp: , Rfl:     nitroGLYCERIN (NITROSTAT) 0.4 MG Sublingual SL Tab, Place 1 tablet (0.4 mg total) under the tongue every 5 (five) minutes as needed., Disp: 30 tablet, Rfl: 5    Black Elderberry,Berry-Flower, 575 MG Oral Cap, Take by mouth., Disp: , Rfl:     mometasone 0.1 % External Ointment, Apply topically as needed., Disp: , Rfl:     cetirizine 10 MG Oral Tab, Take 1 tablet (10 mg total) by mouth daily., Disp: , Rfl:     acetaminophen 500 MG Oral Tab, Take 1 tablet (500 mg total) by mouth every 6 (six) hours as needed for Pain., Disp: , Rfl:     PEG 3350 Oral Powd Pack, Take 17 g by mouth nightly., Disp: , Rfl:     Multiple Vitamin (MULTI-VITAMIN) Oral Tab, Take 1 tablet by mouth daily., Disp: , Rfl:     Review of Systems:  No chest pain or palpitations; otherwise as noted in HPI.    Exam:  /67 (BP Location: Left arm, Patient Position: Sitting, Cuff Size: adult)   Pulse 68   Resp 16   Ht 62\"   Wt 156 lb (70.8 kg)   BMI 28.53 kg/m²   Estimated body mass index is 28.53 kg/m² as calculated from the following:    Height as of this encounter: 62\".    Weight as of this encounter: 156 lb (70.8 kg).    Gen: well developed, well nourished, no acute distress  HEENT: normocephalic  Heart; normal S1/S2, regular rate and rhythm  Lungs: clear to auscultation bilaterally  Extremities: no edema, peripheral pulses intact    Neck: supple, full range of motion; no carotid bruits; no meningitic signs      Neuro:    NIHSS: 0     Mental status:  Orientation: Alert and oriented to person, place, time  Speech fluent and conversational     CN: PERRL, EOMI without nystagmus, VFF, smile symmetric, sensation intact, tongue and palate midline, SCM intact; otherwise, 2-12 intact; no sensory deficit in V1,V2, V3 distribution   Motor: weak in left APB 4+/5, otherwise, 5/5 strength  throughout, tone normal - no drift; no tremor noted; no bradykinesia or rigidity noted   DTR: 2+, symmetric, throughout, toes downgoing bilaterally; no clonus  Sensory: intact to light touch and pin throughout grossly  Coord: FNF intact with no tremor or dysmetria  Romberg:absent  Gait: normal casual gait - able to tandem     Labs:   None new since last visit:     Prior as noted below  Component      Latest Ref Rng & Units 12/5/2019 12/2/2019 12/12/2018   WBC      4.0 - 11.0 x10(3) uL 4.3     RBC      3.80 - 5.30 x10(6)uL 3.74 (L)     Hemoglobin      12.0 - 16.0 g/dL 10.9 (L)     Hematocrit      35.0 - 48.0 % 33.4 (L)     Platelet Count      150.0 - 450.0 10(3)uL 222.0     MCV      80.0 - 100.0 fL 89.3     MCH      26.0 - 34.0 pg 29.1     MCHC      31.0 - 37.0 g/dL 32.6     RDW      11.0 - 15.0 % 13.2     RDW-SD      35.1 - 46.3 fL 43.3     Prelim Neutrophil Abs      1.50 - 7.70 x10 (3) uL 2.69     Neutrophils Absolute      1.50 - 7.70 x10(3) uL 2.69     Lymphocytes Absolute      1.00 - 4.00 x10(3) uL 1.05     Monocytes Absolute      0.10 - 1.00 x10(3) uL 0.44     Eosinophils Absolute      0.00 - 0.70 x10(3) uL 0.12     Basophils Absolute      0.00 - 0.20 x10(3) uL 0.02     Immature Granulocyte Absolute      0.00 - 1.00 x10(3) uL 0.00     Neutrophils %      % 62.2     Lymphocytes %      % 24.3     Monocytes %      % 10.2     Eosinophils %      % 2.8     Basophils %      % 0.5     Immature Granulocyte %      % 0.0     Glucose      70 - 99 mg/dL 93     Sodium      136 - 145 mmol/L 143     Potassium      3.5 - 5.1 mmol/L 3.9     Chloride      98 - 112 mmol/L 111     Carbon Dioxide, Total      21.0 - 32.0 mmol/L 29.0     ANION GAP      0 - 18 mmol/L 3     BUN      7 - 18 mg/dL 10     CREATININE      0.55 - 1.02 mg/dL 0.70     BUN/CREAT Ratio      10.0 - 20.0 14.3     CALCIUM      8.5 - 10.1 mg/dL 9.0     CALCULATED OSMOLALITY      275 - 295 mOsm/kg 295     eGFR NON-AFR. AMERICAN      >=60 89     eGFR        >=60 103     AST (SGOT)      15 - 37 U/L 36     ALT (SGPT)      13 - 56 U/L 51     ALKALINE PHOSPHATASE      55 - 142 U/L 105     Total Bilirubin      0.1 - 2.0 mg/dL 0.4     TOTAL PROTEIN      6.4 - 8.2 g/dL 6.4     Albumin      3.4 - 5.0 g/dL 3.0 (L)     Globulin      2.8 - 4.4 g/dL 3.4     A/G Ratio      1.0 - 2.0 0.9 (L)     HEMOGLOBIN A1c      <5.7 %  6.4 (H) 7.2 (H)   ESTIMATED AVERAGE GLUCOSE      68 - 126 mg/dL  137 (H) 160 (H)       Prior as noted below  Component      Latest Ref Rng & Units 9/11/2019   WBC      4.0 - 11.0 x10(3) uL 5.1   RBC      3.80 - 5.30 x10(6)uL 4.33   Hemoglobin      12.0 - 16.0 g/dL 12.7   Hematocrit      35.0 - 48.0 % 38.0   Platelet Count      150.0 - 450.0 10(3)uL 299.0   MCV      80.0 - 100.0 fL 87.8   MCH      26.0 - 34.0 pg 29.3   MCHC      31.0 - 37.0 g/dL 33.4   RDW      11.0 - 15.0 % 13.2   RDW-SD      35.1 - 46.3 fL 42.3   Prelim Neutrophil Abs      1.50 - 7.70 x10 (3) uL 3.11   Neutrophils Absolute      1.50 - 7.70 x10(3) uL 3.11   Lymphocytes Absolute      1.00 - 4.00 x10(3) uL 1.24   Monocytes Absolute      0.10 - 1.00 x10(3) uL 0.61   Eosinophils Absolute      0.00 - 0.70 x10(3) uL 0.11   Basophils Absolute      0.00 - 0.20 x10(3) uL 0.02   Immature Granulocyte Absolute      0.00 - 1.00 x10(3) uL 0.01   Neutrophils %      % 60.9   Lymphocytes %      % 24.3   Monocytes %      % 12.0   Eosinophils %      % 2.2   Basophils %      % 0.4   Immature Granulocyte %      % 0.2   Glucose      70 - 99 mg/dL 91   Sodium      136 - 145 mmol/L 141   Potassium      3.5 - 5.1 mmol/L 4.1   Chloride      98 - 112 mmol/L 108   Carbon Dioxide, Total      21.0 - 32.0 mmol/L 28.0   ANION GAP      0 - 18 mmol/L 5   BUN      7 - 18 mg/dL 16   CREATININE      0.55 - 1.02 mg/dL 0.89   BUN/CREAT Ratio      10.0 - 20.0 18.0   CALCIUM      8.5 - 10.1 mg/dL 9.5   CALCULATED OSMOLALITY      275 - 295 mOsm/kg 293   eGFR NON-AFR. AMERICAN      >=60 67   eGFR       >=60 77   AST  (SGOT)      15 - 37 U/L 29   ALT (SGPT)      13 - 56 U/L 38   ALKALINE PHOSPHATASE      55 - 142 U/L 83   Total Bilirubin      0.1 - 2.0 mg/dL 0.6   TOTAL PROTEIN      6.4 - 8.2 g/dL 7.3   Albumin      3.4 - 5.0 g/dL 3.8   Globulin      2.8 - 4.4 g/dL 3.5   A/G Ratio      1.0 - 2.0 1.1       Imaging:     New    US VENOUS DOPPLER LEG RIGHT - DIAG IMG (CPT=93971)    Result Date: 1/24/2024  CONCLUSION:  No evidence of deep venous thrombosis in the right lower extremity.  LOCATION:  [id number]    Dictated by (CST): Nathan Bain MD on 1/24/2024 at 4:48 PM     Finalized by (CST): Nathan Bain MD on 1/24/2024 at 4:48 PM       MRI BRAIN MRA HEAD+MRA NECK (ALL W+WO) (CPT=70553/17946/65315)    Result Date: 11/21/2023  CONCLUSION:   1. No acute intracranial abnormality identified.  2. There is a subtle nodular focus of enhancement in lateral aspect of the right internal auditory canal measuring up to 6 x 2 x 3 mm that is concerning for a this tubular schwannoma.  Clinical correlation recommended.  The inner ear structures have a normal signal and morphology. The vestibular aqueducts are nondilated.  3. A branch of the left superior cerebellar artery course along the superior and medial aspects of the cisternal segment of the left trigeminal nerve resulting in local deformity and slight lateral displacement.  Clinical correlation recommended.  4. Mild chronic microvascular ischemic changes in the cerebral white matter.  Old lacunar infarct in the left thalamus.  5. A stable saccular aneurysm is seen projecting laterally from cavernous segment of the left internal carotid artery measuring up to 3 mm.  6. There is a nonspecific focal area of vascular prominence noted projecting anteriorly from area of the origin of the left ophthalmic artery measuring up to 4 mm that is concerning for a small aneurysm versus infundibulum, with vascular tortuosity or artifact not entirely excluded.  This could be further assessed with  conventional cerebral angiography as clinically directed.  7. No evidence of occlusion, dissection, or flow-limiting stenosis in the cervical vertebral or carotid arteries. No evidence of hemodynamically significant carotid stenosis by NASCET criteria.  Please see above for further details.  LOCATION:  [id number]   Dictated by (CST): Nathan Bain MD on 11/21/2023 at 6:53 AM     Finalized by (CST): Nathan Bain MD on 11/21/2023 at 7:03 AM          Prior as noted below    MRI brain with and without contrast / MRA brain (9/12/19):     FINDINGS:    MRI BRAIN  INTRACRANIAL:  There is no evidence of acute ischemia or infarction.  There is scattered subcortical high signal white matter lesions seen in the centrum semiovale and periventricular white matter similar to the prior examination of 4/24/2018 consistent   with chronic microvascular disease.  There are no diffusion abnormalities evident.  There is an old lacunar infarct of the left posterior lateral thalamus measuring approximately 6 mm.   VENTRICLES/SULCI:   Ventricles and sulci are normal in caliber.  There are no extra-axial fluid collections.  There is no midline shift.  SINUSES/ORBITS:       The visualized paranasal sinuses are clear.  The orbits are unremarkable.  MASTOIDS:       The mastoids are clear.     MRA BRAIN  INTRACRANIAL CAROTIDS:         Within the left cavernous carotid segment there is a 2.9 x 2.1 mm aneurysm.  This is probably on a short neck which is difficult to measure but is estimated at between 1 and 1.5 mm.  The right cavernous carotid segment is   normal.  The A1 and M1 segments are normal.  The vertebrobasilar junction is normal.  There is a dominant left vertebral artery.  The basilar artery and basilar tip are normal.  There is fetal origin of the right PCA.  There is a patent left posterior   communicating artery.  The PCA branches are symmetrical and normal bilaterally.  ANTERIOR CEREBRALS:         No visible stenosis or  aneurysm.  ANTERIOR COMMUNICATING:  No visible stenosis or aneurysm.  MIDDLE CEREBRALS:         No visible stenosis or aneurysm.  POSTERIOR COMMUNICATING: No visible stenosis or aneurysm.  SUPERIOR CEREBELLARS:         No visible stenosis or aneurysm.  BASILAR:             No visible stenosis or aneurysm.  INTRACRANIAL VERTEBRALS: No visible significant stenosis or dissection.     =====  CONCLUSION:  No evidence of acute cerebral ischemia or infarction.  No evidence of ventriculomegaly or intra-axial mass lesion or hemorrhage.     Chronic microvascular disease involving both cerebral hemispheres unchanged from 4/24/2018.  Noted is a old 6 mm left posterior lateral thalamic lacunar infarct.     2.9 x 2.1 mm left cavernous carotid aneurysm with short neck.  No other evidence of aneurysm formation or branch occlusion is identified intracranially.       No results found.    Piror as noted below:    MRI brain (4/24//18):     FINDINGS:    Cerebral atrophy is present with symmetrical prominence of the ventricles.  There are scattered patchy areas of increased T2 intensity seen  predominantly within the periventricular white matter. These findings are consistent with age related changes.   There is no evidence of hemorrhage or mass. There is no evidence of acute infarction. No extra-axial fluid collection or midline shift is noted.     The visualized paranasal sinuses show mucous retention cyst within the left maxillary sinus..     =====  CONCLUSION:  1. No acute intracranial findings.  2. Cerebral atrophy with small vessel changes.    Other procedures:     None new since last visit    Prior as noted below    NCS/EMG (7/16/2020):     Sensory NCS      Nerve / Sites Rec. Site Onset Lat Peak Lat NP Amp PP Amp Segments Distance Peak Diff Velocity       ms ms µV µV   cm ms m/s   R Median - Digit II (Antidromic)      Wrist Dig II 3.70 4.38 1.7 1.8 Wrist - Dig II 13   35   L Median - Digit II (Antidromic)      Wrist Dig II NR NR  NR NR Wrist - Dig II 13   NR   R Ulnar - Digit V (Antidromic)      Wrist Dig V 2.14 2.66 3.0 5.7 Wrist - Dig V 11   52      B.Elbow Dig V 2.08 2.60 3.3 5.9 B.Elbow - Wrist   -0.05     L Ulnar - Digit V (Antidromic)      Wrist Dig V 1.93 2.71 23.1 37.9 Wrist - Dig V 11   57      B.Elbow Dig V 1.88 2.66 25.1 41.7 B.Elbow - Wrist   -0.05     R Radial - Anatomical snuff box (Forearm)      Forearm Wrist 1.72 2.29 5.2 4.4 Forearm - Wrist 10   58   L Radial - Anatomical snuff box (Forearm)      Forearm Wrist 1.61 2.19 41.1 53.7 Forearm - Wrist 10   62   R Median, Ulnar - Transcarpal comparison      Median Palm Wrist 2.34 3.02 4.3 3.1 Median Palm - Wrist 8   34      Ulnar Palm Wrist 1.35 1.88 3.1 3.0 Ulnar Palm - Wrist 8   59               Median Palm - Ulnar Palm   1.15     L Median, Ulnar - Transcarpal comparison      Median Palm Wrist NR NR NR NR Median Palm - Wrist 8   NR      Ulnar Palm Wrist 1.35 1.77 26.2 36.7 Ulnar Palm - Wrist 8   59               Median Palm - Ulnar Palm   NR         Motor NCS      Nerve / Sites Muscle Latency Amplitude Amp % Duration Area Segments Distance Lat Diff Velocity       ms mV % ms mVms   cm ms m/s   R Median - APB      Wrist APB 4.06 5.6 100 4.64 15.2 Wrist - APB 7          Elbow APB 7.81 5.4 95.9 4.79 14.7 Elbow - Wrist 20 3.75 53      A.Elbow APB 4.01 10.6 187 4.53 28.6 A.Elbow - Elbow   -3.80     L Median - APB      Wrist APB 5.05 2.9 100 8.80 11.5 Wrist - APB 7          Elbow APB 8.70 2.8 96.5 8.91 11.9 Elbow - Wrist 20 3.65 55   R Ulnar - ADM      Wrist ADM 2.40 12.7 100 4.84 36.2 Wrist - ADM 7          B.Elbow ADM 5.63 12.8 101 5.10 36.8 B.Elbow - Wrist 20 3.23 62   L Ulnar - ADM      Wrist ADM 2.40 9.8 100 5.99 30.4 Wrist - ADM 7          B.Elbow ADM 5.83 10.1 104 5.89 31.9 B.Elbow - Wrist 20.5 3.44 60       F  Wave      Nerve F Lat M Lat F-M Lat     ms ms ms   R Median - APB 25.3 4.4 20.9   R Ulnar - ADM 25.5 2.4 23.1   L Median - APB 28.0 5.1 22.9   L Ulnar - ADM 26.2 2.6 23.6        EMG                       EMG Summary Table       Spontaneous MUAP Recruitment   Muscle Nerve Roots IA Fib PSW Fasc H.F. Amp Dur. PPP Pattern   L. Deltoid Axillary C5-C6 N None None None None N N N N   L. Biceps brachii Musculocutaneous C5-C6 N None None None None N N N N   L. Triceps brachii Radial C6-C8 N None None None None N N N N   L. Extensor digitorum communis Radial C7-C8 N None None None None N N N N   L. Abductor pollicis brevis Median C8-T1 N None None None None N N N N   L. First dorsal interosseous Ulnar C8-T1 N None None None None N N N N   R. Deltoid Axillary C5-C6 N None None None None N N N N   R. Biceps brachii Musculocutaneous C5-C6 N None None None None N N N N   R. Triceps brachii Radial C6-C8 N None None None None N N N N   R. Extensor digitorum communis Radial C7-C8 N None None None None N N N N   R. Abductor pollicis brevis Median C8-T1 N None None None None N N N N   R. First dorsal interosseous Ulnar C8-T1 N None None None None N N N N               Summary:     Nerve conduction studies:  1.  Right median sensory response was abnormal due to markedly reduced amplitude, with normal peak latency, and normal conduction velocity.  2.  Left median sensory response was absent.  3.  Right ulnar sensory response was abnormal due to markedly reduced amplitude, with normal peak latency, and normal conduction velocity.  4.  Left ulnar sensory response was normal.  5.  Right radial sensory response was abnormal due to markedly reduced amplitude, with normal peak latency, and normal conduction velocity.  6.  Left radial sensory spots was normal.  7.  Right median to ulnar palmar mixed nerve comparison study demonstrated significant inter-latency prolongation of median relative to ulnar nerve (approximately 1.15 ms), consistent with median nerve entrapment across the right wrist.  8.  Left median to ulnar palmar mixed nerve comparison study demonstrated absent median response.  9.  Right median  motor response was normal; F wave response was normal.  10.  Left median motor response was abnormal due to prolonged peak latency, with reduced amplitude, and normal conduction velocity; F wave response was normal.  11.  Right ulnar motor response was normal; F wave response was normal.  12.  Left ulnar motor response was normal; F wave response was normal.     EMG (needle exam):  Concentric needle EMG was performed on the selected muscles listed above, with the following findings: All muscles tested were normal, including bilateral APB muscles.        Impression:   This is an abnormal study.  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     In addition, there is evidence for localized sensory neuropathic process in the right upper extremity, which may be related to prior history of trauma.  Clinical correlation is advised.  There is no electrophysiologic evidence to suggest a generalized large fiber polyneuropathy, or myopathy.     Overall, the findings of bilateral median nerve entrapment suggest a moderate to severe median nerve entrapment across the left wrist, and a mild median nerve entrapment across the right wrist, with sensory nerve affected only, with demyelinating type changes, and no secondary axonal loss.     Prior as noted below    EMG 5/4/18:     Sensory NCS      Nerve / Sites Rec. Site Onset Lat Peak Lat NP Amp PP Amp Segments Distance Peak Diff Velocity       ms ms µV µV   cm ms m/s   R Median - Digit II (Antidromic)      Wrist Dig II 3.44 4.48 22.6 44.8 Wrist - Dig II 14   41   R Ulnar - Digit V (Antidromic)      Wrist Dig V 2.14 2.92 33.5 43.7 Wrist - Dig V 11   52      B.Elbow Dig V 2.08 2.86 33.4 45.6 B.Elbow - Wrist   -0.05     R Radial - Anatomical snuff box  (Forearm)      Forearm Wrist 1.77 2.34 31.5 49.2 Forearm - Wrist 10   56   R Sural - Ankle (Calf)      Calf Ankle 2.55 3.18 5.9 8.2 Calf - Ankle 13   51      2 Ankle 2.34 3.13 2.9 9.0              3 Ankle 2.66 3.33 2.5 5.2           R Median, Ulnar - Transcarpal comparison      Median Palm Wrist 2.50 3.07 19.5 28.4 Median Palm - Wrist 8   32      Ulnar Palm Wrist 1.35 1.93 24.0 25.8 Ulnar Palm - Wrist 8   59               Median Palm - Ulnar Palm   1.15         Motor NCS      Nerve / Sites Muscle Latency Amplitude Amp % Duration Area Segments Distance Lat Diff Velocity       ms mV % ms mVms   cm ms m/s   R Median - APB      Wrist APB 4.43 4.2 100 7.55 13.4 Wrist - APB 7          Elbow APB 8.28 4.0 96.3 7.45 13.0 Elbow - Wrist 19.5 3.85 51   R Ulnar - ADM      Wrist ADM 2.45 10.6 100 5.63 30.1 Wrist - ADM 7          B.Elbow ADM 5.57 10.7 102 5.89 31.4 B.Elbow - Wrist 19.5 3.13 62      A.Elbow ADM 7.50 10.6 101 5.89 30.5 A.Elbow - B.Elbow 10 1.93 52   R Peroneal - EDB      Ankle EDB 4.27 0.6 100 4.43 1.8 Ankle - EDB 7          Fib head EDB 10.10 0.7 111 4.79 2.1 Fib head - Ankle 28 5.83 48   R Tibial - AH      Ankle AH 4.01 8.9 100 6.20 28.5 Ankle - AH 8          Pop fossa AH 11.15 5.8 65.1 7.08 22.3 Pop fossa - Ankle 38 7.14 53   R Peroneal - Tib Ant      Fib Head Tib Ant 4.11 7.9 100 8.54 42.4 Fib Head - Tib Ant 11          Pop fossa Tib Ant 5.57 7.8 99.1 8.65 42.6 Pop fossa - Fib Head 7.5 1.46 51       F  Wave      Nerve F Lat M Lat F-M Lat     ms ms ms   R Peroneal - EDB NR NR NR   R Tibial - AH 39.8 4.9 34.8   R Median - APB 27.4 3.9 23.5   R Ulnar - ADM 25.5 2.1 23.4       EMG                       EMG Summary Table       Spontaneous MUAP Recruitment   Muscle Nerve Roots IA Fib PSW Fasc H.F. Amp Dur. PPP Pattern   R. Deltoid Axillary C5-C6 N None None None None N N N N   R. Biceps brachii Musculocutaneous C5-C6 N None None None None N N N N   R. Triceps brachii Radial C6-C8 N None None None None N N N N   R.  Extensor digitorum communis Radial C7-C8 N None None None None N N N N   R. First dorsal interosseous Ulnar C8-T1 N None None None None N N N N   R. Vastus medialis Femoral L2-L4 N None None None None N N N N   R. Tibialis anterior Deep peroneal (Fibular) L4-L5 N None None None None 1+ 1+ N Reduced   R. Peroneus longus Perineal L5-S1 N None None None None 1+ 1+ N Reduced   R. Gastrocnemius (Medial head) Tibial S1-S2 N None None None None N N N N   R. Extensor hallucis longus Deep peroneal (Fibular) L5-S1 N None None None None 1+ 1+ N Reduced               Summary:     Right sural sensory response was borderline due to borderline amplitude, with normal peak latency and normal conduction velocity.   Right median sensory response was abnormal due to prolonged peak latency, with normal amplitude and slowed conduction velocity   Right ulnar sensory response was normal  Right radial sensory response was normal.   Right median to ulnar palmar mixed nerve comparison study was abnormal and demonstrated significant interlatency prolongation of median relative to ulnar nerve (1.15 sec [>0.5 msec]), consistent with focal slowing of median nerve across the wrist and entrapment of median nerve across the wrist.   Right common peroneal motor response was abnormal due to markedly reduced amplitude, with normal distal motor latency and normal conduction velocity; F wave was absent  Right tibial motor response was normal; F wave was normal.  Right median motor response was borderline due to borderline distal motor latency and borderline amplitude with normal conduction velocity; F wave was normal.  Right ulnar motor response was normal; F wave was normal.      EMG (needle exam)  Concentric needle EMG was performed on the selected muscles listed above in the table, with the following findings:   No increased insertional or spontaneous activity was noted in any of the muscles  Motor unit potentials demonstrated chronic denervation with  increased amplitude, duration and reduced recruitment in the R lower extremity in the R TA, peroneus longus and EHL muscles.         Impression:      This study is abnormal.  There is electrophysiologic evidence consistent with a Right median nerve entrapment across the wrist, with demyelinating features.  In addition, this is suggestive of but not diagnostic for a mild, chronic R lower lumbar radiculopathy.        There is no clear evidence for a large fiber polyneuropathy or myopathy as clinically questioned.     Diagnostic and imaging as previously noted below:    MRI brain / MRA head / neck (4/11/17):     FINDINGS:  Ventricles and sulci are normal caliber. No mass effect or midline shift. There is a small focus of restricted diffusion involving the left basal ganglia measuring up to 0.6 cm. There is associated minimal low signal on the ADC map. There is    corresponding mild flair hyperintensity.        There is scattered mild chronic small vessel disease. No abnormal extra-axial fluid. Normal central flow voids.        The carotid arteries are patent. There is mild carotid bifurcation atherosclerosis. Narrowing is estimated at no more than 15-20% bilaterally. The vertebral arteries are patent. The left is dominant in caliber. The basilar artery is patent. There is no   carotid or vertebral basilar dissection. No focal high grade stenosis.        The anterior, middle and posterior cerebral arteries are patent. No intracranial aneurysm or arterial venous malformation demonstrated.        The superior cerebellar, AICA and PICA segments demonstrate flow related signal.          =====  CONCLUSION:     #1. Small subacute infarct left basal ganglia.  #2. Patent carotid and vertebral basilar systems. Mild carotid bifurcation atherosclerosis.  #3. Unremarkable [place] MRA.    Echo:   Conclusions:    1. Left ventricle: The cavity size was normal. Wall thickness was normal.     Systolic function was normal. The  estimated ejection fraction was 65%. No     diagnostic evidence for regional wall motion abnormalities. Doppler     parameters are consistent with abnormal left ventricular relaxation -     grade 1 diastolic dysfunction.  2. Mitral valve: Systolic bowing without prolapse. There was trivial to mild     regurgitation.  3. Left atrium: The left atrial volume was mildly increased.  4. Right atrium: The atrium was mildly dilated.  5. Tricuspid valve: Structurally normal valve. There was moderate     regurgitation.  6. Pulmonary arteries: Systolic pressure was mildly increased, in the range     of 40mm Hg to 45mm Hg.  Impressions:  There was no diagnostic evidence for a cardiac embolic source.  This study is compared with previous dated 16: Compared to the  previous study, systemic pressure has decreased from 151/70 mmHg to 137/65  mmHg. PA systolic pressure has decreased from ~ 60 mmHg to 40-45 mmHg.    Labs:     None new:    Prior as noted below     17:   LDL 43  Chol,total: 123  Tri  HDL 52    AST/ALT: normal     Prior as noted below:     LDL: 80  A1C: 6.5           Impression/ Plan:    Liss Fagan is a 73 year old woman with past medical history significant for HTN, DM type 2, GERD, who originally presented to ED 17, with numbness in R face/arm/ leg and found to have acute left basal ganglia infract     MRI brain previously repeated for similar symptoms and likely with extension of prior stroke    Workup unremarkable with normal Echo, LDL 80, A1C 6.5, and likely small vessel etiology.       Patient previously was seen earlier than scheduled due to new onset of severe headache, associated with nausea and numbness on the right side of the face.  This has since resolved.  Imaging demonstrated left cavernous carotid aneurysm 2019.  Patient was evaluated by neuro interventional radiology with no recommendation for acute intervention, and with recommendation for serial imaging ~ 1 yr from  original image.      In terms of stroke symptoms, she is currently doing well on her dose of Plavix 75 mg daily and Lipitor 40 mg daily (could not tolerate ASA).        She subsequently presented for left side facial pain with symptoms somewhat suggestive of trigeminal neuralgia but with some atypical features with distribution primarily into V1 region and also with more continuous pain than lancinating pain. 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 She was advised to see neurosurgery / neuro-interventional service (Dr. Yanes) for additional recommendations and was recommended to have serial imaging for surveillance rather than traditional angiogram.    1. Carotid aneurysm, left (HCC)  As noted above    2. Trigeminal neuralgia syndrome  As noted above     3. Vestibular schwannoma (HCC)  As noted above    Return in about 4 months (around 5/29/2024).    Wenceslao Bishop MD, Neurology  [facility]  Pager [phone]  1/29/2024

## 2024-02-08 ENCOUNTER — TELEPHONE (OUTPATIENT)
Dept: INTERNAL MEDICINE CLINIC | Facility: CLINIC | Age: 74
End: 2024-02-08

## 2024-02-08 NOTE — TELEPHONE ENCOUNTER
TO - please advise on continuing medications. Ty!    Pt called office to provide update, as requested.   Pt reports that the leg swelling is better with the lasix.   Dr. Ward is booked until March, so pt has appt with PA on 2/12/24.   Pt has a 30-day supply of lasix that she picked up on 1/24/24.     Okay to continue until she can get further guidance from ortho?    Pt also has been taking gabapentin 100 mg once per day. Pt reports she had 1 episode last week of sharp pain on the right side of her head (at the top) which was bad, but no further incidents since that time.     Okay to continue taking the gabapentin once per day?    Pt saw Neuro on 1/29/24:    Patient last seen 10/26/2203.  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 She was advised to see neurosurgery / neuro-interventional service (Dr. Yanes) for additional recommendations and was recommended to have serial imaging for surveillance rather than traditional angiogram.      She is still on gabapentin 100 mg bid but recently reduced to 100 mg daily by PCP with plan to stop taking if still not having left facial pain; this is resolved currently; she has some pain in R  leg below knee - DVT was normal.

## 2024-02-12 ENCOUNTER — OFFICE VISIT (OUTPATIENT)
Dept: ORTHOPEDICS CLINIC | Facility: CLINIC | Age: 74
End: 2024-02-12
Payer: MEDICARE

## 2024-02-12 VITALS — WEIGHT: 151 LBS | HEIGHT: 62 IN | BODY MASS INDEX: 27.79 KG/M2

## 2024-02-12 DIAGNOSIS — M17.11 PRIMARY OSTEOARTHRITIS OF RIGHT KNEE: Primary | ICD-10-CM

## 2024-02-12 RX ORDER — TRIAMCINOLONE ACETONIDE 40 MG/ML
40 INJECTION, SUSPENSION INTRA-ARTICULAR; INTRAMUSCULAR ONCE
Status: COMPLETED | OUTPATIENT
Start: 2024-02-12 | End: 2024-02-12

## 2024-02-12 RX ADMIN — TRIAMCINOLONE ACETONIDE 40 MG: 40 INJECTION, SUSPENSION INTRA-ARTICULAR; INTRAMUSCULAR at 16:17:00

## 2024-02-12 NOTE — PROGRESS NOTES
[facility] Progress Note    Subjective: Patient returns to clinic after last being seen by Dr. Ward on 4/24/2023.  At that visit, she was given a right knee injection which did work very well for both pain and swelling.  She reports in January she insidiously began developing pain and swelling from the right knee down the right lower extremity.  She reports feeling a lump in the back of her right knee.  She reports that her  told her that he had a similar instance of a lump in the back of the knee and felt it was a Baker's cyst.  She reports her daughter is a nurse who advised she go to the emergency department due to the leg swelling where an ultrasound was obtained ruling out DVT.  Her primary care physicians referred her back to our office for leg swelling.  The patient denies any known injury.  Reports clicking and popping deep within the right knee that does worsen with certain rotational movements.    Objective: Patient seated in the exam chair.  Alert and oriented x 3.  Conversational, tangential.  Demonstrates full active knee extension and flexion approximately 100 degrees.  There does appear to be swelling along the anterior knee around the infrapatellar bursa.  There is no pitting edema.  There is no knee effusion.  There is no redness about the knee.  There is tenderness to palpation diffusely throughout the knee around the medial joint line, the lateral joint line, and the posterior knee.    Assessment/Plan: 73-year-old female with right lower extremity swelling and knee pain in the setting of radiographically minimal osteoarthritis.  I discussed the potential etiologies with the patient in detail including possible meniscal aggravation leading to inflammation within the right knee joint.  She has very little arthritis on radiographs and has benefited greatly from steroid injection in the past.  We discussed repeating this measure to help control any inflammation within the knee joint.   Ultimately, the patient endorsed interest in a knee injection was provided in clinic today.  I also highly encouraged that she ice and wrap the right knee as often as possible and an Ace bandage was provided to the patient in clinic today.  If no significant improvement would obtain an MRI of the right knee.  Her questions were sought and answered satisfactorily.  She is happy with the plan and will follow as advised.      Neal Iglesias PA-C  [facility]    This note was dictated using Dragon software.  While it was briefly proofread prior to completion, some grammatical, spelling, and word choice errors due to dictation may still occur.

## 2024-02-12 NOTE — PROCEDURES
After informed consent, the patient's right knee was marked, locally anesthetized with skin refrigerant, prepped with topical antiseptic, and injected with a mixture of 1mL 40mg/mL Kenalog, 2mL 1% lidocaine and 2mL 0.5% marcaine through the inferolateral portal.  A band-aid was applied.  The patient tolerated the procedure well.    Neal Iglesias PA-C  [facility]

## 2024-02-19 RX ORDER — FUROSEMIDE 20 MG/1
20 TABLET ORAL DAILY
Qty: 30 TABLET | Refills: 0 | Status: SHIPPED | OUTPATIENT
Start: 2024-02-19

## 2024-02-19 NOTE — TELEPHONE ENCOUNTER
Requesting   Name from pharmacy: Furosemide 20 Mg Tab Risi          Will file in chart as: FUROSEMIDE 20 MG Oral Tab    Sig: Take 1 tablet (20 mg total) by mouth daily.    Disp: 30 tablet    Refills: 0    Start: 2/19/2024    Class: Normal    Non-formulary    Last ordered: 3 weeks ago (1/24/2024) by Rikki Rizvi MD    Last refill: 1/24/2024    Rx #: [id number]    Hypertension Medications Protocol Riroth8002/19/2024 10:44 AM   Protocol Details Last BP reading less than 140/90    CMP or BMP in past 12 months    In person appointment or virtual visit in the past 12 mos or appointment in next 3 mos    EGFRCR or GFRNAA > 50        LOV: 1/24/2024  RTC: none noted   Last Relevant Labs: 6/12/2023  Filled: 1/24/2024 #30 with 0 refills    Future Appointments   Date Time Provider Department Center   3/20/2024  1:30 PM Georges Orozco DPM [id number]   5/30/2024 11:00 AM Wenceslao Bishop MD [facility]   6/5/2024 11:30 AM  MR RM4 (3T WIDE)  [facility]   6/5/2024 12:15 PM  MR RM4 (3T WIDE)  [facility]

## 2024-02-20 RX ORDER — GABAPENTIN 100 MG/1
100 CAPSULE ORAL DAILY
Qty: 30 CAPSULE | Refills: 0 | Status: SHIPPED | OUTPATIENT
Start: 2024-02-20

## 2024-02-20 NOTE — TELEPHONE ENCOUNTER
Pt reported takes 100 mg daily    Gabapentin 100 mg  Filled 1-24-24  Qty 60  0 refills  Future Appointments   Date Time Provider Department Center   3/20/2024  1:30 PM Georgse Orozco DPM [id number]   5/30/2024 11:00 AM Wenceslao Bishop MD [facility]   6/5/2024 11:30 AM  MR RM4 (3T WIDE)  [facility]   6/5/2024 12:15 PM  MR RM4 (3T WIDE)  [facility]   LOV 01-24-24 TO

## 2024-02-21 NOTE — TELEPHONE ENCOUNTER
Lab Results   Component Value Date    HGBA1C 9.8 (H) 10/25/2022     Patient is s/p bilateral AKA  Neuropathy concern seems controlled/stable on gabapentin 200mg BID  Consider wean if remaining stable/controlled and in setting of bilateral AKA if peripheral neuropathy becomes less of an issue as she heals and gets used to her prosthetics   Yes ok to hold Plavix

## 2024-02-22 ENCOUNTER — TELEPHONE (OUTPATIENT)
Dept: INTERNAL MEDICINE CLINIC | Facility: CLINIC | Age: 74
End: 2024-02-22

## 2024-02-22 DIAGNOSIS — I10 ESSENTIAL HYPERTENSION: ICD-10-CM

## 2024-02-22 DIAGNOSIS — Z86.73 HISTORY OF STROKE WITHOUT RESIDUAL DEFICITS: ICD-10-CM

## 2024-02-22 RX ORDER — RANOLAZINE 500 MG/1
500 TABLET, EXTENDED RELEASE ORAL DAILY
Qty: 90 TABLET | Refills: 0 | Status: SHIPPED | OUTPATIENT
Start: 2024-02-22 | End: 2024-02-22

## 2024-02-22 RX ORDER — RANOLAZINE 500 MG/1
500 TABLET, EXTENDED RELEASE ORAL DAILY
Qty: 90 TABLET | Refills: 0 | Status: SHIPPED
Start: 2024-02-22

## 2024-02-22 NOTE — TELEPHONE ENCOUNTER
Patient called to request refill on   Omeprazole 40 MG Oral Capsule Delayed Release  METOPROLOL TARTRATE 25 MG Oral Tab  lisinopril-hydroCHLOROthiazide 20-25 MG Oral Tab  metFORMIN 500 MG Oral Tab  clopidogrel 75 MG Oral Tab  Please send to   [id number] - [place], [place] - [address] [phone], [phone]    Patient is calling for refills as she switched from [facility] to [place]

## 2024-02-22 NOTE — TELEPHONE ENCOUNTER
Requesting    Name from pharmacy: RANOLAZINE  MG TABLET         Will file in chart as: RANOLAZINE  MG Oral Tablet 12 Hr    Sig: Take 1 tablet (500 mg total) by mouth daily.    Disp: 90 tablet    Refills: 0 (Pharmacy requested: Not specified)    Start: 2/22/2024    Class: Normal    Non-formulary    Last ordered: 2 months ago (11/27/2023) by Rikki Rizvi MD    Last refill: 11/27/2023    Rx #: [id number]     LOV: 1/24/2024  RTC: none noted   Last Relevant Labs: n/a   Filled: 11/27/2023 #90 with 0 refills    Future Appointments   Date Time Provider Department Center   3/20/2024  1:30 PM Georges Orozco DPM [id number]   5/30/2024 11:00 AM Wenceslao Bishop MD [facility]   6/5/2024 11:30 AM  MR RM4 (3T WIDE)  [facility]   6/5/2024 12:15 PM  MR RM4 (3T WIDE)  [facility]

## 2024-02-22 NOTE — TELEPHONE ENCOUNTER
Patient called office to inform that she is no longer using the [facility] pharmacy as she uses the [id number] - [place], [place] - [address] [phone], [phone] [[id number]] pharmacy now. Please advise.

## 2024-02-22 NOTE — TELEPHONE ENCOUNTER
Patient called to inform the office on how she is doing regarding the swelling of her R knee. She states she calls the office once weekly regarding this. Please call back at [phone] and advise.

## 2024-02-22 NOTE — TELEPHONE ENCOUNTER
Pt called back to give upate  She said knee is \"way better\" than what it was before  Slight swelling of knee by the end of the day, but not bad at all.   No pain in knee  No need for ice    TO:   Please advise on next steps, should pt stop the :   Disp Refills Start End    furosemide 20 MG Oral Tab 30 tablet 0 2/19/2024 --    Sig - Route: Take 1 tablet (20 mg total) by mouth daily. - Oral

## 2024-02-23 RX ORDER — CLOPIDOGREL BISULFATE 75 MG/1
75 TABLET ORAL DAILY
Qty: 90 TABLET | Refills: 0 | Status: SHIPPED | OUTPATIENT
Start: 2024-02-23

## 2024-02-23 RX ORDER — OMEPRAZOLE 40 MG/1
40 CAPSULE, DELAYED RELEASE ORAL DAILY
Qty: 90 CAPSULE | Refills: 0 | Status: SHIPPED | OUTPATIENT
Start: 2024-02-23

## 2024-02-23 RX ORDER — LISINOPRIL AND HYDROCHLOROTHIAZIDE 25; 20 MG/1; MG/1
1 TABLET ORAL DAILY
Qty: 90 TABLET | Refills: 0 | Status: SHIPPED | OUTPATIENT
Start: 2024-02-23

## 2024-02-23 NOTE — TELEPHONE ENCOUNTER
Requesting   Omeprazole 40 MG Oral Capsule Delayed Release          Sig: Take 1 capsule (40 mg total) by mouth daily.    Disp: 52 capsule    Refills: 0    Start: 2/22/2024    Class: Normal    Non-formulary    Last ordered: 2 months ago (12/18/2023) by Rikki Rizvi MD    Gastrointestional Medication Protocol Samggj4602/22/2024 03:30 PM   Protocol Details In person appointment or virtual visit in the past 12 mos or appointment in next 3 mos       metoprolol tartrate 25 MG Oral Tab         Sig: Take 1 tablet (25 mg total) by mouth 2 (two) times daily.    Disp: 180 tablet    Refills: 0    Start: 2/22/2024    Class: Normal    Non-formulary For: Essential hypertension    Last ordered: 2 months ago (12/20/2023) by Rikki Rizvi MD    Hypertension Medications Protocol Iaqdpb9502/22/2024 03:30 PM   Protocol Details Last BP reading less than 140/90    CMP or BMP in past 12 months    In person appointment or virtual visit in the past 12 mos or appointment in next 3 mos    EGFRCR or GFRNAA > 50       lisinopril-hydroCHLOROthiazide 20-25 MG Oral Tab         Sig: Take 1 tablet by mouth daily.    Disp: 90 tablet    Refills: 0    Start: 2/22/2024    Class: Normal    Non-formulary For: Essential hypertension    Last ordered: 2 months ago (12/21/2023) by Rikki Rizvi MD    Hypertension Medications Protocol [id number]/22/2024 03:30 PM   Protocol Details Last BP reading less than 140/90    CMP or BMP in past 12 months    In person appointment or virtual visit in the past 12 mos or appointment in next 3 mos    EGFRCR or GFRNAA > 50       metFORMIN 500 MG Oral Tab         Sig: Take 1 tablet (500 mg total) by mouth 2 (two) times daily with meals.    Disp: 180 tablet    Refills: 0    Start: 2/22/2024    Class: Normal    Non-formulary    Last ordered: 2 months ago (12/21/2023) by Rikki Rizvi MD    Diabetes Medication Protocol Outdcb2702/22/2024 03:30 PM   Protocol Details Last A1C < 7.5 and within past 6 months    In person appointment or  virtual visit in the past 6 mos or appointment in next 3 mos    Microalbumin procedure in past 12 months or taking ACE/ARB    EGFRCR or GFRNAA > 50    GFR in the past 12 months       clopidogrel 75 MG Oral Tab         Sig: Take 1 tablet (75 mg total) by mouth daily.    Disp: 90 tablet    Refills: 0    Start: 2/22/2024    Class: Normal    Non-formulary For: History of stroke without residual deficits    Last ordered: 2 months ago (12/18/2023) by Rikki Rizvi MD     LOV: 1/24/2024  RTC: none noted   Last Relevant Labs: 8/17/2023  Filled: December 2023     Future Appointments   Date Time Provider Department Center   3/20/2024  1:30 PM Georges Orozco DPM [id number]   5/30/2024 11:00 AM Wenceslao Bishop MD [facility]   6/5/2024 11:30 AM  MR RM4 (3T WIDE)  [facility]   6/5/2024 12:15 PM  MR RM4 (3T WIDE)  [facility]

## 2024-03-12 ENCOUNTER — TELEPHONE (OUTPATIENT)
Dept: INTERNAL MEDICINE CLINIC | Facility: CLINIC | Age: 74
End: 2024-03-12

## 2024-03-12 NOTE — TELEPHONE ENCOUNTER
Patient wanted to share following update:      Last couple days has had increased swelling to both ankles (L>R), non-pitting, resolves in morning, increases at end of day.  Has had \"a little more activity past few days\", and \"has been eating pastrami since past Saturday\".  Denies any shortness of breath, difficulty walking (except for \"ongoing issue with left knee\"), or redness/drainage to ankles/legs.  Has been continuing to take lasix 20mg every other day.  BP today 110/62, P68    Discussed following with patient:   -benefits of following low sodium diet (stop pastrami as high in sodium)  -Elevate legs when sitting   -Monitor for pitting edema or swelling that does not resolve with elevation/rest.  Contact office if occurs  -Proceed to immediate care/ER if develops any shortness of breath, redness to affected swollen area, or leg/ankle pain.      Patient verbalized understanding.  Denies further questions.    TO- any further recommendations for patient?

## 2024-03-17 DIAGNOSIS — I10 ESSENTIAL HYPERTENSION: ICD-10-CM

## 2024-03-18 RX ORDER — LISINOPRIL AND HYDROCHLOROTHIAZIDE 25; 20 MG/1; MG/1
1 TABLET ORAL DAILY
Qty: 90 TABLET | Refills: 0 | OUTPATIENT
Start: 2024-03-18

## 2024-03-18 RX ORDER — GABAPENTIN 100 MG/1
100 CAPSULE ORAL DAILY
Qty: 30 CAPSULE | Refills: 0 | Status: SHIPPED | OUTPATIENT
Start: 2024-03-18

## 2024-03-18 RX ORDER — FUROSEMIDE 20 MG/1
20 TABLET ORAL DAILY
Qty: 30 TABLET | Refills: 0 | Status: SHIPPED | OUTPATIENT
Start: 2024-03-18

## 2024-03-18 NOTE — TELEPHONE ENCOUNTER
Requesting   Name from pharmacy: METFORMIN  MG TABLET          Will file in chart as: METFORMIN 500 MG Oral Tab    Sig: TAKE 1 TABLET BY MOUTH TWICE A DAY WITH MEALS    Disp: 180 tablet    Refills: 0 (Pharmacy requested: Not specified)    Start: 3/17/2024    Class: Normal    Non-formulary    Last ordered: 3 weeks ago (2/23/2024) by Rikki Rizvi MD    Last refill: 12/21/2023    Rx #: [id number]    Diabetes Medication Protocol Cmvwfb0203/17/2024 07:00 AM   Protocol Details Last A1C < 7.5 and within past 6 months    In person appointment or virtual visit in the past 6 mos or appointment in next 3 mos    Microalbumin procedure in past 12 months or taking ACE/ARB    EGFRCR or GFRNAA > 50    GFR in the past 12 months       Name from pharmacy: LISINOPRIL-HCTZ 20-25 MG TAB         Will file in chart as: LISINOPRIL-HYDROCHLOROTHIAZIDE 20-25 MG Oral Tab    Sig: TAKE 1 TABLET BY MOUTH EVERY DAY    Disp: 90 tablet    Refills: 0 (Pharmacy requested: Not specified)    Start: 3/17/2024    Class: Normal    Non-formulary For: Essential hypertension    Last ordered: 3 weeks ago (2/23/2024) by Rikki Rizvi MD    Last refill: 12/21/2023    Rx #: [id number]    Hypertension Medications Protocol Jbnyug7103/17/2024 07:00 AM   Protocol Details Last BP reading less than 140/90    CMP or BMP in past 12 months    In person appointment or virtual visit in the past 12 mos or appointment in next 3 mos    EGFRCR or GFRNAA > 50        LOV: 1/24/2024  RTC: none noted   Last Relevant Labs: 6/12/2023  Filled: 2/23/2024 #90 day supply with 0 refills    Future Appointments   Date Time Provider Department Center   3/20/2024  1:30 PM Georges Orozco DPM [id number]   5/30/2024 11:00 AM Wenceslao Bishop MD [facility]   6/5/2024 11:30 AM [facility] MR RM4 (3T WIDE)  [facility]   6/5/2024 12:15 PM E[facility] MR RM4 (3T WIDE)  [facility]

## 2024-03-18 NOTE — TELEPHONE ENCOUNTER
Requesting   Name from pharmacy: Furosemide 20 Mg Tab Risi          Will file in chart as: FUROSEMIDE 20 MG Oral Tab    Sig: Take 1 tablet (20 mg total) by mouth daily.    Disp: 30 tablet    Refills: 0    Start: 3/18/2024    Class: Normal    Non-formulary    Last ordered: 4 weeks ago (2/19/2024) by Rikki Rizvi MD    Last refill: 2/19/2024    Rx #: [id number]    Hypertension Medications Protocol [id number]/18/2024 09:49 AM   Protocol Details Last BP reading less than 140/90    CMP or BMP in past 12 months    In person appointment or virtual visit in the past 12 mos or appointment in next 3 mos    EGFRCR or GFRNAA > 50       Name from pharmacy: Gabapentin 100 Mg Cap Nort         Will file in chart as: GABAPENTIN 100 MG Oral Cap    Sig: Take 1 capsule (100 mg total) by mouth daily.    Disp: 30 capsule    Refills: 0    Start: 3/18/2024    Class: Normal    Non-formulary    Last ordered: 3 weeks ago (2/20/2024) by Rikki Rizvi MD    Last refill: 2/21/2024    Rx #: [id number]    Neurology Medications Jommor5603/18/2024 09:49 AM   Protocol Details In person appointment or virtual visit in the past 6 mos or appointment in next 3 mos        LOV: 1/4/2024  RTC: none noted   Last Relevant Labs: 6/12/2023  Filled: 2/19/2024 #30 with 0 refills    Future Appointments   Date Time Provider Department Center   3/20/2024  1:30 PM Georges Orozco DPM [id number]   5/30/2024 11:00 AM Wenceslao Bishop MD [facility]   6/5/2024 11:30 AM  MR RM4 (3T WIDE)  [facility]   6/5/2024 12:15 PM E[facility] MR RM4 (3T WIDE)  [facility]

## 2024-03-20 ENCOUNTER — OFFICE VISIT (OUTPATIENT)
Dept: PODIATRY CLINIC | Facility: CLINIC | Age: 74
End: 2024-03-20
Payer: MEDICARE

## 2024-03-20 DIAGNOSIS — M79.674 TOE PAIN, BILATERAL: ICD-10-CM

## 2024-03-20 DIAGNOSIS — M54.16 LUMBAR RADICULOPATHY: ICD-10-CM

## 2024-03-20 DIAGNOSIS — M20.41 HAMMER TOES OF BOTH FEET: ICD-10-CM

## 2024-03-20 DIAGNOSIS — E11.9 TYPE 2 DIABETES MELLITUS WITHOUT COMPLICATION, WITHOUT LONG-TERM CURRENT USE OF INSULIN (HCC): ICD-10-CM

## 2024-03-20 DIAGNOSIS — M20.42 HAMMER TOES OF BOTH FEET: ICD-10-CM

## 2024-03-20 DIAGNOSIS — B35.1 ONYCHOMYCOSIS: Primary | ICD-10-CM

## 2024-03-20 DIAGNOSIS — M79.675 TOE PAIN, BILATERAL: ICD-10-CM

## 2024-03-20 DIAGNOSIS — L60.8 INCURVATED NAIL: ICD-10-CM

## 2024-03-20 PROCEDURE — 99213 OFFICE O/P EST LOW 20 MIN: CPT | Performed by: STUDENT IN AN ORGANIZED HEALTH CARE EDUCATION/TRAINING PROGRAM

## 2024-03-20 NOTE — PROGRESS NOTES
[facility]  Progress Note    Liss Fagan is a 73 year old female.   Chief Complaint   Patient presents with    Toenail Care     Nail care and foot check- fbg 98- A1c 6.7 06/12/23 -          HPI:     Patient is a pleasant 73 year old female with PMHx of diabetes and lumbar radiculopathy who RTC for diabetic foot exam. Patient complains of elongated and thickened nails she has difficulty trimming on her own that occasinoally become incurvated and painful.  Her hallux nails especially become incurvated and painful.   Her last A1c was 6.7% on 6/12/23.  Her blood sugars were 98 mg/dL check this morning.  PMHx, medications, and allergies were reviewed.      Allergies: Penicillins, Advil [ibuprofen], Aspirin, Codeine sulfate, Demerol, Naproxen, Norvasc [amlodipine besylate], Nsaids, Tramadol, Vicodin [hydrocodone-acetaminophen], and Zetia [ezetimibe]   Current Outpatient Medications   Medication Sig Dispense Refill    furosemide 20 MG Oral Tab Take 1 tablet (20 mg total) by mouth daily. 30 tablet 0    gabapentin 100 MG Oral Cap Take 1 capsule (100 mg total) by mouth daily. 30 capsule 0    Omeprazole 40 MG Oral Capsule Delayed Release Take 1 capsule (40 mg total) by mouth daily. 90 capsule 0    metoprolol tartrate 25 MG Oral Tab Take 1 tablet (25 mg total) by mouth 2 (two) times daily. 180 tablet 0    lisinopril-hydroCHLOROthiazide 20-25 MG Oral Tab Take 1 tablet by mouth daily. 90 tablet 0    metFORMIN 500 MG Oral Tab Take 1 tablet (500 mg total) by mouth 2 (two) times daily with meals. 180 tablet 0    clopidogrel 75 MG Oral Tab Take 1 tablet (75 mg total) by mouth daily. 90 tablet 0    ranolazine  MG Oral Tablet 12 Hr Take 1 tablet (500 mg total) by mouth daily. 90 tablet 0    NIFEdipine ER 60 MG Oral Tablet 24 Hr Take 1 tablet (60 mg total) by mouth daily. 90 tablet 0    isosorbide mononitrate  MG Oral Tablet 24 Hr Take 1 tablet (120 mg total) by mouth daily. 90 tablet 0    Microlet  Lancets Does not apply Misc USE TO TEST ONCE DAILY *DX E11.9* 100 each 1    atorvastatin 40 MG Oral Tab TAKE 1 TABLET BY MOUTH EVERY DAY IN THE EVENING 90 tablet 2    Glucose Blood (CONTOUR TEST) In Vitro Strip USE TO TEST ONCE DAILY AS DIRECTED. 100 strip 2    omega-3 fatty acids 1000 MG Oral Cap Take 1,000 mg by mouth daily.      NON FORMULARY Take 1 tablet by mouth daily. LACTATE PILLS      nitroGLYCERIN (NITROSTAT) 0.4 MG Sublingual SL Tab Place 1 tablet (0.4 mg total) under the tongue every 5 (five) minutes as needed. 30 tablet 5    Black Elderberry,Berry-Flower, 575 MG Oral Cap Take by mouth.      mometasone 0.1 % External Ointment Apply topically as needed.      cetirizine 10 MG Oral Tab Take 1 tablet (10 mg total) by mouth daily.      acetaminophen 500 MG Oral Tab Take 1 tablet (500 mg total) by mouth every 6 (six) hours as needed for Pain.      PEG 3350 Oral Powd Pack Take 17 g by mouth nightly.      Multiple Vitamin (MULTI-VITAMIN) Oral Tab Take 1 tablet by mouth daily.        Past Medical History:   Diagnosis Date    Abnormal maternal glucose tolerance, complicating pregnancy, childbirth, or the puerperium, unspecified as to episode of care     Acute bronchitis 04/11/2011    Back problem     Cataract     Chest pain, unspecified 12/05    admit 12/05    Deep vein thrombosis (HCC)     during pregnancy right leg    Diabetes (HCC)     Diverticulosis of large intestine     unsure if divertiulitis or diverticulosis    Esophageal reflux     Essential hypertension, benign 4/24/2013    Helicobacter pylori gastritis 2004    High cholesterol     Incontinence     Palpitations     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 11/09    PONV (postoperative nausea and vomiting)     nausea,pain results in cardiac vasospasms    Sleep apnea     Stroke (HCC)     r/o 4/11/17    Unspecified gastritis and gastroduodenitis without mention of hemorrhage 2004    Visual impairment     glasses      Past  Surgical History:   Procedure Laterality Date    BACK SURGERY  1984    Spinal diskectomy lumbar    at [place]        1987    CARDIAC CATHERTERIZATION (PBP)      status normal resolved on 2007    CATARACT      CHOLECYSTECTOMY  1999    COLONOSCOPY  2011    colon polyp removal  francesca cee 5    COLONOSCOPY      COLONOSCOPY N/A 2017    Procedure: COLONOSCOPY;  Surgeon: Addison Castellano MD;  Location:  ENDOSCOPY    HERNIA SURGERY  1999    Umbilical hernia repair;     HYSTERECTOMY      d/t fibroids    LUMBAR DISCECTOMY Left 2019    L4-L5   Dr Drew    OTHER      Spinal Steroid Injection     SHOULDER SURG PROC UNLISTED      SPINE SURGERY PROCEDURE UNLISTED      bulging discs in lower back    UPPER GI ENDOSCOPY PERFORMED  2011      Family History   Problem Relation Age of Onset    Cancer Father          at age 52 Leukemia    Diabetes Father     Breast Cancer Paternal Grandmother     Cancer Paternal Grandmother         bone cancer    Cancer Other       Social History     Socioeconomic History    Marital status:    Tobacco Use    Smoking status: Never     Passive exposure: Never    Smokeless tobacco: Never   Vaping Use    Vaping Use: Never used   Substance and Sexual Activity    Alcohol use: Not Currently     Comment: very seldom    Drug use: No   Other Topics Concern    Caffeine Concern Yes     Comment: 1 cup of coffee every morning and 1 glass of tea during dinner.  Sometimes dark chocolate.      Exercise No           REVIEW OF SYSTEMS:     Today reviewed systems as documented below  GENERAL HEALTH: feels well otherwise  SKIN: denies any unusual skin lesions or rashes  RESPIRATORY: denies shortness of breath with exertion  CARDIOVASCULAR: denies chest pain on exertion  GI: denies abdominal pain and denies heartburn  NEURO: denies headaches  MUSCULO: history of lower back pain      EXAM:   There were no vitals taken for this visit.  GENERAL: well  developed, well nourished, in no apparent distress  EXTREMITIES:  1. Integument: Normal skin temperature and turgor.  Nails x10 are elongated and thickened and incurvated. No acute signs of infection noted.   2. Vascular: Dorsalis pedis two out of four bilateral and posterior tibial pulses two out of   four bilateral, capillary refill normal.   3. Musculoskeletal: All muscle groups are graded 5 out of 5 in the foot and ankle. Hammertoe deformities bilaterally.  Adductovarus rotation of left fifth toe.  Mild pain noted to lateral border of right hallux nail.   4. Neurological: Normal sharp dull sensation; reflexes normal. Protective sensation intact to digits via 10g monofilament.      ASSESSMENT AND PLAN:   Diagnoses and all orders for this visit:    Onychomycosis    Incurvated nail    Hammer toes of both feet    Lumbar radiculopathy    Type 2 diabetes mellitus without complication, without long-term current use of insulin (HCC)    Toe pain, bilateral              Plan:     -Patient examined, chart history reviewed.  -Discussed importance of proper pedal hygiene, regular foot checks, and tight glucose control.  -Sharply debrided nails x10 with a sterile nail nipper achieving a 20% reduction in thickness and length, without incident.  Slant back procedure performed to areas of incurvated nails without incident.  Nails further smoothed with dremel.    -Pared HPK x 1 with #15 blade to healthy tissue WOI. Can use urea cream to site between visits.  -Patient to ambulate with supportive shoes with wide toe box.  -Patient to check feet daily and follow up if any concerns arise.  -Educated patient on acute signs of infection advised patient to seek immediate medical attention if symptoms arise.    The patient indicates understanding of these issues and agrees to the plan.    RTC 2 months.    Georges Orozco DPM

## 2024-03-21 ENCOUNTER — TELEPHONE (OUTPATIENT)
Dept: ORTHOPEDICS CLINIC | Facility: CLINIC | Age: 74
End: 2024-03-21

## 2024-03-21 DIAGNOSIS — M17.11 PRIMARY OSTEOARTHRITIS OF RIGHT KNEE: Primary | ICD-10-CM

## 2024-03-21 NOTE — TELEPHONE ENCOUNTER
Patient is requesting a call back in regards to her right knee pain. States she is in a lot of pain and cannot put weight on her leg. Please advise.

## 2024-03-21 NOTE — TELEPHONE ENCOUNTER
Do you want to order MRI of right knee? Please advise. Thank you!    LOV 2/12/24 If no significant improvement would obtain an MRI of the right knee.     After March 5th knee starting swelling and increased pain. Pain   10/10 when weight bearing.  Is still icing, can only take tylenol due to apirin allergy, takes at night to help with  sleep.    Dr Smith put on furosemide 20mg every other day for pitting edema. Encouraged to restart daily ACE wrap.

## 2024-03-22 NOTE — TELEPHONE ENCOUNTER
Spoke with patient to let her know the order for MRI of her knee is placed.  Patient stated she had a good night last night and was able to sleep.  Gave patient the number for [facility] if Smith MRI is really backed up and to call us so we can change the order for [facility] if they can get her in sooner.  Patient verbalized understanding.

## 2024-03-25 ENCOUNTER — HOSPITAL ENCOUNTER (OUTPATIENT)
Dept: MRI IMAGING | Age: 74
Discharge: HOME | End: 2024-03-25
Attending: PHYSICIAN ASSISTANT
Payer: MEDICARE

## 2024-03-25 DIAGNOSIS — M17.11 PRIMARY OSTEOARTHRITIS OF RIGHT KNEE: ICD-10-CM

## 2024-03-25 PROCEDURE — 73721 MRI JNT OF LWR EXTRE W/O DYE: CPT | Performed by: PHYSICIAN ASSISTANT

## 2024-03-26 ENCOUNTER — TELEPHONE (OUTPATIENT)
Dept: ORTHOPEDICS CLINIC | Facility: CLINIC | Age: 74
End: 2024-03-26

## 2024-03-26 NOTE — TELEPHONE ENCOUNTER
Spoke with pt. She just wanted to know if she should keep her appointment with Neal, or if she needs to see ?     Advised her that we will let Neal know that the MRI result in the EMR, and to keep her appointment with him for now, and we will [place] back after Neal has had a chance to review the findings.     Verbalized understanding.

## 2024-03-26 NOTE — TELEPHONE ENCOUNTER
Patient is calling in regards to results of MRI she recently had done put in from Neal Iglesias. Patient was booked an appointment for follow up however would like to know next steps in treatment. Please Advise     Patient would like a call back at   Telephone Information:   Home Phone [phone]   Mobile [phone]      Future Appointments   Date Time Provider Department Center   4/10/2024  3:20 PM Neal Iglesias PA-C [facility]   5/21/2024  2:45 PM Georges Orozco DPM [id number]   5/30/2024 11:00 AM Wenceslao Bishop MD [facility] [place]   6/5/2024 11:30 AM  MR RM4 (3T WIDE)  [facility]   6/5/2024 12:15 PM  MR RM4 (3T WIDE)  [facility]

## 2024-03-27 NOTE — TELEPHONE ENCOUNTER
Would recommend rest, ice, compression, and elevation until I see her on 4/10. She will not need to see Dr. Ward because she has little arthritis.

## 2024-04-08 NOTE — TELEPHONE ENCOUNTER
Nifedipine ER 60 mg  Filled 1-7-24  Qty 90  0 refills  Future Appointments   Date Time Provider Department Center   4/10/2024  3:20 PM Neal Iglesias PA-C [facility]   4/11/2024 10:00 AM Rikki Rizvi MD [facility] [place]   5/21/2024  2:45 PM Georges Orozco DPM [id number]   5/30/2024 11:00 AM Wenceslao Bishop MD [facility] [place]   6/5/2024 11:30 AM  MR RM4 (3T WIDE)  [facility]   6/5/2024 12:15 PM  MR RM4 (3T WIDE)  [facility] 8-17-23 TO

## 2024-04-10 ENCOUNTER — OFFICE VISIT (OUTPATIENT)
Dept: ORTHOPEDICS CLINIC | Facility: CLINIC | Age: 74
End: 2024-04-10
Payer: MEDICARE

## 2024-04-10 VITALS — HEIGHT: 62 IN | WEIGHT: 151 LBS | BODY MASS INDEX: 27.79 KG/M2

## 2024-04-10 DIAGNOSIS — S83.271A COMPLEX TEAR OF LATERAL MENISCUS OF RIGHT KNEE AS CURRENT INJURY, INITIAL ENCOUNTER: ICD-10-CM

## 2024-04-10 DIAGNOSIS — M70.41 PREPATELLAR BURSITIS OF RIGHT KNEE: Primary | ICD-10-CM

## 2024-04-10 PROCEDURE — 99213 OFFICE O/P EST LOW 20 MIN: CPT | Performed by: PHYSICIAN ASSISTANT

## 2024-04-10 NOTE — PROGRESS NOTES
[facility] Progress Note    Subjective: Patient returns to clinic after last being seen on 2/12/2024 for right knee steroid injection.  She reports about 2 weeks of relief from the right knee steroid injection.  She reports primarily pain along the lateral aspect of the knee with radiation to the medial aspect.  She also reports swelling along the anterior aspect of the knee.  She reports that the inside of the knee hurts when at night when she lies down and both insides of the knees touch each other.  She reports occasional tingling sensation along the lateral knee with radiation down the right lateral leg.  She had an MRI of the right knee performed recently which is available for review.  She reports occasional episodes of significant 10/10 intensity pain with weightbearing primarily through the lateral aspect of the knee.  She can only take Tylenol due to Plavix usage.    Objective: Patient seated comfortably in the exam chair.  Alert and oriented x 3.  Nonlabored breathing.  Gross neurologically intact.  Right knee without any redness, warmth, suprapatellar effusion.  There is palpable mild  prepatellar bursitis.  Tenderness palpation along the lateral joint line.    Imaging: MRI of the right knee personally viewed, independently interpreted and radiology report read.  MRI demonstrates degenerative type meniscus tearing along the lateral meniscus with lateral meniscal cyst.  There is also evidence of prepatellar bursal swelling, suprapatellar knee effusion, and small Baker's cyst in the back of the knee.    Assessment/Plan: I discussed the MRI findings with the patient in detail as well as reviewed the MRI images in clinic.  I discussed the lateral knee pain very likely is related to the degenerative tearing of the meniscus, mild lateral compartmental arthritis, and lateral meniscal cyst.  I discussed the medial knee pain is very likely related to the chronic sprain of the MCL.  I discussed treatments for  both as well as treatment for prepatellar bursitis.  The patient has been icing and wrapping the right knee with an Ace bandage for the prepatellar bursitis and I encouraged her to continue with this.  She has exhausted conservative measures including Tylenol, ice, intra-articular steroid injections, so I did discuss arthroscopic surgery for the right knee at this point in time.  We did provide her information about our sports colleagues for surgical consultation.  Her questions were sought and answered satisfactorily.  She is happy with the plan and will follow as advised.        Neal Iglesias PA-C  [facility]    This note was dictated using Dragon software.  While it was briefly proofread prior to completion, some grammatical, spelling, and word choice errors due to dictation may still occur.

## 2024-04-11 ENCOUNTER — OFFICE VISIT (OUTPATIENT)
Dept: INTERNAL MEDICINE CLINIC | Facility: CLINIC | Age: 74
End: 2024-04-11
Payer: MEDICARE

## 2024-04-11 ENCOUNTER — TELEPHONE (OUTPATIENT)
Dept: ORTHOPEDICS CLINIC | Facility: CLINIC | Age: 74
End: 2024-04-11

## 2024-04-11 VITALS
BODY MASS INDEX: 28.67 KG/M2 | HEIGHT: 62 IN | SYSTOLIC BLOOD PRESSURE: 122 MMHG | WEIGHT: 155.81 LBS | HEART RATE: 72 BPM | OXYGEN SATURATION: 94 % | TEMPERATURE: 98 F | DIASTOLIC BLOOD PRESSURE: 60 MMHG

## 2024-04-11 DIAGNOSIS — E11.9 TYPE 2 DIABETES MELLITUS WITHOUT COMPLICATION, WITHOUT LONG-TERM CURRENT USE OF INSULIN (HCC): ICD-10-CM

## 2024-04-11 DIAGNOSIS — S83.203D OTHER TEAR OF MENISCUS OF RIGHT KNEE, UNSPECIFIED MENISCUS, UNSPECIFIED WHETHER OLD OR CURRENT TEAR, SUBSEQUENT ENCOUNTER: Primary | ICD-10-CM

## 2024-04-11 DIAGNOSIS — I10 ESSENTIAL HYPERTENSION: ICD-10-CM

## 2024-04-11 DIAGNOSIS — R60.0 LOWER EXTREMITY EDEMA: ICD-10-CM

## 2024-04-11 PROCEDURE — 99215 OFFICE O/P EST HI 40 MIN: CPT | Performed by: FAMILY MEDICINE

## 2024-04-11 RX ORDER — LISINOPRIL AND HYDROCHLOROTHIAZIDE 25; 20 MG/1; MG/1
1 TABLET ORAL DAILY
Qty: 90 TABLET | Refills: 0 | Status: SHIPPED | OUTPATIENT
Start: 2024-04-11

## 2024-04-11 NOTE — TELEPHONE ENCOUNTER
Spoke with patient to let her know she does not need a referral, she stated she already made an appointment with Dr Joiner yesterday.

## 2024-04-11 NOTE — TELEPHONE ENCOUNTER
Patient is calling in regards to a possible referral to the sports surgeon. No information stated in LOV. Please advise and call patient with details of who she's being referred too and if a referral is needed.    Patient has medicare and supplement    Patient saw Neal Iglesias yesterday. Patient states was referred to a sports surgeon for her injury.    Thank you

## 2024-04-11 NOTE — PROGRESS NOTES
Liss Fagan is a 73 year old female.  HPI:   Here to go over her LE swelling   R>L    It can be pitting as the days go along      Is on water pill every other day     Does help       In the AM seems to be fine      no pain w it but can ache in the calf at times     ortho was seen yesterday   She had an MRI prior     She is to have laparoscopy on it in the future w Dr Joiner    asking if needs TKA         Is not fasting     Breathing ok    no CP   She is going to see Dr Guevara this summer         Current Outpatient Medications   Medication Sig Dispense Refill    NIFEDIPINE ER 60 MG Oral Tablet 24 Hr TAKE ONE TABLET BY MOUTH DAILY 90 tablet 0    furosemide 20 MG Oral Tab Take 1 tablet (20 mg total) by mouth daily. 30 tablet 0    gabapentin 100 MG Oral Cap Take 1 capsule (100 mg total) by mouth daily. 30 capsule 0    Omeprazole 40 MG Oral Capsule Delayed Release Take 1 capsule (40 mg total) by mouth daily. 90 capsule 0    metoprolol tartrate 25 MG Oral Tab Take 1 tablet (25 mg total) by mouth 2 (two) times daily. 180 tablet 0    lisinopril-hydroCHLOROthiazide 20-25 MG Oral Tab Take 1 tablet by mouth daily. 90 tablet 0    metFORMIN 500 MG Oral Tab Take 1 tablet (500 mg total) by mouth 2 (two) times daily with meals. 180 tablet 0    clopidogrel 75 MG Oral Tab Take 1 tablet (75 mg total) by mouth daily. 90 tablet 0    ranolazine  MG Oral Tablet 12 Hr Take 1 tablet (500 mg total) by mouth daily. 90 tablet 0    isosorbide mononitrate  MG Oral Tablet 24 Hr Take 1 tablet (120 mg total) by mouth daily. 90 tablet 0    Microlet Lancets Does not apply Misc USE TO TEST ONCE DAILY *DX E11.9* 100 each 1    atorvastatin 40 MG Oral Tab TAKE 1 TABLET BY MOUTH EVERY DAY IN THE EVENING 90 tablet 2    Glucose Blood (CONTOUR TEST) In Vitro Strip USE TO TEST ONCE DAILY AS DIRECTED. 100 strip 2    omega-3 fatty acids 1000 MG Oral Cap Take 1,000 mg by mouth daily.      NON FORMULARY Take 1 tablet by mouth daily. LACTATE PILLS       nitroGLYCERIN (NITROSTAT) 0.4 MG Sublingual SL Tab Place 1 tablet (0.4 mg total) under the tongue every 5 (five) minutes as needed. 30 tablet 5    Black Elderberry,Berry-Flower, 575 MG Oral Cap Take by mouth.      mometasone 0.1 % External Ointment Apply topically as needed.      cetirizine 10 MG Oral Tab Take 1 tablet (10 mg total) by mouth daily.      acetaminophen 500 MG Oral Tab Take 1 tablet (500 mg total) by mouth every 6 (six) hours as needed for Pain.      PEG 3350 Oral Powd Pack Take 17 g by mouth nightly.      Multiple Vitamin (MULTI-VITAMIN) Oral Tab Take 1 tablet by mouth daily.        Past Medical History:    Abnormal maternal glucose tolerance, complicating pregnancy, childbirth, or the puerperium, unspecified as to episode of care    Acute bronchitis    Back problem    Cataract    Chest pain, unspecified    admit 12/05    Deep vein thrombosis (HCC)    during pregnancy right leg    Diabetes (HCC)    Diverticulosis of large intestine    unsure if divertiulitis or diverticulosis    Esophageal reflux    Essential hypertension, benign    Helicobacter pylori gastritis    High cholesterol    Incontinence    Palpitations    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    PONV (postoperative nausea and vomiting)    nausea,pain results in cardiac vasospasms    Sleep apnea    Stroke (HCC)    r/o 4/11/17    Unspecified gastritis and gastroduodenitis without mention of hemorrhage    Visual impairment    glasses      Social History:  Social History     Socioeconomic History    Marital status:    Tobacco Use    Smoking status: Never     Passive exposure: Never    Smokeless tobacco: Never   Vaping Use    Vaping status: Never Used   Substance and Sexual Activity    Alcohol use: Not Currently     Comment: very seldom    Drug use: No   Other Topics Concern    Caffeine Concern Yes     Comment: 1 cup of coffee every morning and 1 glass of tea during dinner.  Sometimes dark  chocolate.      Exercise No        REVIEW OF SYSTEMS:   GENERAL HEALTH: feels well otherwise       EXAM:   /60   Pulse 72   Temp 97.8 °F (36.6 °C) (Temporal)   Ht 5' 2\" (1.575 m)   Wt 155 lb 12.8 oz (70.7 kg)   SpO2 94%   BMI 28.50 kg/m²   GENERAL: well developed, well nourished,in no apparent distress  SKIN: no rashes  EXTREMITIES: trace BL  edema    ASSESSMENT AND PLAN:   1. Other tear of meniscus of right knee, unspecified meniscus, unspecified whether old or current tear, subsequent encounter  Discussed the MRI she had and the ortho appt     Does not look like she needs TKA as prior to her injury, she had felt OK w the knee    the arthroscopy should clean the tear and cyst and she should feel better    Of course down the road she may need TKA    2. Essential hypertension  Discussed her BP meds     CPM      - lisinopril-hydroCHLOROthiazide 20-25 MG Oral Tab; Take 1 tablet by mouth daily.  Dispense: 90 tablet; Refill: 0  - Assay, Thyroid Stim Hormone; Future    3. Lower extremity edema  Trace edema   OK w using lasix every other day for now       check TSH   - Assay, Thyroid Stim Hormone; Future    4. Type 2 diabetes mellitus without complication, without long-term current use of insulin (HCC)  Discussed labs she is due for       Ordered      - Microalb/Creat Ratio, Random Urine; Future  - Comp Metabolic Panel (14); Future  - Lipid Panel; Future  - Hemoglobin A1C; Future     The patient indicates understanding of these issues and agrees to the plan.  .

## 2024-04-15 ENCOUNTER — LAB ENCOUNTER (OUTPATIENT)
Dept: LAB | Age: 74
End: 2024-04-15
Attending: FAMILY MEDICINE
Payer: MEDICARE

## 2024-04-15 DIAGNOSIS — E11.9 TYPE 2 DIABETES MELLITUS WITHOUT COMPLICATION, WITHOUT LONG-TERM CURRENT USE OF INSULIN (HCC): ICD-10-CM

## 2024-04-15 DIAGNOSIS — I10 ESSENTIAL HYPERTENSION: ICD-10-CM

## 2024-04-15 DIAGNOSIS — R60.0 LOWER EXTREMITY EDEMA: ICD-10-CM

## 2024-04-15 LAB
ALBUMIN SERPL-MCNC: 4.3 G/DL (ref 3.2–4.8)
ALBUMIN/GLOB SERPL: 1.7 {RATIO} (ref 1–2)
ALP LIVER SERPL-CCNC: 95 U/L
ALT SERPL-CCNC: 21 U/L
ANION GAP SERPL CALC-SCNC: 6 MMOL/L (ref 0–18)
AST SERPL-CCNC: 24 U/L (ref ?–34)
BILIRUB SERPL-MCNC: 0.6 MG/DL (ref 0.2–1.1)
BUN BLD-MCNC: 21 MG/DL (ref 9–23)
BUN/CREAT SERPL: 22.6 (ref 10–20)
CALCIUM BLD-MCNC: 9.9 MG/DL (ref 8.7–10.4)
CHLORIDE SERPL-SCNC: 104 MMOL/L (ref 98–112)
CHOLEST SERPL-MCNC: 122 MG/DL (ref ?–200)
CO2 SERPL-SCNC: 30 MMOL/L (ref 21–32)
CREAT BLD-MCNC: 0.93 MG/DL
CREAT UR-SCNC: 59.3 MG/DL
EGFRCR SERPLBLD CKD-EPI 2021: 65 ML/MIN/1.73M2 (ref 60–?)
EST. AVERAGE GLUCOSE BLD GHB EST-MCNC: 143 MG/DL (ref 68–126)
FASTING PATIENT LIPID ANSWER: YES
FASTING STATUS PATIENT QL REPORTED: YES
GLOBULIN PLAS-MCNC: 2.6 G/DL (ref 2.8–4.4)
GLUCOSE BLD-MCNC: 101 MG/DL (ref 70–99)
HBA1C MFR BLD: 6.6 % (ref ?–5.7)
HDLC SERPL-MCNC: 43 MG/DL (ref 40–59)
LDLC SERPL CALC-MCNC: 54 MG/DL (ref ?–100)
MICROALBUMIN UR-MCNC: <0.3 MG/DL
NONHDLC SERPL-MCNC: 79 MG/DL (ref ?–130)
OSMOLALITY SERPL CALC.SUM OF ELEC: 293 MOSM/KG (ref 275–295)
POTASSIUM SERPL-SCNC: 3.7 MMOL/L (ref 3.5–5.1)
PROT SERPL-MCNC: 6.9 G/DL (ref 5.7–8.2)
SODIUM SERPL-SCNC: 140 MMOL/L (ref 136–145)
TRIGL SERPL-MCNC: 143 MG/DL (ref 30–149)
TSI SER-ACNC: 4.27 MIU/ML (ref 0.55–4.78)
VLDLC SERPL CALC-MCNC: 21 MG/DL (ref 0–30)

## 2024-04-15 PROCEDURE — 83036 HEMOGLOBIN GLYCOSYLATED A1C: CPT

## 2024-04-15 PROCEDURE — 80053 COMPREHEN METABOLIC PANEL: CPT

## 2024-04-15 PROCEDURE — 82043 UR ALBUMIN QUANTITATIVE: CPT

## 2024-04-15 PROCEDURE — 82570 ASSAY OF URINE CREATININE: CPT

## 2024-04-15 PROCEDURE — 80061 LIPID PANEL: CPT

## 2024-04-15 PROCEDURE — 84443 ASSAY THYROID STIM HORMONE: CPT

## 2024-04-15 PROCEDURE — 36415 COLL VENOUS BLD VENIPUNCTURE: CPT

## 2024-04-15 RX ORDER — GABAPENTIN 100 MG/1
100 CAPSULE ORAL DAILY
Qty: 30 CAPSULE | Refills: 2 | Status: SHIPPED | OUTPATIENT
Start: 2024-04-15

## 2024-04-15 RX ORDER — FUROSEMIDE 20 MG/1
20 TABLET ORAL DAILY
Qty: 30 TABLET | Refills: 2 | Status: SHIPPED | OUTPATIENT
Start: 2024-04-15

## 2024-04-15 NOTE — TELEPHONE ENCOUNTER
Furosemide 20 mg  Filled 3-18-24  Qty 30  0 refills  Future Appointments   Date Time Provider Department Center   4/17/2024 11:40 AM Harjit Joiner MD [facility] [id number]   5/21/2024  2:45 PM Georges Orozco DPM [id number]   5/30/2024 11:00 AM Wenceslao Bishop MD [facility] [place]   6/5/2024 11:30 AM  MR RM4 (3T WIDE)  [facility]   6/5/2024 12:15 PM [facility] MR RM4 (3T WIDE)  [facility]   LOV 4-11-24 TO    Gabapentin 100 mg  Filled 3-18-24  Qty 30  0 refills  Future Appointments   Date Time Provider Department Center   4/17/2024 11:40 AM Harjit Joiner MD [id number]   5/21/2024  2:45 PM Georges Orozco DPM [id number]   5/30/2024 11:00 AM Wenceslao Bishop MD [facility] [place]   6/5/2024 11:30 AM  MR RM4 (3T WIDE)  [facility]   6/5/2024 12:15 PM [facility] MR RM4 (3T WIDE)  [facility]   LOV 1-24-24 TO

## 2024-04-17 ENCOUNTER — TELEPHONE (OUTPATIENT)
Dept: ORTHOPEDICS CLINIC | Facility: CLINIC | Age: 74
End: 2024-04-17

## 2024-04-17 ENCOUNTER — OFFICE VISIT (OUTPATIENT)
Dept: ORTHOPEDICS CLINIC | Facility: CLINIC | Age: 74
End: 2024-04-17
Payer: MEDICARE

## 2024-04-17 VITALS — BODY MASS INDEX: 28.34 KG/M2 | WEIGHT: 154 LBS | HEIGHT: 62 IN

## 2024-04-17 DIAGNOSIS — M65.9 SYNOVITIS OF KNEE: ICD-10-CM

## 2024-04-17 DIAGNOSIS — S83.271A COMPLEX TEAR OF LATERAL MENISCUS OF RIGHT KNEE AS CURRENT INJURY, INITIAL ENCOUNTER: Primary | ICD-10-CM

## 2024-04-17 DIAGNOSIS — M94.261 CHONDROMALACIA OF RIGHT KNEE: ICD-10-CM

## 2024-04-17 PROCEDURE — 99215 OFFICE O/P EST HI 40 MIN: CPT | Performed by: ORTHOPAEDIC SURGERY

## 2024-04-17 NOTE — TELEPHONE ENCOUNTER
Date of Surgery:  2024    Post Op Appt:  2024 11:30    Case ID: [id number]     Notes: Lets target May, thanks!             OR BOOKING SHEET KNEE ARTHROSCOPY  Location: [x] [facility]                    [x] [facility]  Name: Liss Fagan  MRN: [id number]   : 4/15/1950  Diagnosis:  [x] Complex tear of lateral meniscus of right knee as current injury, initial encounter [S83.429A]  Disposition:    [x] Ambulatory  Operative Time Required: 45 minutes ([facility])  Procedure:  Antibiotics: 2 g cefazolin within 60 minutes of surgical incision (3 g if > 120 kg)  Laterality:                  [] RIGHT                  [] LEFT                          [] BILATERAL  Procedures:                    [x] Knee Arthroscopy                                                      [x] Partial Lateral Meniscectomy (53665)                       [x] Synovectomy (52122)                    [x] Abrasionplasty (69451)     Additional info:   [x] PCP Clearance Needed  [] MRSA  [x] Physical Therapy External - Hernan Trujillo  [] DME Rx  [] Appt with Dr. Joiner needed  Implants needed: None  Positioning Equipment: Supine with Lateral Post

## 2024-04-17 NOTE — PROGRESS NOTES
OR BOOKING SHEET KNEE ARTHROSCOPY  Location: [x] [facility]   [x] [facility]  Name: Liss Fagan  MRN: [id number]   : 4/15/1950  Diagnosis:  [x] Complex tear of lateral meniscus of right knee as current injury, initial encounter [S83.096A]  Disposition:    [x] Ambulatory  Operative Time Required: 45 minutes ([facility])  Procedure:  Antibiotics: 2 g cefazolin within 60 minutes of surgical incision (3 g if > 120 kg)  Laterality: [] RIGHT  [] LEFT                       [] BILATERAL  Procedures:   [x] Knee Arthroscopy       [x] Partial Lateral Meniscectomy (83784)     [x] Synovectomy (13936)   [x] Abrasionplasty (23148)    Additional info:   [x] PCP Clearance Needed  [] MRSA  [x] Physical Therapy External - Hernan Trujillo  [] DME Rx  [] Appt with Dr. Joiner needed  Implants needed: None  Positioning Equipment: Supine with Lateral Post

## 2024-04-17 NOTE — H&P
Orthopaedic Surgery  [address] [place] [id number]  [phone]     PRE SURGICAL - HISTORY AND PHYSICAL EXAMINATION     Name: Liss Fagan   MRN: [id number]  Date: 4/17/2024     CC: Right Knee Pain and mechanical symptoms    REFERRED BY: Rikki Rizvi MD    HPI:   Liss Fagan is a very pleasant 74 year old female who presents today for evaluation of Right knee pain and mechanical symptoms and recent completion of MRI demonstrating meniscal pathology.     To summarize: right knee pain ongoing since January 2024. She frequently receives steroid injections from Dr. Ward for osteoarthritis which usually last 1 year. In January, she began noticing the injection was not providing much relief. Started experiencing excessive swelling throughout the leg. She was then evaluated by BRONSON Lyons on 2/12/24 and she was given a repeat steroid injection. This provided 2 weeks of relief but swelling never subsided. Subsequently, an MRI was done showing a meniscus tear. She has difficulty sleeping at night and is unable to walk without limping. Pain is 8/10 with swelling and weakness.     She enjoys boating and playing with grandchildren. Lives alone and is retired.    PMH:   Past Medical History:    Abnormal maternal glucose tolerance, complicating pregnancy, childbirth, or the puerperium, unspecified as to episode of care    Acute bronchitis    Back problem    Cataract    Chest pain, unspecified    admit 12/05    Deep vein thrombosis (HCC)    during pregnancy right leg    Diabetes (HCC)    Diverticulosis of large intestine    unsure if divertiulitis or diverticulosis    Esophageal reflux    Essential hypertension, benign    Helicobacter pylori gastritis    High cholesterol    Incontinence    Palpitations    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    PONV (postoperative nausea and vomiting)    nausea,pain results in cardiac vasospasms    Sleep apnea     Stroke (HCC)    r/o 17    Unspecified gastritis and gastroduodenitis without mention of hemorrhage    Visual impairment    glasses       PAST SURGICAL HX:  Past Surgical History:   Procedure Laterality Date    Back surgery      Spinal diskectomy lumbar    at [place]        1987    Cardiac catherterization (pbp)      status normal resolved on 2007    Cataract      Cholecystectomy  1999    Colonoscopy  2011    colon polyp removal  ma  coleman 5    Colonoscopy      Colonoscopy N/A 2017    Procedure: COLONOSCOPY;  Surgeon: Addison Castellano MD;  Location:  [facility]    Hernia surgery  1999    Umbilical hernia repair;     Hysterectomy      d/t fibroids    Lumbar discectomy Left 2019    L4-L5   Dr Drew    Other      Spinal Steroid Injection     Shoulder surg proc unlisted      Spine surgery procedure unlisted      bulging discs in lower back    Upper gi endoscopy performed  2011       FAMILY HX:  Family History   Problem Relation Age of Onset    Cancer Father          at age 52 Leukemia    Diabetes Father     Breast Cancer Paternal Grandmother     Cancer Paternal Grandmother         bone cancer    Cancer Other        ALLERGIES:  Penicillins, Advil [ibuprofen], Aspirin, Codeine sulfate, Demerol, Naproxen, Norvasc [amlodipine besylate], Nsaids, Tramadol, Vicodin [hydrocodone-acetaminophen], and Zetia [ezetimibe]    MEDICATIONS:   Current Outpatient Medications   Medication Sig Dispense Refill    furosemide 20 MG Oral Tab Take 1 tablet (20 mg total) by mouth daily. 30 tablet 2    gabapentin 100 MG Oral Cap Take 1 capsule (100 mg total) by mouth daily. 30 capsule 2    lisinopril-hydroCHLOROthiazide 20-25 MG Oral Tab Take 1 tablet by mouth daily. 90 tablet 0    NIFEDIPINE ER 60 MG Oral Tablet 24 Hr TAKE ONE TABLET BY MOUTH DAILY 90 tablet 0    Omeprazole 40 MG Oral Capsule Delayed Release Take 1 capsule (40 mg total) by mouth daily. 90 capsule 0     metoprolol tartrate 25 MG Oral Tab Take 1 tablet (25 mg total) by mouth 2 (two) times daily. 180 tablet 0    metFORMIN 500 MG Oral Tab Take 1 tablet (500 mg total) by mouth 2 (two) times daily with meals. 180 tablet 0    clopidogrel 75 MG Oral Tab Take 1 tablet (75 mg total) by mouth daily. 90 tablet 0    ranolazine  MG Oral Tablet 12 Hr Take 1 tablet (500 mg total) by mouth daily. 90 tablet 0    isosorbide mononitrate  MG Oral Tablet 24 Hr Take 1 tablet (120 mg total) by mouth daily. 90 tablet 0    Microlet Lancets Does not apply Misc USE TO TEST ONCE DAILY *DX E11.9* 100 each 1    atorvastatin 40 MG Oral Tab TAKE 1 TABLET BY MOUTH EVERY DAY IN THE EVENING 90 tablet 2    Glucose Blood (CONTOUR TEST) In Vitro Strip USE TO TEST ONCE DAILY AS DIRECTED. 100 strip 2    omega-3 fatty acids 1000 MG Oral Cap Take 1,000 mg by mouth daily.      NON FORMULARY Take 1 tablet by mouth daily. LACTATE PILLS      nitroGLYCERIN (NITROSTAT) 0.4 MG Sublingual SL Tab Place 1 tablet (0.4 mg total) under the tongue every 5 (five) minutes as needed. 30 tablet 5    Black Elderberry,Berry-Flower, 575 MG Oral Cap Take by mouth.      mometasone 0.1 % External Ointment Apply topically as needed.      cetirizine 10 MG Oral Tab Take 1 tablet (10 mg total) by mouth daily.      acetaminophen 500 MG Oral Tab Take 1 tablet (500 mg total) by mouth every 6 (six) hours as needed for Pain.      PEG 3350 Oral Powd Pack Take 17 g by mouth nightly.      Multiple Vitamin (MULTI-VITAMIN) Oral Tab Take 1 tablet by mouth daily.         ROS: A comprehensive 14 point review of systems was performed and was negative aside from the aforementioned per history of present illness.    SOCIAL HX:  Social History     Tobacco Use    Smoking status: Never     Passive exposure: Never    Smokeless tobacco: Never   Substance Use Topics    Alcohol use: Not Currently     Comment: very seldom       PE:   Vitals:    04/17/24 1143   Weight: 154 lb   Height: 5' 2\"  (1.575 m)     Estimated body mass index is 28.17 kg/m² as calculated from the following:    Height as of this encounter: 5' 2\" (1.575 m).    Weight as of this encounter: 154 lb.    Physical Exam  Constitutional:       Appearance: Normal appearance.   HENT:      Head: Normocephalic and atraumatic.   Eyes:      Extraocular Movements: Extraocular movements intact.   Neck:      Musculoskeletal: Normal range of motion and neck supple.   Cardiovascular:      Pulses: Normal pulses.   Pulmonary:      Effort: Pulmonary effort is normal. No respiratory distress.   Abdominal:      General: There is no distension.   Skin:     General: Skin is warm.      Capillary Refill: Capillary refill takes less than 2 seconds.      Findings: No bruising.   Neurological:      General: No focal deficit present.      Mental Status: Alert.   Psychiatric:         Mood and Affect: Mood normal.     Examination of the right knee demonstrates:     Skin is intact, warm and dry.   Atrophy: none    Effusion: small    Joint line tenderness: lateral  Crepitation: none   Jamal: Positive   Patellar mobility: normal without apprehension  J-sign: none    ROM: Extension lacking less than 5 degrees  Flexion 120 degrees  ACL:  Negative Lachman, Negative Pivot Shift   PCL:  Negative Posterior Drawer  Collateral Ligaments: Stable to Varus and Valgus stress at 0 and 30 degrees  Strength: mild weakness   Hip joint: normal pain-free ROM   Gait:  mildly antalgic   Leg length: equal and symmetric  Alignment:  neutral     No obvious peripheral edema noted.   Distal neurovascular exam demonstrates normal perfusion, intact sensation to light touch and full strength.     Examination of the contralateral knee demonstrates:  No significant atrophy, swelling or effusion. Full range of motion. Neurovascularly intact distally.    Radiographic Examination/Diagnostics:  XR and MRI of the knee personally viewed, independently interpreted and radiology report was  reviewed.    MRI KNEE, RIGHT (EVS=67734)    Result Date: 3/25/2024  PROCEDURE:  MRI KNEE, RIGHT (CPT=73721)  COMPARISON:  None.  INDICATIONS:  Right knee pain and instability for several months.  No known injury.  TECHNIQUE:  Axial, coronal, and sagittal proton density with and without fat saturation images were obtained.  PATIENT STATED HISTORY: (As transcribed by Technologist)  Patient comlains of right knee pain and instability. Patient states pain started months ago with no known injury or fall     FINDINGS:  LIGAMENTS:          The ACL, PCL, patellar retinacula, and collateral ligament complexes are intact.  There is mild thickening of the proximal, superficial fibers of the MCL consistent with a low-grade chronic sprain. MENISCI:            The medial meniscus is intact and unremarkable.  The lateral meniscus reveals blunting of the free edge at the level of the anterior horn and body consistent with a moderate sized radial tear.  There is some myxoid degenerative signal  diffusely throughout the lateral meniscus as well. TENDONS:            The tendinous insertions about the knee are intact without significant tendinosis or tears.  Superficial to the patellar tendon is a ovoid focus of prepatellar bursitis measuring 5.4 x 1.1 x 3.7 cm. MUSCULATURE:        No evidence of strain, edema, or atrophy. BONY COMPARTMENTS:  There is chondromalacia of the patellofemoral compartment.  There is moderate osteoarthritis of the lateral femoral tibial compartment with foci of high grade articular cartilage loss along the posterior weight-bearing surface of the lateral femoral condyle and lateral tibial plateau.  No significant arthropathy or chondromalacia of the medial femoral tibial compartment noted. SYNOVIUM:           There is a mild knee joint effusion and small, leaking Baker's cyst with synovitis.             CONCLUSION:    1. There is moderate osteoarthritis of the lateral femoral tibial compartment with  moderate-sized radial tear along the free edge of the body and anterior horn of the lateral meniscus.  There is also myxoid degenerative signal diffusely throughout the lateral meniscus.   2. Chondromalacia of the patellofemoral compartment without hugo osteoarthritis.   3. Prepatellar/pretibial bursitis with a bursal collection measuring 5.4 x 1.1 x 3.7 cm.   4. Mild joint effusion with small, leaking Baker's cyst and synovitis.      LOCATION:  [facility]          Dictated by (CST): Nisreen Boo DO on 3/25/2024 at 4:56 PM     Finalized by (CST): Nisreen Boo DO on 3/25/2024 at 5:02 PM         IMPRESSION: Liss Fagan is a 74 year old female with right Lateral Meniscus Tear, synovitis and chondromalacia sustained January 2024.  They have failed extensive conservative treatment including Physical therapy greater than 6 weeks, intra-articular knee corticosteroid injection, oral anti-inflammatory medications, and activity modification.     Consequently, they are an appropriate candidate for knee arthroscopy, partial meniscectomy, abrasionplasty, and extensive debridement/synovectomy.    PLAN:   I had a lengthy discussion with Liss about the diagnosis and options, both surgical and nonsurgical. I have recommended that we proceed with arthroscopic surgical treatment as we agree that this intervention will likely offer the best opportunity for symptomatic relief and functional recovery. I used the MRI scan and a 3-dimensional knee model to outline her pathology, as well as general surgical principles. We reviewed the risks associated with arthroscopic partial meniscectomy and debridement / synovectomy.  In particular I emphasized that the displaced flap component and degenerative portion of the meniscus would be removed as this is dysvascular.  Simultaneously, I will perform a diagnostic knee arthroscopy of the knee and hope to identify and address any other pathology that may be encountered such as scar tissue,  inflammation, cartilage pathology.  In particular we discussed risks that include, a low risk of infection (<1%), potential transient or permanent injury to nerves with superficial numbness, joint stiffness which may require additional physical therapy, persistent pain/lack of symptomatic improvement, need for future operation, wound complications (rare as incisions are very small), deep vein thrombosis (DVT) and pulmonary embolism (PE).   We discussed the proposed rehabilitation timeline as well as expected postoperative restrictions.  In this regard, I emphasized that there will be no weightbearing or range of motion restrictions post operatively.  Crutches will be provided initially for patient comfort and I will aim to have the patient begin physical therapy on postoperative day 1.  The advantage of this is to begin immediate mobility and range of motion to mitigate pain and encourage reduction of inflammation/swelling.  This will ultimately lead to a quicker postsurgical rehabilitation.  For most patients, this requires a total of 4 to 6 weeks of postsurgical physical therapy to attain full functional status after simple knee arthroscopy.  Liss voiced a good understanding of treatment options, risks and benefits, postoperative instructions, rehabilitation timeline, and restrictions. She was given the opportunity to ask questions, which were all answered to the best of my ability and to her satisfaction. Liss will work with my office to arrange a time for surgery and obtain any medical clearance information necessary. My pre-operative information packet, which details the process and answers many FAQ's will be provided. She was encouraged to call the office with any further questions or concerns.  I spent 45 minutes in preparation to see the patient, counseling/education of relevant pathology, discussing imaging results, ordering post surgical physical therapy intervention, surgical counseling, and care  coordination.        FOLLOW-UP:  Post-Operative Visit, POD 6 with Sincer WENDY Robles PA-C.         Harjit Joiner MD  Knee, Shoulder, & Elbow Surgery / Sports Medicine Specialist  Orthopaedic Surgery  [address] [place] [address].[facility]  [email or web address]  t: [phone]  o: [phone]  f: [phone]    This note was dictated using Dragon software.  While it was briefly proofread prior to completion, some grammatical, spelling, and word choice errors due to dictation may still occur.

## 2024-04-18 ENCOUNTER — TELEPHONE (OUTPATIENT)
Dept: INTERNAL MEDICINE CLINIC | Facility: CLINIC | Age: 74
End: 2024-04-18

## 2024-04-18 ENCOUNTER — TELEPHONE (OUTPATIENT)
Dept: ORTHOPEDICS CLINIC | Facility: CLINIC | Age: 74
End: 2024-04-18

## 2024-04-18 NOTE — TELEPHONE ENCOUNTER
SPOKE WITH PATIENT AND WE SCHEDULED SURGERY, POST OP AND WENT OVER PRE OPERATIVE PROCEDURES. ALL QUESTIONS ANSWERED    PCP CLEARANCE SENT

## 2024-04-18 NOTE — TELEPHONE ENCOUNTER
Incoming fax from     Patient is having  RIGHT KNEE ARTHROSCOPY PARTIAL LATERAL MENISCECTOMY SYNOVECTOMY ABRASIONPLASTY  done by  at [facility] on 5/7/2024    Patient is in need of the following-    H&P and Medical Clearance      Fax to both [phone] and [phone]    Paperwork placed in upcoming appointments folder on Megans' desk      Front staff please assist in scheduling patient if no appointment has been made

## 2024-04-18 NOTE — TELEPHONE ENCOUNTER
Pt would like to speak with Behzad again to change the surgery date due to family ability to assist her.    Answered question about driving. No narcotics and recommended how to acclimate and test herself to see if she is ready to drive typically 4-6 weeks post-op.  Discouraged stairs. Encouraged pt to prep home environment for safety concerns (ie shower chair, removing loose rugs/clutter, etc.)

## 2024-04-18 NOTE — TELEPHONE ENCOUNTER
Outgoing call to pt to schedule Pre-Op appt with TO    Pt is scheduled for Pre-Op DOS changed to 6/4/24    Future Appointments   Date Time Provider Department Center   5/8/2024 11:00 AM Rikki Rizvi MD EMG 8 [facility] [place]   5/21/2024  2:45 PM Georges Orozco DPM [id number]   5/30/2024 11:00 AM Wenceslao Bishop MD ENIWARR [facility] [place]   6/5/2024 11:30 AM  MR RM4 (3T WIDE)  [facility]   6/5/2024 12:15 PM  MR RM4 (3T WIDE)  [facility]   6/10/2024 11:30 AM [id number]

## 2024-04-18 NOTE — TELEPHONE ENCOUNTER
Rescheduled to:    Date of Surgery: 6/4/2024        Post Op Appt: 6/10/2024 1130AM     New PCP clearance sent

## 2024-04-22 RX ORDER — ISOSORBIDE MONONITRATE 120 MG/1
120 TABLET, EXTENDED RELEASE ORAL DAILY
Qty: 90 TABLET | Refills: 0 | Status: SHIPPED | OUTPATIENT
Start: 2024-04-22

## 2024-04-22 NOTE — TELEPHONE ENCOUNTER
Requesting         isosorbide mononitrate  MG Oral Tablet 24 Hr         Sig: Take 1 tablet (120 mg total) by mouth daily.    Disp: 90 tablet    Refills: 0    Start: 4/22/2024    Class: Normal    Non-formulary    Last ordered: 4 months ago (12/18/2023) by Rikki Rizvi MD            LOV: 4/11/2024  RTC: none noted  Last Relevant Labs: n/a  Filled: 12/18/2023 #90 with 0 refills    Future Appointments   Date Time Provider Department Center   5/8/2024 11:00 AM Rikki Rizvi MD EMG 8 [facility] [place]   5/21/2024  2:45 PM Georges Orozco DPM [id number]   5/30/2024 11:00 AM [facility] [place]   6/5/2024 11:30 AM  MR RM4 (3T WIDE)  [facility]   6/5/2024 12:15 PM  MR RM4 (3T WIDE)  [facility]   6/10/2024 11:30 AM [id number]

## 2024-04-22 NOTE — TELEPHONE ENCOUNTER
Patient called to request refill on  isosorbide mononitrate  MG Oral Tablet 24 Hr  Send to   [id number] - [place], [place] - [address] [phone], [phone] [[id number]]      Patient also wanted to inform last time   Omeprazole 40 MG Oral Capsule Delayed Release  Was refilled dosing instructions stated to take once daily. Patient informed she is only taking 4 times weekly and feels this was written incorrectly. Please advise on dosing instructions for Omeprazole 40 MG.

## 2024-04-23 NOTE — TELEPHONE ENCOUNTER
passed few weeks ago    She just may have an intestinal bug   i'd follow her s/s now   Avoid dairy and caffeine   If not better in few days or if gets worse may need to be seen , here or ER PA/Nursing/Patient

## 2024-04-24 ENCOUNTER — TELEPHONE (OUTPATIENT)
Dept: ORTHOPEDICS CLINIC | Facility: CLINIC | Age: 74
End: 2024-04-24

## 2024-04-24 NOTE — TELEPHONE ENCOUNTER
Patient wants to know if she can go ahead with her 2 scheduled brain MRI's the day after her right knee scope on 6/4.  Please advise

## 2024-04-24 NOTE — TELEPHONE ENCOUNTER
Spoke with pt. Advised no contraindications with having the brain MRI day after her knee surgery but recommended she have a care companion to assist her with getting on and off the table, advised slow deliberate movements for safety and to let the MRI staff know she had the knee surgery. Pt stated understanding.

## 2024-05-08 ENCOUNTER — OFFICE VISIT (OUTPATIENT)
Dept: INTERNAL MEDICINE CLINIC | Facility: CLINIC | Age: 74
End: 2024-05-08
Payer: MEDICARE

## 2024-05-08 VITALS
DIASTOLIC BLOOD PRESSURE: 58 MMHG | TEMPERATURE: 98 F | OXYGEN SATURATION: 95 % | HEIGHT: 62 IN | WEIGHT: 153.63 LBS | HEART RATE: 72 BPM | SYSTOLIC BLOOD PRESSURE: 112 MMHG | BODY MASS INDEX: 28.27 KG/M2

## 2024-05-08 DIAGNOSIS — E11.9 TYPE 2 DIABETES MELLITUS WITHOUT COMPLICATION, WITHOUT LONG-TERM CURRENT USE OF INSULIN (HCC): ICD-10-CM

## 2024-05-08 DIAGNOSIS — Z01.818 PREOP EXAMINATION: Primary | ICD-10-CM

## 2024-05-08 DIAGNOSIS — I10 ESSENTIAL HYPERTENSION: ICD-10-CM

## 2024-05-08 PROCEDURE — 99214 OFFICE O/P EST MOD 30 MIN: CPT | Performed by: FAMILY MEDICINE

## 2024-05-08 NOTE — PROGRESS NOTES
Liss Fagan is a 74 year old female.  HPI:   Pt is here for a preop exam for her upcoming R knee surgery under the care of Dr Joiner     Date is set for 6/4/24 at [facility]     Outpatient      General anesthesia to be used     No issues w it in the past except for one time where she had a lot of nausea         No CP   Breathing is good    Did see cardiology Dr Guevara and cleared by them 2days ago      Had nl EKG          Patient Active Problem List   Diagnosis    Gastritis and gastroduodenitis    Obstructive sleep apnea (adult) (pediatric)    Esophageal reflux    Diabetes mellitus (HCC)    Hyperlipidemia    Prinzmetal angina (HCC)    Proteinuria    Essential hypertension    H/O: CVA (cerebrovascular accident)    Atherosclerosis of aorta (HCC)    Lumbar radiculopathy    Bilateral carotid artery stenosis    Small vessel disease (HCC)    Pure hypercholesterolemia       Current Outpatient Medications   Medication Sig Dispense Refill    isosorbide mononitrate  MG Oral Tablet 24 Hr Take 1 tablet (120 mg total) by mouth daily. 90 tablet 0    furosemide 20 MG Oral Tab Take 1 tablet (20 mg total) by mouth daily. 30 tablet 2    gabapentin 100 MG Oral Cap Take 1 capsule (100 mg total) by mouth daily. 30 capsule 2    lisinopril-hydroCHLOROthiazide 20-25 MG Oral Tab Take 1 tablet by mouth daily. 90 tablet 0    NIFEDIPINE ER 60 MG Oral Tablet 24 Hr TAKE ONE TABLET BY MOUTH DAILY 90 tablet 0    Omeprazole 40 MG Oral Capsule Delayed Release Take 1 capsule (40 mg total) by mouth daily. (Patient taking differently: Take 1 capsule (40 mg total) by mouth 4 (four) times a week. Sun, mon, wed, fri) 90 capsule 0    metoprolol tartrate 25 MG Oral Tab Take 1 tablet (25 mg total) by mouth 2 (two) times daily. 180 tablet 0    metFORMIN 500 MG Oral Tab Take 1 tablet (500 mg total) by mouth 2 (two) times daily with meals. 180 tablet 0    clopidogrel 75 MG Oral Tab Take 1 tablet (75 mg total) by mouth daily. 90 tablet 0     ranolazine  MG Oral Tablet 12 Hr Take 1 tablet (500 mg total) by mouth daily. 90 tablet 0    Microlet Lancets Does not apply Misc USE TO TEST ONCE DAILY *DX E11.9* 100 each 1    atorvastatin 40 MG Oral Tab TAKE 1 TABLET BY MOUTH EVERY DAY IN THE EVENING 90 tablet 2    Glucose Blood (CONTOUR TEST) In Vitro Strip USE TO TEST ONCE DAILY AS DIRECTED. 100 strip 2    omega-3 fatty acids 1000 MG Oral Cap Take 1,000 mg by mouth every other day.      NON FORMULARY Take 1 tablet by mouth daily. LACTATE PILLS      nitroGLYCERIN (NITROSTAT) 0.4 MG Sublingual SL Tab Place 1 tablet (0.4 mg total) under the tongue every 5 (five) minutes as needed. 30 tablet 5    Black Elderberry,Berry-Flower, 575 MG Oral Cap Take by mouth every other day.      mometasone 0.1 % External Ointment Apply topically as needed.      cetirizine 10 MG Oral Tab Take 1 tablet (10 mg total) by mouth daily.      acetaminophen 500 MG Oral Tab Take 1 tablet (500 mg total) by mouth every 6 (six) hours as needed for Pain.      PEG 3350 Oral Powd Pack Take 17 g by mouth nightly.      Multiple Vitamin (MULTI-VITAMIN) Oral Tab Take 1 tablet by mouth every other day.        Past Medical History:    Abnormal maternal glucose tolerance, complicating pregnancy, childbirth, or the puerperium, unspecified as to episode of care    Acute bronchitis    Back problem    Cataract    Chest pain, unspecified    admit 12/05    Deep vein thrombosis (HCC)    during pregnancy right leg    Diabetes (HCC)    Diverticulosis of large intestine    unsure if divertiulitis or diverticulosis    Esophageal reflux    Essential hypertension, benign    Helicobacter pylori gastritis    High blood pressure    High cholesterol    Incontinence    Osteoarthritis    knees    Palpitations    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    PONV (postoperative nausea and vomiting)    nausea,pain results in cardiac vasospasms    Sleep apnea    no device    Stroke  (HCC)    r/o 17    Unspecified gastritis and gastroduodenitis without mention of hemorrhage    Visual impairment    glasses      Past Surgical History:   Procedure Laterality Date    Back surgery  1984    Spinal diskectomy lumbar    at [place]        1987    Cardiac catherterization (pbp)      status normal resolved on 2007    Cataract      Cholecystectomy  1999    Colonoscopy  2011    colon polyp removal  ma  coleman 5    Colonoscopy      Colonoscopy N/A 2017    Procedure: COLONOSCOPY;  Surgeon: Addison Castellano MD;  Location:  ENDOSCOPY    Hernia surgery  1999    Umbilical hernia repair;     Hysterectomy      d/t fibroids    Lumbar discectomy Left 2019    L4-L5   Dr Drew    Other      Spinal Steroid Injection     Shoulder surg proc unlisted      Spine surgery procedure unlisted      bulging discs in lower back    Upper gi endoscopy performed  2011     Social History:  Social History     Socioeconomic History    Marital status:    Tobacco Use    Smoking status: Never     Passive exposure: Never    Smokeless tobacco: Never   Vaping Use    Vaping status: Never Used   Substance and Sexual Activity    Alcohol use: Not Currently     Comment: very seldom    Drug use: No   Other Topics Concern    Caffeine Concern Yes     Comment: 1 cup of coffee every morning and 1 glass of tea during dinner.  Sometimes dark chocolate.      Exercise No        REVIEW OF SYSTEMS:   GENERAL HEALTH: feels well otherwise  SKIN: denies   rashes  RESPIRATORY: denies shortness of breath   CARDIOVASCULAR: denies chest pain    GI: denies abdominal pain and denies heartburn  NEURO: denies headaches    EXAM:   /58 (BP Location: Left arm, Patient Position: Sitting, Cuff Size: adult)   Pulse 72   Temp 97.6 °F (36.4 °C) (Temporal)   Ht 5' 2\" (1.575 m)   Wt 153 lb 9.6 oz (69.7 kg)   SpO2 95%   BMI 28.09 kg/m²   GENERAL: well developed, well nourished,in no apparent  distress  SKIN: no rashes   NECK: supple,no adenopathy     LUNGS: clear to auscultation  CARDIO: RRR without murmur  GI: good BS's,no masses, HSM or tenderness  EXTREMITIES: no   edema    ASSESSMENT AND PLAN:   1. Preop examination  She appears quite stable   BP good  Recent labs are fine   EKG is fine    Cleared by cardiology    She is therefore cleared for her procedure and may proceed at an acceptable risk for complications.       2. Type 2 diabetes mellitus without complication, without long-term current use of insulin (HCC)  Good control of DM    Hold metformin day of surgery      3. Essential hypertension  Stable BP  w meds      The patient indicates understanding of these issues and agrees to the plan.

## 2024-05-15 NOTE — TELEPHONE ENCOUNTER
PATIENT IS CLEARED BY PCP FOR SURGERY. OFFICE VISIT 5/8/2024 IN [facility]

## 2024-05-21 ENCOUNTER — OFFICE VISIT (OUTPATIENT)
Dept: PODIATRY CLINIC | Facility: CLINIC | Age: 74
End: 2024-05-21

## 2024-05-21 DIAGNOSIS — L60.8 INCURVATED NAIL: ICD-10-CM

## 2024-05-21 DIAGNOSIS — M20.42 HAMMER TOES OF BOTH FEET: ICD-10-CM

## 2024-05-21 DIAGNOSIS — M79.675 TOE PAIN, BILATERAL: ICD-10-CM

## 2024-05-21 DIAGNOSIS — E11.9 TYPE 2 DIABETES MELLITUS WITHOUT COMPLICATION, WITHOUT LONG-TERM CURRENT USE OF INSULIN (HCC): ICD-10-CM

## 2024-05-21 DIAGNOSIS — M79.675 PAIN OF TOE OF LEFT FOOT: ICD-10-CM

## 2024-05-21 DIAGNOSIS — B35.1 ONYCHOMYCOSIS: Primary | ICD-10-CM

## 2024-05-21 DIAGNOSIS — M20.41 HAMMER TOES OF BOTH FEET: ICD-10-CM

## 2024-05-21 DIAGNOSIS — M54.16 LUMBAR RADICULOPATHY: ICD-10-CM

## 2024-05-21 DIAGNOSIS — M79.674 TOE PAIN, BILATERAL: ICD-10-CM

## 2024-05-21 PROCEDURE — 99213 OFFICE O/P EST LOW 20 MIN: CPT | Performed by: STUDENT IN AN ORGANIZED HEALTH CARE EDUCATION/TRAINING PROGRAM

## 2024-05-21 RX ORDER — OMEPRAZOLE 40 MG/1
40 CAPSULE, DELAYED RELEASE ORAL DAILY
Qty: 90 CAPSULE | Refills: 0 | Status: SHIPPED | OUTPATIENT
Start: 2024-05-21

## 2024-05-21 NOTE — TELEPHONE ENCOUNTER
Requesting    Name from pharmacy: Omeprazole Dr 40 Mg Cap Nort         Will file in chart as: OMEPRAZOLE 40 MG Oral Capsule Delayed Release    Sig: TAKE 1 CAPSULE BY MOUTH DAILY    Disp: 90 capsule    Refills: 0    Start: 5/21/2024    Class: Normal    Non-formulary    Last ordered: 2 months ago (2/23/2024) by Rikki Rizvi MD    Last refill: 2/23/2024    Rx #: [id number]    Gastrointestional Medication Protocol [id number]/21/2024 09:40 AM   Protocol Details In person appointment or virtual visit in the past 12 mos or appointment in next 3 mos           LOV: 5/8/2024  RTC: none noted   Last Relevant Labs: n/a   Filled: 2/23/2024 #90 with 0 refills    Future Appointments   Date Time Provider Department Center   5/21/2024  2:45 PM Georges Orozco DPM [id number]   5/30/2024 11:00 AM Wenceslao Bishop MD ENIWARREN [facility] [place]   6/5/2024 11:30 AM  MR RM4 (3T WIDE) [facility]   6/5/2024 12:15 PM  [facility]T WIDE[facility]   6/6/2024  2:00 PM Ariel Yanes MD [id number] [facility] [place]   6/10/2024 11:30 AM [facility]les [id number]

## 2024-05-21 NOTE — PROGRESS NOTES
[facility]  Progress Note    Liss Fagan is a 74 year old female.   Chief Complaint   Patient presents with    Diabetic Foot Care     Nail trim and foot check f/u- last AIC= 6.6 on 4/15/2024- FBS this am= 124- denies pain         HPI:     Patient is a pleasant 74 year old female with PMHx of diabetes and lumbar radiculopathy who RTC for diabetic foot exam. Patient complains of elongated and thickened nails she has difficulty trimming on her own that occasinoally become incurvated and painful.  Her hallux nails especially become incurvated and painful.   Her last A1c was 6.6% on 4/15/24.  Her blood sugars were 124 mg/dL check this morning.  PMHx, medications, and allergies were reviewed.      Allergies: Penicillins, Advil [ibuprofen], Aspirin, Codeine sulfate, Demerol, Hydrocodone, Naproxen, Norvasc [amlodipine besylate], Nsaids, Tramadol, and Zetia [ezetimibe]   Current Outpatient Medications   Medication Sig Dispense Refill    OMEPRAZOLE 40 MG Oral Capsule Delayed Release TAKE 1 CAPSULE BY MOUTH DAILY 90 capsule 0    isosorbide mononitrate  MG Oral Tablet 24 Hr Take 1 tablet (120 mg total) by mouth daily. 90 tablet 0    furosemide 20 MG Oral Tab Take 1 tablet (20 mg total) by mouth daily. 30 tablet 2    gabapentin 100 MG Oral Cap Take 1 capsule (100 mg total) by mouth daily. 30 capsule 2    lisinopril-hydroCHLOROthiazide 20-25 MG Oral Tab Take 1 tablet by mouth daily. 90 tablet 0    NIFEDIPINE ER 60 MG Oral Tablet 24 Hr TAKE ONE TABLET BY MOUTH DAILY 90 tablet 0    metoprolol tartrate 25 MG Oral Tab Take 1 tablet (25 mg total) by mouth 2 (two) times daily. 180 tablet 0    metFORMIN 500 MG Oral Tab Take 1 tablet (500 mg total) by mouth 2 (two) times daily with meals. 180 tablet 0    clopidogrel 75 MG Oral Tab Take 1 tablet (75 mg total) by mouth daily. 90 tablet 0    ranolazine  MG Oral Tablet 12 Hr Take 1 tablet (500 mg total) by mouth daily. 90 tablet 0    Microlet Lancets Does  not apply Misc USE TO TEST ONCE DAILY *DX E11.9* 100 each 1    atorvastatin 40 MG Oral Tab TAKE 1 TABLET BY MOUTH EVERY DAY IN THE EVENING 90 tablet 2    Glucose Blood (CONTOUR TEST) In Vitro Strip USE TO TEST ONCE DAILY AS DIRECTED. 100 strip 2    omega-3 fatty acids 1000 MG Oral Cap Take 1,000 mg by mouth every other day.      NON FORMULARY Take 1 tablet by mouth daily. LACTATE PILLS      nitroGLYCERIN (NITROSTAT) 0.4 MG Sublingual SL Tab Place 1 tablet (0.4 mg total) under the tongue every 5 (five) minutes as needed. 30 tablet 5    Black Elderberry,Berry-Flower, 575 MG Oral Cap Take by mouth every other day.      mometasone 0.1 % External Ointment Apply topically as needed.      cetirizine 10 MG Oral Tab Take 1 tablet (10 mg total) by mouth daily.      acetaminophen 500 MG Oral Tab Take 1 tablet (500 mg total) by mouth every 6 (six) hours as needed for Pain.      PEG 3350 Oral Powd Pack Take 17 g by mouth nightly.      Multiple Vitamin (MULTI-VITAMIN) Oral Tab Take 1 tablet by mouth every other day.        Past Medical History:    Abnormal maternal glucose tolerance, complicating pregnancy, childbirth, or the puerperium, unspecified as to episode of care    Acute bronchitis    Back problem    Cataract    Chest pain, unspecified    admit 12/05    Deep vein thrombosis (HCC)    during pregnancy right leg    Diabetes (HCC)    Diverticulosis of large intestine    unsure if divertiulitis or diverticulosis    Esophageal reflux    Essential hypertension, benign    Helicobacter pylori gastritis    High blood pressure    High cholesterol    Incontinence    Osteoarthritis    knees    Palpitations    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    PONV (postoperative nausea and vomiting)    nausea,pain results in cardiac vasospasms    Sleep apnea    no device    Stroke (HCC)    r/o 4/11/17    Unspecified gastritis and gastroduodenitis without mention of hemorrhage    Visual impairment     glasses      Past Surgical History:   Procedure Laterality Date    Back surgery  1984    Spinal diskectomy lumbar    at [place]        1987    Cardiac catherterization (pbp)      status normal resolved on 2007    Cataract      Cholecystectomy  1999    Colonoscopy  2011    colon polyp removal  francesca cee 5    Colonoscopy      Colonoscopy N/A 2017    Procedure: COLONOSCOPY;  Surgeon: Addison Castellano MD;  Location:  ENDOSCOPY    Hernia surgery  1999    Umbilical hernia repair;     Hysterectomy      d/t fibroids    Lumbar discectomy Left 2019    L4-L5   Dr Drew    Other      Spinal Steroid Injection     Shoulder surg proc unlisted      Spine surgery procedure unlisted      bulging discs in lower back    Upper gi endoscopy performed  2011      Family History   Problem Relation Age of Onset    Cancer Father          at age 52 Leukemia    Diabetes Father     Breast Cancer Paternal Grandmother     Cancer Paternal Grandmother         bone cancer    Cancer Other       Social History     Socioeconomic History    Marital status:    Tobacco Use    Smoking status: Never     Passive exposure: Never    Smokeless tobacco: Never   Vaping Use    Vaping status: Never Used   Substance and Sexual Activity    Alcohol use: Not Currently     Comment: very seldom    Drug use: No   Other Topics Concern    Caffeine Concern Yes     Comment: 1 cup of coffee every morning and 1 glass of tea during dinner.  Sometimes dark chocolate.      Exercise No           REVIEW OF SYSTEMS:     Today reviewed systems as documented below  GENERAL HEALTH: feels well otherwise  SKIN: denies any unusual skin lesions or rashes  RESPIRATORY: denies shortness of breath with exertion  CARDIOVASCULAR: denies chest pain on exertion  GI: denies abdominal pain and denies heartburn  NEURO: denies headaches  MUSCULO: history of lower back pain      EXAM:   There were no vitals taken for this  visit.  GENERAL: well developed, well nourished, in no apparent distress  EXTREMITIES:  1. Integument: Normal skin temperature and turgor.  Nails x10 are elongated and thickened and incurvated. No acute signs of infection noted.   2. Vascular: Dorsalis pedis two out of four bilateral and posterior tibial pulses two out of   four bilateral, capillary refill normal.   3. Musculoskeletal: All muscle groups are graded 5 out of 5 in the foot and ankle. Hammertoe deformities bilaterally.  Adductovarus rotation of left fifth toe.  Mild pain noted to lateral border of right hallux nail.   4. Neurological: Normal sharp dull sensation; reflexes normal. Protective sensation intact to digits via 10g monofilament.      ASSESSMENT AND PLAN:   Diagnoses and all orders for this visit:    Onychomycosis    Incurvated nail    Hammer toes of both feet    Lumbar radiculopathy    Type 2 diabetes mellitus without complication, without long-term current use of insulin (HCC)    Toe pain, bilateral    Pain of toe of left foot                Plan:     -Patient examined, chart history reviewed.  -Discussed importance of proper pedal hygiene, regular foot checks, and tight glucose control.  -Sharply debrided nails x10 with a sterile nail nipper achieving a 20% reduction in thickness and length, without incident.  Slant back procedure performed to areas of incurvated nails without incident.  Nails further smoothed with dremel.    -Patient to ambulate with supportive shoes with wide toe box.  -Patient to check feet daily and follow up if any concerns arise.  -Educated patient on acute signs of infection advised patient to seek immediate medical attention if symptoms arise.    The patient indicates understanding of these issues and agrees to the plan.    RTC 2 months.    Georges Orozco DPM

## 2024-05-30 ENCOUNTER — OFFICE VISIT (OUTPATIENT)
Dept: NEUROLOGY | Facility: CLINIC | Age: 74
End: 2024-05-30

## 2024-05-30 VITALS
HEIGHT: 62 IN | OXYGEN SATURATION: 96 % | BODY MASS INDEX: 28 KG/M2 | HEART RATE: 68 BPM | RESPIRATION RATE: 14 BRPM | SYSTOLIC BLOOD PRESSURE: 116 MMHG | DIASTOLIC BLOOD PRESSURE: 64 MMHG

## 2024-05-30 DIAGNOSIS — D33.3 VESTIBULAR SCHWANNOMA (HCC): ICD-10-CM

## 2024-05-30 DIAGNOSIS — G50.0 TRIGEMINAL NEURALGIA SYNDROME: ICD-10-CM

## 2024-05-30 DIAGNOSIS — I72.0 CAROTID ANEURYSM, LEFT (HCC): Primary | ICD-10-CM

## 2024-05-30 PROCEDURE — 99213 OFFICE O/P EST LOW 20 MIN: CPT | Performed by: OTHER

## 2024-05-30 NOTE — PATIENT INSTRUCTIONS
Refill policies:    Allow 2-3 business days for refills; controlled substances may take longer.  Contact your pharmacy at least 5 days prior to running out of medication and have them send an electronic request or submit request through the “request refill” option in your [employer] account.  Refills are not addressed on weekends; covering physicians do not authorize routine medications on weekends.  No narcotics or controlled substances are refilled after noon on Fridays or by on call physicians.  By law, narcotics must be electronically prescribed.  A 30 day supply with no refills is the maximum allowed.  If your prescription is due for a refill, you may be due for a follow up appointment.  To best provide you care, patients receiving routine medications need to be seen at least once a year.  Patients receiving narcotic/controlled substance medications need to be seen at least once every 3 months.  In the event that your preferred pharmacy does not have the requested medication in stock (e.g. Backordered), it is your responsibility to find another pharmacy that has the requested medication available.  We will gladly send a new prescription to that pharmacy at your request.    Scheduling Tests:    If your physician has ordered radiology tests such as MRI or CT scans, please contact Central Scheduling at [phone] right away to schedule the test.  Once scheduled, the [facility] Centralized Referral Team will work with your insurance carrier to obtain pre-certification or prior authorization.  Depending on your insurance carrier, approval may take 3-10 days.  It is highly recommended patients assure they have received an authorization before having a test performed.  If test is done without insurance authorization, patient may be responsible for the entire amount billed.      Precertification and Prior Authorizations:  If your physician has recommended that you have a procedure or additional testing performed the [facility]  Centralized Referral Team will contact your insurance carrier to obtain pre-certification or prior authorization.    You are strongly encouraged to contact your insurance carrier to verify that your procedure/test has been approved and is a COVERED benefit.  Although the [facility] Centralized Referral Team does its due diligence, the insurance carrier gives the disclaimer that \"Although the procedure is authorized, this does not guarantee payment.\"    Ultimately the patient is responsible for payment.   Thank you for your understanding in this matter.  Paperwork Completion:  If you require FMLA or disability paperwork for your recovery, please make sure to either drop it off or have it faxed to our office at [phone]. Be sure the form has your name and date of birth on it.  The form will be faxed to our Forms Department and they will complete it for you.  There is a 25$ fee for all forms that need to be filled out.  Please be aware there is a 10-14 day turnaround time.  You will need to sign a release of information ([employer]) form if your paperwork does not come with one.  You may call the Forms Department with any questions at [phone].  Their fax number is [phone].

## 2024-05-30 NOTE — PROGRESS NOTES
[facility] Progress Note    HPI  Chief Complaint   Patient presents with    Follow - Up     LOV 1/29/24 for carotid aneurysm - Pt has upcoming R knee surgery.  Pt is doing ok, had a couple of episodes of sharp pain on her head. But less frequent overall.     In summary of prior visits:   \"     Patient presents in follow-up after recently discharged 4/12/17, following admission for acute R sided paresthesia with stroke in left basal ganglia .  Patient previously returned 4-13/17 for recurrence of her prior similar stroke symptoms approximately 9 AM, after she had woken up and felt tingling in the right side of the face.  She denied any associated headaches, nausea/emesis, vertigo, double vision, loss of vision, or focal weakness.  Symptoms were persistent throughout the day, but gradually improved.  Patient was evaluated in the ER and had MRI of the brain, which demonstrated possible acute on subacute infarct, but she was discharged on same dose of Plavix, and atorvastatin, as her recent stroke workup was completed. \"       Prior notes as per 7/7/2020.  Overall, since last visit, patient has been doing well in terms of stroke symptoms; she remains on Plavix and denies any new focal numbness/tingling/weakness, speech issues or vision changes; no new stroke or TIA symptoms.     She remains on gabapentin 600 mg bid and denies significant side effects.  However, since April she has been having worsening pain in the left hand.  She states this is worse in the night time even though she has been wearing her braces on her wrists at night.  She denies any weakness but states her symptoms limit her ability to do things in the left hand; worsens when she is gripping steering wheel.  Overall, R hand is not as severe.       Headaches have been stable and she previously had MRI/ MRA which showed a small left cavernous carotid aneurysm and was seen by neuro-interventional service with no intervention and  monitoring yearly recommended.       Prior notes as per 10/26/2023.  Patient last seen 7/7/2020.  She states she is here for a new issue. She states on 9/20/2023, she had new onset left ear pain, which would shoot down the jaw but also up to the top of the head.  She states if she would touch her head on the left side or touch her hair, she would have severe pain.  She still has been having some feeling like her hearing is muffled on the left side; she also states when she was trying to write.  When this first occurred, she also noted she was having difficulty with hand writing as well. She denied vision changes or double vision.        She was told she had trigeminal neuralgia and started on gabapentin by her PCP - currently up to 100 mg bid with some improvement.           Prior notes as per 1/29/2024.  Patient last seen 10/26/2203.  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 She was advised to see neurosurgery / neuro-interventional service (Dr. Yanes) for additional recommendations and was recommended to have serial imaging for surveillance rather than traditional angiogram.      She is still on gabapentin 100 mg bid but recently reduced to 100 mg daily by PCP with plan to stop taking if still not having left facial pain; this is resolved currently; she has some pain in R leg below knee - evaluation for DVT was normal.      Patient las seen 1/29/2024. She has been doing well overall aside from a few episodes of transient pain over the right side of the head lateral aspect of head, but no pain in the face and no vision changes or muscle twitching and denies associated light / sound sensitivity or nausea.  She has been on gabapentin at lower dose of 100 mg daily per PCP.     She had episode about a week and a half ago during which she was sleeping and had pain in  the right leg, followed by \"feeling of shaking\" of right hand digits 1-3 for !0 minutes but no loss of awareness or visible tremor (felt \"internal\").                      Past Medical History:    Abnormal maternal glucose tolerance, complicating pregnancy, childbirth, or the puerperium, unspecified as to episode of care    Acute bronchitis    Back problem    Cataract    Chest pain, unspecified    admit     Deep vein thrombosis (HCC)    during pregnancy right leg    Diabetes (HCC)    Diverticulosis of large intestine    unsure if divertiulitis or diverticulosis    Esophageal reflux    Essential hypertension, benign    Helicobacter pylori gastritis    High blood pressure    High cholesterol    Incontinence    Osteoarthritis    knees    Palpitations    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     PONV (postoperative nausea and vomiting)    nausea,pain results in cardiac vasospasms    Sleep apnea    no device    Stroke (HCC)    r/o 17    Unspecified gastritis and gastroduodenitis without mention of hemorrhage    Visual impairment    glasses     Past Surgical History:   Procedure Laterality Date    Back surgery      Spinal diskectomy lumbar    at [place]        1987    Cardiac catherterization (pbp)      status normal resolved on 2007    Cataract      Cholecystectomy  1999    Colonoscopy  2011    colon polyp removal  francesca cee 5    Colonoscopy      Colonoscopy N/A 2017    Procedure: COLONOSCOPY;  Surgeon: Addison Castellano MD;  Location:  ENDOSCOPY    Hernia surgery  1999    Umbilical hernia repair;     Hysterectomy      d/t fibroids    Lumbar discectomy Left 2019    L4-L5   Dr Drew    Other      Spinal Steroid Injection     Shoulder surg proc unlisted      Spine surgery procedure unlisted      bulging discs in lower back    Upper gi endoscopy performed  2011     Family History   Problem Relation Age of  Onset    Cancer Father          at age 52 Leukemia    Diabetes Father     Breast Cancer Paternal Grandmother     Cancer Paternal Grandmother         bone cancer    Cancer Other      Social History     Socioeconomic History    Marital status:    Tobacco Use    Smoking status: Never     Passive exposure: Never    Smokeless tobacco: Never   Vaping Use    Vaping status: Never Used   Substance and Sexual Activity    Alcohol use: Not Currently     Comment: very seldom    Drug use: No   Other Topics Concern    Caffeine Concern Yes     Comment: 1 cup of coffee every morning and 1 glass of tea during dinner.  Sometimes dark chocolate.      Exercise No       Allergies   Allergen Reactions    Penicillins RASH and SWELLING    Advil [Ibuprofen] NAUSEA AND VOMITING and DIZZINESS    Aspirin NAUSEA AND VOMITING and DIZZINESS    Codeine Sulfate NAUSEA AND VOMITING    Demerol NAUSEA ONLY and DIZZINESS    Hydrocodone NAUSEA ONLY and DIZZINESS    Naproxen DIZZINESS    Norvasc [Amlodipine Besylate] NAUSEA AND VOMITING    Nsaids NAUSEA ONLY and DIZZINESS    Tramadol NAUSEA AND VOMITING and DIZZINESS    Zetia [Ezetimibe] NAUSEA AND VOMITING     Tabs,Muscle spasm          Current Outpatient Medications:     OMEPRAZOLE 40 MG Oral Capsule Delayed Release, TAKE 1 CAPSULE BY MOUTH DAILY, Disp: 90 capsule, Rfl: 0    isosorbide mononitrate  MG Oral Tablet 24 Hr, Take 1 tablet (120 mg total) by mouth daily., Disp: 90 tablet, Rfl: 0    furosemide 20 MG Oral Tab, Take 1 tablet (20 mg total) by mouth daily., Disp: 30 tablet, Rfl: 2    gabapentin 100 MG Oral Cap, Take 1 capsule (100 mg total) by mouth daily., Disp: 30 capsule, Rfl: 2    lisinopril-hydroCHLOROthiazide 20-25 MG Oral Tab, Take 1 tablet by mouth daily., Disp: 90 tablet, Rfl: 0    NIFEDIPINE ER 60 MG Oral Tablet 24 Hr, TAKE ONE TABLET BY MOUTH DAILY, Disp: 90 tablet, Rfl: 0    metoprolol tartrate 25 MG Oral Tab, Take 1 tablet (25 mg total) by mouth 2 (two) times  daily., Disp: 180 tablet, Rfl: 0    metFORMIN 500 MG Oral Tab, Take 1 tablet (500 mg total) by mouth 2 (two) times daily with meals., Disp: 180 tablet, Rfl: 0    ranolazine  MG Oral Tablet 12 Hr, Take 1 tablet (500 mg total) by mouth daily., Disp: 90 tablet, Rfl: 0    Microlet Lancets Does not apply Misc, USE TO TEST ONCE DAILY *DX E11.9*, Disp: 100 each, Rfl: 1    atorvastatin 40 MG Oral Tab, TAKE 1 TABLET BY MOUTH EVERY DAY IN THE EVENING, Disp: 90 tablet, Rfl: 2    Glucose Blood (CONTOUR TEST) In Vitro Strip, USE TO TEST ONCE DAILY AS DIRECTED., Disp: 100 strip, Rfl: 2    NON FORMULARY, Take 1 tablet by mouth daily. LACTATE PILLS, Disp: , Rfl:     nitroGLYCERIN (NITROSTAT) 0.4 MG Sublingual SL Tab, Place 1 tablet (0.4 mg total) under the tongue every 5 (five) minutes as needed., Disp: 30 tablet, Rfl: 5    cetirizine 10 MG Oral Tab, Take 1 tablet (10 mg total) by mouth daily., Disp: , Rfl:     acetaminophen 500 MG Oral Tab, Take 1 tablet (500 mg total) by mouth every 6 (six) hours as needed for Pain., Disp: , Rfl:     PEG 3350 Oral Powd Pack, Take 17 g by mouth nightly., Disp: , Rfl:     Multiple Vitamin (MULTI-VITAMIN) Oral Tab, Take 1 tablet by mouth every other day., Disp: , Rfl:     clopidogrel 75 MG Oral Tab, Take 1 tablet (75 mg total) by mouth daily. (Patient not taking: Reported on 5/30/2024), Disp: 90 tablet, Rfl: 0    omega-3 fatty acids 1000 MG Oral Cap, Take 1,000 mg by mouth every other day. (Patient not taking: Reported on 5/30/2024), Disp: , Rfl:     Black Elderberry,Berry-Flower, 575 MG Oral Cap, Take by mouth every other day. (Patient not taking: Reported on 5/30/2024), Disp: , Rfl:     mometasone 0.1 % External Ointment, Apply topically as needed. (Patient not taking: Reported on 5/30/2024), Disp: , Rfl:     Review of Systems:  No chest pain or palpitations; otherwise as noted in HPI.    Exam:  /64   Pulse 68   Resp 14   Ht 62\"   SpO2 96%   BMI 28.09 kg/m²   Estimated body mass  index is 28.09 kg/m² as calculated from the following:    Height as of this encounter: 62\".    Weight as of 5/8/24: 153 lb 9.6 oz (69.7 kg).    Gen: well developed, well nourished, no acute distress  HEENT: normocephalic  Heart; normal S1/S2, regular rate and rhythm  Lungs: clear to auscultation bilaterally  Extremities: no edema, peripheral pulses intact    Neck: supple, full range of motion; no carotid bruits; no meningitic signs      Neuro:    NIHSS: 0     Mental status:  Orientation: Alert and oriented to person, place, time  Speech fluent and conversational     CN: PERRL, EOMI without nystagmus, VFF, smile symmetric, sensation intact, tongue and palate midline, SCM intact; otherwise, 2-12 intact; no sensory deficit in V1,V2, V3 distribution   Motor: 5/5 strength throughout, tone normal - no drift; no tremor noted; no bradykinesia or rigidity noted   DTR: 2+, symmetric, throughout, toes downgoing bilaterally; no clonus  Sensory: intact to light touch and pin throughout grossly  Coord: FNF intact with no tremor or dysmetria  Romberg:absent  Gait: antalgic gait due to knee pain    Labs:   None new since last visit:     Prior as noted below  Component      Latest Ref Rng & Units 12/5/2019 12/2/2019 12/12/2018   WBC      4.0 - 11.0 x10(3) uL 4.3     RBC      3.80 - 5.30 x10(6)uL 3.74 (L)     Hemoglobin      12.0 - 16.0 g/dL 10.9 (L)     Hematocrit      35.0 - 48.0 % 33.4 (L)     Platelet Count      150.0 - 450.0 10(3)uL 222.0     MCV      80.0 - 100.0 fL 89.3     MCH      26.0 - 34.0 pg 29.1     MCHC      31.0 - 37.0 g/dL 32.6     RDW      11.0 - 15.0 % 13.2     RDW-SD      35.1 - 46.3 fL 43.3     Prelim Neutrophil Abs      1.50 - 7.70 x10 (3) uL 2.69     Neutrophils Absolute      1.50 - 7.70 x10(3) uL 2.69     Lymphocytes Absolute      1.00 - 4.00 x10(3) uL 1.05     Monocytes Absolute      0.10 - 1.00 x10(3) uL 0.44     Eosinophils Absolute      0.00 - 0.70 x10(3) uL 0.12     Basophils Absolute      0.00 - 0.20  x10(3) uL 0.02     Immature Granulocyte Absolute      0.00 - 1.00 x10(3) uL 0.00     Neutrophils %      % 62.2     Lymphocytes %      % 24.3     Monocytes %      % 10.2     Eosinophils %      % 2.8     Basophils %      % 0.5     Immature Granulocyte %      % 0.0     Glucose      70 - 99 mg/dL 93     Sodium      136 - 145 mmol/L 143     Potassium      3.5 - 5.1 mmol/L 3.9     Chloride      98 - 112 mmol/L 111     Carbon Dioxide, Total      21.0 - 32.0 mmol/L 29.0     ANION GAP      0 - 18 mmol/L 3     BUN      7 - 18 mg/dL 10     CREATININE      0.55 - 1.02 mg/dL 0.70     BUN/CREAT Ratio      10.0 - 20.0 14.3     CALCIUM      8.5 - 10.1 mg/dL 9.0     CALCULATED OSMOLALITY      275 - 295 mOsm/kg 295     eGFR NON-AFR. AMERICAN      >=60 89     eGFR       >=60 103     AST (SGOT)      15 - 37 U/L 36     ALT (SGPT)      13 - 56 U/L 51     ALKALINE PHOSPHATASE      55 - 142 U/L 105     Total Bilirubin      0.1 - 2.0 mg/dL 0.4     TOTAL PROTEIN      6.4 - 8.2 g/dL 6.4     Albumin      3.4 - 5.0 g/dL 3.0 (L)     Globulin      2.8 - 4.4 g/dL 3.4     A/G Ratio      1.0 - 2.0 0.9 (L)     HEMOGLOBIN A1c      <5.7 %  6.4 (H) 7.2 (H)   ESTIMATED AVERAGE GLUCOSE      68 - 126 mg/dL  137 (H) 160 (H)       Prior as noted below  Component      Latest Ref Rng & Units 9/11/2019   WBC      4.0 - 11.0 x10(3) uL 5.1   RBC      3.80 - 5.30 x10(6)uL 4.33   Hemoglobin      12.0 - 16.0 g/dL 12.7   Hematocrit      35.0 - 48.0 % 38.0   Platelet Count      150.0 - 450.0 10(3)uL 299.0   MCV      80.0 - 100.0 fL 87.8   MCH      26.0 - 34.0 pg 29.3   MCHC      31.0 - 37.0 g/dL 33.4   RDW      11.0 - 15.0 % 13.2   RDW-SD      35.1 - 46.3 fL 42.3   Prelim Neutrophil Abs      1.50 - 7.70 x10 (3) uL 3.11   Neutrophils Absolute      1.50 - 7.70 x10(3) uL 3.11   Lymphocytes Absolute      1.00 - 4.00 x10(3) uL 1.24   Monocytes Absolute      0.10 - 1.00 x10(3) uL 0.61   Eosinophils Absolute      0.00 - 0.70 x10(3) uL 0.11   Basophils  Absolute      0.00 - 0.20 x10(3) uL 0.02   Immature Granulocyte Absolute      0.00 - 1.00 x10(3) uL 0.01   Neutrophils %      % 60.9   Lymphocytes %      % 24.3   Monocytes %      % 12.0   Eosinophils %      % 2.2   Basophils %      % 0.4   Immature Granulocyte %      % 0.2   Glucose      70 - 99 mg/dL 91   Sodium      136 - 145 mmol/L 141   Potassium      3.5 - 5.1 mmol/L 4.1   Chloride      98 - 112 mmol/L 108   Carbon Dioxide, Total      21.0 - 32.0 mmol/L 28.0   ANION GAP      0 - 18 mmol/L 5   BUN      7 - 18 mg/dL 16   CREATININE      0.55 - 1.02 mg/dL 0.89   BUN/CREAT Ratio      10.0 - 20.0 18.0   CALCIUM      8.5 - 10.1 mg/dL 9.5   CALCULATED OSMOLALITY      275 - 295 mOsm/kg 293   eGFR NON-AFR. AMERICAN      >=60 67   eGFR       >=60 77   AST (SGOT)      15 - 37 U/L 29   ALT (SGPT)      13 - 56 U/L 38   ALKALINE PHOSPHATASE      55 - 142 U/L 83   Total Bilirubin      0.1 - 2.0 mg/dL 0.6   TOTAL PROTEIN      6.4 - 8.2 g/dL 7.3   Albumin      3.4 - 5.0 g/dL 3.8   Globulin      2.8 - 4.4 g/dL 3.5   A/G Ratio      1.0 - 2.0 1.1       Imaging:     None new    Prior as noted below    Result Date: 11/21/2023  CONCLUSION:   1. No acute intracranial abnormality identified.  2. There is a subtle nodular focus of enhancement in lateral aspect of the right internal auditory canal measuring up to 6 x 2 x 3 mm that is concerning for a this tubular schwannoma.  Clinical correlation recommended.  The inner ear structures have a normal signal and morphology. The vestibular aqueducts are nondilated.  3. A branch of the left superior cerebellar artery course along the superior and medial aspects of the cisternal segment of the left trigeminal nerve resulting in local deformity and slight lateral displacement.  Clinical correlation recommended.  4. Mild chronic microvascular ischemic changes in the cerebral white matter.  Old lacunar infarct in the left thalamus.  5. A stable saccular aneurysm is seen  projecting laterally from cavernous segment of the left internal carotid artery measuring up to 3 mm.  6. There is a nonspecific focal area of vascular prominence noted projecting anteriorly from area of the origin of the left ophthalmic artery measuring up to 4 mm that is concerning for a small aneurysm versus infundibulum, with vascular tortuosity or artifact not entirely excluded.  This could be further assessed with conventional cerebral angiography as clinically directed.  7. No evidence of occlusion, dissection, or flow-limiting stenosis in the cervical vertebral or carotid arteries. No evidence of hemodynamically significant carotid stenosis by NASCET criteria.        Prior as noted below    MRI brain with and without contrast / MRA brain (9/12/19):     FINDINGS:    MRI BRAIN  INTRACRANIAL:  There is no evidence of acute ischemia or infarction.  There is scattered subcortical high signal white matter lesions seen in the centrum semiovale and periventricular white matter similar to the prior examination of 4/24/2018 consistent   with chronic microvascular disease.  There are no diffusion abnormalities evident.  There is an old lacunar infarct of the left posterior lateral thalamus measuring approximately 6 mm.   VENTRICLES/SULCI:   Ventricles and sulci are normal in caliber.  There are no extra-axial fluid collections.  There is no midline shift.  SINUSES/ORBITS:       The visualized paranasal sinuses are clear.  The orbits are unremarkable.  MASTOIDS:       The mastoids are clear.     MRA BRAIN  INTRACRANIAL CAROTIDS:         Within the left cavernous carotid segment there is a 2.9 x 2.1 mm aneurysm.  This is probably on a short neck which is difficult to measure but is estimated at between 1 and 1.5 mm.  The right cavernous carotid segment is   normal.  The A1 and M1 segments are normal.  The vertebrobasilar junction is normal.  There is a dominant left vertebral artery.  The basilar artery and basilar tip  are normal.  There is fetal origin of the right PCA.  There is a patent left posterior   communicating artery.  The PCA branches are symmetrical and normal bilaterally.  ANTERIOR CEREBRALS:         No visible stenosis or aneurysm.  ANTERIOR COMMUNICATING:  No visible stenosis or aneurysm.  MIDDLE CEREBRALS:         No visible stenosis or aneurysm.  POSTERIOR COMMUNICATING: No visible stenosis or aneurysm.  SUPERIOR CEREBELLARS:         No visible stenosis or aneurysm.  BASILAR:             No visible stenosis or aneurysm.  INTRACRANIAL VERTEBRALS: No visible significant stenosis or dissection.     =====  CONCLUSION:  No evidence of acute cerebral ischemia or infarction.  No evidence of ventriculomegaly or intra-axial mass lesion or hemorrhage.     Chronic microvascular disease involving both cerebral hemispheres unchanged from 4/24/2018.  Noted is a old 6 mm left posterior lateral thalamic lacunar infarct.     2.9 x 2.1 mm left cavernous carotid aneurysm with short neck.  No other evidence of aneurysm formation or branch occlusion is identified intracranially.       No results found.    Piror as noted below:    MRI brain (4/24//18):     FINDINGS:    Cerebral atrophy is present with symmetrical prominence of the ventricles.  There are scattered patchy areas of increased T2 intensity seen  predominantly within the periventricular white matter. These findings are consistent with age related changes.   There is no evidence of hemorrhage or mass. There is no evidence of acute infarction. No extra-axial fluid collection or midline shift is noted.     The visualized paranasal sinuses show mucous retention cyst within the left maxillary sinus..     =====  CONCLUSION:  1. No acute intracranial findings.  2. Cerebral atrophy with small vessel changes.    Other procedures:     None new since last visit    Prior as noted below    NCS/EMG (7/16/2020):     Sensory NCS      Nerve / Sites Rec. Site Onset Lat Peak Lat NP Amp PP Amp  Segments Distance Peak Diff Velocity       ms ms µV µV   cm ms m/s   R Median - Digit II (Antidromic)      Wrist Dig II 3.70 4.38 1.7 1.8 Wrist - Dig II 13   35   L Median - Digit II (Antidromic)      Wrist Dig II NR NR NR NR Wrist - Dig II 13   NR   R Ulnar - Digit V (Antidromic)      Wrist Dig V 2.14 2.66 3.0 5.7 Wrist - Dig V 11   52      B.Elbow Dig V 2.08 2.60 3.3 5.9 B.Elbow - Wrist   -0.05     L Ulnar - Digit V (Antidromic)      Wrist Dig V 1.93 2.71 23.1 37.9 Wrist - Dig V 11   57      B.Elbow Dig V 1.88 2.66 25.1 41.7 B.Elbow - Wrist   -0.05     R Radial - Anatomical snuff box (Forearm)      Forearm Wrist 1.72 2.29 5.2 4.4 Forearm - Wrist 10   58   L Radial - Anatomical snuff box (Forearm)      Forearm Wrist 1.61 2.19 41.1 53.7 Forearm - Wrist 10   62   R Median, Ulnar - Transcarpal comparison      Median Palm Wrist 2.34 3.02 4.3 3.1 Median Palm - Wrist 8   34      Ulnar Palm Wrist 1.35 1.88 3.1 3.0 Ulnar Palm - Wrist 8   59               Median Palm - Ulnar Palm   1.15     L Median, Ulnar - Transcarpal comparison      Median Palm Wrist NR NR NR NR Median Palm - Wrist 8   NR      Ulnar Palm Wrist 1.35 1.77 26.2 36.7 Ulnar Palm - Wrist 8   59               Median Palm - Ulnar Palm   NR         Motor NCS      Nerve / Sites Muscle Latency Amplitude Amp % Duration Area Segments Distance Lat Diff Velocity       ms mV % ms mVms   cm ms m/s   R Median - APB      Wrist APB 4.06 5.6 100 4.64 15.2 Wrist - APB 7          Elbow APB 7.81 5.4 95.9 4.79 14.7 Elbow - Wrist 20 3.75 53      A.Elbow APB 4.01 10.6 187 4.53 28.6 A.Elbow - Elbow   -3.80     L Median - APB      Wrist APB 5.05 2.9 100 8.80 11.5 Wrist - APB 7          Elbow APB 8.70 2.8 96.5 8.91 11.9 Elbow - Wrist 20 3.65 55   R Ulnar - ADM      Wrist ADM 2.40 12.7 100 4.84 36.2 Wrist - ADM 7          B.Elbow ADM 5.63 12.8 101 5.10 36.8 B.Elbow - Wrist 20 3.23 62   L Ulnar - ADM      Wrist ADM 2.40 9.8 100 5.99 30.4 Wrist - ADM 7          B.Elbow ADM 5.83 10.1 104  5.89 31.9 B.Elbow - Wrist 20.5 3.44 60       F  Wave      Nerve F Lat M Lat F-M Lat     ms ms ms   R Median - APB 25.3 4.4 20.9   R Ulnar - ADM 25.5 2.4 23.1   L Median - APB 28.0 5.1 22.9   L Ulnar - ADM 26.2 2.6 23.6       EMG                       EMG Summary Table       Spontaneous MUAP Recruitment   Muscle Nerve Roots IA Fib PSW Fasc H.F. Amp Dur. PPP Pattern   L. Deltoid Axillary C5-C6 N None None None None N N N N   L. Biceps brachii Musculocutaneous C5-C6 N None None None None N N N N   L. Triceps brachii Radial C6-C8 N None None None None N N N N   L. Extensor digitorum communis Radial C7-C8 N None None None None N N N N   L. Abductor pollicis brevis Median C8-T1 N None None None None N N N N   L. First dorsal interosseous Ulnar C8-T1 N None None None None N N N N   R. Deltoid Axillary C5-C6 N None None None None N N N N   R. Biceps brachii Musculocutaneous C5-C6 N None None None None N N N N   R. Triceps brachii Radial C6-C8 N None None None None N N N N   R. Extensor digitorum communis Radial C7-C8 N None None None None N N N N   R. Abductor pollicis brevis Median C8-T1 N None None None None N N N N   R. First dorsal interosseous Ulnar C8-T1 N None None None None N N N N               Summary:     Nerve conduction studies:  1.  Right median sensory response was abnormal due to markedly reduced amplitude, with normal peak latency, and normal conduction velocity.  2.  Left median sensory response was absent.  3.  Right ulnar sensory response was abnormal due to markedly reduced amplitude, with normal peak latency, and normal conduction velocity.  4.  Left ulnar sensory response was normal.  5.  Right radial sensory response was abnormal due to markedly reduced amplitude, with normal peak latency, and normal conduction velocity.  6.  Left radial sensory spots was normal.  7.  Right median to ulnar palmar mixed nerve comparison study demonstrated significant inter-latency prolongation of median relative to  ulnar nerve (approximately 1.15 ms), consistent with median nerve entrapment across the right wrist.  8.  Left median to ulnar palmar mixed nerve comparison study demonstrated absent median response.  9.  Right median motor response was normal; F wave response was normal.  10.  Left median motor response was abnormal due to prolonged peak latency, with reduced amplitude, and normal conduction velocity; F wave response was normal.  11.  Right ulnar motor response was normal; F wave response was normal.  12.  Left ulnar motor response was normal; F wave response was normal.     EMG (needle exam):  Concentric needle EMG was performed on the selected muscles listed above, with the following findings: All muscles tested were normal, including bilateral APB muscles.        Impression:   This is an abnormal study.  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     In addition, there is evidence for localized sensory neuropathic process in the right upper extremity, which may be related to prior history of trauma.  Clinical correlation is advised.  There is no electrophysiologic evidence to suggest a generalized large fiber polyneuropathy, or myopathy.     Overall, the findings of bilateral median nerve entrapment suggest a moderate to severe median nerve entrapment across the left wrist, and a mild median nerve entrapment across the right wrist, with sensory nerve affected only, with demyelinating type changes, and no secondary axonal loss.     Prior as noted below    EMG 5/4/18:     Sensory NCS      Nerve / Sites Rec. Site Onset Lat Peak Lat NP Amp PP Amp Segments Distance Peak Diff Velocity       ms ms µV µV   cm ms m/s   R Median - Digit II (Antidromic)      Wrist Dig II 3.44 4.48 22.6  44.8 Wrist - Dig II 14   41   R Ulnar - Digit V (Antidromic)      Wrist Dig V 2.14 2.92 33.5 43.7 Wrist - Dig V 11   52      B.Elbow Dig V 2.08 2.86 33.4 45.6 B.Elbow - Wrist   -0.05     R Radial - Anatomical snuff box (Forearm)      Forearm Wrist 1.77 2.34 31.5 49.2 Forearm - Wrist 10   56   R Sural - Ankle (Calf)      Calf Ankle 2.55 3.18 5.9 8.2 Calf - Ankle 13   51      2 Ankle 2.34 3.13 2.9 9.0              3 Ankle 2.66 3.33 2.5 5.2           R Median, Ulnar - Transcarpal comparison      Median Palm Wrist 2.50 3.07 19.5 28.4 Median Palm - Wrist 8   32      Ulnar Palm Wrist 1.35 1.93 24.0 25.8 Ulnar Palm - Wrist 8   59               Median Palm - Ulnar Palm   1.15         Motor NCS      Nerve / Sites Muscle Latency Amplitude Amp % Duration Area Segments Distance Lat Diff Velocity       ms mV % ms mVms   cm ms m/s   R Median - APB      Wrist APB 4.43 4.2 100 7.55 13.4 Wrist - APB 7          Elbow APB 8.28 4.0 96.3 7.45 13.0 Elbow - Wrist 19.5 3.85 51   R Ulnar - ADM      Wrist ADM 2.45 10.6 100 5.63 30.1 Wrist - ADM 7          B.Elbow ADM 5.57 10.7 102 5.89 31.4 B.Elbow - Wrist 19.5 3.13 62      A.Elbow ADM 7.50 10.6 101 5.89 30.5 A.Elbow - B.Elbow 10 1.93 52   R Peroneal - EDB      Ankle EDB 4.27 0.6 100 4.43 1.8 Ankle - EDB 7          Fib head EDB 10.10 0.7 111 4.79 2.1 Fib head - Ankle 28 5.83 48   R Tibial - AH      Ankle AH 4.01 8.9 100 6.20 28.5 Ankle - AH 8          Pop fossa AH 11.15 5.8 65.1 7.08 22.3 Pop fossa - Ankle 38 7.14 53   R Peroneal - Tib Ant      Fib Head Tib Ant 4.11 7.9 100 8.54 42.4 Fib Head - Tib Ant 11          Pop fossa Tib Ant 5.57 7.8 99.1 8.65 42.6 Pop fossa - Fib Head 7.5 1.46 51       F  Wave      Nerve F Lat M Lat F-M Lat     ms ms ms   R Peroneal - EDB NR NR NR   R Tibial - AH 39.8 4.9 34.8   R Median - APB 27.4 3.9 23.5   R Ulnar - ADM 25.5 2.1 23.4       EMG                       EMG Summary Table       Spontaneous MUAP Recruitment   Muscle Nerve Roots IA Fib PSW Fasc H.F. Amp  Dur. PPP Pattern   R. Deltoid Axillary C5-C6 N None None None None N N N N   R. Biceps brachii Musculocutaneous C5-C6 N None None None None N N N N   R. Triceps brachii Radial C6-C8 N None None None None N N N N   R. Extensor digitorum communis Radial C7-C8 N None None None None N N N N   R. First dorsal interosseous Ulnar C8-T1 N None None None None N N N N   R. Vastus medialis Femoral L2-L4 N None None None None N N N N   R. Tibialis anterior Deep peroneal (Fibular) L4-L5 N None None None None 1+ 1+ N Reduced   R. Peroneus longus Perineal L5-S1 N None None None None 1+ 1+ N Reduced   R. Gastrocnemius (Medial head) Tibial S1-S2 N None None None None N N N N   R. Extensor hallucis longus Deep peroneal (Fibular) L5-S1 N None None None None 1+ 1+ N Reduced               Summary:     Right sural sensory response was borderline due to borderline amplitude, with normal peak latency and normal conduction velocity.   Right median sensory response was abnormal due to prolonged peak latency, with normal amplitude and slowed conduction velocity   Right ulnar sensory response was normal  Right radial sensory response was normal.   Right median to ulnar palmar mixed nerve comparison study was abnormal and demonstrated significant interlatency prolongation of median relative to ulnar nerve (1.15 sec [>0.5 msec]), consistent with focal slowing of median nerve across the wrist and entrapment of median nerve across the wrist.   Right common peroneal motor response was abnormal due to markedly reduced amplitude, with normal distal motor latency and normal conduction velocity; F wave was absent  Right tibial motor response was normal; F wave was normal.  Right median motor response was borderline due to borderline distal motor latency and borderline amplitude with normal conduction velocity; F wave was normal.  Right ulnar motor response was normal; F wave was normal.      EMG (needle exam)  Concentric needle EMG was performed on the  selected muscles listed above in the table, with the following findings:   No increased insertional or spontaneous activity was noted in any of the muscles  Motor unit potentials demonstrated chronic denervation with increased amplitude, duration and reduced recruitment in the R lower extremity in the R TA, peroneus longus and EHL muscles.         Impression:      This study is abnormal.  There is electrophysiologic evidence consistent with a Right median nerve entrapment across the wrist, with demyelinating features.  In addition, this is suggestive of but not diagnostic for a mild, chronic R lower lumbar radiculopathy.        There is no clear evidence for a large fiber polyneuropathy or myopathy as clinically questioned.     Diagnostic and imaging as previously noted below:    MRI brain / MRA head / neck (4/11/17):     FINDINGS:  Ventricles and sulci are normal caliber. No mass effect or midline shift. There is a small focus of restricted diffusion involving the left basal ganglia measuring up to 0.6 cm. There is associated minimal low signal on the ADC map. There is    corresponding mild flair hyperintensity.        There is scattered mild chronic small vessel disease. No abnormal extra-axial fluid. Normal central flow voids.        The carotid arteries are patent. There is mild carotid bifurcation atherosclerosis. Narrowing is estimated at no more than 15-20% bilaterally. The vertebral arteries are patent. The left is dominant in caliber. The basilar artery is patent. There is no   carotid or vertebral basilar dissection. No focal high grade stenosis.        The anterior, middle and posterior cerebral arteries are patent. No intracranial aneurysm or arterial venous malformation demonstrated.        The superior cerebellar, AICA and PICA segments demonstrate flow related signal.          =====  CONCLUSION:     #1. Small subacute infarct left basal ganglia.  #2. Patent carotid and vertebral basilar systems. Mild  carotid bifurcation atherosclerosis.  #3. Unremarkable [place] MRA.    Echo:   Conclusions:    1. Left ventricle: The cavity size was normal. Wall thickness was normal.     Systolic function was normal. The estimated ejection fraction was 65%. No     diagnostic evidence for regional wall motion abnormalities. Doppler     parameters are consistent with abnormal left ventricular relaxation -     grade 1 diastolic dysfunction.  2. Mitral valve: Systolic bowing without prolapse. There was trivial to mild     regurgitation.  3. Left atrium: The left atrial volume was mildly increased.  4. Right atrium: The atrium was mildly dilated.  5. Tricuspid valve: Structurally normal valve. There was moderate     regurgitation.  6. Pulmonary arteries: Systolic pressure was mildly increased, in the range     of 40mm Hg to 45mm Hg.  Impressions:  There was no diagnostic evidence for a cardiac embolic source.  This study is compared with previous dated 16: Compared to the  previous study, systemic pressure has decreased from 151/70 mmHg to 137/65  mmHg. PA systolic pressure has decreased from ~ 60 mmHg to 40-45 mmHg.    Labs:     None new:    Prior as noted below     17:   LDL 43  Chol,total: 123  Tri  HDL 52    AST/ALT: normal     Prior as noted below:     LDL: 80  A1C: 6.5           Impression/ Plan:    Liss Fagan is a 74 year old woman with past medical history significant for HTN, DM type 2, GERD, who originally presented to ED 17, with numbness in R face/arm/ leg and found to have acute left basal ganglia infract     MRI brain previously repeated for similar symptoms and likely with extension of prior stroke    Workup unremarkable with normal Echo, LDL 80, A1C 6.5, and likely small vessel etiology.       Patient previously was seen earlier than scheduled due to new onset of severe headache, associated with nausea and numbness on the right side of the face.  This has since resolved.  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  Patient was evaluated by neuro interventional radiology with no recommendation for acute intervention, and with recommendation for serial imaging ~ 1 yr from original image.      In terms of stroke symptoms, she is currently doing well on her dose of Plavix 75 mg daily and Lipitor 40 mg daily (could not tolerate ASA).        She subsequently presented for left side facial pain with symptoms somewhat suggestive of trigeminal neuralgia but with some atypical features with distribution primarily into V1 region and also with more continuous pain than lancinating pain.       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 She was advised to see neurosurgery / neuro-interventional service (Dr. Yanes) for additional recommendations and was recommended to have serial imaging for surveillance rather than traditional angiogram with plan for this to be done ~6/2024.      She is now on gabapentin 100 mg daily and has been doing well; will monitor    1. Carotid aneurysm, left (HCC)  As noted above     2. Vestibular schwannoma (HCC)  As noted above     3. Trigeminal neuralgia syndrome  As noted above     Return in about 4 months (around 9/30/2024).    Wenceslao Bishop MD, Neurology  [facility]  Pager [phone]  5/30/2024

## 2024-05-31 RX ORDER — ONDANSETRON 4 MG/1
TABLET, FILM COATED ORAL
Qty: 8 TABLET | Refills: 0 | Status: SHIPPED | OUTPATIENT
Start: 2024-05-31

## 2024-05-31 NOTE — DISCHARGE INSTRUCTIONS
Knee Arthroscopy - Meniscectomy / Debridement  Postoperative Guidelines      This document will help you plan for your post-operative recovery course following surgery. Please read and retain this information for future reference. Many of the questions you may have later can be answered by referring to this information.      DIET   Begin with clear liquids and light foods (jellos, soups, etc.).   Progress to your normal diet if you are not experiencing nausea.       WOUND CARE   Please keep the ACE wrap on for the first 2-3 days, you may remove the ACE wrap for ice therapy.  Underneath the ACE wrap are waterproof bandages, please keep these in place until your first post-op appointment with Dr. Joiner.  Under the waterproof bandages is Dermabond, this is a surgical glue and tape that is used in conjunction with absorbable stitches to close the incision.   Please do not touch the Dermabond or place any ointments lotions or creams directly over the incisions.  You may shower after removing the ACE wrap by placing Saran wrap around the leg and covering the bandages.  NO soaking of the operative leg (ie: bath or pool) is allowed until 6 weeks after surgery.     CRUTCHES   Crutches are to aid your progression to walking.   You may discontinue the crutches as soon as you are comfortable with full weight on the leg (usually 1-3 days). Your physical therapist may guide you with this process.     PAIN MANAGEMENT  Local numbing medications are injected into the wound around the knee at the time of surgery. These will wear off within 8-12 hours and it is not uncommon for you to encounter more pain on the first or second day after surgery when swelling peaks.   Do not drive a car or operate machinery while taking the narcotic medication.   Please avoid alcohol use while taking the narcotic pain medication.     NON-NARCOTIC PAIN MEDICATIONS   Extra-Strength Tylenol (Acetaminophen) 500mg Tablets (available over the  counter)  Indication: pain, non-narcotic pain reliever  Use: Take 1-2 tablets every 6 hours.  Do not exceed 8 tablets in a 24-hour period.  Mobic (Meloxicam) 15mg Tablets (Prescription sent to pharmacy)  Indication: pain and anti-inflammatory, non-narcotic pain reliever  Use: Take 1 tablets daily with meals for the first 14 days after surgery.  Side Effects: upset stomach, acid reflux.  If this occurs, stop the medication.    NARCOTIC PAIN MEDICATION  Tramadol is a prescription pain medication that should only be used if adequate pain control is not achieved with combination of ice, extra-strength Tylenol and Aleve as outlined above.   Side effects include constipation, nausea, drowsiness, dry mouth, itchiness.  Use a 0-10 pain scale to decide how much medication to take:                                Pain 0-4/10: No tramadol necessary                                Pain 5-7/10: Take one tablet                                 Pain 8-10/10: Take two tablets   Tramadol 50mg tablet  Indication: pain 5/10 or greater.  Narcotic pain medication.  Use: 1-2 tabs every 4-6 hours as needed for pain.  Do not exceed more than 10 tabs in any 24-hour time period unless otherwise directed.    MEDICATIONS TO MANAGE SIDE EFFECTS  Narcotics may cause nausea and constipation. Bowel regimen is recommended.  Colace (Docusate) 100 mg - available OTC  Indication:  constipation, stool softener.  Take consistently while on narcotics.  Use:  Take 1 pill three times per day while you are taking narcotics.    Senokot (Senna) 8.6 mg - available OTC  Indication: constipation, stool laxative.  Take consistently while on narcotics.   Use: Take 2 pills at bedtime.  Increase to 2 pills twice daily if no bowel movement by post-surgery day two.    Zofran (Ondansetron ODT) 4 mg- Prescription sent to pharmacy  Indication: nausea.  Take as needed.  Use: Take 1 pill AS NEEDED every 8 hours, do not exceed 3 pills in 24 hours      ACTIVITY   Raise the  operative leg to chest level whenever possible to decrease swelling.   Do not place pillows under knees (i.e. do not maintain knee in a flexed or bent position), but rather place pillows under the foot/ankle.   Use crutches to assist with walking for the first 24-48 hours  Do not engage in activities which increase knee pain/swelling (prolonged periods of standing or walking) for the first 2 weeks following surgery.   Avoid long periods of sitting (without leg elevated) or long distance traveling for 2 weeks.   NO driving until instructed otherwise by physician.   You may return to sedentary work or school 1-2 days after surgery if feeling well.    You will not be needing a knee brace      ICE THERAPY   Icing is very important in the initial post-operative period and should begin immediately after surgery.   Use ice packs / bags for 30-45 minutes every 2 hours daily until there is no swelling and pain is relieved - remember to keep leg elevated to level of chest while icing. Care should be taken with icing to avoid frostbite to the skin.       EXERCISES   Begin exercises 24 hours after surgery, including knee extension, quad sets, straight leg raises, and active flexion / extension unless otherwise instructed. (Please see handout)  Discomfort and knee stiffness are normal for a few days following surgery. It is safe to bend your knee in a non-weightbearing position when performing exercises unless otherwise instructed.   Complete exercises 3-4 times daily until your first post-operative visit - your motion goals are to have complete extension (straightening) and 90 degrees of flexion (bending) at your first post- operative appointment unless otherwise instructed.   Perform ankle pumps continuously throughout the day to reduce the risk of developing a blood clot in your calf.   Formal physical therapy (PT) typically begins as soon as possible, ideally 1-3 days after surgery. A prescription and protocol will be  provided prior to surgery.            EMERGENCIES**   Contact Dr. Joiner’s Team via [employer] or [phone] if any of the following are present:     Painful swelling or numbness (note that some swelling and numbness is normal).   Unrelenting pain.  Fever (over 101° - it is normal to have a low-grade fever for the first day or two following surgery) or chills.   Redness around incisions.   Color change in foot or ankle.   Continuous drainage or bleeding from incision (a small amount of drainage is expected).   Difficulty breathing.   Excessive nausea/vomiting   Severe calf pain.   If you have an emergency after office hours or on the weekend, contact the office at [phone] and you will be connected to our pager service. This will connect you with the Physician on call.   If you have an emergency that requires immediate attention proceed to the nearest emergency room.       FOLLOW-UP CARE/QUESTIONS   Your first post-operative appointment will be 7-10 days following surgery for wound evaluation and to answer any questions you have regarding the procedure.   Typically, the first physical therapy appointment is made for 1-3 days following surgery. This prescription will be communicated prior to surgery.  If you have any further questions please contact Dr. Joiner’s team directly.

## 2024-06-03 ENCOUNTER — TELEPHONE (OUTPATIENT)
Facility: CLINIC | Age: 74
End: 2024-06-03

## 2024-06-03 ENCOUNTER — ANESTHESIA EVENT (OUTPATIENT)
Dept: SURGERY | Facility: HOSPITAL | Age: 74
End: 2024-06-03
Payer: MEDICARE

## 2024-06-03 RX ORDER — RANOLAZINE 500 MG/1
500 TABLET, EXTENDED RELEASE ORAL DAILY
Qty: 90 TABLET | Refills: 0 | Status: SHIPPED | OUTPATIENT
Start: 2024-06-03

## 2024-06-03 NOTE — TELEPHONE ENCOUNTER
Patient is calling with questions in regards to surgery for tomorrow.    Did not specify questions with me.

## 2024-06-03 NOTE — TELEPHONE ENCOUNTER
Requesting    Name from pharmacy: Ranolazine Er 12hr 500 Mg Tab Scie         Will file in chart as: RANOLAZINE  MG Oral Tablet 12 Hr    Sig: TAKE ONE TABLET BY MOUTH ONE TIME DAILY    Disp: 90 tablet    Refills: 0    Start: 5/31/2024    Class: Normal    Non-formulary    Last ordered: 3 months ago (2/22/2024) by iRkki Rizvi MD    Last refill: 3/6/2024    Rx #: [id number]        LOV: 5/8/2024  RTC: none noted   Last Relevant Labs: na   Filled: 2/22/2024 #90 with 0 refills    Future Appointments   Date Time Provider Department Center   6/5/2024 11:30 AM  [facility]   6/5/2024 12:15 PM  [facility]   6/6/2024  2:00 PM Ariel Yanes MD [email or web address] [facility] [place]   6/10/2024 11:30 AM Franko Robles PA [id number]   7/23/2024  3:30 PM Georges Orozco DPM [id number]   9/30/2024  1:00 PM Wenceslao Bishop MD ENIWARREN [facility] [place]

## 2024-06-04 ENCOUNTER — HOSPITAL ENCOUNTER (OUTPATIENT)
Facility: HOSPITAL | Age: 74
Setting detail: HOSPITAL OUTPATIENT SURGERY
Discharge: HOME | End: 2024-06-04
Attending: ORTHOPAEDIC SURGERY | Admitting: ORTHOPAEDIC SURGERY
Payer: MEDICARE

## 2024-06-04 ENCOUNTER — ANESTHESIA (OUTPATIENT)
Dept: SURGERY | Facility: HOSPITAL | Age: 74
End: 2024-06-04
Payer: MEDICARE

## 2024-06-04 VITALS
WEIGHT: 152 LBS | RESPIRATION RATE: 16 BRPM | DIASTOLIC BLOOD PRESSURE: 69 MMHG | HEIGHT: 62 IN | BODY MASS INDEX: 27.97 KG/M2 | TEMPERATURE: 97 F | OXYGEN SATURATION: 94 % | HEART RATE: 72 BPM | SYSTOLIC BLOOD PRESSURE: 148 MMHG

## 2024-06-04 DIAGNOSIS — Z48.89 AFTERCARE FOLLOWING SURGERY: Primary | ICD-10-CM

## 2024-06-04 LAB — GLUCOSE BLD-MCNC: 119 MG/DL (ref 70–99)

## 2024-06-04 PROCEDURE — 82962 GLUCOSE BLOOD TEST: CPT

## 2024-06-04 PROCEDURE — 0MBN0ZZ EXCISION OF RIGHT KNEE BURSA AND LIGAMENT, OPEN APPROACH: ICD-10-PCS | Performed by: ORTHOPAEDIC SURGERY

## 2024-06-04 PROCEDURE — 0SBC4ZZ EXCISION OF RIGHT KNEE JOINT, PERCUTANEOUS ENDOSCOPIC APPROACH: ICD-10-PCS | Performed by: ORTHOPAEDIC SURGERY

## 2024-06-04 RX ORDER — NICOTINE POLACRILEX 4 MG
15 LOZENGE BUCCAL
Status: DISCONTINUED | OUTPATIENT
Start: 2024-06-04 | End: 2024-06-04 | Stop reason: HOSPADM

## 2024-06-04 RX ORDER — LABETALOL HYDROCHLORIDE 5 MG/ML
5 INJECTION, SOLUTION INTRAVENOUS EVERY 5 MIN PRN
Status: DISCONTINUED | OUTPATIENT
Start: 2024-06-04 | End: 2024-06-04

## 2024-06-04 RX ORDER — KETOROLAC TROMETHAMINE 30 MG/ML
INJECTION, SOLUTION INTRAMUSCULAR; INTRAVENOUS AS NEEDED
Status: DISCONTINUED | OUTPATIENT
Start: 2024-06-04 | End: 2024-06-04 | Stop reason: SURG

## 2024-06-04 RX ORDER — MIDAZOLAM HYDROCHLORIDE 1 MG/ML
1 INJECTION INTRAMUSCULAR; INTRAVENOUS EVERY 5 MIN PRN
Status: DISCONTINUED | OUTPATIENT
Start: 2024-06-04 | End: 2024-06-04

## 2024-06-04 RX ORDER — LABETALOL HYDROCHLORIDE 5 MG/ML
INJECTION, SOLUTION INTRAVENOUS AS NEEDED
Status: DISCONTINUED | OUTPATIENT
Start: 2024-06-04 | End: 2024-06-04 | Stop reason: SURG

## 2024-06-04 RX ORDER — DEXAMETHASONE SODIUM PHOSPHATE 4 MG/ML
VIAL (ML) INJECTION AS NEEDED
Status: DISCONTINUED | OUTPATIENT
Start: 2024-06-04 | End: 2024-06-04 | Stop reason: SURG

## 2024-06-04 RX ORDER — HYDROMORPHONE HYDROCHLORIDE 1 MG/ML
0.4 INJECTION, SOLUTION INTRAMUSCULAR; INTRAVENOUS; SUBCUTANEOUS EVERY 5 MIN PRN
Status: DISCONTINUED | OUTPATIENT
Start: 2024-06-04 | End: 2024-06-04

## 2024-06-04 RX ORDER — SODIUM CHLORIDE, SODIUM LACTATE, POTASSIUM CHLORIDE, CALCIUM CHLORIDE 600; 310; 30; 20 MG/100ML; MG/100ML; MG/100ML; MG/100ML
INJECTION, SOLUTION INTRAVENOUS CONTINUOUS
Status: DISCONTINUED | OUTPATIENT
Start: 2024-06-04 | End: 2024-06-04

## 2024-06-04 RX ORDER — BUPIVACAINE HYDROCHLORIDE AND EPINEPHRINE 5; 5 MG/ML; UG/ML
INJECTION, SOLUTION EPIDURAL; INTRACAUDAL; PERINEURAL AS NEEDED
Status: DISCONTINUED | OUTPATIENT
Start: 2024-06-04 | End: 2024-06-04 | Stop reason: HOSPADM

## 2024-06-04 RX ORDER — ACETAMINOPHEN 500 MG
1000 TABLET ORAL ONCE AS NEEDED
Status: COMPLETED | OUTPATIENT
Start: 2024-06-04 | End: 2024-06-04

## 2024-06-04 RX ORDER — NICOTINE POLACRILEX 4 MG
30 LOZENGE BUCCAL
Status: DISCONTINUED | OUTPATIENT
Start: 2024-06-04 | End: 2024-06-04 | Stop reason: HOSPADM

## 2024-06-04 RX ORDER — ACETAMINOPHEN 500 MG
1000 TABLET ORAL ONCE
Status: DISCONTINUED | OUTPATIENT
Start: 2024-06-04 | End: 2024-06-04 | Stop reason: HOSPADM

## 2024-06-04 RX ORDER — METOCLOPRAMIDE HYDROCHLORIDE 5 MG/ML
INJECTION INTRAMUSCULAR; INTRAVENOUS AS NEEDED
Status: DISCONTINUED | OUTPATIENT
Start: 2024-06-04 | End: 2024-06-04 | Stop reason: SURG

## 2024-06-04 RX ORDER — HYDROMORPHONE HYDROCHLORIDE 1 MG/ML
0.6 INJECTION, SOLUTION INTRAMUSCULAR; INTRAVENOUS; SUBCUTANEOUS EVERY 5 MIN PRN
Status: DISCONTINUED | OUTPATIENT
Start: 2024-06-04 | End: 2024-06-04

## 2024-06-04 RX ORDER — ONDANSETRON 2 MG/ML
INJECTION INTRAMUSCULAR; INTRAVENOUS AS NEEDED
Status: DISCONTINUED | OUTPATIENT
Start: 2024-06-04 | End: 2024-06-04 | Stop reason: SURG

## 2024-06-04 RX ORDER — DEXTROSE MONOHYDRATE 25 G/50ML
50 INJECTION, SOLUTION INTRAVENOUS
Status: DISCONTINUED | OUTPATIENT
Start: 2024-06-04 | End: 2024-06-04 | Stop reason: HOSPADM

## 2024-06-04 RX ORDER — LIDOCAINE HYDROCHLORIDE 10 MG/ML
INJECTION, SOLUTION EPIDURAL; INFILTRATION; INTRACAUDAL; PERINEURAL AS NEEDED
Status: DISCONTINUED | OUTPATIENT
Start: 2024-06-04 | End: 2024-06-04 | Stop reason: SURG

## 2024-06-04 RX ORDER — ONDANSETRON 2 MG/ML
4 INJECTION INTRAMUSCULAR; INTRAVENOUS EVERY 6 HOURS PRN
Status: DISCONTINUED | OUTPATIENT
Start: 2024-06-04 | End: 2024-06-04

## 2024-06-04 RX ORDER — HYDROMORPHONE HYDROCHLORIDE 1 MG/ML
INJECTION, SOLUTION INTRAMUSCULAR; INTRAVENOUS; SUBCUTANEOUS
Status: COMPLETED
Start: 2024-06-04 | End: 2024-06-04

## 2024-06-04 RX ORDER — HYDROMORPHONE HYDROCHLORIDE 1 MG/ML
0.2 INJECTION, SOLUTION INTRAMUSCULAR; INTRAVENOUS; SUBCUTANEOUS EVERY 5 MIN PRN
Status: DISCONTINUED | OUTPATIENT
Start: 2024-06-04 | End: 2024-06-04

## 2024-06-04 RX ORDER — METOCLOPRAMIDE HYDROCHLORIDE 5 MG/ML
10 INJECTION INTRAMUSCULAR; INTRAVENOUS EVERY 8 HOURS PRN
Status: DISCONTINUED | OUTPATIENT
Start: 2024-06-04 | End: 2024-06-04

## 2024-06-04 RX ORDER — EPHEDRINE SULFATE 50 MG/ML
INJECTION INTRAVENOUS AS NEEDED
Status: DISCONTINUED | OUTPATIENT
Start: 2024-06-04 | End: 2024-06-04 | Stop reason: SURG

## 2024-06-04 RX ORDER — NALOXONE HYDROCHLORIDE 0.4 MG/ML
80 INJECTION, SOLUTION INTRAMUSCULAR; INTRAVENOUS; SUBCUTANEOUS AS NEEDED
Status: DISCONTINUED | OUTPATIENT
Start: 2024-06-04 | End: 2024-06-04

## 2024-06-04 RX ADMIN — SODIUM CHLORIDE, SODIUM LACTATE, POTASSIUM CHLORIDE, CALCIUM CHLORIDE: 600; 310; 30; 20 INJECTION, SOLUTION INTRAVENOUS at 15:28:00

## 2024-06-04 RX ADMIN — ONDANSETRON 4 MG: 2 INJECTION INTRAMUSCULAR; INTRAVENOUS at 14:55:00

## 2024-06-04 RX ADMIN — SODIUM CHLORIDE, SODIUM LACTATE, POTASSIUM CHLORIDE, CALCIUM CHLORIDE: 600; 310; 30; 20 INJECTION, SOLUTION INTRAVENOUS at 13:47:00

## 2024-06-04 RX ADMIN — KETOROLAC TROMETHAMINE 15 MG: 30 INJECTION, SOLUTION INTRAMUSCULAR; INTRAVENOUS at 14:55:00

## 2024-06-04 RX ADMIN — LIDOCAINE HYDROCHLORIDE 50 MG: 10 INJECTION, SOLUTION EPIDURAL; INFILTRATION; INTRACAUDAL; PERINEURAL at 13:53:00

## 2024-06-04 RX ADMIN — LABETALOL HYDROCHLORIDE 5 MG: 5 INJECTION, SOLUTION INTRAVENOUS at 14:15:00

## 2024-06-04 RX ADMIN — DEXAMETHASONE SODIUM PHOSPHATE 4 MG: 4 MG/ML VIAL (ML) INJECTION at 13:53:00

## 2024-06-04 RX ADMIN — LABETALOL HYDROCHLORIDE 5 MG: 5 INJECTION, SOLUTION INTRAVENOUS at 14:20:00

## 2024-06-04 RX ADMIN — EPHEDRINE SULFATE 5 MG: 50 INJECTION INTRAVENOUS at 14:10:00

## 2024-06-04 RX ADMIN — METOCLOPRAMIDE HYDROCHLORIDE 10 MG: 5 INJECTION INTRAMUSCULAR; INTRAVENOUS at 13:53:00

## 2024-06-04 NOTE — H&P
The below referenced H&P was reviewed by Harjit Joiner MD, the patient was examined and no significant changes have occurred in the patient's condition since the H&P was performed.  I discussed with the patient and/or legal representative the potential benefits, risks and side effects of this procedure; the likelihood of the patient achieving goals; and potential problems that might occur during recuperation.  I discussed reasonable alternatives to the procedure, including risks, benefits and side effects related to the alternatives and risks related to not receiving this procedure.  We will proceed with procedure as planned.           Orthopaedic Surgery  [address] [place] [id number]  [phone]     PRE SURGICAL - HISTORY AND PHYSICAL EXAMINATION     Name: Liss Fagan   MRN: [id number]     CC: Right Knee Pain and mechanical symptoms    REFERRED BY: Rikki Rizvi MD    HPI:   Liss Fagan is a very pleasant 74 year old female who presents today for evaluation of Right knee pain and mechanical symptoms and recent completion of MRI demonstrating meniscal pathology.     To summarize: right knee pain ongoing since January 2024. She frequently receives steroid injections from Dr. Ward for osteoarthritis which usually last 1 year. In January, she began noticing the injection was not providing much relief. Started experiencing excessive swelling throughout the leg. She was then evaluated by BRONSON Lyons on 2/12/24 and she was given a repeat steroid injection. This provided 2 weeks of relief but swelling never subsided. Subsequently, an MRI was done showing a meniscus tear. She has difficulty sleeping at night and is unable to walk without limping. Pain is 8/10 with swelling and weakness.     She enjoys boating and playing with grandchildren. Lives alone and is retired.    PMH:   Past Medical History:    Abnormal maternal glucose tolerance, complicating pregnancy, childbirth, or the  puerperium, unspecified as to episode of care    Acute bronchitis    Back problem    Cataract    Chest pain, unspecified    admit     Deep vein thrombosis (HCC)    during pregnancy right leg    Diabetes (HCC)    Diverticulosis of large intestine    unsure if divertiulitis or diverticulosis    Esophageal reflux    Essential hypertension, benign    Helicobacter pylori gastritis    High blood pressure    High cholesterol    Incontinence    Osteoarthritis    knees    Palpitations    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     PONV (postoperative nausea and vomiting)    nausea,pain results in cardiac vasospasms    Sleep apnea    no device    Stroke (HCC)    r/o 17    Unspecified gastritis and gastroduodenitis without mention of hemorrhage    Visual impairment    glasses       PAST SURGICAL HX:  Past Surgical History:   Procedure Laterality Date    Back surgery      Spinal diskectomy lumbar    at [facility]        1987    Cardiac catherterization (pbp)      status normal resolved on 2007    Cataract      Cholecystectomy  1999    Colonoscopy  2011    colon polyp removal  ma  coleman 5    Colonoscopy      Colonoscopy N/A 2017    Procedure: COLONOSCOPY;  Surgeon: Addison Castellano MD;  Location:  ENDOSCOPY    Hernia surgery  1999    Umbilical hernia repair;     Hysterectomy      d/t fibroids    Lumbar discectomy Left 2019    L4-L5   Dr Drew    Other      Spinal Steroid Injection     Shoulder surg proc unlisted      Spine surgery procedure unlisted      bulging discs in lower back    Upper gi endoscopy performed  2011       FAMILY HX:  Family History   Problem Relation Age of Onset    Cancer Father          at age 52 Leukemia    Diabetes Father     Breast Cancer Paternal Grandmother     Cancer Paternal Grandmother         bone cancer    Cancer Other        ALLERGIES:  Penicillins, Advil [ibuprofen],  Aspirin, Codeine sulfate, Demerol, Hydrocodone, Naproxen, Norvasc [amlodipine besylate], Nsaids, Tramadol, and Zetia [ezetimibe]    MEDICATIONS:   Current Outpatient Medications   Medication Sig Dispense Refill    Acetaminophen 500 MG Oral Cap Take 2 capsules (1,000 mg total) by mouth every 6 (six) hours for 14 days. 50 capsule 1    Sennosides-Docusate Sodium 8.6-50 MG Oral Cap Take 1 capsule by mouth daily as needed. 30 capsule 1    ondansetron (ZOFRAN) 4 mg tablet Take 1 tablet by mouth every 8 hours as needed for nausea. 8 tablet 0       ROS: A comprehensive 14 point review of systems was performed and was negative aside from the aforementioned per history of present illness.    SOCIAL HX:  Social History     Tobacco Use    Smoking status: Never     Passive exposure: Never    Smokeless tobacco: Never   Substance Use Topics    Alcohol use: Not Currently     Comment: very seldom       PE:   Vitals:    05/06/24 1548   Weight: 154 lb (69.9 kg)   Height: 5' 2\" (1.575 m)     Estimated body mass index is 28.17 kg/m² as calculated from the following:    Height as of this encounter: 5' 2\" (1.575 m).    Weight as of this encounter: 154 lb (69.9 kg).    Physical Exam  Constitutional:       Appearance: Normal appearance.   HENT:      Head: Normocephalic and atraumatic.   Eyes:      Extraocular Movements: Extraocular movements intact.   Neck:      Musculoskeletal: Normal range of motion and neck supple.   Cardiovascular:      Pulses: Normal pulses.   Pulmonary:      Effort: Pulmonary effort is normal. No respiratory distress.   Abdominal:      General: There is no distension.   Skin:     General: Skin is warm.      Capillary Refill: Capillary refill takes less than 2 seconds.      Findings: No bruising.   Neurological:      General: No focal deficit present.      Mental Status: Alert.   Psychiatric:         Mood and Affect: Mood normal.     Examination of the right knee demonstrates:     Skin is intact, warm and dry.    Atrophy: none    Effusion: small    Joint line tenderness: lateral  Crepitation: none   Jamal: Positive   Patellar mobility: normal without apprehension  J-sign: none    ROM: Extension lacking less than 5 degrees  Flexion 120 degrees  ACL:  Negative Lachman, Negative Pivot Shift   PCL:  Negative Posterior Drawer  Collateral Ligaments: Stable to Varus and Valgus stress at 0 and 30 degrees  Strength: mild weakness   Hip joint: normal pain-free ROM   Gait:  mildly antalgic   Leg length: equal and symmetric  Alignment:  neutral     No obvious peripheral edema noted.   Distal neurovascular exam demonstrates normal perfusion, intact sensation to light touch and full strength.     Examination of the contralateral knee demonstrates:  No significant atrophy, swelling or effusion. Full range of motion. Neurovascularly intact distally.    Radiographic Examination/Diagnostics:  XR and MRI of the knee personally viewed, independently interpreted and radiology report was reviewed.    MRI KNEE, RIGHT (EKW=65353)    Result Date: 3/25/2024  PROCEDURE:  MRI KNEE, RIGHT (CPT=73721)  COMPARISON:  None.  INDICATIONS:  Right knee pain and instability for several months.  No known injury.  TECHNIQUE:  Axial, coronal, and sagittal proton density with and without fat saturation images were obtained.  PATIENT STATED HISTORY: (As transcribed by Technologist)  Patient comlains of right knee pain and instability. Patient states pain started months ago with no known injury or fall     FINDINGS:  LIGAMENTS:          The ACL, PCL, patellar retinacula, and collateral ligament complexes are intact.  There is mild thickening of the proximal, superficial fibers of the MCL consistent with a low-grade chronic sprain. MENISCI:            The medial meniscus is intact and unremarkable.  The lateral meniscus reveals blunting of the free edge at the level of the anterior horn and body consistent with a moderate sized radial tear.  There is some myxoid  degenerative signal  diffusely throughout the lateral meniscus as well. TENDONS:            The tendinous insertions about the knee are intact without significant tendinosis or tears.  Superficial to the patellar tendon is a ovoid focus of prepatellar bursitis measuring 5.4 x 1.1 x 3.7 cm. MUSCULATURE:        No evidence of strain, edema, or atrophy. BONY COMPARTMENTS:  There is chondromalacia of the patellofemoral compartment.  There is moderate osteoarthritis of the lateral femoral tibial compartment with foci of high grade articular cartilage loss along the posterior weight-bearing surface of the lateral femoral condyle and lateral tibial plateau.  No significant arthropathy or chondromalacia of the medial femoral tibial compartment noted. SYNOVIUM:           There is a mild knee joint effusion and small, leaking Baker's cyst with synovitis.             CONCLUSION:    1. There is moderate osteoarthritis of the lateral femoral tibial compartment with moderate-sized radial tear along the free edge of the body and anterior horn of the lateral meniscus.  There is also myxoid degenerative signal diffusely throughout the lateral meniscus.   2. Chondromalacia of the patellofemoral compartment without hugo osteoarthritis.   3. Prepatellar/pretibial bursitis with a bursal collection measuring 5.4 x 1.1 x 3.7 cm.   4. Mild joint effusion with small, leaking Baker's cyst and synovitis.      LOCATION:  [facility]          Dictated by ([facility]): Nisreen Boo DO on 3/25/2024 at 4:56 PM     Finalized by ([facility]): Nisreen Boo DO on 3/25/2024 at 5:02 PM         IMPRESSION: Liss Fagan is a 74 year old female with right Lateral Meniscus Tear, synovitis and chondromalacia sustained January 2024.  They have failed extensive conservative treatment including Physical therapy greater than 6 weeks, intra-articular knee corticosteroid injection, oral anti-inflammatory medications, and activity modification.     Consequently, they are an  appropriate candidate for knee arthroscopy, partial meniscectomy, abrasionplasty, and extensive debridement/synovectomy.    PLAN:   I had a lengthy discussion with Liss about the diagnosis and options, both surgical and nonsurgical. I have recommended that we proceed with arthroscopic surgical treatment as we agree that this intervention will likely offer the best opportunity for symptomatic relief and functional recovery. I used the MRI scan and a 3-dimensional knee model to outline her pathology, as well as general surgical principles. We reviewed the risks associated with arthroscopic partial meniscectomy and debridement / synovectomy.  In particular I emphasized that the displaced flap component and degenerative portion of the meniscus would be removed as this is dysvascular.  Simultaneously, I will perform a diagnostic knee arthroscopy of the knee and hope to identify and address any other pathology that may be encountered such as scar tissue, inflammation, cartilage pathology.  In particular we discussed risks that include, a low risk of infection (<1%), potential transient or permanent injury to nerves with superficial numbness, joint stiffness which may require additional physical therapy, persistent pain/lack of symptomatic improvement, need for future operation, wound complications (rare as incisions are very small), deep vein thrombosis (DVT) and pulmonary embolism (PE).   We discussed the proposed rehabilitation timeline as well as expected postoperative restrictions.  In this regard, I emphasized that there will be no weightbearing or range of motion restrictions post operatively.  Crutches will be provided initially for patient comfort and I will aim to have the patient begin physical therapy on postoperative day 1.  The advantage of this is to begin immediate mobility and range of motion to mitigate pain and encourage reduction of inflammation/swelling.  This will ultimately lead to a quicker  postsurgical rehabilitation.  For most patients, this requires a total of 4 to 6 weeks of postsurgical physical therapy to attain full functional status after simple knee arthroscopy.  Liss voiced a good understanding of treatment options, risks and benefits, postoperative instructions, rehabilitation timeline, and restrictions. She was given the opportunity to ask questions, which were all answered to the best of my ability and to her satisfaction. Liss will work with my office to arrange a time for surgery and obtain any medical clearance information necessary. My pre-operative information packet, which details the process and answers many FAQ's will be provided. She was encouraged to call the office with any further questions or concerns.  I spent 45 minutes in preparation to see the patient, counseling/education of relevant pathology, discussing imaging results, ordering post surgical physical therapy intervention, surgical counseling, and care coordination.        FOLLOW-UP:  Post-Operative Visit, POD 6 with Sincer WENDY Robles PA-C.         Harjit Joiner MD  Knee, Shoulder, & Elbow Surgery / Sports Medicine Specialist  Orthopaedic Surgery  [address] [place] [address].org  [email or web address]  t: [phone]  o: [phone]  f: [phone]    This note was dictated using Dragon software.  While it was briefly proofread prior to completion, some grammatical, spelling, and word choice errors due to dictation may still occur.

## 2024-06-04 NOTE — ANESTHESIA PROCEDURE NOTES
Airway  Date/Time: 6/4/2024 1:54 PM  Urgency: elective      General Information and Staff    Patient location during procedure: OR  Anesthesiologist: Abhijeet Somers MD  Performed: anesthesiologist   Performed by: Abhijeet Somers MD  Authorized by: Abhijeet Somers MD      Indications and Patient Condition  Indications for airway management: anesthesia  Sedation level: deep  Preoxygenated: yes  Patient position: sniffing  Mask difficulty assessment: 1 - vent by mask    Final Airway Details  Final airway type: supraglottic airway      Successful airway: classic  Size 3       Number of attempts at approach: 1  Number of other approaches attempted: 0

## 2024-06-04 NOTE — ANESTHESIA POSTPROCEDURE EVALUATION
[facility]    Liss Fagan Patient Status:  Hospital Outpatient Surgery   Age/Gender 74 year old female MRN [id number]   Location [facility] POST ANESTHESIA CARE UNIT Attending Harjit Joiner MD   Hosp Day # 0 PCP Rikki Rizvi MD       Anesthesia Post-op Note    RIGHT KNEE ARTHROSCOPY PARTIAL LATERAL MENISCECTOMY SYNOVECTOMY ABRASIONPLASTY, REMOVAL OF SOFT TISSUE MASS    Procedure Summary       Date: 06/04/24 Room / Location:  MAIN OR 10 / [facility] MAIN OR    Anesthesia Start: 1347 Anesthesia Stop: 1528    Procedure: RIGHT KNEE ARTHROSCOPY PARTIAL LATERAL MENISCECTOMY SYNOVECTOMY ABRASIONPLASTY, REMOVAL OF SOFT TISSUE MASS (Right: Knee) Diagnosis:       Complex tear of lateral meniscus of right knee as current injury, initial encounter      (Complex tear of lateral meniscus of right knee as current injury, initial encounter [S83.271A])    Surgeons: Harjit Joiner MD Anesthesiologist: Abhijeet Somers MD    Anesthesia Type: general ASA Status: 2            Anesthesia Type: general    Vitals Value Taken Time   /66 06/04/24 1530   Temp 97 °F (36.1 °C) 06/04/24 1530   Pulse 56 06/04/24 1530   Resp 13 06/04/24 1530   SpO2 89 % 06/04/24 1530   Vitals shown include unfiled device data.    Patient Location: PACU    Anesthesia Type: general    Airway Patency: patent and extubated    Postop Pain Control: adequate    Mental Status: mildly sedated but able to meaningfully participate in the post-anesthesia evaluation    Nausea/Vomiting: none    Cardiopulmonary/Hydration status: stable euvolemic    Complications: no apparent anesthesia related complications    Postop vital signs: stable    Dental Exam: Unchanged from Preop    Patient to be discharged from PACU when criteria met.

## 2024-06-04 NOTE — ANESTHESIA PREPROCEDURE EVALUATION
PRE-OP EVALUATION    Patient Name: Liss Fagan    Admit Diagnosis: Complex tear of lateral meniscus of right knee as current injury, initial encounter [S83.797A]    Pre-op Diagnosis: Complex tear of lateral meniscus of right knee as current injury, initial encounter [S83.514A]    RIGHT KNEE ARTHROSCOPY PARTIAL LATERAL MENISCECTOMY SYNOVECTOMY ABRASIONPLASTY    Anesthesia Procedure: RIGHT KNEE ARTHROSCOPY PARTIAL LATERAL MENISCECTOMY SYNOVECTOMY ABRASIONPLASTY (Right)    Surgeons and Role:     * Harjit Joiner MD - Primary    Pre-op vitals reviewed.        Body mass index is 28.17 kg/m².    Current medications reviewed.  Hospital Medications:  No current facility-administered medications on file as of .       Outpatient Medications:     No medications prior to admission.       Allergies: Penicillins, Advil [ibuprofen], Aspirin, Codeine sulfate, Demerol, Hydrocodone, Naproxen, Norvasc [amlodipine besylate], Nsaids, Tramadol, and Zetia [ezetimibe]      Anesthesia Evaluation    Patient summary reviewed.    Anesthetic Complications  (-) history of anesthetic complications         GI/Hepatic/Renal      (+) GERD and well controlled                          Cardiovascular    Negative cardiovascular ROS.  ECG reviewed.  Exercise tolerance: good     MET: >4      (+) hypertension and well controlled  (+) hyperlipidemia  (+) CAD                                Endo/Other      (+) diabetes and well controlled,                           Pulmonary                    (+) sleep apnea       Neuro/Psych          (+) CVA                    As per Epic:  Patient Active Problem List:     Gastritis and gastroduodenitis     Obstructive sleep apnea (adult) (pediatric)     Esophageal reflux     Diabetes mellitus (HCC)     Hyperlipidemia     Prinzmetal angina (HCC)     Proteinuria     Essential hypertension     H/O: CVA (cerebrovascular accident)     Atherosclerosis of aorta (HCC)     Lumbar radiculopathy     Bilateral carotid artery  stenosis     Small vessel disease (HCC)     Pure hypercholesterolemia          Past Surgical History:   Procedure Laterality Date    Back surgery  1984    Spinal diskectomy lumbar    at [place]        1987    Cardiac catherterization (pbp)      status normal resolved on 2007    Cataract      Cholecystectomy  1999    Colonoscopy  2011    colon polyp removal  francesca cee 5    Colonoscopy      Colonoscopy N/A 2017    Procedure: COLONOSCOPY;  Surgeon: Addison Castellano MD;  Location:  ENDOSCOPY    Hernia surgery  1999    Umbilical hernia repair;     Hysterectomy      d/t fibroids    Lumbar discectomy Left 2019    L4-L5   Dr Drew    Other      Spinal Steroid Injection     Shoulder surg proc unlisted      Spine surgery procedure unlisted      bulging discs in lower back    Upper gi endoscopy performed  2011     Social History     Socioeconomic History    Marital status:    Tobacco Use    Smoking status: Never     Passive exposure: Never    Smokeless tobacco: Never   Vaping Use    Vaping status: Never Used   Substance and Sexual Activity    Alcohol use: Not Currently     Comment: very seldom    Drug use: No   Other Topics Concern    Caffeine Concern Yes     Comment: 1 cup of coffee every morning and 1 glass of tea during dinner.  Sometimes dark chocolate.      Exercise No     History   Drug Use No     Available pre-op labs reviewed.     Lab Results   Component Value Date     04/15/2024    K 3.7 04/15/2024     04/15/2024    CO2 30.0 04/15/2024    BUN 21 04/15/2024    CREATSERUM 0.93 04/15/2024     (H) 04/15/2024    CA 9.9 04/15/2024            Airway      Mallampati: II  Mouth opening: >3 FB  TM distance: > 6 cm  Neck ROM: full Cardiovascular    Cardiovascular exam normal.  Rhythm: regular  Rate: normal     Dental      Dental appliance(s): upper dentures and lower dentures       Pulmonary    Pulmonary exam normal.  Breath sounds clear  to auscultation bilaterally.               Other findings              ASA: 2   Plan: general  NPO status verified and patient meets guidelines.  Patient has not taken beta blockers in last 24 hours.  Post-procedure pain management plan discussed with surgeon and patient.    Comment: I spoke with the patient and discussed the risks of general anesthesia, which include nausea and vomiting; sore throat; injury to the lips, gums, teeth, and eyes; cardiac, pulmonary, and neurologic events; aspiration; and allergic reactions. The patient understands these risks and consents to receiving general anesthesia for this procedure.  Plan/risks discussed with: patient and child/children                Present on Admission:  **None**

## 2024-06-05 ENCOUNTER — TELEPHONE (OUTPATIENT)
Dept: ORTHOPEDICS CLINIC | Facility: CLINIC | Age: 74
End: 2024-06-05

## 2024-06-05 DIAGNOSIS — S83.271A COMPLEX TEAR OF LATERAL MENISCUS OF RIGHT KNEE AS CURRENT INJURY, INITIAL ENCOUNTER: ICD-10-CM

## 2024-06-05 DIAGNOSIS — I10 ESSENTIAL HYPERTENSION: ICD-10-CM

## 2024-06-05 DIAGNOSIS — M65.9 SYNOVITIS OF KNEE: Primary | ICD-10-CM

## 2024-06-05 RX ORDER — CYCLOBENZAPRINE HCL 5 MG
5 TABLET ORAL 3 TIMES DAILY PRN
Qty: 21 TABLET | Refills: 0 | Status: SHIPPED | OUTPATIENT
Start: 2024-06-05 | End: 2024-06-12

## 2024-06-05 RX ORDER — CYCLOBENZAPRINE HCL 10 MG
10 TABLET ORAL 3 TIMES DAILY
Qty: 30 TABLET | Refills: 1 | Status: SHIPPED | OUTPATIENT
Start: 2024-06-05 | End: 2024-06-05 | Stop reason: CLARIF

## 2024-06-05 NOTE — TELEPHONE ENCOUNTER
Sincer ordered 5mg flexeril to pharmacy.  Called pharmacy to cancel 10mg. Advised may need Goodrx coupon.  Spoke with pt to advise of rx ordered.  Confirmed with pt she has postop appt with Sincer this coming Monday 6/10/24.

## 2024-06-05 NOTE — TELEPHONE ENCOUNTER
Requesting   Name from pharmacy: Metoprolol Tartrate 25 Mg Tab Auro          Will file in chart as: METOPROLOL TARTRATE 25 MG Oral Tab    Sig: TAKE 1 TABLET BY MOUTH 2 TIMES DAILY    Disp: 180 tablet    Refills: 0    Start: 6/5/2024    Class: Normal    Non-formulary For: Essential hypertension    Last ordered: 3 months ago (2/23/2024) by Rikki Rizvi MD    Last refill: 3/10/2024    Rx #: [id number]    Hypertension Medications Protocol [id number]/05/2024 09:41 AM   Protocol Details Last BP reading less than 140/90    CMP or BMP in past 12 months    In person appointment or virtual visit in the past 12 mos or appointment in next 3 mos    EGFRCR or GFRNAA > 50        LOV: 5/8/2024  RTC: 6 months per protocol   Last Relevant Labs: 4/15/2024  Filled: 2/23/2024 #180 with 0 refills    Future Appointments   Date Time Provider Department Center   6/10/2024 11:30 AM [id number]   7/18/2024  9:30 AM  MR RM4 (3T WIDE)  [facility]   7/18/2024 10:15 AM  MR RM4 (3T WIDE) [facility]   7/19/2024 10:00 AM Ariel Yanes MD [email or web address] [facility] [place]   7/23/2024  3:30 PM Georges Orozco DPM [id number]   9/30/2024  1:00 PM Wenceslao Bishop MD ENIWARREN [facility] [place]

## 2024-06-05 NOTE — OPERATIVE REPORT
OPERATIVE REPORT  ATTENDING SURGEON: BERNARDO ODOM MD  ASSISTANT(S): Salty Hernandez (AJ)  STATEMENT OF MEDICAL NECESSITY  The aid of assistant Salty Hernandez (AJ) was needed for patient positioning, preparation, drape, retraction,  wound closure, dressing application and critical portions of the procedure.   PRELIMINARY DIAGNOSIS:  1.  Right Medial and lateral Meniscus Tear  2.  Right Patellofemoral and lateral compartment Chondromalacia  3.  Right Knee synovitis involving multiple compartments  4.  Right Knee Pre-Patellar Bursitis     POSTOPERATIVE DIAGNOSIS:  1.  Right Medial and Lateral Meniscus Tear  2.  Right Patellofemoral and Lateral compartment Chondromalacia  3.  Right Knee synovitis involving multiple compartments  4.  Right Knee Pre-Patellar Bursitis    THE OPERATION:  1.  Right Knee Arthroscopy, Partial Medial and Lateral Meniscectomy (71263)  2.  Right Knee arthroscopic extensive debridement and synovectomy involving multiple compartments (97729)   3.  Right knee arthroscopic abrasion plasty of lateral femoral condyle (81570)  4.  Right knee open removal of soft tissue mass     ANESTHESIA: General Endotracheal  ANESTHESIOLOGIST:  MD Yonis  ANTIBIOTICS: Cefazolin 2g within 60 minutes of surgical incision.    IMPLANTS:   None  PATHOLOGY/CULTURES: None  ESTIMATED BLOOD LOSS: Blood Output: 10 mL (6/4/2024  2:34 PM)    DRAINS: None  COMPLICATIONS: No intraoperative nor immediate postoperative complications noted.  COUNTS: All sponge and needle counts were correct at the conclusion of the procedure.  TOURNIQUET TIME: Not Applicable  OPERATIVE FINDINGS:  Evidence of complex medial meniscus tearing with displaced flap component into the medial compartment.  Significant synovitis and inflammatory pathology successfully managed with removal of the partial medial meniscus, approximately 40% of the medial meniscus was resected to a stable margin.  Similar/extensive pathology of the lateral meniscus with  displaced flap component as well as irregular margins throughout the entirety of the lateral meniscus.  Approximately 50% of the lateral meniscus was removed to a stable margin.  Grade 3 versus grade 4 articular cartilage wear along the weightbearing lateral femoral condyle as well as the lateral tibial plateau managed with abrasion plasty down to bleeding bone and an area of focal pathology of approximately 15 x 15 mm.  Severe synovitis throughout the knee managed with debridement to a stable margin.  Focal evidence of prepatellar soft tissue mass consistent with pathologic bursitis, successfully managed with excision using slight extension of the anteromedial portal to complete an approximate 3 cm incision.  Adequate hemostasis obtained at conclusion of procedure.    Indications:   Liss Fagan is a 74 year old female with right Lateral Meniscus Tear, synovitis and chondromalacia sustained January 2024.  They have failed extensive conservative treatment including Physical therapy greater than 6 weeks, intra-articular knee corticosteroid injection, oral anti-inflammatory medications, and activity modification.      Consequently, they are an appropriate candidate for knee arthroscopy, partial meniscectomy, abrasionplasty, and extensive debridement/synovectomy.     Operative and nonoperative options were discussed with her in my office and we ultimately agreed that surgery would provide the highest likelihood of symptomatic relief and functional improvement.  The risks and benefits of surgery as well as the postoperative rehabilitation plan were reviewed. She voiced a good understanding of treatment options, risks and benefits, and alternatives to surgery. She was given the opportunity to ask questions, which were all answered to the best of my ability and to her satisfaction. Liss wished to proceed.   On the day of surgery, the Liss was seen in the preoperative area.  I confirmed her identity by name  and birthday. We reviewed and confirmed the surgical consent including laterality.  The surgical site was marked with my initials.  We re-reviewed the risks and benefits of the procedure and I answered all additional questions to her satisfaction.      OPERATIVE REPORT:   Liss was taken to the operating room in stable condition.    A clear 1010 drape x 2 was applied to the upper thigh. An adjustable lateral post was utilized. The extremity underwent a prescrub with chlorhexidine and alcohol followed by a chlorhexidine formal preparation.  A preoperative timeout was performed confirming the correct surgical site, procedure, and preoperative imaging was reviewed.      Exam under anesthesia demonstrated a Stable knee with normal range of motion.      Diagnostic knee arthroscopy was performed with standard anterolateral visualization portal was made utilizing a 15 blade, and an arthroscope was introduced. The medial working portal was established under spinal needle localization and diagnostic arthroscopy was commenced.          Diagnostic arthroscopy revealed:      Suprapatellar Moderate inflammatory tissue and pathologic appearance without significant loose fragments or irregularity.   Patellofemoral Diffuse grade 2 changes throughout the patellar surface with central trochlear wear grade 2 managed with gentle debridement to a stable margin.   Gutters Clear without evidence of loose bodies or osteophytes.   Lateral compartment Grade 3 tibial cartilage  Grade 3--4 femoral cartilage  Lateral meniscus complex irregular tearing with myxoid degeneration involving the anterior 50% of the lateral meniscus.  This was carefully managed with debridement of approximately 50% of the meniscus to allow for removal of any obstructive pathology.  With the aid of shaver, biter as well as Coblation device.   Medial compartment Grade 1 tibial cartilage  Grade 1 femoral cartilage   Medial meniscus tearing primarily involving Zone-A B  centimeters along the anterior margin of the meniscus with displaced flap component.  Successfully managed with resection of the medial meniscus of approximately 40% to a stable margin.  Large pathologic medial plica was encountered and carefully debrided with Coblation and shaver device to a stable margin.   Ligaments ACL Intact.   PCL intact  Popliteus intact    Other Severe synovitis involving the medial, lateral and patellofemoral compartments managed with synovectomy using curved mechanical 4.0 mm shaver and coablation device.     Within the medial compartment, approximately 40 % of the meniscus volume was extracted utilizing a curved 4.0 mm mechanical shaver.  Peripheral fraying was ablated with a Coblation device to prevent further propagation of tearing.  Comprehensive partial meniscectomy was performed.  Within the lateral compartment, severe synovitis was encountered and debrided as well as stable margin.  Posterior-based debridement was additionally performed extending to zone E and F.  Abrasion plasty was performed over the weightbearing lateral femoral condyle approximately 15 x 15 with curved mechanical 4.0 mm shaver.  Within the patellofemoral compartment, a 4.0 mm curved mechanical shaver was used to debride chondral surfaces of the patella and trochlea to stable margin.    Extensive synovectomy was performed in all 3 compartments with the aid of 4.0 mm curved mechanical shaver and Coblation to ensure hemostasis.  At this point the anteromedial incision was excised longitudinally approximately 2.5 cm to allow for dissection and removal of thickened prepatellar pathologic bursa with the aid of the Metzenbaum as well as curved Paniagua scissors.  Appropriate hemostasis was maintained at this time.  Adequate hemostasis were noted.  Wound closure was performed with buried 3-0 monocryl and overlying Dermabond and steri strips were applied.    At this point in time approximately 30 cc of Marcaine with  epinephrine were utilized to provide local anesthesia.    4 x 4 and Tegaderm, Jun style dressing was applied.  The knee was wrapped in a 6 inch Ace wrap.   The final count prior to closure was correct. The patient tolerated the procedure well without complications.   Liss was taken to the recovery room in a stable condition with plan for ambulatory discharge to home. She was provided my postoperative discharge booklet, appropriate home exercises, script for outpatient physical therapy, and a copy of my rehabilitation guidelines.      POST OPERATIVE PLAN:  Activity Precautions: WBAT / ROMAT. To begin physical therapy ASAP.   DVT Prophylaxis: Not indicated as patient is ambulatory.   Follow-up Visit: POD #7-14        Harjit Joiner MD  Knee, Shoulder, & Elbow Surgery / Sports Medicine Specialist  Orthopaedic Surgery  [address] [place] [address].org  [email or web address]  t: [phone]  o: [phone]  f: [phone]    This note was dictated using Dragon software.  While it was briefly proofread prior to completion, some grammatical, spelling, and word choice errors due to dictation may still occur.

## 2024-06-05 NOTE — TELEPHONE ENCOUNTER
LOV 4/17/24  DOS yesterday 6/4/24    74 y.o. with R knee arthroscopy yesterday.  Taking tylenol 1000mg every 6 hours with no relief. Icing frequently which provides relief until she stops.  Extremely painful to get up and stand. Severe pain and muscle spasms at operative leg.  Has family with to assist at this time.    Requesting different medication - flexeril or other muscle relaxant. Cannot tolerate NSAIDs or opioids.    States flexeril has helped in past, tolerated, denied dizziness - most recently taken was 2019.  Did explain that pain will be worse today and tomorrow.  Also explained risks with muscle relaxants such as dizziness and that sometimes Medicare will not cover it so may be out of pocket expense.    Explained she is at absolute max dose of tylenol, beyond that heightened risk of liver failure. Pt stated understanding.    Allergies/intolerances to:  NSAIDs: nausea, dizziness  Tramadol: N/V, dizziness  Hydrocodone: nausea, dizziness    Goodrx codes pended with Rx for discount if not covered by insurance.

## 2024-06-05 NOTE — TELEPHONE ENCOUNTER
Patient had surgery yesterday and is in a lot of pain, states the Tyleonol is not helping and she has been unable to sleep. Patient is requesting a call back to discuss getting a different medication. Please advise.

## 2024-06-08 DIAGNOSIS — Z86.73 HISTORY OF STROKE WITHOUT RESIDUAL DEFICITS: ICD-10-CM

## 2024-06-08 RX ORDER — CLOPIDOGREL BISULFATE 75 MG/1
75 TABLET ORAL DAILY
Qty: 90 TABLET | Refills: 0 | Status: SHIPPED | OUTPATIENT
Start: 2024-06-08

## 2024-06-08 NOTE — TELEPHONE ENCOUNTER
Requesting    Name from pharmacy: Metformin Hydrochloride 500 Mg Tab Johnny         Will file in chart as: METFORMIN 500 MG Oral Tab    Sig: TAKE 1 TABLET BY MOUTH TWICE DAILY WITH MEALS    Disp: 180 tablet    Refills: 0    Start: 6/8/2024    Class: Normal    Non-formulary    Last ordered: 3 months ago (2/23/2024) by Rikki Rizvi MD    Last refill: 3/11/2024    Rx #: [id number]    Diabetes Medication Protocol Sglbol6606/08/2024 09:40 AM   Protocol Details Last A1C < 7.5 and within past 6 months    In person appointment or virtual visit in the past 6 mos or appointment in next 3 mos    Microalbumin procedure in past 12 months or taking ACE/ARB    EGFRCR or GFRNAA > 50    GFR in the past 12 months       Name from pharmacy: Clopidogrel 75 Mg Tab Nort         Will file in chart as: CLOPIDOGREL 75 MG Oral Tab    Sig: TAKE 1 TABLET BY MOUTH DAILY    Disp: 90 tablet    Refills: 0    Start: 6/8/2024    Class: Normal    Non-formulary For: History of stroke without residual deficits    Last ordered: 3 months ago (2/23/2024) by Rikki Rizvi MD    Last refill: 3/10/2024    Rx #: [id number]     LOV: 5/8/2024  RTC: none noted   Last Relevant Labs: 4/15/2024  Filled: 2/23/2024    Future Appointments   Date Time Provider Department Center   6/10/2024 11:30 AM Franko Robles PA [id number]   7/18/2024  9:30 AM  MR RM4 (3T WIDE) [facility]   7/18/2024 10:15 AM  MR RM4 (3T WIDE) [facility]   7/19/2024 10:00 AM Ariel Yanes MD [email or web address] [facility] [place]   7/23/2024  3:30 PM Georges Orozco DPM [id number]   9/30/2024  1:00 PM Wenceslao Bishop MD ENIWARREN [facility] [place]

## 2024-06-10 ENCOUNTER — OFFICE VISIT (OUTPATIENT)
Dept: ORTHOPEDICS CLINIC | Facility: CLINIC | Age: 74
End: 2024-06-10
Payer: MEDICARE

## 2024-06-10 DIAGNOSIS — Z98.890 S/P ARTHROSCOPY OF KNEE: Primary | ICD-10-CM

## 2024-06-10 PROCEDURE — 99024 POSTOP FOLLOW-UP VISIT: CPT | Performed by: PHYSICIAN ASSISTANT

## 2024-06-10 RX ORDER — CYCLOBENZAPRINE HCL 5 MG
5 TABLET ORAL 3 TIMES DAILY PRN
Qty: 20 TABLET | Refills: 0 | Status: SHIPPED | OUTPATIENT
Start: 2024-06-10

## 2024-06-10 NOTE — PROGRESS NOTES
[facility]  [address] [place] [address]  [phone]       Name: Liss Fagan   MRN: [id number]  Date: 6/10/2024     REASON FOR VISIT: First Post-Surgical Visit   Surgery: Right knee scope, medial and lateral meniscus tear, open soft tissue mass removal on 06/04/2024.     INTERVAL HISTORY:  Liss Fagan is a 74 year old female who returns after the aforementioned procedure.  The post-operative course has been unremarkable with pain well controlled and overall progress noted.     Physical therapy was started and is progressing well.  The patient denies any calf pain or tenderness, fevers, chills, sweats or signs of active infection. The patient has been compliant with the postoperative protocol, and admits to normal bowel and bladder function. No acute issues.     ROS: ROS    PE:   There were no vitals filed for this visit.  Estimated body mass index is 27.8 kg/m² as calculated from the following:    Height as of 5/30/24: 5' 2\" (1.575 m).    Weight as of 6/4/24: 152 lb (68.9 kg).    Physical Exam  Constitutional:       Appearance: Normal appearance.   HENT:      Head: Normocephalic and atraumatic.   Eyes:      Extraocular Movements: Extraocular movements intact.   Neck:      Musculoskeletal: Normal range of motion and neck supple.   Cardiovascular:      Pulses: Normal pulses.   Pulmonary:      Effort: Pulmonary effort is normal. No respiratory distress.   Abdominal:      General: There is no distension.   Skin:     General: Skin is warm.      Capillary Refill: Capillary refill takes less than 2 seconds.      Findings: No bruising.   Neurological:      General: No focal deficit present.      Mental Status: She is alert.   Psychiatric:         Mood and Affect: Mood normal.     Examination of the right knee demonstrates:     Physical examination the patient is alert and oriented x3, well-developed, well-nourished, no acute distress.     Tegaderm dressings were  removed, and Steri-Strips were maintained and kept in place.    Incisional sites are clean dry intact without signs of active pathology.  Calf is soft and nontender to palpation.    The contralateral knee is without limitation in range of motion or strength, no positive provocative maneuvers.       IMPRESSION: Liss Fagan is a 74 year old female who presents 6 days s/p  Right knee scope, medial and lateral meniscus tear, open soft tissue mass removal on 06/04/2024.     PLAN:   We had a lengthy discussion with the patient regarding the patient's findings consistent with the expected postoperative course. We recommend continuation of physical therapy with rehabilitation efforts focused on strengthening, range of motion, functional ability, and return to baseline activity. The patient can continue to progress per protocol.    All questions were answered appropriately and the patient was in agreement with the treatment plan.       FOLLOW-UP:  Return to clinic in four weeks. No imaging required at next visit.             Franko Robles [place] [facility] [facility], PA-C Orthopedic Surgery / Sports Medicine Specialist  [facility]  [address] [place] [address] [id number] [place] [address].org  [email or web address]  t: [phone]  o: [phone]  f: [phone]    This note was dictated using Dragon software.  While it was briefly proofread prior to completion, some grammatical, spelling, and word choice errors due to dictation may still occur.

## 2024-07-01 ENCOUNTER — MED REC SCAN ONLY (OUTPATIENT)
Facility: CLINIC | Age: 74
End: 2024-07-01

## 2024-07-04 DIAGNOSIS — Z86.73 HISTORY OF STROKE WITHIN LAST YEAR: ICD-10-CM

## 2024-07-04 DIAGNOSIS — I10 ESSENTIAL HYPERTENSION: ICD-10-CM

## 2024-07-04 RX ORDER — ATORVASTATIN CALCIUM 40 MG/1
40 TABLET, FILM COATED ORAL EVERY EVENING
Qty: 90 TABLET | Refills: 0 | Status: SHIPPED | OUTPATIENT
Start: 2024-07-04

## 2024-07-04 RX ORDER — LISINOPRIL AND HYDROCHLOROTHIAZIDE 25; 20 MG/1; MG/1
1 TABLET ORAL DAILY
Qty: 90 TABLET | Refills: 0 | Status: SHIPPED | OUTPATIENT
Start: 2024-07-04

## 2024-07-08 ENCOUNTER — OFFICE VISIT (OUTPATIENT)
Dept: ORTHOPEDICS CLINIC | Facility: CLINIC | Age: 74
End: 2024-07-08
Payer: MEDICARE

## 2024-07-08 DIAGNOSIS — Z98.890 S/P ARTHROSCOPY OF KNEE: Primary | ICD-10-CM

## 2024-07-08 NOTE — PROGRESS NOTES
[facility]  [address] [place] [address]  [phone]       Name: Liss Fagan   MRN: [id number]  Date: 7/8/2024     REASON FOR VISIT: Second Post-Surgical Visit   Surgery: Right knee partial medial and lateral menisectomy, removal of soft tissue mass on 06/04/2024.     INTERVAL HISTORY:  Liss Fagan is a 74 year old female who returns after the aforementioned procedure.  The post-operative course has been unremarkable with pain well controlled and overall progress noted.     Physical therapy was started and is progressing well.  The patient denies any calf pain or tenderness, fevers, chills, sweats or signs of active infection. The patient has been compliant with the postoperative protocol, and admits to normal bowel and bladder function. No acute issues.     ROS: ROS    PE:   There were no vitals filed for this visit.  Estimated body mass index is 27.8 kg/m² as calculated from the following:    Height as of 5/30/24: 5' 2\" (1.575 m).    Weight as of 6/4/24: 152 lb (68.9 kg).    Physical Exam  Constitutional:       Appearance: Normal appearance.   HENT:      Head: Normocephalic and atraumatic.   Eyes:      Extraocular Movements: Extraocular movements intact.   Neck:      Musculoskeletal: Normal range of motion and neck supple.   Cardiovascular:      Pulses: Normal pulses.   Pulmonary:      Effort: Pulmonary effort is normal. No respiratory distress.   Abdominal:      General: There is no distension.   Skin:     General: Skin is warm.      Capillary Refill: Capillary refill takes less than 2 seconds.      Findings: No bruising.   Neurological:      General: No focal deficit present.      Mental Status: She is alert.   Psychiatric:         Mood and Affect: Mood normal.     Examination of the right knee demonstrates:     Physical examination the patient is alert and oriented x3, well-developed, well-nourished, no acute distress.     The patient achieves full  and symmetric knee ROM.     Incisional sites are clean dry intact without signs of active pathology.  Calf is soft and nontender to palpation.    The contralateral knee is without limitation in range of motion or strength, no positive provocative maneuvers.       IMPRESSION: Liss Fagan is a 74 year old female who presents 4 weeks s/p Right knee partial medial and lateral menisectomy, removal of soft tissue mass on 06/04/2024.     PLAN:   We had a lengthy discussion with the patient regarding the patient's findings consistent with the expected postoperative course. We recommend continuation of physical therapy with rehabilitation efforts focused on strengthening, range of motion, functional ability, and return to baseline activity. The patient can continue to progress per protocol.    All questions were answered appropriately and the patient was in agreement with the treatment plan.       FOLLOW-UP:  Return to clinic on an as needed basis.             Franko Robles, [facility], PA-C Orthopedic Surgery / Sports Medicine Specialist  [facility] Surgery  [address] [place] [address].org  [email or web address]  t: [phone]  o: [phone]  f: [phone]    This note was dictated using Dragon software.  While it was briefly proofread prior to completion, some grammatical, spelling, and word choice errors due to dictation may still occur.

## 2024-07-09 RX ORDER — FUROSEMIDE 20 MG/1
20 TABLET ORAL DAILY
Qty: 30 TABLET | Refills: 0 | Status: SHIPPED | OUTPATIENT
Start: 2024-07-09

## 2024-07-09 NOTE — TELEPHONE ENCOUNTER
Furosemide 20 mg  Hypertension Medications Protocol Yymekd6907/09/2024 10:25 AM   Protocol Details Last BP reading less than 140/90   Filled 4-15-24  Qty 30  2 refills  Future Appointments   Date Time Provider Department Center   7/18/2024  9:30 AM  MR RM4 (3T WIDE)  [facility]   7/18/2024 10:15 AM [facility] MR RM4 (3T WIDE)  [facility]   7/19/2024 10:00 AM Ariel Yanes MD ENINAPER2 [facility] [place]   7/23/2024  3:30 PM Georges Orozco DPM [id number]   9/9/2024 11:40 AM Franko Robles PA [id number]   9/30/2024  1:00 PM Wenceslao Bishop MD ENIWARREN [facility] 5-8-24

## 2024-07-10 ENCOUNTER — TELEPHONE (OUTPATIENT)
Dept: ORTHOPEDICS CLINIC | Facility: CLINIC | Age: 74
End: 2024-07-10

## 2024-07-10 NOTE — TELEPHONE ENCOUNTER
Patient called with 2 post op questions: First she wants to know if she can watch her grandchildren before her 6 weeks post op, PT says she should be able to.  Second, how far can patient drive.  Patients wants to know if 3 hours is ok.  Please advise.

## 2024-07-11 NOTE — TELEPHONE ENCOUNTER
Questions  Can I watch my grandchildren: children are 4 and 8 years old.  Her primary concern is going down stairs. It was recommended that she talk to physical therapist about working on stairs at next visit.   Driving around town but she has not driven on the express way.  The physical therapist recommended she go with another person and try increasing the distance with each trip.  We agreed with physical therapist recommendations.

## 2024-07-12 RX ORDER — GABAPENTIN 100 MG/1
100 CAPSULE ORAL DAILY
Qty: 30 CAPSULE | Refills: 0 | Status: SHIPPED | OUTPATIENT
Start: 2024-07-12

## 2024-07-12 NOTE — TELEPHONE ENCOUNTER
Gabapentin 100 mg  Filled 4-15-24  Qty 30  2 refills  Future Appointments   Date Time Provider Department Center   7/18/2024  9:30 AM  MR RM4 (3T WIDE)  [facility]   7/18/2024 10:15 AM  MR RM4 (3T WIDE)  [facility]   7/19/2024 10:00 AM Ariel Yanes MD ENINAPER2 [facility] [place]   7/23/2024  3:30 PM Georges Orozco DPM [id number]   9/9/2024 11:40 AM Franko Robles PA [facility] [id number]   9/30/2024  1:00 PM Wenceslao Bishop MD ENIWARREN [facility] 4-11-24

## 2024-07-18 ENCOUNTER — HOSPITAL ENCOUNTER (OUTPATIENT)
Dept: MRI IMAGING | Facility: HOSPITAL | Age: 74
Discharge: HOME | End: 2024-07-18
Attending: NEUROLOGICAL SURGERY
Payer: MEDICARE

## 2024-07-18 DIAGNOSIS — D33.3 ACOUSTIC NEUROMA (HCC): ICD-10-CM

## 2024-07-18 DIAGNOSIS — I67.1 BRAIN ANEURYSM (HCC): ICD-10-CM

## 2024-07-18 PROCEDURE — A9575 INJ GADOTERATE MEGLUMI 0.1ML: HCPCS | Performed by: NEUROLOGICAL SURGERY

## 2024-07-18 PROCEDURE — 70553 MRI BRAIN STEM W/O & W/DYE: CPT | Performed by: NEUROLOGICAL SURGERY

## 2024-07-18 PROCEDURE — 70546 MR ANGIOGRAPH HEAD W/O&W/DYE: CPT | Performed by: NEUROLOGICAL SURGERY

## 2024-07-18 RX ORDER — GADOTERATE MEGLUMINE 376.9 MG/ML
13 INJECTION INTRAVENOUS
Status: COMPLETED | OUTPATIENT
Start: 2024-07-18 | End: 2024-07-18

## 2024-07-18 RX ADMIN — GADOTERATE MEGLUMINE 13 ML: 376.9 INJECTION INTRAVENOUS at 11:05:00

## 2024-07-19 ENCOUNTER — OFFICE VISIT (OUTPATIENT)
Dept: SURGERY | Facility: CLINIC | Age: 74
End: 2024-07-19
Payer: MEDICARE

## 2024-07-19 ENCOUNTER — TELEPHONE (OUTPATIENT)
Dept: SURGERY | Facility: CLINIC | Age: 74
End: 2024-07-19

## 2024-07-19 VITALS
WEIGHT: 152 LBS | DIASTOLIC BLOOD PRESSURE: 72 MMHG | SYSTOLIC BLOOD PRESSURE: 118 MMHG | HEART RATE: 76 BPM | HEIGHT: 62 IN | BODY MASS INDEX: 27.97 KG/M2

## 2024-07-19 DIAGNOSIS — D33.3 ACOUSTIC NEUROMA (HCC): ICD-10-CM

## 2024-07-19 DIAGNOSIS — I67.1 BRAIN ANEURYSM (HCC): Primary | ICD-10-CM

## 2024-07-19 PROCEDURE — 99212 OFFICE O/P EST SF 10 MIN: CPT | Performed by: NEUROLOGICAL SURGERY

## 2024-07-19 NOTE — TELEPHONE ENCOUNTER
Pt reminder request rcvd from Dr. Yanes.    Pt reminder placed as ordered below.    \"Follow up in 1 year with Daily with a repeat MRA brain with and without contrast and MRI IAC with and without contrast \"    Closing encounter at this time.

## 2024-07-19 NOTE — PATIENT INSTRUCTIONS
Refill policies:    Allow 2-3 business days for refills; controlled substances may take longer.  Contact your pharmacy at least 5 days prior to running out of medication and have them send an electronic request or submit request through the “request refill” option in your [facility] account.  Refills are not addressed on weekends; covering physicians do not authorize routine medications on weekends.  No narcotics or controlled substances are refilled after noon on Fridays or by on call physicians.  By law, narcotics must be electronically prescribed.  A 30 day supply with no refills is the maximum allowed.  If your prescription is due for a refill, you may be due for a follow up appointment.  To best provide you care, patients receiving routine medications need to be seen at least once a year.  Patients receiving narcotic/controlled substance medications need to be seen at least once every 3 months.  In the event that your preferred pharmacy does not have the requested medication in stock (e.g. Backordered), it is your responsibility to find another pharmacy that has the requested medication available.  We will gladly send a new prescription to that pharmacy at your request.    Scheduling Tests:    If your physician has ordered radiology tests such as MRI or CT scans, please contact Central Scheduling at [phone] right away to schedule the test.  Once scheduled, the [facility] Centralized Referral Team will work with your insurance carrier to obtain pre-certification or prior authorization.  Depending on your insurance carrier, approval may take 3-10 days.  It is highly recommended patients assure they have received an authorization before having a test performed.  If test is done without insurance authorization, patient may be responsible for the entire amount billed.      Precertification and Prior Authorizations:  If your physician has recommended that you have a procedure or additional testing performed the [facility]  Centralized Referral Team will contact your insurance carrier to obtain pre-certification or prior authorization.    You are strongly encouraged to contact your insurance carrier to verify that your procedure/test has been approved and is a COVERED benefit.  Although the [facility] Centralized Referral Team does its due diligence, the insurance carrier gives the disclaimer that \"Although the procedure is authorized, this does not guarantee payment.\"    Ultimately the patient is responsible for payment.   Thank you for your understanding in this matter.  Paperwork Completion:  If you require FMLA or disability paperwork for your recovery, please make sure to either drop it off or have it faxed to our office at [phone]. Be sure the form has your name and date of birth on it.  The form will be faxed to our Forms Department and they will complete it for you.  There is a 25$ fee for all forms that need to be filled out.  Please be aware there is a 10-14 day turnaround time.  You will need to sign a release of information (FANTASMA) form if your paperwork does not come with one.  You may call the Forms Department with any questions at [phone].  Their fax number is [phone].

## 2024-07-20 RX ORDER — ISOSORBIDE MONONITRATE 120 MG/1
120 TABLET, EXTENDED RELEASE ORAL DAILY
Qty: 90 TABLET | Refills: 0 | Status: SHIPPED | OUTPATIENT
Start: 2024-07-20

## 2024-07-20 NOTE — TELEPHONE ENCOUNTER
Requesting    Name from pharmacy: Isosorbide Mononitrate Er 24hr 120 Mg Tab Torr         Will file in chart as: ISOSORBIDE MONONITRATE  MG Oral Tablet 24 Hr    Sig: Take 1 tablet (120 mg total) by mouth daily.    Disp: 90 tablet    Refills: 0    Start: 7/19/2024    Class: Normal    Non-formulary    Last ordered: 2 months ago (4/22/2024) by Rikki Rizvi MD    Last refill: 4/22/2024    Rx #: [id number]     LOV: 5/8/2024  RTC: none noted   Last Relevant Labs: n/a   Filled: 4/22/2024 #90 with 0 refills    Future Appointments   Date Time Provider Department Center   9/4/2024  4:15 PM Georges Orozco DPM [id number]   9/9/2024 11:40 AM Franko Robles PA EMG ORTHO Wo [id number]   9/30/2024  1:00 PM Wenceslao Bishop MD E[facility]EN [facility] [place]

## 2024-07-29 NOTE — PROGRESS NOTES
[facility]  Neurological Surgery Clinic Note    Liss Fagan  4/15/1950  [id number]  PCP: Rikki Rizvi MD    REASON FOR VISIT:  Unruptured left cavernous ICA aneurysm  Possible left ophthalmic ICA aneurysm  6mm right IAC possible acoustic neuroma  Left ear/head pain     HISTORY OF PRESENT ILLNESS:  Liss Fagan is a 74 year old female who initially presented on 12/7/23 for evaluation of an unruptured left cavernous ICA aneurysm, a possible left ophthalmic ICA aneurysm, and a 6mm right IAC possible acoustic neuroma.  She began having left sided ear pain that radiated to the top of her head, not in a trigeminal distribution, which resolved with gabapentin.  She had a stroke in 2017 in the left basal ganglia at which time a cavernous ICA aneurysm was diagnosed.  Her sister sustained a ruptured aneurysm 10 years ago.  Serial imaging since 2017 demonstrates stability of the left cavernous aneurysm, seen most recently on MRA 11/20/23, which also demonstrates fullness at the left ophthalmic artery origin, which is slightly more prominent that prior MRAs. The MRI 11/20/23 also demonstrates some enhancement in the right IAC, which is stable to prior MRI brains. She previously had an episode of vertigo attributable to BPPV which improved with therapy.  She denies smoking or illicits.     Interval History 7/19/2024  MRI IAC w/wo 7/18/24 demonstrates a stable probable right acoustic neuroma and MRA 7/18/24 demonstrate stable aneurysms. Denies any new symptoms or issues.    PAST MEDICAL HISTORY:  Past Medical History:    Abnormal maternal glucose tolerance, complicating pregnancy, childbirth, or the puerperium, unspecified as to episode of care    Acute bronchitis    Back problem    Cataract    Chest pain, unspecified    admit 12/05    Deep vein thrombosis (HCC)    during pregnancy right leg    Diabetes (HCC)    Diverticulosis of large intestine    unsure if divertiulitis  or diverticulosis    Esophageal reflux    Essential hypertension, benign    Helicobacter pylori gastritis    High blood pressure    High cholesterol    Incontinence    Osteoarthritis    knees    Palpitations    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     PONV (postoperative nausea and vomiting)    nausea,pain results in cardiac vasospasms    Sleep apnea    no device    Stroke (HCC)    r/o 17    Unspecified gastritis and gastroduodenitis without mention of hemorrhage    Visual impairment    glasses       PAST SURGICAL HISTORY:  Past Surgical History:   Procedure Laterality Date    Back surgery  1984    Spinal diskectomy lumbar    at [place]        1987    Cardiac catherterization (pbp)      status normal resolved on 2007    Cataract      Cholecystectomy  1999    Colonoscopy  2011    colon polyp removal  ma  coleman 5    Colonoscopy      Colonoscopy N/A 2017    Procedure: COLONOSCOPY;  Surgeon: Addison Castellano MD;  Location:  ENDOSCOPY    Hernia surgery  1999    Umbilical hernia repair;     Hysterectomy      d/t fibroids    Lumbar discectomy Left 2019    L4-L5   [facility]    Other      Spinal Steroid Injection     Other surgical history  2024    RIGHT KNEE ARTHROSCOPY PARTIAL LATERAL MENISCECTOMY SYNOVECTOMY ABRASIONPLASTY, REMOVAL OF SOFT TISSUE MASS    Shoulder surg proc unlisted      Spine surgery procedure unlisted      bulging discs in lower back    Upper gi endoscopy performed  2011       FAMILY HISTORY:  family history includes Breast Cancer in her paternal grandmother; Cancer in her father, paternal grandmother, and another family member; Diabetes in her father.    SOCIAL HISTORY:   reports that she has never smoked. She has never been exposed to tobacco smoke. She has never used smokeless tobacco. She reports that she does not currently use alcohol. She reports that she does not use  drugs.    ALLERGIES:  Allergies   Allergen Reactions    Penicillins RASH and SWELLING    Advil [Ibuprofen] NAUSEA AND VOMITING and DIZZINESS    Aspirin NAUSEA AND VOMITING and DIZZINESS    Codeine Sulfate NAUSEA AND VOMITING    Demerol NAUSEA ONLY and DIZZINESS    Hydrocodone NAUSEA ONLY and DIZZINESS    Naproxen DIZZINESS    Norvasc [Amlodipine Besylate] NAUSEA AND VOMITING    Nsaids NAUSEA ONLY and DIZZINESS    Tramadol NAUSEA AND VOMITING and DIZZINESS    Zetia [Ezetimibe] NAUSEA AND VOMITING     Tabs,Muscle spasm        MEDICATIONS:  Current Outpatient Medications on File Prior to Visit   Medication Sig Dispense Refill    GABAPENTIN 100 MG Oral Cap Take 1 capsule (100 mg total) by mouth daily. 30 capsule 0    FUROSEMIDE 20 MG Oral Tab Take 1 tablet (20 mg total) by mouth daily. 30 tablet 0    NIFEDIPINE ER 60 MG Oral Tablet 24 Hr TAKE ONE TABLET BY MOUTH DAILY 90 tablet 0    ATORVASTATIN 40 MG Oral Tab TAKE ONE TABLET BY MOUTH ONE TIME DAILY IN THE EVENING 90 tablet 0    LISINOPRIL-HYDROCHLOROTHIAZIDE 20-25 MG Oral Tab Take 1 tablet by mouth daily. 90 tablet 0    cyclobenzaprine 5 MG Oral Tab Take 1 tablet (5 mg total) by mouth 3 (three) times daily as needed for Muscle spasms. 20 tablet 0    METFORMIN 500 MG Oral Tab TAKE 1 TABLET BY MOUTH TWICE DAILY WITH MEALS 180 tablet 0    CLOPIDOGREL 75 MG Oral Tab TAKE 1 TABLET BY MOUTH DAILY 90 tablet 0    METOPROLOL TARTRATE 25 MG Oral Tab TAKE 1 TABLET BY MOUTH 2 TIMES DAILY 180 tablet 0    RANOLAZINE  MG Oral Tablet 12 Hr TAKE ONE TABLET BY MOUTH ONE TIME DAILY 90 tablet 0    Sennosides-Docusate Sodium 8.6-50 MG Oral Cap Take 1 capsule by mouth daily as needed. 30 capsule 1    OMEPRAZOLE 40 MG Oral Capsule Delayed Release TAKE 1 CAPSULE BY MOUTH DAILY 90 capsule 0    Microlet Lancets Does not apply Misc USE TO TEST ONCE DAILY *DX E11.9* 100 each 1    Glucose Blood (CONTOUR TEST) In Vitro Strip USE TO TEST ONCE DAILY AS DIRECTED. 100 strip 2    omega-3 fatty  acids 1000 MG Oral Cap Take 1,000 mg by mouth every other day.      NON FORMULARY Take 1 tablet by mouth daily. LACTATE PILLS      nitroGLYCERIN (NITROSTAT) 0.4 MG Sublingual SL Tab Place 1 tablet (0.4 mg total) under the tongue every 5 (five) minutes as needed. 30 tablet 5    Black Elderberry,Berry-Flower, 575 MG Oral Cap Take by mouth every other day.      mometasone 0.1 % External Ointment Apply topically as needed.      cetirizine 10 MG Oral Tab Take 1 tablet (10 mg total) by mouth daily.      PEG 3350 Oral Powd Pack Take 17 g by mouth nightly.      Multiple Vitamin (MULTI-VITAMIN) Oral Tab Take 1 tablet by mouth every other day.       No current facility-administered medications on file prior to visit.       REVIEW OF SYSTEMS:  A 10-point system was reviewed.  Pertinent positives and negatives are noted in HPI.      PHYSICAL EXAMINATION:  VITAL SIGNS: /72   Pulse 76   Ht 62\"   Wt 152 lb (68.9 kg)   BMI 27.80 kg/m²     A&Ox3, no acute distress  PERRL, EOMi, FS, TM  Full strength x 4, no drift  Sensation intact     ASSESSMENT:  75yo female with a stable unruptured left cavernous ICA aneurysm, a stable possible left ophthalmic ICA aneurysm versus infundibulum, and a stable 6mm right IAC possible acoustic neuroma     We discussed the natural history of intracranial aneurysms and the risks of further workup and treatment.  We discussed that the left ophthalmic artery origin may be a small aneurysm versus an infundibulum. We reviewed the imaging together.  We discussed modifiable risk factors.  We discussed the signs and symptoms of subarachnoid hemorrhage and that emergency medical attention should be sought for these symptoms.  After this detailed discussion and answering all questions, the patient wished to proceed with observation with serial imaging.     We discussed serial observation for the enhancement within the IAC and that surgery is not indicated at this time.      Plan:  Follow up in one year  with a repeat MRA w/wo and an MRI IAC w/wo     Ariel Yanes MD  Neurological Surgery  [facility]     Care Time: 10 min including face to face time, chart review, imaging interpretation, and coordination of care

## 2024-08-06 ENCOUNTER — TELEPHONE (OUTPATIENT)
Dept: SURGERY | Facility: CLINIC | Age: 74
End: 2024-08-06

## 2024-08-06 NOTE — TELEPHONE ENCOUNTER
Patient is requesting to speak with clinical staff in regards to finding blood on her q-tip after her shower today. Please advise

## 2024-08-06 NOTE — TELEPHONE ENCOUNTER
Message below noted.    Pt noticed blood on tip of Q-tip after shower in her right ear.     Pt took another Q-tip and it was less blood the second time. Color appeared Red/Orangey. Did one more swipe and it was a little dot.     Per LOV notes from 7.19.24:  LOV 7.19.24  \"ASSESSMENT:  75yo female with a stable unruptured left cavernous ICA aneurysm, a stable possible left ophthalmic ICA aneurysm versus infundibulum, and a stable 6mm right IAC possible acoustic neuroma     We discussed the natural history of intracranial aneurysms and the risks of further workup and treatment.  We discussed that the left ophthalmic artery origin may be a small aneurysm versus an infundibulum. We reviewed the imaging together.  We discussed modifiable risk factors.  We discussed the signs and symptoms of subarachnoid hemorrhage and that emergency medical attention should be sought for these symptoms.  After this detailed discussion and answering all questions, the patient wished to proceed with observation with serial imaging.     We discussed serial observation for the enhancement within the IAC and that surgery is not indicated at this time.      Plan:  Follow up in one year with a repeat MRA w/wo and an MRI IAC w/wo\"    Pain? 0/10     Pt denied dizziness, ringing in the ears, and headache.     Advised pt it could be just older wax that scabbed and caused ear to bleed a little. Pt to reach back out with any new or worsening symptoms.     Routed to Provider with update.

## 2024-08-08 ENCOUNTER — TELEPHONE (OUTPATIENT)
Dept: INTERNAL MEDICINE CLINIC | Facility: CLINIC | Age: 74
End: 2024-08-08

## 2024-08-08 RX ORDER — GABAPENTIN 100 MG/1
100 CAPSULE ORAL DAILY
Qty: 30 CAPSULE | Refills: 0 | Status: SHIPPED | OUTPATIENT
Start: 2024-08-08

## 2024-08-08 NOTE — TELEPHONE ENCOUNTER
Requesting    Name from pharmacy: Gabapentin 100 Mg Cap Nort         Will file in chart as: GABAPENTIN 100 MG Oral Cap    Sig: Take 1 capsule (100 mg total) by mouth daily.    Disp: 30 capsule    Refills: 0    Start: 8/7/2024    Class: Normal    Non-formulary    Last ordered: 3 weeks ago (7/12/2024) by Rikki Rizvi MD    Last refill: 7/12/2024    Rx #: [id number]    Neurology Medications [id number]/07/2024 10:30 PM   Protocol Details In person appointment or virtual visit in the past 6 mos or appointment in next 3 mos        LOV: 5/8/2024  RTC: none noted   Last Relevant Labs: n/a   Filled: 7/12/2024 #30 with 0 refills    Future Appointments   Date Time Provider Department Center   9/4/2024  4:15 PM Georges Orozco DPM [id number]   9/9/2024 11:40 AM [id number]   9/30/2024  1:00 PM Wenceslao Bishop MD [facility] [place]

## 2024-08-08 NOTE — TELEPHONE ENCOUNTER
Incoming fax from [facility]    Patients Diabetic eye exam done on 8/7/2024 by     Flowsheet updated     Placed in TO in basket for review

## 2024-08-19 ENCOUNTER — TELEPHONE (OUTPATIENT)
Dept: INTERNAL MEDICINE CLINIC | Facility: CLINIC | Age: 74
End: 2024-08-19

## 2024-08-19 DIAGNOSIS — E11.9 TYPE 2 DIABETES MELLITUS WITHOUT COMPLICATIONS (HCC): ICD-10-CM

## 2024-08-19 RX ORDER — OMEPRAZOLE 40 MG/1
40 CAPSULE, DELAYED RELEASE ORAL DAILY
Qty: 90 CAPSULE | Refills: 0 | Status: SHIPPED | OUTPATIENT
Start: 2024-08-19

## 2024-08-19 NOTE — TELEPHONE ENCOUNTER
Appt. Scheduled   Future Appointments   Date Time Provider Department Center   8/29/2024 11:00 AM Rikki Rizvi MD EMG 8 [facility] [id number]   9/4/2024  4:15 PM Georges Orozco DPM [id number]   9/9/2024 11:40 AM Franko Robles PA EMG OR[facility]O [id number]   9/30/2024  1:00 PM Wenceslao Bishop MD [facility] [place]         Omeprazole 40 mg  Filled 5-21-24  Qty 90  0 refills  Future Appointments   Date Time Provider Department Center   9/4/2024  4:15 PM Georges Orozco DPM [id number]   9/9/2024 11:40 AM Franko Robles PA EM[facility] ORTHO [facility] [id number]   9/30/2024  1:00 PM Wenceslao Bishop MD E[facility]ARREN EM[facility] 8-17-23 TO

## 2024-08-19 NOTE — TELEPHONE ENCOUNTER
Request refill for: Glucose Blood (CONTOUR TEST) In Vitro Strip 100 strip    Pharmacy: [id number] - [address]    #: [phone]

## 2024-08-26 RX ORDER — CARVEDILOL 25 MG/1
TABLET, FILM COATED ORAL
Qty: 100 STRIP | Refills: 2 | Status: SHIPPED | OUTPATIENT
Start: 2024-08-26

## 2024-08-29 ENCOUNTER — OFFICE VISIT (OUTPATIENT)
Dept: INTERNAL MEDICINE CLINIC | Facility: CLINIC | Age: 74
End: 2024-08-29
Payer: MEDICARE

## 2024-08-29 VITALS
HEART RATE: 79 BPM | OXYGEN SATURATION: 96 % | SYSTOLIC BLOOD PRESSURE: 128 MMHG | HEIGHT: 62 IN | BODY MASS INDEX: 27.42 KG/M2 | TEMPERATURE: 99 F | WEIGHT: 149 LBS | DIASTOLIC BLOOD PRESSURE: 62 MMHG

## 2024-08-29 DIAGNOSIS — I65.23 BILATERAL CAROTID ARTERY STENOSIS: ICD-10-CM

## 2024-08-29 DIAGNOSIS — M54.16 LUMBAR RADICULOPATHY: ICD-10-CM

## 2024-08-29 DIAGNOSIS — K29.70 GASTRITIS AND GASTRODUODENITIS: ICD-10-CM

## 2024-08-29 DIAGNOSIS — K21.00 GASTROESOPHAGEAL REFLUX DISEASE WITH ESOPHAGITIS WITHOUT HEMORRHAGE: ICD-10-CM

## 2024-08-29 DIAGNOSIS — Z12.31 VISIT FOR SCREENING MAMMOGRAM: ICD-10-CM

## 2024-08-29 DIAGNOSIS — K29.90 GASTRITIS AND GASTRODUODENITIS: ICD-10-CM

## 2024-08-29 DIAGNOSIS — J01.90 ACUTE NON-RECURRENT SINUSITIS, UNSPECIFIED LOCATION: ICD-10-CM

## 2024-08-29 DIAGNOSIS — R80.9 PROTEINURIA, UNSPECIFIED TYPE: ICD-10-CM

## 2024-08-29 DIAGNOSIS — I20.1 PRINZMETAL ANGINA (HCC): ICD-10-CM

## 2024-08-29 DIAGNOSIS — I73.9 SMALL VESSEL DISEASE (HCC): Primary | ICD-10-CM

## 2024-08-29 DIAGNOSIS — I70.0 ATHEROSCLEROSIS OF AORTA (HCC): ICD-10-CM

## 2024-08-29 DIAGNOSIS — G47.33 OBSTRUCTIVE SLEEP APNEA (ADULT) (PEDIATRIC): ICD-10-CM

## 2024-08-29 DIAGNOSIS — I10 ESSENTIAL HYPERTENSION: ICD-10-CM

## 2024-08-29 DIAGNOSIS — Z00.00 ENCOUNTER FOR ANNUAL HEALTH EXAMINATION: ICD-10-CM

## 2024-08-29 DIAGNOSIS — E11.9 TYPE 2 DIABETES MELLITUS WITHOUT COMPLICATION, WITHOUT LONG-TERM CURRENT USE OF INSULIN (HCC): ICD-10-CM

## 2024-08-29 DIAGNOSIS — Z86.73 H/O: CVA (CEREBROVASCULAR ACCIDENT): ICD-10-CM

## 2024-08-29 DIAGNOSIS — E78.00 PURE HYPERCHOLESTEROLEMIA: ICD-10-CM

## 2024-08-29 PROCEDURE — G0439 PPPS, SUBSEQ VISIT: HCPCS | Performed by: FAMILY MEDICINE

## 2024-08-29 PROCEDURE — 99213 OFFICE O/P EST LOW 20 MIN: CPT | Performed by: FAMILY MEDICINE

## 2024-08-29 RX ORDER — AZITHROMYCIN 250 MG/1
TABLET, FILM COATED ORAL
Qty: 6 TABLET | Refills: 0 | Status: SHIPPED | OUTPATIENT
Start: 2024-08-29 | End: 2024-09-02

## 2024-08-29 NOTE — PROGRESS NOTES
Subjective:   Liss Fagan is a 74 year old female who presents for a Subsequent Annual Wellness visit (Pt already had Initial Annual Wellness) and scheduled follow up of multiple significant but stable problems.       History/Other:   Fall Risk Assessment:   She has been screened for Falls and is low risk.      Cognitive Assessment:   She had a completely normal cognitive assessment - see flowsheet entries       Functional Ability/Status:   Liss Fagan has a completely normal functional assessment. See flowsheet for details.      Depression Screening (PHQ):  PHQ-2 SCORE: 0  , done 8/22/2024             Advanced Directives:   She does have a Living Will but we do NOT have it on file in Epic.    She does have a POA but we do NOT have it on file in Epic.    Discussed Advance Care Planning with patient (and family/surrogate if present). Standard forms made available to patient in After Visit Summary.      Patient Active Problem List   Diagnosis    Gastritis and gastroduodenitis    Obstructive sleep apnea (adult) (pediatric)    Esophageal reflux    Diabetes mellitus (HCC)    Prinzmetal angina (HCC)    Proteinuria    Essential hypertension    H/O: CVA (cerebrovascular accident)    Atherosclerosis of aorta (HCC)    Lumbar radiculopathy    Bilateral carotid artery stenosis    Small vessel disease (HCC)    Pure hypercholesterolemia     Allergies:  She is allergic to penicillins, advil [ibuprofen], aspirin, codeine sulfate, demerol, hydrocodone, naproxen, norvasc [amlodipine besylate], nsaids, tramadol, and zetia [ezetimibe].    Current Medications:  Outpatient Medications Marked as Taking for the 8/29/24 encounter (Office Visit) with Rikki Rizvi MD   Medication Sig    azithromycin (ZITHROMAX Z-BOBO) 250 MG Oral Tab Take 2 tablets (500 mg total) by mouth daily for 1 day, THEN 1 tablet (250 mg total) daily for 4 days.    Glucose Blood (CONTOUR NEXT TEST) In Vitro Strip Use daily    OMEPRAZOLE 40 MG Oral Capsule  Delayed Release TAKE 1 CAPSULE BY MOUTH DAILY (Patient taking differently: Take 1 capsule (40 mg total) by mouth. Patient takes medication 4 times weekly--Sun,Mon,Wedn,Friday)    GABAPENTIN 100 MG Oral Cap Take 1 capsule (100 mg total) by mouth daily.    ISOSORBIDE MONONITRATE  MG Oral Tablet 24 Hr Take 1 tablet (120 mg total) by mouth daily.    FUROSEMIDE 20 MG Oral Tab Take 1 tablet (20 mg total) by mouth daily.    NIFEDIPINE ER 60 MG Oral Tablet 24 Hr TAKE ONE TABLET BY MOUTH DAILY    ATORVASTATIN 40 MG Oral Tab TAKE ONE TABLET BY MOUTH ONE TIME DAILY IN THE EVENING    LISINOPRIL-HYDROCHLOROTHIAZIDE 20-25 MG Oral Tab Take 1 tablet by mouth daily.    METFORMIN 500 MG Oral Tab TAKE 1 TABLET BY MOUTH TWICE DAILY WITH MEALS    CLOPIDOGREL 75 MG Oral Tab TAKE 1 TABLET BY MOUTH DAILY    METOPROLOL TARTRATE 25 MG Oral Tab TAKE 1 TABLET BY MOUTH 2 TIMES DAILY    RANOLAZINE  MG Oral Tablet 12 Hr TAKE ONE TABLET BY MOUTH ONE TIME DAILY    Sennosides-Docusate Sodium 8.6-50 MG Oral Cap Take 1 capsule by mouth daily as needed.    Microlet Lancets Does not apply Misc USE TO TEST ONCE DAILY *DX E11.9*    omega-3 fatty acids 1000 MG Oral Cap Take 1,000 mg by mouth every other day.    NON FORMULARY Take 1 tablet by mouth daily. LACTATE PILLS    nitroGLYCERIN (NITROSTAT) 0.4 MG Sublingual SL Tab Place 1 tablet (0.4 mg total) under the tongue every 5 (five) minutes as needed.    Black Elderberry,Berry-Flower, 575 MG Oral Cap Take by mouth every other day.    mometasone 0.1 % External Ointment Apply topically as needed.    cetirizine 10 MG Oral Tab Take 1 tablet (10 mg total) by mouth daily.    Multiple Vitamin (MULTI-VITAMIN) Oral Tab Take 1 tablet by mouth every other day.       Medical History:  She  has a past medical history of Abnormal maternal glucose tolerance, complicating pregnancy, childbirth, or the puerperium, unspecified as to episode of care, Acute bronchitis (04/11/2011), Back problem, Cataract, Chest  pain, unspecified (2005), Deep vein thrombosis (HCC), Diabetes (HCC), Diverticulosis of large intestine, Esophageal reflux, Essential hypertension, benign (2013), Helicobacter pylori gastritis (), High blood pressure, High cholesterol, Incontinence, Osteoarthritis, Palpitations, Personal history of venous thrombosis and embolism, PONV (postoperative nausea and vomiting), Sleep apnea, Stroke (HCC), Unspecified gastritis and gastroduodenitis without mention of hemorrhage (), and Visual impairment.  Surgical History:  She  has a past surgical history that includes cardiac catherterization (); colonoscopy (2011);  (1987); cholecystectomy (1999); back surgery (); hysterectomy (); upper gi endoscopy performed (2011); hernia surgery (1999); colonoscopy; spine surgery procedure unlisted; shoulder surg proc unlisted; colonoscopy (N/A, 2017); other; cataract; Lumbar discectomy (Left, 2019); and other surgical history (2024).   Family History:  Her family history includes Breast Cancer in her paternal grandmother; Cancer in her father, paternal grandmother, and another family member; Diabetes in her father.  Social History:  She  reports that she has never smoked. She has never been exposed to tobacco smoke. She has never used smokeless tobacco. She reports that she does not currently use alcohol. She reports that she does not use drugs.    Tobacco:  She has never smoked tobacco.    CAGE Alcohol Screen:   CAGE screening score of 0 on 2024, showing low risk of alcohol abuse.      Patient Care Team:  Rikki Rizvi MD as PCP - General (Family Medicine)  Wenceslao Bishop MD as Consulting Physician (NEUROLOGY)  Kelli Rodriguez PT as Physical Therapist (Physical Therapy)  Moiz Drew MD as Consulting Physician (NEUROSURGERY)  Stephenie Bah PT as Physical Therapist  Rebecca Valentino PT as Physical Therapist (Physical  Therapy)  Shanika Nieto PT (Physical Therapy)  Ariel Yanes MD (NEUROSURGERY)  Shaq Guevara MD (Cardiovascular Diseases)    Review of Systems  GENERAL: feels well otherwise  SKIN: denies rash  HEENT: 5d w URI s/s  sore throat rhinitis cough now     +sinus pressure   facial pressure    yellow mucous   Neg COVID 3d ago    LUNGS: denies shortness of breath with exertion  CARDIOVASCULAR: denies chest pain on exertion  GI: denies abdominal pain, denies heartburn  : denies dysuria,    MUSCULOSKELETAL: h/o back pain  NEURO: denies headaches  PSYCHE: denies depression or anxiety  HEMATOLOGIC: denies hx of anemia  ENDOCRINE: denies thyroid history  ALL/ASTHMA: denies asthma    Objective:   Physical Exam  General Appearance:  Alert, cooperative, no distress, appears stated age   Head:  Normocephalic, without obvious abnormality, atraumatic        Ears:  Normal TM's and external ear canals, both ears   Nose: Nares normal, septum midline,mucosa normal, no drainage or sinus tenderness   Throat: Lips, mucosa, and tongue normal; teeth and gums normal   Neck: Supple, symmetrical, trachea midline, no adenopathy;  thyroid: not enlarged, symmetric, no tenderness/mass/nodules; no JVD        Lungs:   Clear to auscultation bilaterally, respirations unlabored   Heart:  Regular rate and rhythm, S1 and S2 normal, no murmur, rub, or gallop   Abdomen:   Soft, non-tender, bowel sounds active all four quadrants,  no masses, no organomegaly   Pelvic: Deferred   Extremities: Bilateral barefoot skin diabetic exam is normal, visualized feet and the appearance is normal.  Bilateral monofilament/sensation of both feet is normal.  Pulsation pedal pulse exam of both lower legs/feet is normal as well.   Pulses: 2+ and symmetric   Skin:  no rashes   Lymph nodes: Cervical, supraclavicular nodes normal   Neurologic: Normal       /62 (BP Location: Left arm, Patient Position: Sitting, Cuff Size: large)   Pulse 79   Temp 98.6 °F (37 °C)  (Temporal)   Ht 5' 2\" (1.575 m)   Wt 149 lb (67.6 kg)   SpO2 96%   BMI 27.25 kg/m²  Estimated body mass index is 27.25 kg/m² as calculated from the following:    Height as of this encounter: 5' 2\" (1.575 m).    Weight as of this encounter: 149 lb (67.6 kg).    Medicare Hearing Assessment:   Hearing Screening    Screening Method: Other         Visual Acuity:   Right Eye Visual Acuity: Uncorrected Right Eye Chart Acuity: 20/40   Left Eye Visual Acuity: Uncorrected Left Eye Chart Acuity: 20/40   Both Eyes Visual Acuity: Uncorrected Both Eyes Chart Acuity: 20/40   Able To Tolerate Visual Acuity: Yes        Assessment & Plan:   Liss Fagan is a 74 year old female who presents for a Medicare Assessment.   1. Small vessel disease (HCC)  Noted on imaging    follow    2. Prinzmetal angina (HCC)  Sees Dr Guevara      on meds      3. Type 2 diabetes mellitus without complication, without long-term current use of insulin (HCC)  Last A1c is good        4. Atherosclerosis of aorta (HCC)  Noted on imaging    follow    5. Pure hypercholesterolemia  Last lipids were good   CPM        6. Essential hypertension  Stable w meds       7. Bilateral carotid artery stenosis  Follow        8. Lumbar radiculopathy  No acute issues    9. H/O: CVA (cerebrovascular accident)  No recent events       10. Gastritis and gastroduodenitis  On omeprazole    11. Gastroesophageal reflux disease with esophagitis without hemorrhage  On omeprazole        12. Proteinuria, unspecified type  Good DM control is important       13. Obstructive sleep apnea (adult) (pediatric)  CPAP       14. Visit for screening mammogram  MMG ordered      - [facility] CALEB 2D+3D SCREENING BILAT (CPT=77067/74543); Future    15. Acute non-recurrent sinusitis, unspecified location  Zpak ordered     - Expanded, Low Complexity (10677)    16. Encounter for annual health examination  As above      1. Small vessel disease (HCC) (Primary)  2. Prinzmetal angina (HCC)  3. Type 2 diabetes  mellitus without complication, without long-term current use of insulin (HCC)  4. Atherosclerosis of aorta (HCC)  5. Pure hypercholesterolemia  6. Essential hypertension  7. Bilateral carotid artery stenosis  Overview:  had doppler thru Dr Guevara 2021 that showed mild to moderate     8. Lumbar radiculopathy  9. H/O: CVA (cerebrovascular accident)  10. Gastritis and gastroduodenitis  11. Gastroesophageal reflux disease with esophagitis without hemorrhage  12. Proteinuria, unspecified type  13. Obstructive sleep apnea (adult) (pediatric)  Overview:  Off CPAP   Gave her bloody noses    14. Visit for screening mammogram  -     [facility] CALEB 2D+3D SCREENING BILAT (CPT=77067/09589); Future; Expected date: 08/29/2024  15. Acute non-recurrent sinusitis, unspecified location  -     Expanded, Low Complexity (17754)  16. Encounter for annual health examination  Other orders  -     Azithromycin; Take 2 tablets (500 mg total) by mouth daily for 1 day, THEN 1 tablet (250 mg total) daily for 4 days.  Dispense: 6 tablet; Refill: 0  -     Contour Next Test; Use daily  Dispense: 100 strip; Refill: 2    The patient indicates understanding of these issues and agrees to the plan.  Reinforced healthy diet, lifestyle, and exercise.      No follow-ups on file.     Rikki Rizvi MD, 8/29/2024     Supplementary Documentation:   General Health:  In the past six months, have you lost more than 10 pounds without trying?: 2 - No  Has your appetite been poor?: No  Type of Diet: Balanced;Diabetic;Low Salt;Low Carb  How does the patient maintain a good energy level?: Other  How would you describe your daily physical activity?: Moderate  How would you describe your current health state?: Good  How do you maintain positive mental well-being?: Social Interaction;Games;Visiting Family  On a scale of 0 to 10, with 0 being no pain and 10 being severe pain, what is your pain level?: 0 - (None)  In the past six months, have you experienced urine leakage?:  0-No  At any time do you feel concerned for the safety/well-being of yourself and/or your children, in your home or elsewhere?: No  Have you had any immunizations at another office such as Influenza, Hepatitis B, Tetanus, or Pneumococcal?: No    Health Maintenance   Topic Date Due    Zoster Vaccines (2 of 3) 12/19/2016    COVID-19 Vaccine (3 - 2023-24 season) 09/01/2023    Diabetes Care Foot Exam  08/17/2024    Annual Physical  08/17/2024    Mammogram  08/30/2024    Influenza Vaccine (1) 10/01/2024    Diabetes Care A1C  10/15/2024    Diabetes Care: GFR  04/15/2025    Diabetes Care: Microalb/Creat Ratio  04/15/2025    Diabetes Care Dilated Eye Exam  08/07/2025    Colorectal Cancer Screening  09/14/2027    DEXA Scan  Completed    Annual Depression Screening  Completed    Fall Risk Screening (Annual)  Completed    Pneumococcal Vaccine: 65+ Years  Completed

## 2024-09-02 RX ORDER — RANOLAZINE 500 MG/1
500 TABLET, EXTENDED RELEASE ORAL DAILY
Qty: 90 TABLET | Refills: 0 | Status: SHIPPED | OUTPATIENT
Start: 2024-09-02

## 2024-09-04 ENCOUNTER — MED REC SCAN ONLY (OUTPATIENT)
Dept: ORTHOPEDICS CLINIC | Facility: CLINIC | Age: 74
End: 2024-09-04

## 2024-09-07 DIAGNOSIS — I10 ESSENTIAL HYPERTENSION: ICD-10-CM

## 2024-09-07 DIAGNOSIS — Z86.73 HISTORY OF STROKE WITHOUT RESIDUAL DEFICITS: ICD-10-CM

## 2024-09-09 ENCOUNTER — OFFICE VISIT (OUTPATIENT)
Dept: ORTHOPEDICS CLINIC | Facility: CLINIC | Age: 74
End: 2024-09-09
Payer: MEDICARE

## 2024-09-09 DIAGNOSIS — Z98.890 S/P ARTHROSCOPY OF KNEE: Primary | ICD-10-CM

## 2024-09-09 PROCEDURE — 99213 OFFICE O/P EST LOW 20 MIN: CPT | Performed by: ORTHOPAEDIC SURGERY

## 2024-09-09 RX ORDER — CLOPIDOGREL BISULFATE 75 MG/1
75 TABLET ORAL DAILY
Qty: 90 TABLET | Refills: 0 | Status: SHIPPED | OUTPATIENT
Start: 2024-09-09

## 2024-09-09 RX ORDER — METOPROLOL TARTRATE 25 MG/1
25 TABLET, FILM COATED ORAL 2 TIMES DAILY
Qty: 180 TABLET | Refills: 0 | Status: SHIPPED | OUTPATIENT
Start: 2024-09-09

## 2024-09-09 NOTE — PROGRESS NOTES
[facility]  [address] [place] [id number]  [phone]       Name: Liss Fagan   MRN: [id number]  Date: 9/9/2024     REASON FOR VISIT: Third Post-Surgical Visit   Surgery:  Right knee partial medial and lateral menisectomy, removal of soft tissue mass on 06/04/2024.     INTERVAL HISTORY:  Liss Fagan is a 74 year old female who returns after the aforementioned procedure.  The post-operative course has been unremarkable with pain well controlled and overall progress noted.     Physical therapy was started and is progressing well.   She is working with [facility]. 0 out of 10 pain.  She notes improvement.      PE:   There were no vitals filed for this visit.  Estimated body mass index is 27.25 kg/m² as calculated from the following:    Height as of 8/29/24: 5' 2\" (1.575 m).    Weight as of 8/29/24: 149 lb (67.6 kg).    Physical Exam  Constitutional:       Appearance: Normal appearance.   HENT:      Head: Normocephalic and atraumatic.   Eyes:      Extraocular Movements: Extraocular movements intact.   Neck:      Musculoskeletal: Normal range of motion and neck supple.   Cardiovascular:      Pulses: Normal pulses.   Pulmonary:      Effort: Pulmonary effort is normal. No respiratory distress.   Abdominal:      General: There is no distension.   Skin:     General: Skin is warm.      Capillary Refill: Capillary refill takes less than 2 seconds.      Findings: No bruising.   Neurological:      General: No focal deficit present.      Mental Status: She is alert.   Psychiatric:         Mood and Affect: Mood normal.     Examination of the right knee demonstrates:     Physical examination the patient is alert and oriented x3, well-developed, well-nourished, no acute distress.   No effusion.     The patient achieves full and symmetric knee ROM.     Incisional sites are clean dry intact without signs of active pathology.  Calf is soft and nontender to palpation.    The  contralateral knee is without limitation in range of motion or strength, no positive provocative maneuvers.       IMPRESSION: Liss Fagan is a 74 year old female who presents 3 months s/p  Right knee partial medial and lateral menisectomy, removal of soft tissue mass on 06/04/2024.     PLAN:   We had a lengthy discussion with the patient regarding the patient's findings consistent with the expected postoperative course. We recommend continuation of physical therapy with rehabilitation efforts focused on strengthening, range of motion, functional ability, and return to baseline activity. The patient can continue to progress per protocol.    All questions were answered appropriately and the patient was in agreement with the treatment plan.     It has been a pleasure to take care of Liss.      FOLLOW-UP:  Return to clinic on an as needed basis.         Harjit Joiner MD  Knee, Shoulder, & Elbow Surgery / Sports Medicine Specialist  Orthopaedic Surgery  [address] [place] [address].org  [email or web address]  t: [phone]  o: [phone]  f: [phone]     This note was dictated using Dragon software.  While it was briefly proofread prior to completion, some grammatical, spelling, and word choice errors due to dictation may still occur.

## 2024-09-09 NOTE — TELEPHONE ENCOUNTER
Requesting   Name from pharmacy: Metoprolol Tartrate 25 Mg Tab Auro          Will file in chart as: METOPROLOL TARTRATE 25 MG Oral Tab    Sig: TAKE 1 TABLET BY MOUTH 2 TIMES DAILY    Disp: 180 tablet    Refills: 0    Start: 9/7/2024    Class: Normal    Non-formulary For: Essential hypertension    Last ordered: 3 months ago (6/5/2024) by Rikki Rizvi MD    Last refill: 6/5/2024    Rx #: [id number]    Hypertension Medications Protocol Llgner9109/07/2024 09:58 AM   Protocol Details CMP or BMP in past 12 months    Last BP reading less than 140/90    In person appointment or virtual visit in the past 12 mos or appointment in next 3 mos    EGFRCR or GFRNAA > 50       Name from pharmacy: Metformin Hydrochloride 500 Mg Tab Johnny         Will file in chart as: METFORMIN 500 MG Oral Tab    Sig: TAKE 1 TABLET BY MOUTH TWICE DAILY WITH MEALS    Disp: 180 tablet    Refills: 0    Start: 9/7/2024    Class: Normal    Non-formulary    Last ordered: 3 months ago (6/8/2024) by Rikki Rizvi MD    Last refill: 6/8/2024    Rx #: [id number]    Diabetes Medication Protocol Hjjpun6809/07/2024 09:58 AM   Protocol Details Last A1C < 7.5 and within past 6 months    In person appointment or virtual visit in the past 6 mos or appointment in next 3 mos    Microalbumin procedure in past 12 months or taking ACE/ARB    EGFRCR or GFRNAA > 50    GFR in the past 12 months       Name from pharmacy: Clopidogrel 75 Mg Tab Nort         Will file in chart as: CLOPIDOGREL 75 MG Oral Tab    Sig: TAKE 1 TABLET BY MOUTH DAILY    Disp: 90 tablet    Refills: 0    Start: 9/7/2024    Class: Normal    Non-formulary For: History of stroke without residual deficits    Last ordered: 3 months ago (6/8/2024) by Rikki Rizvi MD    Last refill: 6/8/2024    Rx #: [id number]     LOV: 8/29/2024  RTC: none noted   Last Relevant Labs: 4/15/2024  Filled: June 2024 #90 with 0 refills    Future Appointments   Date Time Provider Department Center   9/9/2024 11:40 AM [id number]   9/30/2024  1:00 PM Wenceslao Bishop MD ENIWARREN [facility] [place]   10/23/2024  5:45 PM Georges Orozco DPM [id number]   2/28/2025 11:30 AM Rikki Rizvi MD EMG 8 [facility] [place]

## 2024-09-10 RX ORDER — GABAPENTIN 100 MG/1
100 CAPSULE ORAL DAILY
Qty: 30 CAPSULE | Refills: 2 | Status: SHIPPED | OUTPATIENT
Start: 2024-09-10 | End: 2024-12-09

## 2024-09-10 NOTE — TELEPHONE ENCOUNTER
Gabapentin 100 mg  Filled 8-8-24  Qty 30  0 refills  Future Appointments   Date Time Provider Department Center   9/30/2024  1:00 PM Wenceslao Bishop MD ENIWARR [facility] [place]   10/23/2024  5:45 PM Georges Orozco DPM [id number]   2/28/2025 11:30 AM Rikki Rizvi MD EMG 8 EMG [facility] 8-29-24 TO

## 2024-09-11 ENCOUNTER — TELEPHONE (OUTPATIENT)
Dept: INTERNAL MEDICINE CLINIC | Facility: CLINIC | Age: 74
End: 2024-09-11

## 2024-09-11 ENCOUNTER — HOSPITAL ENCOUNTER (OUTPATIENT)
Dept: MAMMOGRAPHY | Age: 74
Discharge: HOME | End: 2024-09-11
Attending: FAMILY MEDICINE
Payer: MEDICARE

## 2024-09-11 DIAGNOSIS — E11.9 TYPE 2 DIABETES MELLITUS WITHOUT COMPLICATION, WITHOUT LONG-TERM CURRENT USE OF INSULIN (HCC): Primary | ICD-10-CM

## 2024-09-11 DIAGNOSIS — Z12.31 VISIT FOR SCREENING MAMMOGRAM: ICD-10-CM

## 2024-09-11 PROCEDURE — 77063 BREAST TOMOSYNTHESIS BI: CPT | Performed by: FAMILY MEDICINE

## 2024-09-11 PROCEDURE — 77067 SCR MAMMO BI INCL CAD: CPT | Performed by: FAMILY MEDICINE

## 2024-09-11 NOTE — TELEPHONE ENCOUNTER
Requesting refill for:   Glucose Blood (CONTOUR NEXT TEST) In Vitro Strip 100 strip       Per patient insurance needs PA for RX.     Please advise.    CB# [id number]     Cb# [phone]

## 2024-09-30 ENCOUNTER — OFFICE VISIT (OUTPATIENT)
Dept: NEUROLOGY | Facility: CLINIC | Age: 74
End: 2024-09-30
Payer: MEDICARE

## 2024-09-30 VITALS
HEIGHT: 62 IN | BODY MASS INDEX: 27.42 KG/M2 | WEIGHT: 149 LBS | SYSTOLIC BLOOD PRESSURE: 110 MMHG | HEART RATE: 69 BPM | DIASTOLIC BLOOD PRESSURE: 64 MMHG | RESPIRATION RATE: 16 BRPM

## 2024-09-30 DIAGNOSIS — I72.0 CAROTID ANEURYSM, LEFT (HCC): Primary | ICD-10-CM

## 2024-09-30 DIAGNOSIS — D33.3 VESTIBULAR SCHWANNOMA (HCC): ICD-10-CM

## 2024-09-30 DIAGNOSIS — G50.0 TRIGEMINAL NEURALGIA SYNDROME: ICD-10-CM

## 2024-09-30 PROCEDURE — 99213 OFFICE O/P EST LOW 20 MIN: CPT | Performed by: OTHER

## 2024-09-30 NOTE — PATIENT INSTRUCTIONS
Refill policies:    Allow 2-3 business days for refills; controlled substances may take longer.  Contact your pharmacy at least 5 days prior to running out of medication and have them send an electronic request or submit request through the “request refill” option in your [employer] account.  Refills are not addressed on weekends; covering physicians do not authorize routine medications on weekends.  No narcotics or controlled substances are refilled after noon on Fridays or by on call physicians.  By law, narcotics must be electronically prescribed.  A 30 day supply with no refills is the maximum allowed.  If your prescription is due for a refill, you may be due for a follow up appointment.  To best provide you care, patients receiving routine medications need to be seen at least once a year.  Patients receiving narcotic/controlled substance medications need to be seen at least once every 3 months.  In the event that your preferred pharmacy does not have the requested medication in stock (e.g. Backordered), it is your responsibility to find another pharmacy that has the requested medication available.  We will gladly send a new prescription to that pharmacy at your request.    Scheduling Tests:    If your physician has ordered radiology tests such as MRI or CT scans, please contact Central Scheduling at [phone] right away to schedule the test.  Once scheduled, the [facility] Centralized Referral Team will work with your insurance carrier to obtain pre-certification or prior authorization.  Depending on your insurance carrier, approval may take 3-10 days.  It is highly recommended patients assure they have received an authorization before having a test performed.  If test is done without insurance authorization, patient may be responsible for the entire amount billed.      Precertification and Prior Authorizations:  If your physician has recommended that you have a procedure or additional testing performed the [facility]  Centralized Referral Team will contact your insurance carrier to obtain pre-certification or prior authorization.    You are strongly encouraged to contact your insurance carrier to verify that your procedure/test has been approved and is a COVERED benefit.  Although the [facility] Centralized Referral Team does its due diligence, the insurance carrier gives the disclaimer that \"Although the procedure is authorized, this does not guarantee payment.\"    Ultimately the patient is responsible for payment.   Thank you for your understanding in this matter.  Paperwork Completion:  If you require FMLA or disability paperwork for your recovery, please make sure to either drop it off or have it faxed to our office at [phone]. Be sure the form has your name and date of birth on it.  The form will be faxed to our Forms Department and they will complete it for you.  There is a 25$ fee for all forms that need to be filled out.  Please be aware there is a 10-14 day turnaround time.  You will need to sign a release of information ([employer]) form if your paperwork does not come with one.  You may call the Forms Department with any questions at [phone].  Their fax number is [phone].

## 2024-09-30 NOTE — PROGRESS NOTES
[facility] Progress Note    HPI  Chief Complaint   Patient presents with    Neurologic Problem     L side carotid aneurysm/trigeminal neuralgia syndrome/vestibular schwannoma, notes recent found \"second tumor\" on L side, ongoing manageable pain tolerance w/ Rx, noted minor R side head pain, ongoing episodes of balance challenges w/o trips/falls    Test Results     MRI/MRA Brain 7/18, MRI IAC 7/18     In summary of prior visits:   \"     Patient presents in follow-up after recently discharged 4/12/17, following admission for acute R sided paresthesia with stroke in left basal ganglia .  Patient previously returned 4-13/17 for recurrence of her prior similar stroke symptoms approximately 9 AM, after she had woken up and felt tingling in the right side of the face.  She denied any associated headaches, nausea/emesis, vertigo, double vision, loss of vision, or focal weakness.  Symptoms were persistent throughout the day, but gradually improved.  Patient was evaluated in the ER and had MRI of the brain, which demonstrated possible acute on subacute infarct, but she was discharged on same dose of Plavix, and atorvastatin, as her recent stroke workup was completed. \"       Prior notes as per 7/7/2020.  Overall, since last visit, patient has been doing well in terms of stroke symptoms; she remains on Plavix and denies any new focal numbness/tingling/weakness, speech issues or vision changes; no new stroke or TIA symptoms.     She remains on gabapentin 600 mg bid and denies significant side effects.  However, since April she has been having worsening pain in the left hand.  She states this is worse in the night time even though she has been wearing her braces on her wrists at night.  She denies any weakness but states her symptoms limit her ability to do things in the left hand; worsens when she is gripping steering wheel.  Overall, R hand is not as severe.       Headaches have been stable and she  previously had MRI/ MRA which showed a small left cavernous carotid aneurysm and was seen by neuro-interventional service with no intervention and monitoring yearly recommended.     Prior notes as per 10/26/2023.  Patient last seen 7/7/2020.  She states she is here for a new issue. She states on 9/20/2023, she had new onset left ear pain, which would shoot down the jaw but also up to the top of the head.  She states if she would touch her head on the left side or touch her hair, she would have severe pain.  She still has been having some feeling like her hearing is muffled on the left side; she also states when she was trying to write.  When this first occurred, she also noted she was having difficulty with hand writing as well. She denied vision changes or double vision.        She was told she had trigeminal neuralgia and started on gabapentin by her PCP - currently up to 100 mg bid with some improvement.           Prior notes as per 1/29/2024.  Patient last seen 10/26/2203.  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 She was advised to see neurosurgery / neuro-interventional service (Dr. Yanes) for additional recommendations and was recommended to have serial imaging for surveillance rather than traditional angiogram.      She is still on gabapentin 100 mg bid but recently reduced to 100 mg daily by PCP with plan to stop taking if still not having left facial pain; this is resolved currently; she has some pain in R leg below knee - evaluation for DVT was normal.      Prior notes as per 5/30/2024.  Patient las seen 1/29/2024. She has been doing well overall aside from a few episodes of transient pain over the right side of the head lateral aspect of head, but no pain in the face and no vision changes or muscle twitching and denies associated light / sound  sensitivity or nausea.  She has been on gabapentin at lower dose of 100 mg daily per PCP.     She had episode about a week and a half ago during which she was sleeping and had pain in the right leg, followed by \"feeling of shaking\" of right hand digits 1-3 for !0 minutes but no loss of awareness or visible tremor (felt \"internal\").        Patient last seen 2024. Since last visit, she has been been doing well; denies new numbness/tingling/weakness but has intermittent dizzy spells, with no loss of awareness or auras of odd tastes, smells or sounds. She denies any other new neurologic symptoms and was seen by neuro-interventiional service with plan for continued watchful waiting with no intervention needed. She remains on gabapentin 100 mg daily.        Past Medical History:    Abnormal maternal glucose tolerance, complicating pregnancy, childbirth, or the puerperium, unspecified as to episode of care    Acute bronchitis    Back problem    Cataract    Chest pain, unspecified    admit     Deep vein thrombosis (HCC)    during pregnancy right leg    Diabetes (HCC)    Diverticulosis of large intestine    unsure if divertiulitis or diverticulosis    Esophageal reflux    Essential hypertension, benign    Helicobacter pylori gastritis    High blood pressure    High cholesterol    Incontinence    Osteoarthritis    knees    Palpitations    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     PONV (postoperative nausea and vomiting)    nausea,pain results in cardiac vasospasms    Sleep apnea    no device    Stroke (HCC)    r/o 17    Unspecified gastritis and gastroduodenitis without mention of hemorrhage    Visual impairment    glasses     Past Surgical History:   Procedure Laterality Date    Back surgery      Spinal diskectomy lumbar    at [place]        1987    Cardiac catherterization (pbp)      status normal resolved on 2007    Cataract       Cholecystectomy  1999    Colonoscopy  2011    colon polyp removal  ma  coleman 5    Colonoscopy      Colonoscopy N/A 2017    Procedure: COLONOSCOPY;  Surgeon: Addison Castellano MD;  Location:  ENDOSCOPY    Hernia surgery  1999    Umbilical hernia repair;     Hysterectomy      d/t fibroids    Lumbar discectomy Left 2019    L4-L5   [facility]    Other      Spinal Steroid Injection     Other surgical history  2024    RIGHT KNEE ARTHROSCOPY PARTIAL LATERAL MENISCECTOMY SYNOVECTOMY ABRASIONPLASTY, REMOVAL OF SOFT TISSUE MASS    Shoulder surg proc unlisted      Spine surgery procedure unlisted      bulging discs in lower back    Upper gi endoscopy performed  2011     Family History   Problem Relation Age of Onset    Cancer Father          at age 52 Leukemia    Diabetes Father     Breast Cancer Paternal Grandmother         unknown    Cancer Paternal Grandmother         bone cancer    Cancer Other      Social History     Socioeconomic History    Marital status:    Tobacco Use    Smoking status: Never     Passive exposure: Never    Smokeless tobacco: Never   Vaping Use    Vaping status: Never Used   Substance and Sexual Activity    Alcohol use: Not Currently     Comment: very seldom    Drug use: No   Other Topics Concern    Caffeine Concern Yes     Comment: 1 cup of coffee every morning and 1 glass of tea during dinner.  Sometimes dark chocolate.      Exercise No       Allergies   Allergen Reactions    Penicillins RASH and SWELLING    Advil [Ibuprofen] NAUSEA AND VOMITING and DIZZINESS    Aspirin NAUSEA AND VOMITING and DIZZINESS    Codeine Sulfate NAUSEA AND VOMITING    Demerol NAUSEA ONLY and DIZZINESS    Hydrocodone NAUSEA ONLY and DIZZINESS    Naproxen DIZZINESS    Norvasc [Amlodipine Besylate] NAUSEA AND VOMITING    Nsaids NAUSEA ONLY and DIZZINESS    Tramadol NAUSEA AND VOMITING and DIZZINESS    Zetia [Ezetimibe] NAUSEA AND VOMITING     Tabs,Muscle spasm           Current Outpatient Medications:     Glucose Blood (CONTOUR NEXT TEST) In Vitro Strip, Use daily--E11.9 Non insulin dependent, Disp: 100 strip, Rfl: 2    gabapentin 100 MG Oral Cap, Take 1 capsule (100 mg total) by mouth daily., Disp: 30 capsule, Rfl: 2    METFORMIN 500 MG Oral Tab, TAKE 1 TABLET BY MOUTH TWICE DAILY WITH MEALS, Disp: 180 tablet, Rfl: 0    CLOPIDOGREL 75 MG Oral Tab, TAKE 1 TABLET BY MOUTH DAILY, Disp: 90 tablet, Rfl: 0    RANOLAZINE  MG Oral Tablet 12 Hr, TAKE ONE TABLET BY MOUTH ONE TIME DAILY, Disp: 90 tablet, Rfl: 0    OMEPRAZOLE 40 MG Oral Capsule Delayed Release, TAKE 1 CAPSULE BY MOUTH DAILY (Patient taking differently: Take 1 capsule (40 mg total) by mouth. Patient takes medication 4 times weekly--Sun,Mon,Wedn,Friday), Disp: 90 capsule, Rfl: 0    ISOSORBIDE MONONITRATE  MG Oral Tablet 24 Hr, Take 1 tablet (120 mg total) by mouth daily., Disp: 90 tablet, Rfl: 0    FUROSEMIDE 20 MG Oral Tab, Take 1 tablet (20 mg total) by mouth daily. (Patient taking differently: Take 1 tablet (20 mg total) by mouth every other day.), Disp: 30 tablet, Rfl: 0    NIFEDIPINE ER 60 MG Oral Tablet 24 Hr, TAKE ONE TABLET BY MOUTH DAILY, Disp: 90 tablet, Rfl: 0    ATORVASTATIN 40 MG Oral Tab, TAKE ONE TABLET BY MOUTH ONE TIME DAILY IN THE EVENING, Disp: 90 tablet, Rfl: 0    LISINOPRIL-HYDROCHLOROTHIAZIDE 20-25 MG Oral Tab, Take 1 tablet by mouth daily., Disp: 90 tablet, Rfl: 0    cyclobenzaprine 5 MG Oral Tab, Take 1 tablet (5 mg total) by mouth 3 (three) times daily as needed for Muscle spasms., Disp: 20 tablet, Rfl: 0    Sennosides-Docusate Sodium 8.6-50 MG Oral Cap, Take 1 capsule by mouth daily as needed., Disp: 30 capsule, Rfl: 1    omega-3 fatty acids 1000 MG Oral Cap, Take 1,000 mg by mouth every other day., Disp: , Rfl:     NON FORMULARY, Take 1 tablet by mouth daily. LACTATE PILLS, Disp: , Rfl:     nitroGLYCERIN (NITROSTAT) 0.4 MG Sublingual SL Tab, Place 1 tablet (0.4 mg total) under the  tongue every 5 (five) minutes as needed., Disp: 30 tablet, Rfl: 5    Black Elderberry,Berry-Flower, 575 MG Oral Cap, Take by mouth every other day., Disp: , Rfl:     cetirizine 10 MG Oral Tab, Take 1 tablet (10 mg total) by mouth daily., Disp: , Rfl:     PEG 3350 Oral Powd Pack, Take 17 g by mouth nightly., Disp: , Rfl:     METOPROLOL TARTRATE 25 MG Oral Tab, TAKE 1 TABLET BY MOUTH 2 TIMES DAILY, Disp: 180 tablet, Rfl: 0    Microlet Lancets Does not apply Misc, USE TO TEST ONCE DAILY *DX E11.9*, Disp: 100 each, Rfl: 1    mometasone 0.1 % External Ointment, Apply topically as needed., Disp: , Rfl:     Multiple Vitamin (MULTI-VITAMIN) Oral Tab, Take 1 tablet by mouth every other day., Disp: , Rfl:     Review of Systems:  No chest pain or palpitations; otherwise as noted in HPI.    Exam:  /64 (BP Location: Left arm, Patient Position: Sitting, Cuff Size: adult)   Pulse 69   Resp 16   Ht 62\"   Wt 149 lb (67.6 kg)   BMI 27.25 kg/m²   Estimated body mass index is 27.25 kg/m² as calculated from the following:    Height as of this encounter: 62\".    Weight as of this encounter: 149 lb (67.6 kg).    Gen: well developed, well nourished, no acute distress  HEENT: normocephalic  Heart; normal S1/S2, regular rate and rhythm  Lungs: clear to auscultation bilaterally  Extremities: no edema, peripheral pulses intact    Neck: supple, full range of motion; no carotid bruits; no meningitic signs      Neuro:    NIHSS: 0     Mental status:  Orientation: Alert and oriented to person, place, time  Speech fluent and conversational     CN: PERRL, EOMI without nystagmus, VFF, smile symmetric, sensation intact, tongue and palate midline, SCM intact; otherwise, 2-12 intact; no sensory deficit in V1,V2, V3 distribution   Motor: 5/5 strength throughout, tone normal - no drift; no tremor noted; no bradykinesia or rigidity noted   DTR: 2+, symmetric, throughout, toes downgoing bilaterally; no clonus  Sensory: intact to light touch and  pin throughout grossly  Coord: FNF intact with no tremor or dysmetria  Romberg:absent  Gait: antalgic gait due to knee pain    Labs:   None new since last visit:     Prior as noted below  Component      Latest Ref Rng & Units 12/5/2019 12/2/2019 12/12/2018   WBC      4.0 - 11.0 x10(3) uL 4.3     RBC      3.80 - 5.30 x10(6)uL 3.74 (L)     Hemoglobin      12.0 - 16.0 g/dL 10.9 (L)     Hematocrit      35.0 - 48.0 % 33.4 (L)     Platelet Count      150.0 - 450.0 10(3)uL 222.0     MCV      80.0 - 100.0 fL 89.3     MCH      26.0 - 34.0 pg 29.1     MCHC      31.0 - 37.0 g/dL 32.6     RDW      11.0 - 15.0 % 13.2     RDW-SD      35.1 - 46.3 fL 43.3     Prelim Neutrophil Abs      1.50 - 7.70 x10 (3) uL 2.69     Neutrophils Absolute      1.50 - 7.70 x10(3) uL 2.69     Lymphocytes Absolute      1.00 - 4.00 x10(3) uL 1.05     Monocytes Absolute      0.10 - 1.00 x10(3) uL 0.44     Eosinophils Absolute      0.00 - 0.70 x10(3) uL 0.12     Basophils Absolute      0.00 - 0.20 x10(3) uL 0.02     Immature Granulocyte Absolute      0.00 - 1.00 x10(3) uL 0.00     Neutrophils %      % 62.2     Lymphocytes %      % 24.3     Monocytes %      % 10.2     Eosinophils %      % 2.8     Basophils %      % 0.5     Immature Granulocyte %      % 0.0     Glucose      70 - 99 mg/dL 93     Sodium      136 - 145 mmol/L 143     Potassium      3.5 - 5.1 mmol/L 3.9     Chloride      98 - 112 mmol/L 111     Carbon Dioxide, Total      21.0 - 32.0 mmol/L 29.0     ANION GAP      0 - 18 mmol/L 3     BUN      7 - 18 mg/dL 10     CREATININE      0.55 - 1.02 mg/dL 0.70     BUN/CREAT Ratio      10.0 - 20.0 14.3     CALCIUM      8.5 - 10.1 mg/dL 9.0     CALCULATED OSMOLALITY      275 - 295 mOsm/kg 295     eGFR NON-AFR. AMERICAN      >=60 89     eGFR       >=60 103     AST (SGOT)      15 - 37 U/L 36     ALT (SGPT)      13 - 56 U/L 51     ALKALINE PHOSPHATASE      55 - 142 U/L 105     Total Bilirubin      0.1 - 2.0 mg/dL 0.4     TOTAL PROTEIN      6.4  - 8.2 g/dL 6.4     Albumin      3.4 - 5.0 g/dL 3.0 (L)     Globulin      2.8 - 4.4 g/dL 3.4     A/G Ratio      1.0 - 2.0 0.9 (L)     HEMOGLOBIN A1c      <5.7 %  6.4 (H) 7.2 (H)   ESTIMATED AVERAGE GLUCOSE      68 - 126 mg/dL  137 (H) 160 (H)       Prior as noted below  Component      Latest Ref Rng & Units 9/11/2019   WBC      4.0 - 11.0 x10(3) uL 5.1   RBC      3.80 - 5.30 x10(6)uL 4.33   Hemoglobin      12.0 - 16.0 g/dL 12.7   Hematocrit      35.0 - 48.0 % 38.0   Platelet Count      150.0 - 450.0 10(3)uL 299.0   MCV      80.0 - 100.0 fL 87.8   MCH      26.0 - 34.0 pg 29.3   MCHC      31.0 - 37.0 g/dL 33.4   RDW      11.0 - 15.0 % 13.2   RDW-SD      35.1 - 46.3 fL 42.3   Prelim Neutrophil Abs      1.50 - 7.70 x10 (3) uL 3.11   Neutrophils Absolute      1.50 - 7.70 x10(3) uL 3.11   Lymphocytes Absolute      1.00 - 4.00 x10(3) uL 1.24   Monocytes Absolute      0.10 - 1.00 x10(3) uL 0.61   Eosinophils Absolute      0.00 - 0.70 x10(3) uL 0.11   Basophils Absolute      0.00 - 0.20 x10(3) uL 0.02   Immature Granulocyte Absolute      0.00 - 1.00 x10(3) uL 0.01   Neutrophils %      % 60.9   Lymphocytes %      % 24.3   Monocytes %      % 12.0   Eosinophils %      % 2.2   Basophils %      % 0.4   Immature Granulocyte %      % 0.2   Glucose      70 - 99 mg/dL 91   Sodium      136 - 145 mmol/L 141   Potassium      3.5 - 5.1 mmol/L 4.1   Chloride      98 - 112 mmol/L 108   Carbon Dioxide, Total      21.0 - 32.0 mmol/L 28.0   ANION GAP      0 - 18 mmol/L 5   BUN      7 - 18 mg/dL 16   CREATININE      0.55 - 1.02 mg/dL 0.89   BUN/CREAT Ratio      10.0 - 20.0 18.0   CALCIUM      8.5 - 10.1 mg/dL 9.5   CALCULATED OSMOLALITY      275 - 295 mOsm/kg 293   eGFR NON-AFR. AMERICAN      >=60 67   eGFR       >=60 77   AST (SGOT)      15 - 37 U/L 29   ALT (SGPT)      13 - 56 U/L 38   ALKALINE PHOSPHATASE      55 - 142 U/L 83   Total Bilirubin      0.1 - 2.0 mg/dL 0.6   TOTAL PROTEIN      6.4 - 8.2 g/dL 7.3   Albumin      3.4  - 5.0 g/dL 3.8   Globulin      2.8 - 4.4 g/dL 3.5   A/G Ratio      1.0 - 2.0 1.1       Imaging:     None new    Prior as noted below    Result Date: 11/21/2023  CONCLUSION:   1. No acute intracranial abnormality identified.  2. There is a subtle nodular focus of enhancement in lateral aspect of the right internal auditory canal measuring up to 6 x 2 x 3 mm that is concerning for a this tubular schwannoma.  Clinical correlation recommended.  The inner ear structures have a normal signal and morphology. The vestibular aqueducts are nondilated.  3. A branch of the left superior cerebellar artery course along the superior and medial aspects of the cisternal segment of the left trigeminal nerve resulting in local deformity and slight lateral displacement.  Clinical correlation recommended.  4. Mild chronic microvascular ischemic changes in the cerebral white matter.  Old lacunar infarct in the left thalamus.  5. A stable saccular aneurysm is seen projecting laterally from cavernous segment of the left internal carotid artery measuring up to 3 mm.  6. There is a nonspecific focal area of vascular prominence noted projecting anteriorly from area of the origin of the left ophthalmic artery measuring up to 4 mm that is concerning for a small aneurysm versus infundibulum, with vascular tortuosity or artifact not entirely excluded.  This could be further assessed with conventional cerebral angiography as clinically directed.  7. No evidence of occlusion, dissection, or flow-limiting stenosis in the cervical vertebral or carotid arteries. No evidence of hemodynamically significant carotid stenosis by NASCET criteria.        Prior as noted below    MRI brain with and without contrast / MRA brain (9/12/19):     FINDINGS:    MRI BRAIN  INTRACRANIAL:  There is no evidence of acute ischemia or infarction.  There is scattered subcortical high signal white matter lesions seen in the centrum semiovale and periventricular white matter  similar to the prior examination of 4/24/2018 consistent   with chronic microvascular disease.  There are no diffusion abnormalities evident.  There is an old lacunar infarct of the left posterior lateral thalamus measuring approximately 6 mm.   VENTRICLES/SULCI:   Ventricles and sulci are normal in caliber.  There are no extra-axial fluid collections.  There is no midline shift.  SINUSES/ORBITS:       The visualized paranasal sinuses are clear.  The orbits are unremarkable.  MASTOIDS:       The mastoids are clear.     MRA BRAIN  INTRACRANIAL CAROTIDS:         Within the left cavernous carotid segment there is a 2.9 x 2.1 mm aneurysm.  This is probably on a short neck which is difficult to measure but is estimated at between 1 and 1.5 mm.  The right cavernous carotid segment is   normal.  The A1 and M1 segments are normal.  The vertebrobasilar junction is normal.  There is a dominant left vertebral artery.  The basilar artery and basilar tip are normal.  There is fetal origin of the right PCA.  There is a patent left posterior   communicating artery.  The PCA branches are symmetrical and normal bilaterally.  ANTERIOR CEREBRALS:         No visible stenosis or aneurysm.  ANTERIOR COMMUNICATING:  No visible stenosis or aneurysm.  MIDDLE CEREBRALS:         No visible stenosis or aneurysm.  POSTERIOR COMMUNICATING: No visible stenosis or aneurysm.  SUPERIOR CEREBELLARS:         No visible stenosis or aneurysm.  BASILAR:             No visible stenosis or aneurysm.  INTRACRANIAL VERTEBRALS: No visible significant stenosis or dissection.     =====  CONCLUSION:  No evidence of acute cerebral ischemia or infarction.  No evidence of ventriculomegaly or intra-axial mass lesion or hemorrhage.     Chronic microvascular disease involving both cerebral hemispheres unchanged from 4/24/2018.  Noted is a old 6 mm left posterior lateral thalamic lacunar infarct.     2.9 x 2.1 mm left cavernous carotid aneurysm with short neck.  No  other evidence of aneurysm formation or branch occlusion is identified intracranially.       No results found.    Piror as noted below:    MRI brain (4/24//18):     FINDINGS:    Cerebral atrophy is present with symmetrical prominence of the ventricles.  There are scattered patchy areas of increased T2 intensity seen  predominantly within the periventricular white matter. These findings are consistent with age related changes.   There is no evidence of hemorrhage or mass. There is no evidence of acute infarction. No extra-axial fluid collection or midline shift is noted.     The visualized paranasal sinuses show mucous retention cyst within the left maxillary sinus..     =====  CONCLUSION:  1. No acute intracranial findings.  2. Cerebral atrophy with small vessel changes.    Other procedures:     None new since last visit    Prior as noted below    NCS/EMG (7/16/2020):     Sensory NCS      Nerve / Sites Rec. Site Onset Lat Peak Lat NP Amp PP Amp Segments Distance Peak Diff Velocity       ms ms µV µV   cm ms m/s   R Median - Digit II (Antidromic)      Wrist Dig II 3.70 4.38 1.7 1.8 Wrist - Dig II 13   35   L Median - Digit II (Antidromic)      Wrist Dig II NR NR NR NR Wrist - Dig II 13   NR   R Ulnar - Digit V (Antidromic)      Wrist Dig V 2.14 2.66 3.0 5.7 Wrist - Dig V 11   52      B.Elbow Dig V 2.08 2.60 3.3 5.9 B.Elbow - Wrist   -0.05     L Ulnar - Digit V (Antidromic)      Wrist Dig V 1.93 2.71 23.1 37.9 Wrist - Dig V 11   57      B.Elbow Dig V 1.88 2.66 25.1 41.7 B.Elbow - Wrist   -0.05     R Radial - Anatomical snuff box (Forearm)      Forearm Wrist 1.72 2.29 5.2 4.4 Forearm - Wrist 10   58   L Radial - Anatomical snuff box (Forearm)      Forearm Wrist 1.61 2.19 41.1 53.7 Forearm - Wrist 10   62   R Median, Ulnar - Transcarpal comparison      Median Palm Wrist 2.34 3.02 4.3 3.1 Median Palm - Wrist 8   34      Ulnar Palm Wrist 1.35 1.88 3.1 3.0 Ulnar Palm - Wrist 8   59               Median Palm - Ulnar Palm    1.15     L Median, Ulnar - Transcarpal comparison      Median Palm Wrist NR NR NR NR Median Palm - Wrist 8   NR      Ulnar Palm Wrist 1.35 1.77 26.2 36.7 Ulnar Palm - Wrist 8   59               Median Palm - Ulnar Palm   NR         Motor NCS      Nerve / Sites Muscle Latency Amplitude Amp % Duration Area Segments Distance Lat Diff Velocity       ms mV % ms mVms   cm ms m/s   R Median - APB      Wrist APB 4.06 5.6 100 4.64 15.2 Wrist - APB 7          Elbow APB 7.81 5.4 95.9 4.79 14.7 Elbow - Wrist 20 3.75 53      A.Elbow APB 4.01 10.6 187 4.53 28.6 A.Elbow - Elbow   -3.80     L Median - APB      Wrist APB 5.05 2.9 100 8.80 11.5 Wrist - APB 7          Elbow APB 8.70 2.8 96.5 8.91 11.9 Elbow - Wrist 20 3.65 55   R Ulnar - ADM      Wrist ADM 2.40 12.7 100 4.84 36.2 Wrist - ADM 7          B.Elbow ADM 5.63 12.8 101 5.10 36.8 B.Elbow - Wrist 20 3.23 62   L Ulnar - ADM      Wrist ADM 2.40 9.8 100 5.99 30.4 Wrist - ADM 7          B.Elbow ADM 5.83 10.1 104 5.89 31.9 B.Elbow - Wrist 20.5 3.44 60       F  Wave      Nerve F Lat M Lat F-M Lat     ms ms ms   R Median - APB 25.3 4.4 20.9   R Ulnar - ADM 25.5 2.4 23.1   L Median - APB 28.0 5.1 22.9   L Ulnar - ADM 26.2 2.6 23.6       EMG                       EMG Summary Table       Spontaneous MUAP Recruitment   Muscle Nerve Roots IA Fib PSW Fasc H.F. Amp Dur. PPP Pattern   L. Deltoid Axillary C5-C6 N None None None None N N N N   L. Biceps brachii Musculocutaneous C5-C6 N None None None None N N N N   L. Triceps brachii Radial C6-C8 N None None None None N N N N   L. Extensor digitorum communis Radial C7-C8 N None None None None N N N N   L. Abductor pollicis brevis Median C8-T1 N None None None None N N N N   L. First dorsal interosseous Ulnar C8-T1 N None None None None N N N N   R. Deltoid Axillary C5-C6 N None None None None N N N N   R. Biceps brachii Musculocutaneous C5-C6 N None None None None N N N N   R. Triceps brachii Radial C6-C8 N None None None None N N N N   R.  Extensor digitorum communis Radial C7-C8 N None None None None N N N N   R. Abductor pollicis brevis Median C8-T1 N None None None None N N N N   R. First dorsal interosseous Ulnar C8-T1 N None None None None N N N N               Summary:     Nerve conduction studies:  1.  Right median sensory response was abnormal due to markedly reduced amplitude, with normal peak latency, and normal conduction velocity.  2.  Left median sensory response was absent.  3.  Right ulnar sensory response was abnormal due to markedly reduced amplitude, with normal peak latency, and normal conduction velocity.  4.  Left ulnar sensory response was normal.  5.  Right radial sensory response was abnormal due to markedly reduced amplitude, with normal peak latency, and normal conduction velocity.  6.  Left radial sensory spots was normal.  7.  Right median to ulnar palmar mixed nerve comparison study demonstrated significant inter-latency prolongation of median relative to ulnar nerve (approximately 1.15 ms), consistent with median nerve entrapment across the right wrist.  8.  Left median to ulnar palmar mixed nerve comparison study demonstrated absent median response.  9.  Right median motor response was normal; F wave response was normal.  10.  Left median motor response was abnormal due to prolonged peak latency, with reduced amplitude, and normal conduction velocity; F wave response was normal.  11.  Right ulnar motor response was normal; F wave response was normal.  12.  Left ulnar motor response was normal; F wave response was normal.     EMG (needle exam):  Concentric needle EMG was performed on the selected muscles listed above, with the following findings: All muscles tested were normal, including bilateral APB muscles.        Impression:   This is an abnormal study.  There is electrophysiologic evidence consistent with bilateral median nerve entrapment across the wrist, with the left more severely affected, with primarily  demyelinating findings, with absent sensory response and reduced CMAP, but without any evidence for secondary denervation.     In addition, there is evidence for localized sensory neuropathic process in the right upper extremity, which may be related to prior history of trauma.  Clinical correlation is advised.  There is no electrophysiologic evidence to suggest a generalized large fiber polyneuropathy, or myopathy.     Overall, the findings of bilateral median nerve entrapment suggest a moderate to severe median nerve entrapment across the left wrist, and a mild median nerve entrapment across the right wrist, with sensory nerve affected only, with demyelinating type changes, and no secondary axonal loss.     Prior as noted below    EMG 5/4/18:     Sensory NCS      Nerve / Sites Rec. Site Onset Lat Peak Lat NP Amp PP Amp Segments Distance Peak Diff Velocity       ms ms µV µV   cm ms m/s   R Median - Digit II (Antidromic)      Wrist Dig II 3.44 4.48 22.6 44.8 Wrist - Dig II 14   41   R Ulnar - Digit V (Antidromic)      Wrist Dig V 2.14 2.92 33.5 43.7 Wrist - Dig V 11   52      B.Elbow Dig V 2.08 2.86 33.4 45.6 B.Elbow - Wrist   -0.05     R Radial - Anatomical snuff box (Forearm)      Forearm Wrist 1.77 2.34 31.5 49.2 Forearm - Wrist 10   56   R Sural - Ankle (Calf)      Calf Ankle 2.55 3.18 5.9 8.2 Calf - Ankle 13   51      2 Ankle 2.34 3.13 2.9 9.0              3 Ankle 2.66 3.33 2.5 5.2           R Median, Ulnar - Transcarpal comparison      Median Palm Wrist 2.50 3.07 19.5 28.4 Median Palm - Wrist 8   32      Ulnar Palm Wrist 1.35 1.93 24.0 25.8 Ulnar Palm - Wrist 8   59               Median Palm - Ulnar Palm   1.15         Motor NCS      Nerve / Sites Muscle Latency Amplitude Amp % Duration Area Segments Distance Lat Diff Velocity       ms mV % ms mVms   cm ms m/s   R Median - APB      Wrist APB 4.43 4.2 100 7.55 13.4 Wrist - APB 7          Elbow APB 8.28 4.0 96.3 7.45 13.0 Elbow - Wrist 19.5 3.85 51   R Ulnar -  ADM      Wrist ADM 2.45 10.6 100 5.63 30.1 Wrist - ADM 7          B.Elbow ADM 5.57 10.7 102 5.89 31.4 B.Elbow - Wrist 19.5 3.13 62      A.Elbow ADM 7.50 10.6 101 5.89 30.5 A.Elbow - B.Elbow 10 1.93 52   R Peroneal - EDB      Ankle EDB 4.27 0.6 100 4.43 1.8 Ankle - EDB 7          Fib head EDB 10.10 0.7 111 4.79 2.1 Fib head - Ankle 28 5.83 48   R Tibial - AH      Ankle AH 4.01 8.9 100 6.20 28.5 Ankle - AH 8          Pop fossa AH 11.15 5.8 65.1 7.08 22.3 Pop fossa - Ankle 38 7.14 53   R Peroneal - Tib Ant      Fib Head Tib Ant 4.11 7.9 100 8.54 42.4 Fib Head - Tib Ant 11          Pop fossa Tib Ant 5.57 7.8 99.1 8.65 42.6 Pop fossa - Fib Head 7.5 1.46 51       F  Wave      Nerve F Lat M Lat F-M Lat     ms ms ms   R Peroneal - EDB NR NR NR   R Tibial - AH 39.8 4.9 34.8   R Median - APB 27.4 3.9 23.5   R Ulnar - ADM 25.5 2.1 23.4       EMG                       EMG Summary Table       Spontaneous MUAP Recruitment   Muscle Nerve Roots IA Fib PSW Fasc H.F. Amp Dur. PPP Pattern   R. Deltoid Axillary C5-C6 N None None None None N N N N   R. Biceps brachii Musculocutaneous C5-C6 N None None None None N N N N   R. Triceps brachii Radial C6-C8 N None None None None N N N N   R. Extensor digitorum communis Radial C7-C8 N None None None None N N N N   R. First dorsal interosseous Ulnar C8-T1 N None None None None N N N N   R. Vastus medialis Femoral L2-L4 N None None None None N N N N   R. Tibialis anterior Deep peroneal (Fibular) L4-L5 N None None None None 1+ 1+ N Reduced   R. Peroneus longus Perineal L5-S1 N None None None None 1+ 1+ N Reduced   R. Gastrocnemius (Medial head) Tibial S1-S2 N None None None None N N N N   R. Extensor hallucis longus Deep peroneal (Fibular) L5-S1 N None None None None 1+ 1+ N Reduced               Summary:     Right sural sensory response was borderline due to borderline amplitude, with normal peak latency and normal conduction velocity.   Right median sensory response was abnormal due to  prolonged peak latency, with normal amplitude and slowed conduction velocity   Right ulnar sensory response was normal  Right radial sensory response was normal.   Right median to ulnar palmar mixed nerve comparison study was abnormal and demonstrated significant interlatency prolongation of median relative to ulnar nerve (1.15 sec [>0.5 msec]), consistent with focal slowing of median nerve across the wrist and entrapment of median nerve across the wrist.   Right common peroneal motor response was abnormal due to markedly reduced amplitude, with normal distal motor latency and normal conduction velocity; F wave was absent  Right tibial motor response was normal; F wave was normal.  Right median motor response was borderline due to borderline distal motor latency and borderline amplitude with normal conduction velocity; F wave was normal.  Right ulnar motor response was normal; F wave was normal.      EMG (needle exam)  Concentric needle EMG was performed on the selected muscles listed above in the table, with the following findings:   No increased insertional or spontaneous activity was noted in any of the muscles  Motor unit potentials demonstrated chronic denervation with increased amplitude, duration and reduced recruitment in the R lower extremity in the R TA, peroneus longus and EHL muscles.         Impression:      This study is abnormal.  There is electrophysiologic evidence consistent with a Right median nerve entrapment across the wrist, with demyelinating features.  In addition, this is suggestive of but not diagnostic for a mild, chronic R lower lumbar radiculopathy.        There is no clear evidence for a large fiber polyneuropathy or myopathy as clinically questioned.     Diagnostic and imaging as previously noted below:    MRI brain / MRA head / neck (4/11/17):     FINDINGS:  Ventricles and sulci are normal caliber. No mass effect or midline shift. There is a small focus of restricted diffusion  involving the left basal ganglia measuring up to 0.6 cm. There is associated minimal low signal on the ADC map. There is    corresponding mild flair hyperintensity.        There is scattered mild chronic small vessel disease. No abnormal extra-axial fluid. Normal central flow voids.        The carotid arteries are patent. There is mild carotid bifurcation atherosclerosis. Narrowing is estimated at no more than 15-20% bilaterally. The vertebral arteries are patent. The left is dominant in caliber. The basilar artery is patent. There is no   carotid or vertebral basilar dissection. No focal high grade stenosis.        The anterior, middle and posterior cerebral arteries are patent. No intracranial aneurysm or arterial venous malformation demonstrated.        The superior cerebellar, AICA and PICA segments demonstrate flow related signal.          =====  CONCLUSION:     #1. Small subacute infarct left basal ganglia.  #2. Patent carotid and vertebral basilar systems. Mild carotid bifurcation atherosclerosis.  #3. Unremarkable [place] MRA.    Echo:   Conclusions:    1. Left ventricle: The cavity size was normal. Wall thickness was normal.     Systolic function was normal. The estimated ejection fraction was 65%. No     diagnostic evidence for regional wall motion abnormalities. Doppler     parameters are consistent with abnormal left ventricular relaxation -     grade 1 diastolic dysfunction.  2. Mitral valve: Systolic bowing without prolapse. There was trivial to mild     regurgitation.  3. Left atrium: The left atrial volume was mildly increased.  4. Right atrium: The atrium was mildly dilated.  5. Tricuspid valve: Structurally normal valve. There was moderate     regurgitation.  6. Pulmonary arteries: Systolic pressure was mildly increased, in the range     of 40mm Hg to 45mm Hg.  Impressions:  There was no diagnostic evidence for a cardiac embolic source.  This study is compared with previous dated 06-02-16:  Compared to the  previous study, systemic pressure has decreased from 151/70 mmHg to 137/65  mmHg. PA systolic pressure has decreased from ~ 60 mmHg to 40-45 mmHg.    Labs:     None new:    Prior as noted below     17:   LDL 43  Chol,total: 123  Tri  HDL 52    AST/ALT: normal     Prior as noted below:     LDL: 80  A1C: 6.5           Impression/ Plan:    Liss Fagan is a 74 year old woman with past medical history significant for HTN, DM type 2, GERD, who originally presented to ED 17, with numbness in R face/arm/ leg and found to have acute left basal ganglia infract     MRI brain previously repeated for similar symptoms and likely with extension of prior stroke    Workup unremarkable with normal Echo, LDL 80, A1C 6.5, and likely small vessel etiology.       Patient previously was seen earlier than scheduled due to new onset of severe headache, associated with nausea and numbness on the right side of the face.  This has since resolved.  Imaging demonstrated left cavernous carotid aneurysm 2019.  Patient was evaluated by neuro interventional radiology with no recommendation for acute intervention, and with recommendation for serial imaging ~ 1 yr from original image.      In terms of stroke symptoms, she is currently doing well on her dose of Plavix 75 mg daily and Lipitor 40 mg daily (could not tolerate ASA).        She subsequently presented for left side facial pain with symptoms somewhat suggestive of trigeminal neuralgia but with some atypical features with distribution primarily into V1 region and also with more continuous pain than lancinating pain.       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 She was advised to see neurosurgery / neuro-interventional service (Dr. Yanes) for additional recommendations and was recommended to have serial imaging for surveillance rather than traditional angiogram and has been following with neuro-interventional service with stable imaging and plan for continue monitoring with repeat imaging to be done ~1 year (ie ~ 7/2025).      She is now on gabapentin 100 mg daily and has been doing well; will monitor    1. Carotid aneurysm, left (HCC)  As noted above     2. Vestibular schwannoma (HCC)  As noted above     3. Trigeminal neuralgia syndrome  As noted above    Return in about 6 months (around 3/30/2025).    Wenceslao Bishop MD, Neurology  [facility]  Pager [phone]  9/30/2024

## 2024-10-03 DIAGNOSIS — I10 ESSENTIAL HYPERTENSION: ICD-10-CM

## 2024-10-03 DIAGNOSIS — Z86.73 HISTORY OF STROKE WITHIN LAST YEAR: ICD-10-CM

## 2024-10-03 RX ORDER — LISINOPRIL AND HYDROCHLOROTHIAZIDE 20; 25 MG/1; MG/1
1 TABLET ORAL DAILY
Qty: 90 TABLET | Refills: 0 | Status: SHIPPED | OUTPATIENT
Start: 2024-10-03

## 2024-10-03 RX ORDER — ATORVASTATIN CALCIUM 40 MG/1
40 TABLET, FILM COATED ORAL EVERY EVENING
Qty: 90 TABLET | Refills: 0 | Status: SHIPPED | OUTPATIENT
Start: 2024-10-03

## 2024-10-03 NOTE — TELEPHONE ENCOUNTER
Requesting   Name from pharmacy: Atorvastatin Calcium 40 Mg Tab [facility]          Will file in chart as: ATORVASTATIN 40 MG Oral Tab    Sig: TAKE ONE TABLET BY MOUTH ONE TIME DAILY IN THE EVENING    Disp: 90 tablet    Refills: 0    Start: 10/3/2024    Class: Normal    Non-formulary For: History of stroke within last year    Last ordered: 3 months ago (7/4/2024) by Rikki Rizvi MD    Last refill: 7/5/2024    Rx #: [id number]    Cholesterol Medication Protocol Rmfwvp57/03/2024 09:30 AM   Protocol Details ALT < 80    ALT resulted within past year    Lipid panel within past 12 months    In person appointment or virtual visit in the past 12 mos or appointment in next 3 mos       Name from pharmacy: Lisinopril/Hydrochlorothiazide 20-25 Mg Tab Sol[facility]         Will file in chart as: LISINOPRIL-HYDROCHLOROTHIAZIDE 20-25 MG Oral Tab    Sig: Take 1 tablet by mouth daily.    Disp: 90 tablet    Refills: 0    Start: 10/3/2024    Class: Normal    Non-formulary For: Essential hypertension    Last ordered: 3 months ago (7/4/2024) by Rikki Rizvi MD    Last refill: 7/5/2024    Rx #: [id number]    Hypertension Medications Protocol Awlnuh58/03/2024 09:30 AM   Protocol Details CMP or BMP in past 12 months    Last BP reading less than 140/90    In person appointment or virtual visit in the past 12 mos or appointment in next 3 mos    EGFRCR or GFRNAA > 50       Name from pharmacy: Nifedipine Er 24hr 60 Mg Tab [facility]         Will file in chart as: NIFEDIPINE ER 60 MG Oral Tablet 24 Hr    Sig: TAKE ONE TABLET BY MOUTH DAILY    Disp: 90 tablet    Refills: 0    Start: 10/3/2024    Class: Normal    Non-formulary    Last ordered: 3 months ago (7/4/2024) by Rikki Rizvi MD    Last refill: 7/5/2024    Rx #: [id number]    Hypertension Medications Protocol Fxchyz65/03/2024 09:30 AM   Protocol Details CMP or BMP in past 12 months    Last BP reading less than 140/90    In person appointment or virtual visit in the past 12 mos or appointment in next 3 mos    EGFRCR  or GFRNAA > 50        LOV: 8/29/2024  RTC: none noted   Last Relevant Labs: 4/15/2024  Filled: 7/4/2024 #90 with 0 refills    Future Appointments   Date Time Provider Department Center   10/23/2024  5:45 PM Georges Orozco DPM [id number]   2/28/2025 11:30 AM Rikki Rizvi MD [facility] [place]   3/6/2025  1:00 PM Wenceslao Bishop MD [facility] [place]

## 2024-10-06 DIAGNOSIS — Z86.73 HISTORY OF STROKE WITHIN LAST YEAR: ICD-10-CM

## 2024-10-06 DIAGNOSIS — I10 ESSENTIAL HYPERTENSION: ICD-10-CM

## 2024-10-07 RX ORDER — ATORVASTATIN CALCIUM 40 MG/1
40 TABLET, FILM COATED ORAL EVERY EVENING
Qty: 90 TABLET | Refills: 0 | OUTPATIENT
Start: 2024-10-07

## 2024-10-07 RX ORDER — LISINOPRIL AND HYDROCHLOROTHIAZIDE 20; 25 MG/1; MG/1
1 TABLET ORAL DAILY
Qty: 90 TABLET | Refills: 0 | OUTPATIENT
Start: 2024-10-07

## 2024-10-07 NOTE — TELEPHONE ENCOUNTER
Requesting   Name from pharmacy: Lisinopril/Hydrochlorothiazide 20-25 Mg Tab Solc          Will file in chart as: LISINOPRIL-HYDROCHLOROTHIAZIDE 20-25 MG Oral Tab     Possible duplicate: Hover to review recent actions on this medication    Sig: Take 1 tablet by mouth daily.    Disp: 90 tablet    Refills: 0    Start: 10/6/2024    Class: Normal    Non-formulary For: Essential hypertension    Last ordered: 4 days ago (10/3/2024) by Rikki Rizvi MD    Last refill: 7/5/2024    Rx #: [id number]    Hypertension Medications Protocol Pixxkh98/06/2024 10:23 AM   Protocol Details CMP or BMP in past 12 months    Last BP reading less than 140/90    In person appointment or virtual visit in the past 12 mos or appointment in next 3 mos    EGFRCR or GFRNAA > 50       Name from pharmacy: Atorvastatin Calcium 40 Mg Tab Nort         Will file in chart as: ATORVASTATIN 40 MG Oral Tab     Possible duplicate: Hover to review recent actions on this medication    Sig: TAKE ONE TABLET BY MOUTH ONE TIME DAILY IN THE EVENING    Disp: 90 tablet    Refills: 0    Start: 10/6/2024    Class: Normal    Non-formulary For: History of stroke within last year    Last ordered: 4 days ago (10/3/2024) by Rikki Rizvi MD    Last refill: 7/5/2024    Rx #: [id number]    Cholesterol Medication Protocol Njrygq44/06/2024 10:23 AM   Protocol Details ALT < 80    ALT resulted within past year    Lipid panel within past 12 months    In person appointment or virtual visit in the past 12 mos or appointment in next 3 mos       Name from pharmacy: Nifedipine Er 24hr 60 Mg Tab [facility]         Will file in chart as: NIFEDIPINE ER 60 MG Oral Tablet 24 Hr     Possible duplicate: Hover to review recent actions on this medication    Sig: TAKE ONE TABLET BY MOUTH DAILY    Disp: 90 tablet    Refills: 0    Start: 10/6/2024    Class: Normal    Non-formulary    Last ordered: 4 days ago (10/3/2024) by Rikki Rizvi MD    Last refill: 7/5/2024    Rx #: [id number]    Hypertension Medications  Protocol Uqiiul97/06/2024 10:23 AM   Protocol Details CMP or BMP in past 12 months    Last BP reading less than 140/90    In person appointment or virtual visit in the past 12 mos or appointment in next 3 mos    EGFRCR or GFRNAA > 50        LOV: 8/29/2024  RTC: none noted   Last Relevant Labs: 4/15/2024  Filled: 10/3/2024 #90 with 0 refills    Future Appointments   Date Time Provider Department Center   10/23/2024  5:45 PM Georges Orozco DPM [id number]   2/28/2025 11:30 AM Rikki Rizvi MD [facility] [place]   3/6/2025  1:00 PM Wenceslao Bishop MD [facility] [place]

## 2024-10-09 ENCOUNTER — OFFICE VISIT (OUTPATIENT)
Dept: PODIATRY CLINIC | Facility: CLINIC | Age: 74
End: 2024-10-09
Payer: MEDICARE

## 2024-10-09 DIAGNOSIS — M20.41 HAMMER TOES OF BOTH FEET: ICD-10-CM

## 2024-10-09 DIAGNOSIS — B35.1 ONYCHOMYCOSIS: Primary | ICD-10-CM

## 2024-10-09 DIAGNOSIS — E11.9 TYPE 2 DIABETES MELLITUS WITHOUT COMPLICATION, WITHOUT LONG-TERM CURRENT USE OF INSULIN (HCC): ICD-10-CM

## 2024-10-09 DIAGNOSIS — M54.16 LUMBAR RADICULOPATHY: ICD-10-CM

## 2024-10-09 DIAGNOSIS — L60.8 INCURVATED NAIL: ICD-10-CM

## 2024-10-09 DIAGNOSIS — M20.42 HAMMER TOES OF BOTH FEET: ICD-10-CM

## 2024-10-09 PROCEDURE — 99213 OFFICE O/P EST LOW 20 MIN: CPT | Performed by: STUDENT IN AN ORGANIZED HEALTH CARE EDUCATION/TRAINING PROGRAM

## 2024-10-09 NOTE — PROGRESS NOTES
[facility]  Progress Note    Liss Fagan is a 74 year old female.   Chief Complaint   Patient presents with    Diabetic Foot Care     Nail care and foot check - - A1C 6.6 on 4/15- LOV PCP T> Belkys on 8/29         HPI:     Patient is a pleasant 74 year old female with PMHx of diabetes and lumbar radiculopathy who RTC for diabetic foot exam. Patient complains of elongated and thickened nails she has difficulty trimming on her own that occasinoally become incurvated and painful.  Her hallux nails especially become incurvated and painful.   Her last A1c was 6.6% on 4/15/24.  Her blood sugars were 111 mg/dL check this morning.  PMHx, medications, and allergies were reviewed.      Allergies: Penicillins, Advil [ibuprofen], Aspirin, Codeine sulfate, Demerol, Hydrocodone, Naproxen, Norvasc [amlodipine besylate], Nsaids, Tramadol, and Zetia [ezetimibe]   Current Outpatient Medications   Medication Sig Dispense Refill    ATORVASTATIN 40 MG Oral Tab TAKE ONE TABLET BY MOUTH ONE TIME DAILY IN THE EVENING 90 tablet 0    LISINOPRIL-HYDROCHLOROTHIAZIDE 20-25 MG Oral Tab Take 1 tablet by mouth daily. 90 tablet 0    NIFEDIPINE ER 60 MG Oral Tablet 24 Hr TAKE ONE TABLET BY MOUTH DAILY 90 tablet 0    Glucose Blood (CONTOUR NEXT TEST) In Vitro Strip Use daily--E11.9 Non insulin dependent 100 strip 2    gabapentin 100 MG Oral Cap Take 1 capsule (100 mg total) by mouth daily. 30 capsule 2    METOPROLOL TARTRATE 25 MG Oral Tab TAKE 1 TABLET BY MOUTH 2 TIMES DAILY 180 tablet 0    METFORMIN 500 MG Oral Tab TAKE 1 TABLET BY MOUTH TWICE DAILY WITH MEALS 180 tablet 0    CLOPIDOGREL 75 MG Oral Tab TAKE 1 TABLET BY MOUTH DAILY 90 tablet 0    RANOLAZINE  MG Oral Tablet 12 Hr TAKE ONE TABLET BY MOUTH ONE TIME DAILY 90 tablet 0    OMEPRAZOLE 40 MG Oral Capsule Delayed Release TAKE 1 CAPSULE BY MOUTH DAILY (Patient taking differently: Take 1 capsule (40 mg total) by mouth. Patient takes medication 4 times  weekly--Sun,Mon,Wedn,Friday) 90 capsule 0    ISOSORBIDE MONONITRATE  MG Oral Tablet 24 Hr Take 1 tablet (120 mg total) by mouth daily. 90 tablet 0    FUROSEMIDE 20 MG Oral Tab Take 1 tablet (20 mg total) by mouth daily. (Patient taking differently: Take 1 tablet (20 mg total) by mouth every other day.) 30 tablet 0    cyclobenzaprine 5 MG Oral Tab Take 1 tablet (5 mg total) by mouth 3 (three) times daily as needed for Muscle spasms. 20 tablet 0    Sennosides-Docusate Sodium 8.6-50 MG Oral Cap Take 1 capsule by mouth daily as needed. 30 capsule 1    Microlet Lancets Does not apply Misc USE TO TEST ONCE DAILY *DX E11.9* 100 each 1    omega-3 fatty acids 1000 MG Oral Cap Take 1,000 mg by mouth every other day.      NON FORMULARY Take 1 tablet by mouth daily. LACTATE PILLS      nitroGLYCERIN (NITROSTAT) 0.4 MG Sublingual SL Tab Place 1 tablet (0.4 mg total) under the tongue every 5 (five) minutes as needed. 30 tablet 5    Black Elderberry,Berry-Flower, 575 MG Oral Cap Take by mouth every other day.      mometasone 0.1 % External Ointment Apply topically as needed.      cetirizine 10 MG Oral Tab Take 1 tablet (10 mg total) by mouth daily.      PEG 3350 Oral Powd Pack Take 17 g by mouth nightly.      Multiple Vitamin (MULTI-VITAMIN) Oral Tab Take 1 tablet by mouth every other day.        Past Medical History:    Abnormal maternal glucose tolerance, complicating pregnancy, childbirth, or the puerperium, unspecified as to episode of care    Acute bronchitis    Back problem    Cataract    Chest pain, unspecified    admit 12/05    Deep vein thrombosis (HCC)    during pregnancy right leg    Diabetes (HCC)    Diverticulosis of large intestine    unsure if divertiulitis or diverticulosis    Esophageal reflux    Essential hypertension, benign    Helicobacter pylori gastritis    High blood pressure    High cholesterol    Incontinence    Osteoarthritis    knees    Palpitations    Personal history of venous thrombosis and  embolism    gonadal right vein     PONV (postoperative nausea and vomiting)    nausea,pain results in cardiac vasospasms    Sleep apnea    no device    Stroke (HCC)    r/o 17    Unspecified gastritis and gastroduodenitis without mention of hemorrhage    Visual impairment    glasses      Past Surgical History:   Procedure Laterality Date    Back surgery  1984    Spinal diskectomy lumbar    at [facility]        1987    Cardiac catherterization (pbp)      status normal resolved on 2007    Cataract      Cholecystectomy  1999    Colonoscopy  2011    colon polyp removal  francesca cee 5    Colonoscopy      Colonoscopy N/A 2017    Procedure: COLONOSCOPY;  Surgeon: Addison Castellano MD;  Location:  ENDOSCOPY    Hernia surgery  1999    Umbilical hernia repair;     Hysterectomy      d/t fibroids    Lumbar discectomy Left 2019    L4-L5   Dr Drew    Other      Spinal Steroid Injection     Other surgical history  2024    RIGHT KNEE ARTHROSCOPY PARTIAL LATERAL MENISCECTOMY SYNOVECTOMY ABRASIONPLASTY, REMOVAL OF SOFT TISSUE MASS    Shoulder surg proc unlisted      Spine surgery procedure unlisted      bulging discs in lower back    Upper gi endoscopy performed  2011      Family History   Problem Relation Age of Onset    Cancer Father          at age 52 Leukemia    Diabetes Father     Breast Cancer Paternal Grandmother         unknown    Cancer Paternal Grandmother         bone cancer    Cancer Other       Social History     Socioeconomic History    Marital status:    Tobacco Use    Smoking status: Never     Passive exposure: Never    Smokeless tobacco: Never   Vaping Use    Vaping status: Never Used   Substance and Sexual Activity    Alcohol use: Not Currently     Comment: very seldom    Drug use: No   Other Topics Concern    Caffeine Concern Yes     Comment: 1 cup of coffee every morning and 1 glass of tea during dinner.  Sometimes dark  chocolate.      Exercise No           REVIEW OF SYSTEMS:     Today reviewed systems as documented below  GENERAL HEALTH: feels well otherwise  SKIN: denies any unusual skin lesions or rashes  RESPIRATORY: denies shortness of breath with exertion  CARDIOVASCULAR: denies chest pain on exertion  GI: denies abdominal pain and denies heartburn  NEURO: denies headaches  MUSCULO: history of lower back pain      EXAM:   There were no vitals taken for this visit.  GENERAL: well developed, well nourished, in no apparent distress  EXTREMITIES:  1. Integument: Normal skin temperature and turgor.  Nails x10 are elongated and thickened and incurvated. No acute signs of infection noted.   2. Vascular: Dorsalis pedis two out of four bilateral and posterior tibial pulses two out of   four bilateral, capillary refill normal.   3. Musculoskeletal: All muscle groups are graded 5 out of 5 in the foot and ankle. Hammertoe deformities bilaterally.  Adductovarus rotation of left fifth toe.  Mild pain noted to lateral border of right hallux nail.   4. Neurological: Normal sharp dull sensation; reflexes normal. Protective sensation intact to digits via 10g monofilament.      ASSESSMENT AND PLAN:   Diagnoses and all orders for this visit:    Onychomycosis    Incurvated nail    Hammer toes of both feet    Lumbar radiculopathy    Type 2 diabetes mellitus without complication, without long-term current use of insulin ([facility])                  Plan:     -Patient examined, chart history reviewed.  -Discussed importance of proper pedal hygiene, regular foot checks, and tight glucose control.  -Sharply debrided nails x10 with a sterile nail nipper achieving a 20% reduction in thickness and length, without incident.  Slant back procedure performed to areas of incurvated nails without incident.  Nails further smoothed with dremel.    -Patient to ambulate with supportive shoes with wide toe box.  -Patient to check feet daily and follow up if any concerns  arise.  -Educated patient on acute signs of infection advised patient to seek immediate medical attention if symptoms arise.    The patient indicates understanding of these issues and agrees to the plan.    RTC 2 months.    Georges Orozco DPM

## 2024-10-23 RX ORDER — ISOSORBIDE MONONITRATE 120 MG/1
120 TABLET, EXTENDED RELEASE ORAL DAILY
Qty: 90 TABLET | Refills: 0 | Status: SHIPPED | OUTPATIENT
Start: 2024-10-23

## 2024-10-23 NOTE — TELEPHONE ENCOUNTER
Requesting    Name from pharmacy: Isosorbide Mononitrate Er 24hr 120 Mg Tab Torr         Will file in chart as: ISOSORBIDE MONONITRATE  MG Oral Tablet 24 Hr    Sig: Take 1 tablet (120 mg total) by mouth daily.    Disp: 90 tablet    Refills: 0    Start: 10/23/2024    Class: Normal    Non-formulary    Last ordered: 3 months ago (7/20/2024) by Rikki Rizvi MD    Last refill: 7/20/2024    Rx #: [id number]     LOV: 8/29/2024  RTC: none noted   Last Relevant Labs: n/a   Filled: 7/20/2024 #90 with 0 refills    Future Appointments   Date Time Provider Department Center   12/11/2024  5:30 PM Georges Orozco DPM [id number]   2/28/2025 11:30 AM Rikki Rizvi MD [facility] 8 [facility] [place]   3/6/2025  1:00 PM Wenceslao Bishop MD ENIWARREN [facility] [place]

## 2024-11-14 RX ORDER — OMEPRAZOLE 40 MG/1
40 CAPSULE, DELAYED RELEASE ORAL DAILY
Qty: 90 CAPSULE | Refills: 0 | Status: SHIPPED | OUTPATIENT
Start: 2024-11-14

## 2024-11-14 NOTE — TELEPHONE ENCOUNTER
Requesting   Name from pharmacy: Omeprazole Dr 40 Mg Cap Nort          Will file in chart as: OMEPRAZOLE 40 MG Oral Capsule Delayed Release    Sig: TAKE 1 CAPSULE BY MOUTH DAILY    Disp: 90 capsule    Refills: 0    Start: 11/14/2024    Class: Normal    Non-formulary    Last ordered: 2 months ago (8/19/2024) by Rikki Rizvi MD    Last refill: 8/19/2024    Rx #: [id number]    Gastrointestional Medication Protocol Vwrqpl8811/14/2024 09:37 AM   Protocol Details In person appointment or virtual visit in the past 12 mos or appointment in next 3 mos        LOV: 8/29/2024  RTC: none noted  Last Relevant Labs: n/a   Filled: 8/19/2024 #90 with 0 refills    Future Appointments   Date Time Provider Department Center   12/11/2024  5:30 PM Georges Orozco DPM [id number]   2/28/2025 11:30 AM Rikki Rizvi MD [facility] 8 [facility]   3/6/2025  1:00 PM Wenceslao Bishop MD [facility] [place]

## 2024-12-01 DIAGNOSIS — Z86.73 HISTORY OF STROKE WITHOUT RESIDUAL DEFICITS: ICD-10-CM

## 2024-12-01 DIAGNOSIS — I10 ESSENTIAL HYPERTENSION: ICD-10-CM

## 2024-12-01 RX ORDER — METOPROLOL TARTRATE 25 MG/1
25 TABLET, FILM COATED ORAL 2 TIMES DAILY
Qty: 180 TABLET | Refills: 0 | Status: SHIPPED | OUTPATIENT
Start: 2024-12-01

## 2024-12-01 RX ORDER — CLOPIDOGREL BISULFATE 75 MG/1
75 TABLET ORAL DAILY
Qty: 90 TABLET | Refills: 0 | Status: SHIPPED | OUTPATIENT
Start: 2024-12-01

## 2024-12-01 RX ORDER — RANOLAZINE 500 MG/1
500 TABLET, EXTENDED RELEASE ORAL DAILY
Qty: 90 TABLET | Refills: 0 | Status: SHIPPED | OUTPATIENT
Start: 2024-12-01

## 2024-12-29 DIAGNOSIS — I10 ESSENTIAL HYPERTENSION: ICD-10-CM

## 2024-12-29 DIAGNOSIS — Z86.73 HISTORY OF STROKE WITHIN LAST YEAR: ICD-10-CM

## 2024-12-30 RX ORDER — GABAPENTIN 100 MG/1
100 CAPSULE ORAL DAILY
Qty: 30 CAPSULE | Refills: 0 | Status: SHIPPED | OUTPATIENT
Start: 2024-12-30 | End: 2025-03-30

## 2024-12-30 RX ORDER — LISINOPRIL AND HYDROCHLOROTHIAZIDE 20; 25 MG/1; MG/1
1 TABLET ORAL DAILY
Qty: 90 TABLET | Refills: 0 | Status: SHIPPED | OUTPATIENT
Start: 2024-12-30

## 2024-12-30 RX ORDER — ATORVASTATIN CALCIUM 40 MG/1
40 TABLET, FILM COATED ORAL EVERY EVENING
Qty: 90 TABLET | Refills: 0 | Status: SHIPPED | OUTPATIENT
Start: 2024-12-30

## 2024-12-30 NOTE — TELEPHONE ENCOUNTER
Requesting    Name from pharmacy: Atorvastatin Calcium 40 Mg Tab Nort         Will file in chart as: ATORVASTATIN 40 MG Oral Tab    Sig: TAKE ONE TABLET BY MOUTH ONE TIME DAILY IN THE EVENING    Disp: 90 tablet    Refills: 0    Start: 12/29/2024    Class: Normal    Non-formulary For: History of stroke within last year    Last ordered: 2 months ago (10/3/2024) by Rikki Rizvi MD    Last refill: 10/3/2024    Rx #: [id number]    Cholesterol Medication Protocol Bcrnvb5512/29/2024 10:55 AM   Protocol Details ALT < 80    ALT resulted within past year    Lipid panel within past 12 months    In person appointment or virtual visit in the past 12 mos or appointment in next 3 mos       Name from pharmacy: Nifedipine Er 24hr 60 Mg Tab Ocea         Will file in chart as: NIFEDIPINE ER 60 MG Oral Tablet 24 Hr    Sig: TAKE ONE TABLET BY MOUTH DAILY    Disp: 90 tablet    Refills: 0    Start: 12/29/2024    Class: Normal    Non-formulary    Last ordered: 2 months ago (10/3/2024) by Rikki Rizvi MD    Last refill: 10/3/2024    Rx #: [id number]    Hypertension Medications Protocol Uxhcdr6612/29/2024 10:55 AM   Protocol Details CMP or BMP in past 12 months    Last BP reading less than 140/90    In person appointment or virtual visit in the past 12 mos or appointment in next 3 mos    EGFRCR or GFRNAA > 50       Name from pharmacy: Lisinopril/Hydrochlorothiazide 20-25 Mg Tab Solc         Will file in chart as: LISINOPRIL-HYDROCHLOROTHIAZIDE 20-25 MG Oral Tab    Sig: Take 1 tablet by mouth daily.    Disp: 90 tablet    Refills: 0    Start: 12/29/2024    Class: Normal    Non-formulary For: Essential hypertension    Last ordered: 2 months ago (10/3/2024) by Rikki Rizvi MD    Last refill: 10/3/2024    Rx #: [id number]    Hypertension Medications Protocol Oevszl9812/29/2024 10:55 AM   Protocol Details CMP or BMP in past 12 months    Last BP reading less than 140/90    In person appointment or virtual visit in the past 12 mos or appointment in next 3 mos     EGFRCR or GFRNAA > 50       Name from pharmacy: Gabapentin 100 Mg Cap Nort         Will file in chart as: GABAPENTIN 100 MG Oral Cap    Sig: Take 1 capsule (100 mg total) by mouth daily.    Disp: 30 capsule    Refills: 0    Start: 12/29/2024    Class: Normal    Non-formulary    Last ordered: 3 months ago (9/10/2024) by Rikki Rizvi MD    Last refill: 11/9/2024    Rx #: [id number]    Neurology Medications Owjchg0012/29/2024 10:55 AM   Protocol Details In person appointment or virtual visit in the past 6 mos or appointment in next 3 mos        LOV: 8/29/2024  RTC: none noted   Last Relevant Labs: 4/15/2024      Future Appointments   Date Time Provider Department Center   2/28/2025 11:30 AM Rikki Rizvi MD [facility] 8 [facility] [place]   3/6/2025  1:00 PM Wenceslao Bishop MD [facility] [place]

## 2025-01-23 NOTE — TELEPHONE ENCOUNTER
Requesting    Name from pharmacy: Isosorbide Mononitrate Er 24hr 120 Mg Tab Torr         Will file in chart as: ISOSORBIDE MONONITRATE  MG Oral Tablet 24 Hr    Sig: Take 1 tablet (120 mg total) by mouth daily.    Disp: 90 tablet    Refills: 0    Start: 1/23/2025    Class: Normal    Non-formulary    Last ordered: 3 months ago (10/23/2024) by Rikki Rizvi MD    Last refill: 10/23/2024    Rx #: [id number]     LOV: 8/29/2024  RTC: none noted   Last Relevant Labs: n/a   Filled: 10/23/2024 #90 with 0 refills    Future Appointments   Date Time Provider Department Center   2/28/2025 11:30 AM Rikki Rizvi MD EM[facility] 8 [facility]   3/6/2025  1:00 PM Wenceslao Bishop MD [facility] [place]

## 2025-01-24 RX ORDER — ISOSORBIDE MONONITRATE 120 MG/1
120 TABLET, EXTENDED RELEASE ORAL DAILY
Qty: 90 TABLET | Refills: 0 | Status: SHIPPED | OUTPATIENT
Start: 2025-01-24

## 2025-01-26 RX ORDER — GABAPENTIN 100 MG/1
100 CAPSULE ORAL DAILY
Qty: 30 CAPSULE | Refills: 0 | Status: SHIPPED | OUTPATIENT
Start: 2025-01-26 | End: 2025-04-26

## 2025-02-10 RX ORDER — OMEPRAZOLE 40 MG/1
40 CAPSULE, DELAYED RELEASE ORAL DAILY
Qty: 90 CAPSULE | Refills: 0 | Status: SHIPPED | OUTPATIENT
Start: 2025-02-10

## 2025-02-10 NOTE — TELEPHONE ENCOUNTER
Requesting    Name from pharmacy: Omeprazole Dr 40 Mg Cap Nort         Will file in chart as: OMEPRAZOLE 40 MG Oral Capsule Delayed Release    Sig: TAKE 1 CAPSULE BY MOUTH DAILY    Disp: 90 capsule    Refills: 0    Start: 2/9/2025    Class: Normal    Non-formulary    Last ordered: 2 months ago (11/14/2024) by Rikki Rizvi MD    Last refill: 11/14/2024    Rx #: [id number]    Gastrointestional Medication Protocol Srnpov7802/09/2025 10:00 AM   Protocol Details In person appointment or virtual visit in the past 12 mos or appointment in next 3 mos    Medication is active on med list        LOV: 8/29/2024  RTC: none noted   Last Relevant Labs: n/a   Filled: 11/14/2024 #90 with 0 refills    Future Appointments   Date Time Provider Department Center   2/28/2025 11:30 AM Rikki Rizvi MD EM[facility] 8 [facility] [place]   3/6/2025  1:00 PM Wenceslao Bishop MD [facility] [place]

## 2025-02-11 RX ORDER — GABAPENTIN 100 MG/1
100 CAPSULE ORAL DAILY
Qty: 30 CAPSULE | Refills: 0 | OUTPATIENT
Start: 2025-02-11 | End: 2025-05-12

## 2025-02-11 NOTE — TELEPHONE ENCOUNTER
Name from pharmacy: Gabapentin 100 Mg Cap Nort         Will file in chart as: GABAPENTIN 100 MG Oral Cap    Sig: Take 1 capsule (100 mg total) by mouth daily.    Disp: 30 capsule    Refills: 0    Start: 2/11/2025    Class: Normal    Non-formulary    Last ordered: 2 weeks ago (1/26/2025) by Rikki Rizvi MD    Last refill: 1/27/2025    Rx #: [id number]    Neurology Medications Tcundn6002/11/2025 03:08 PM   Protocol Details In person appointment or virtual visit in the past 6 mos or appointment in next 3 mos    Medication is active on med list      To be filled at: [id number] - [place], [place] - [address] [phone], [phone]

## 2025-02-15 ENCOUNTER — HOSPITAL ENCOUNTER (OUTPATIENT)
Age: 75
Discharge: HOME | End: 2025-02-15
Attending: EMERGENCY MEDICINE
Payer: MEDICARE

## 2025-02-15 VITALS
SYSTOLIC BLOOD PRESSURE: 138 MMHG | HEIGHT: 62 IN | RESPIRATION RATE: 18 BRPM | HEART RATE: 64 BPM | DIASTOLIC BLOOD PRESSURE: 58 MMHG | BODY MASS INDEX: 27.6 KG/M2 | TEMPERATURE: 97 F | OXYGEN SATURATION: 97 % | WEIGHT: 150 LBS

## 2025-02-15 DIAGNOSIS — J32.9 VIRAL SINUSITIS: Primary | ICD-10-CM

## 2025-02-15 DIAGNOSIS — B97.89 VIRAL SINUSITIS: Primary | ICD-10-CM

## 2025-02-15 LAB
POCT INFLUENZA A: NEGATIVE
POCT INFLUENZA B: NEGATIVE
SARS-COV-2 RNA RESP QL NAA+PROBE: NOT DETECTED

## 2025-02-15 PROCEDURE — 87502 INFLUENZA DNA AMP PROBE: CPT | Performed by: EMERGENCY MEDICINE

## 2025-02-15 PROCEDURE — 99213 OFFICE O/P EST LOW 20 MIN: CPT

## 2025-02-15 RX ORDER — BENZONATATE 100 MG/1
100 CAPSULE ORAL 3 TIMES DAILY PRN
Qty: 30 CAPSULE | Refills: 0 | Status: SHIPPED | OUTPATIENT
Start: 2025-02-15 | End: 2025-03-17

## 2025-02-15 NOTE — ED PROVIDER NOTES
Patient Seen in: [facility]      History     Chief Complaint   Patient presents with    Sinus Problem     Stated Complaint: Loss of Voice; Sinus Issue    Subjective:   HPI      73 yo female with two days of sinus congestion with post nasal drip, sore throat and cough. Has now lost her voice. No fever.     Objective:     Past Medical History:    Abnormal maternal glucose tolerance, complicating pregnancy, childbirth, or the puerperium, unspecified as to episode of care    Acute bronchitis    Back problem    Cataract    Chest pain, unspecified    admit     Deep vein thrombosis (HCC)    during pregnancy right leg    Diabetes (HCC)    Diverticulosis of large intestine    unsure if divertiulitis or diverticulosis    Esophageal reflux    Essential hypertension, benign    Helicobacter pylori gastritis    High blood pressure    High cholesterol    Incontinence    Osteoarthritis    knees    Palpitations    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     PONV (postoperative nausea and vomiting)    nausea,pain results in cardiac vasospasms    Sleep apnea    no device    Stroke (HCC)    r/o 17    Unspecified gastritis and gastroduodenitis without mention of hemorrhage    Visual impairment    glasses              Past Surgical History:   Procedure Laterality Date    Back surgery      Spinal diskectomy lumbar    at [place]        1987    Cardiac catherterization (pbp)      status normal resolved on 2007    Cataract      Cholecystectomy  1999    Colonoscopy  2011    colon polyp removal  francesca  coleman 5    Colonoscopy      Colonoscopy N/A 2017    Procedure: COLONOSCOPY;  Surgeon: Addison Castellano MD;  Location:  ENDOSCOPY    Hernia surgery  1999    Umbilical hernia repair;     Hysterectomy      d/t fibroids    Lumbar discectomy Left 2019    L4-L5   Dr Drew    Other      Spinal Steroid Injection     Other  surgical history  06/04/2024    RIGHT KNEE ARTHROSCOPY PARTIAL LATERAL MENISCECTOMY SYNOVECTOMY ABRASIONPLASTY, REMOVAL OF SOFT TISSUE MASS    Shoulder surg proc unlisted      Spine surgery procedure unlisted      bulging discs in lower back    Upper gi endoscopy performed  01/24/2011                Social History     Socioeconomic History    Marital status:    Tobacco Use    Smoking status: Never     Passive exposure: Never    Smokeless tobacco: Never   Vaping Use    Vaping status: Never Used   Substance and Sexual Activity    Alcohol use: Not Currently     Comment: very seldom    Drug use: No   Other Topics Concern    Caffeine Concern Yes     Comment: 1 cup of coffee every morning and 1 glass of tea during dinner.  Sometimes dark chocolate.      Exercise No              Review of Systems    Positive for stated complaint: Loss of Voice; Sinus Issue  Other systems are as noted in HPI.  Constitutional and vital signs reviewed.      All other systems reviewed and negative except as noted above.    Physical Exam     ED Triage Vitals [02/15/25 1356]   /58   Pulse 64   Resp 18   Temp 97.4 °F (36.3 °C)   Temp src Oral   SpO2 97 %   O2 Device None (Room air)       Current Vitals:   Vital Signs  BP: 138/58  Pulse: 64  Resp: 18  Temp: 97.4 °F (36.3 °C)  Temp src: Oral    Oxygen Therapy  SpO2: 97 %  O2 Device: None (Room air)        Physical Exam  Vitals and nursing note reviewed.   Constitutional:       Appearance: Normal appearance. She is well-developed.      Comments: Voice is hoarse   HENT:      Head: Normocephalic and atraumatic.      Nose: Congestion present.      Mouth/Throat:      Mouth: Mucous membranes are moist.      Pharynx: Oropharynx is clear. No oropharyngeal exudate or posterior oropharyngeal erythema.   Cardiovascular:      Rate and Rhythm: Normal rate and regular rhythm.      Heart sounds: Normal heart sounds.   Pulmonary:      Effort: Pulmonary effort is normal. No respiratory distress.       Breath sounds: Normal breath sounds.   Musculoskeletal:      Cervical back: Neck supple.   Lymphadenopathy:      Cervical: No cervical adenopathy.   Skin:     General: Skin is warm and dry.      Capillary Refill: Capillary refill takes less than 2 seconds.   Neurological:      General: No focal deficit present.      Mental Status: She is alert.   Psychiatric:         Mood and Affect: Mood normal.         Behavior: Behavior normal.            ED Course     Labs Reviewed   POCT FLU TEST - Normal    Narrative:     This assay is a rapid molecular in vitro test utilizing nucleic acid amplification of influenza A and B viral RNA.   SARS-COV-2 BY PCR                   [facility]             Medical Decision Making  Covid, flu, other viral sinusitis in differential. Covid and flu both negative. Patient appears well. Advised flonase nasal spray over the counter and benzonatate.     Disposition and Plan     Clinical Impression:  1. Viral sinusitis         Disposition:  Discharge  2/15/2025  2:42 pm    Follow-up:  Rikki Rizvi MD  [address] [address]  [phone]      As needed          Medications Prescribed:  Current Discharge Medication List        START taking these medications    Details   benzonatate 100 MG Oral Cap Take 1 capsule (100 mg total) by mouth 3 (three) times daily as needed for cough.  Qty: 30 capsule, Refills: 0                 Supplementary Documentation:

## 2025-02-15 NOTE — ED INITIAL ASSESSMENT (HPI)
Sinus pressure, congestion, right ear pain, cough, post nasal drip that started on Thursday. Denies fevers.

## 2025-02-19 RX ORDER — GABAPENTIN 100 MG/1
100 CAPSULE ORAL DAILY
Qty: 30 CAPSULE | Refills: 0 | OUTPATIENT
Start: 2025-02-19 | End: 2025-05-20

## 2025-02-19 NOTE — TELEPHONE ENCOUNTER
GABAPENTIN 100 MG     [id number] - [place], [place] - [address] [phone], [phone]     Pt stated while she was filling her pill organizer she drop most of this medication down the toilet and only had about a week worth of pills and is all out, pt would like to know if she can get a refill. Pt would like this medication expedited.

## 2025-02-20 ENCOUNTER — OFFICE VISIT (OUTPATIENT)
Dept: INTERNAL MEDICINE CLINIC | Facility: CLINIC | Age: 75
End: 2025-02-20
Payer: MEDICARE

## 2025-02-20 VITALS
DIASTOLIC BLOOD PRESSURE: 62 MMHG | OXYGEN SATURATION: 97 % | HEART RATE: 73 BPM | SYSTOLIC BLOOD PRESSURE: 122 MMHG | BODY MASS INDEX: 26.65 KG/M2 | HEIGHT: 62 IN | WEIGHT: 144.81 LBS | TEMPERATURE: 98 F

## 2025-02-20 DIAGNOSIS — J01.00 ACUTE NON-RECURRENT MAXILLARY SINUSITIS: Primary | ICD-10-CM

## 2025-02-20 PROCEDURE — 99213 OFFICE O/P EST LOW 20 MIN: CPT | Performed by: FAMILY MEDICINE

## 2025-02-20 RX ORDER — GABAPENTIN 100 MG/1
100 CAPSULE ORAL DAILY
Qty: 30 CAPSULE | Refills: 0 | Status: SHIPPED | OUTPATIENT
Start: 2025-02-20 | End: 2025-05-21

## 2025-02-20 RX ORDER — AZITHROMYCIN 250 MG/1
TABLET, FILM COATED ORAL
Qty: 6 TABLET | Refills: 0 | Status: SHIPPED | OUTPATIENT
Start: 2025-02-20 | End: 2025-02-25

## 2025-02-20 NOTE — PROGRESS NOTES
CHIEF COMPLAINT:     Chief Complaint   Patient presents with    Urgent Care F/u     Patient is here for a follow up from  from 2/15/2025 for sinus pressure congestion ear pain cough and PND        HPI:   Liss Fagan is a 74 year old female .  The patient presents for a recheck after Urgent Care visit for diagnosis of Viral URI/Sinus. Was seen 5 days ago.   History from the : 75 yo female with  sinus congestion with post nasal drip, sore throat and cough. Has now lost her voice. No fever..  Symptoms started 2/12/15 and have not gone away.  The following tests were done: flu and covid test negative. Pt is on the following meds: Tessalon. The patient is still having sinus congestion, PND, hoarseness, headaches, sinus pressure, ear pain on right side,irritated sore throat. Pt has been taking Tussin DM, Tessalon,flonase, and zyrtec.    Current Outpatient Medications   Medication Sig Dispense Refill    azithromycin (ZITHROMAX Z-BOBO) 250 MG Oral Tab Take 2 tablets (500 mg total) by mouth daily for 1 day, THEN 1 tablet (250 mg total) daily for 4 days. 6 tablet 0    gabapentin 100 MG Oral Cap Take 1 capsule (100 mg total) by mouth daily. 30 capsule 0    benzonatate 100 MG Oral Cap Take 1 capsule (100 mg total) by mouth 3 (three) times daily as needed for cough. 30 capsule 0    OMEPRAZOLE 40 MG Oral Capsule Delayed Release TAKE 1 CAPSULE BY MOUTH DAILY 90 capsule 0    ISOSORBIDE MONONITRATE  MG Oral Tablet 24 Hr Take 1 tablet (120 mg total) by mouth daily. 90 tablet 0    ATORVASTATIN 40 MG Oral Tab TAKE ONE TABLET BY MOUTH ONE TIME DAILY IN THE EVENING 90 tablet 0    NIFEDIPINE ER 60 MG Oral Tablet 24 Hr TAKE ONE TABLET BY MOUTH DAILY 90 tablet 0    LISINOPRIL-HYDROCHLOROTHIAZIDE 20-25 MG Oral Tab Take 1 tablet by mouth daily. 90 tablet 0    CLOPIDOGREL 75 MG Oral Tab TAKE 1 TABLET BY MOUTH DAILY 90 tablet 0    METOPROLOL TARTRATE 25 MG Oral Tab TAKE 1 TABLET BY MOUTH 2 TIMES DAILY 180 tablet 0    RANOLAZINE   MG Oral Tablet 12 Hr TAKE ONE TABLET BY MOUTH ONE TIME DAILY 90 tablet 0    METFORMIN 500 MG Oral Tab TAKE 1 TABLET BY MOUTH TWICE DAILY WITH MEALS 180 tablet 0    Glucose Blood (CONTOUR NEXT TEST) In Vitro Strip Use daily--E11.9 Non insulin dependent 100 strip 2    FUROSEMIDE 20 MG Oral Tab Take 1 tablet (20 mg total) by mouth daily. (Patient taking differently: Take 1 tablet (20 mg total) by mouth every other day.) 30 tablet 0    cyclobenzaprine 5 MG Oral Tab Take 1 tablet (5 mg total) by mouth 3 (three) times daily as needed for Muscle spasms. 20 tablet 0    Sennosides-Docusate Sodium 8.6-50 MG Oral Cap Take 1 capsule by mouth daily as needed. 30 capsule 1    Microlet Lancets Does not apply Misc USE TO TEST ONCE DAILY *DX E11.9* 100 each 1    omega-3 fatty acids 1000 MG Oral Cap Take 1,000 mg by mouth every other day.      NON FORMULARY Take 1 tablet by mouth daily. LACTATE PILLS      nitroGLYCERIN (NITROSTAT) 0.4 MG Sublingual SL Tab Place 1 tablet (0.4 mg total) under the tongue every 5 (five) minutes as needed. 30 tablet 5    Black Elderberry,Berry-Flower, 575 MG Oral Cap Take by mouth every other day.      mometasone 0.1 % External Ointment Apply topically as needed.      cetirizine 10 MG Oral Tab Take 1 tablet (10 mg total) by mouth daily.      PEG 3350 Oral Powd Pack Take 17 g by mouth nightly.      Multiple Vitamin (MULTI-VITAMIN) Oral Tab Take 1 tablet by mouth every other day.        Past Medical History:    Abnormal maternal glucose tolerance, complicating pregnancy, childbirth, or the puerperium, unspecified as to episode of care    Acute bronchitis    Back problem    Cataract    Chest pain, unspecified    admit 12/05    Deep vein thrombosis (HCC)    during pregnancy right leg    Diabetes (HCC)    Diverticulosis of large intestine    unsure if divertiulitis or diverticulosis    Esophageal reflux    Essential hypertension, benign    Helicobacter pylori gastritis    High blood pressure    High  cholesterol    Incontinence    Osteoarthritis    knees    Palpitations    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    PONV (postoperative nausea and vomiting)    nausea,pain results in cardiac vasospasms    Sleep apnea    no device    Stroke (HCC)    r/o 4/11/17    Unspecified gastritis and gastroduodenitis without mention of hemorrhage    Visual impairment    glasses      Social History:  Social History     Socioeconomic History    Marital status:    Tobacco Use    Smoking status: Never     Passive exposure: Never    Smokeless tobacco: Never   Vaping Use    Vaping status: Never Used   Substance and Sexual Activity    Alcohol use: Not Currently     Comment: very seldom    Drug use: No   Other Topics Concern    Caffeine Concern Yes     Comment: 1 cup of coffee every morning and 1 glass of tea during dinner.  Sometimes dark chocolate.      Exercise No        REVIEW OF SYSTEMS:   GENERAL: Denies fever, chills,weight change, decreased appetite  SKIN: Denies rashes, skin wounds or ulcers.  EYES: Denies blurred vision or double vision  HENT:see HPI  CHEST: Denies chest pain, or palpitations  LUNGS: Denies shortness of breath,or wheezing, + cough  GI: Denies abdominal pain, N/V/C/D.   MUSCULOSKELETAL: no arthralgia or swollen joints  LYMPH:  Denies lymphadenopathy  NEURO: +headaches       EXAM:   /62 (BP Location: Left arm, Patient Position: Sitting, Cuff Size: adult)   Pulse 73   Temp 97.8 °F (36.6 °C) (Temporal)   Ht 5' 2\" (1.575 m)   Wt 144 lb 12.8 oz (65.7 kg)   SpO2 97%   BMI 26.48 kg/m²   GENERAL: well developed, well nourished,in no apparent distress  SKIN: no rashes,no suspicious lesions  HEAD: atraumatic, normocephalic  EYES: conjunctiva clear, PERRLA  EARS: TM's clear, no bulging, no retraction, no fluid  NOSE: nostrils patent, yellow exudates, nasal mucosa pink and noninflamed  THROAT: oral mucosa pink, moist. No visible dental caries. Posterior pharynx is  mild erythematous. PND,no exudates.  NECK: supple, non-tender  LUNGS: clear to auscultation bilaterally, no wheezes or rhonchi. Breathing is non labored.  CARDIO: RRR without murmur  LYMPH:  no lymphadenopathy.      Physical Exam    ASSESSMENT AND PLAN:     Encounter Diagnosis   Name Primary?    Acute non-recurrent maxillary sinusitis Yes       No orders of the defined types were placed in this encounter.      Meds & Refills for this Visit:  Requested Prescriptions     Signed Prescriptions Disp Refills    azithromycin (ZITHROMAX Z-BOBO) 250 MG Oral Tab 6 tablet 0     Sig: Take 2 tablets (500 mg total) by mouth daily for 1 day, THEN 1 tablet (250 mg total) daily for 4 days.    gabapentin 100 MG Oral Cap 30 capsule 0     Sig: Take 1 capsule (100 mg total) by mouth daily.       Imaging & Consults:  None    PLAN:      -Cool Humidified air in room  - start Z-bobo, take with food.  -Sleep with head elevated at nightime if coughing    -Push fluids, tea with honey and plenty of rest   -Limit dairy, to avoid increased congestion  -OTC cough medicine such as Tussin DM, or Tessalon  -OTC Tylenol/Ibuprofen   -Soothing cough drops   -Flonase OTC 2 sprays each nostril daily. Pt to go to every other day if gets a bloody nose  -an antihistamine- Zyrtec for post nasal drip.  Call if symptoms worsen or do not improve.  Patient verbalizes understanding of the treatment plan.

## 2025-02-23 DIAGNOSIS — Z86.73 HISTORY OF STROKE WITHOUT RESIDUAL DEFICITS: ICD-10-CM

## 2025-02-23 DIAGNOSIS — I10 ESSENTIAL HYPERTENSION: ICD-10-CM

## 2025-02-24 RX ORDER — RANOLAZINE 500 MG/1
500 TABLET, EXTENDED RELEASE ORAL DAILY
Qty: 90 TABLET | Refills: 0 | Status: SHIPPED | OUTPATIENT
Start: 2025-02-24

## 2025-02-24 RX ORDER — CLOPIDOGREL BISULFATE 75 MG/1
75 TABLET ORAL DAILY
Qty: 90 TABLET | Refills: 0 | Status: SHIPPED | OUTPATIENT
Start: 2025-02-24

## 2025-02-24 RX ORDER — METOPROLOL TARTRATE 25 MG/1
25 TABLET, FILM COATED ORAL 2 TIMES DAILY
Qty: 180 TABLET | Refills: 0 | Status: SHIPPED | OUTPATIENT
Start: 2025-02-24

## 2025-02-24 NOTE — TELEPHONE ENCOUNTER
Requesting   Name from pharmacy: Ranolazine Er 12hr 500 Mg Tab Scie          Will file in chart as: RANOLAZINE  MG Oral Tablet 12 Hr    Sig: TAKE ONE TABLET BY MOUTH ONE TIME DAILY    Disp: 90 tablet    Refills: 0    Start: 2/24/2025    Class: Normal    Non-formulary    Last ordered: 2 months ago (12/1/2024) by Rikki Rizvi MD    Last refill: 12/1/2024    Rx #: [id number]     LOV: 8/29/2024  RTC: none noted   Last Relevant Labs: n/a   Filled: 12/1/2024 #90 with 0 refills    Future Appointments   Date Time Provider Department Center   2/28/2025 11:30 AM Rikki Rizvi MD [facility] 8 [facility]   3/6/2025  1:00 PM Wenceslao Bishop MD [facility]

## 2025-02-24 NOTE — TELEPHONE ENCOUNTER
Requesting    Name from pharmacy: Clopidogrel 75 Mg Tab Auro         Will file in chart as: CLOPIDOGREL 75 MG Oral Tab    Sig: TAKE 1 TABLET BY MOUTH DAILY    Disp: 90 tablet    Refills: 0    Start: 2/23/2025    Class: Normal    Non-formulary For: History of stroke without residual deficits    Last ordered: 2 months ago (12/1/2024) by Rikki Rizvi MD    Last refill: 12/1/2024    Rx #: [id number]        Name from pharmacy: Metoprolol Tartrate 25 Mg Tab Auro         Will file in chart as: METOPROLOL TARTRATE 25 MG Oral Tab    Sig: TAKE 1 TABLET BY MOUTH 2 TIMES DAILY    Disp: 180 tablet    Refills: 0    Start: 2/23/2025    Class: Normal    Non-formulary For: Essential hypertension    Last ordered: 2 months ago (12/1/2024) by Rikki Rizvi MD    Last refill: 12/1/2024    Rx #: [id number]    Hypertension Medications Protocol Stweek0402/23/2025 10:21 AM   Protocol Details CMP or BMP in past 12 months    Last BP reading less than 140/90    In person appointment or virtual visit in the past 12 mos or appointment in next 3 mos    EGFRCR or GFRNAA > 50    Medication is active on med list       Name from pharmacy: Metformin Hydrochloride 500 Mg Tab Johnny         Will file in chart as: METFORMIN 500 MG Oral Tab    Sig: TAKE 1 TABLET BY MOUTH TWICE DAILY WITH MEALS    Disp: 180 tablet    Refills: 0    Start: 2/23/2025    Class: Normal    Non-formulary    Last ordered: 2 months ago (12/1/2024) by Rikki Rizvi MD    Last refill: 12/1/2024    Rx #: [id number]    Diabetes Medication Protocol [id number]/23/2025 10:21 AM   Protocol Details Last A1C < 7.5 and within past 6 months    In person appointment or virtual visit in the past 6 mos or appointment in next 3 mos    Microalbumin procedure in past 12 months or taking ACE/ARB    EGFRCR or GFRNAA > 50    GFR in the past 12 months    Medication is active on med list        LOV: 8/29/2024  RTC: none noted   Last Relevant Labs: 4/15/2024  Filled: 12/1/2024 #90 day with 0 refills    Future  Appointments   Date Time Provider Department Center   2/28/2025 11:30 AM Rikki Rizvi MD EMG 8 EMG [facility]   3/6/2025  1:00 PM Wenceslao Bishop MD [facility]

## 2025-02-28 ENCOUNTER — OFFICE VISIT (OUTPATIENT)
Dept: INTERNAL MEDICINE CLINIC | Facility: CLINIC | Age: 75
End: 2025-02-28
Payer: MEDICARE

## 2025-02-28 VITALS
HEART RATE: 71 BPM | DIASTOLIC BLOOD PRESSURE: 60 MMHG | HEIGHT: 62 IN | WEIGHT: 144 LBS | BODY MASS INDEX: 26.5 KG/M2 | SYSTOLIC BLOOD PRESSURE: 116 MMHG | OXYGEN SATURATION: 95 %

## 2025-02-28 DIAGNOSIS — I20.1 PRINZMETAL ANGINA: ICD-10-CM

## 2025-02-28 DIAGNOSIS — R07.89 CHEST TIGHTNESS: ICD-10-CM

## 2025-02-28 DIAGNOSIS — M25.512 LEFT SHOULDER PAIN, UNSPECIFIED CHRONICITY: ICD-10-CM

## 2025-02-28 DIAGNOSIS — I10 ESSENTIAL HYPERTENSION: ICD-10-CM

## 2025-02-28 DIAGNOSIS — E11.9 TYPE 2 DIABETES MELLITUS WITHOUT COMPLICATION, WITHOUT LONG-TERM CURRENT USE OF INSULIN (HCC): Primary | ICD-10-CM

## 2025-02-28 DIAGNOSIS — J01.00 ACUTE NON-RECURRENT MAXILLARY SINUSITIS: ICD-10-CM

## 2025-02-28 DIAGNOSIS — E78.00 PURE HYPERCHOLESTEROLEMIA: ICD-10-CM

## 2025-02-28 PROBLEM — I73.9 SMALL VESSEL DISEASE: Status: RESOLVED | Noted: 2023-09-22 | Resolved: 2025-02-28

## 2025-02-28 PROCEDURE — 99215 OFFICE O/P EST HI 40 MIN: CPT | Performed by: FAMILY MEDICINE

## 2025-02-28 NOTE — PROGRESS NOTES
Liss Fagan is a 74 year old female.  HPI:   Here for f/u on her meds and DM   Had labs for Dr Guevara few d ago    Overall told was doing well    since the visit did have episode of chest tightness when walking    Did go away after few minutes     did not use the NTG      breathing is good though  no cough     no med changes        Is on gabapentin 100 daily     asking if can stop      Has not had any since then    The infection is better   Saw Clarisa    finished the antibiotics    no bloody nose anymore   Does have sore L shoulder since last summer   may have injured it     hurts in certain positions    no NTW in the LUE   hurts to lay on it       Current Outpatient Medications   Medication Sig Dispense Refill    CLOPIDOGREL 75 MG Oral Tab TAKE 1 TABLET BY MOUTH DAILY 90 tablet 0    METOPROLOL TARTRATE 25 MG Oral Tab TAKE 1 TABLET BY MOUTH 2 TIMES DAILY 180 tablet 0    METFORMIN 500 MG Oral Tab TAKE 1 TABLET BY MOUTH TWICE DAILY WITH MEALS 180 tablet 0    RANOLAZINE  MG Oral Tablet 12 Hr TAKE ONE TABLET BY MOUTH ONE TIME DAILY 90 tablet 0    gabapentin 100 MG Oral Cap Take 1 capsule (100 mg total) by mouth daily. 30 capsule 0    benzonatate 100 MG Oral Cap Take 1 capsule (100 mg total) by mouth 3 (three) times daily as needed for cough. 30 capsule 0    OMEPRAZOLE 40 MG Oral Capsule Delayed Release TAKE 1 CAPSULE BY MOUTH DAILY 90 capsule 0    ISOSORBIDE MONONITRATE  MG Oral Tablet 24 Hr Take 1 tablet (120 mg total) by mouth daily. 90 tablet 0    ATORVASTATIN 40 MG Oral Tab TAKE ONE TABLET BY MOUTH ONE TIME DAILY IN THE EVENING 90 tablet 0    NIFEDIPINE ER 60 MG Oral Tablet 24 Hr TAKE ONE TABLET BY MOUTH DAILY 90 tablet 0    LISINOPRIL-HYDROCHLOROTHIAZIDE 20-25 MG Oral Tab Take 1 tablet by mouth daily. 90 tablet 0    Glucose Blood (CONTOUR NEXT TEST) In Vitro Strip Use daily--E11.9 Non insulin dependent 100 strip 2    FUROSEMIDE 20 MG Oral Tab Take 1 tablet (20 mg total) by mouth daily. (Patient  taking differently: Take 1 tablet (20 mg total) by mouth every other day.) 30 tablet 0    Microlet Lancets Does not apply Misc USE TO TEST ONCE DAILY *DX E11.9* 100 each 1    omega-3 fatty acids 1000 MG Oral Cap Take 1,000 mg by mouth every other day.      NON FORMULARY Take 1 tablet by mouth daily. LACTATE PILLS      nitroGLYCERIN (NITROSTAT) 0.4 MG Sublingual SL Tab Place 1 tablet (0.4 mg total) under the tongue every 5 (five) minutes as needed. 30 tablet 5    Black Elderberry,Berry-Flower, 575 MG Oral Cap Take by mouth every other day.      mometasone 0.1 % External Ointment Apply topically as needed.      cetirizine 10 MG Oral Tab Take 1 tablet (10 mg total) by mouth daily.      Multiple Vitamin (MULTI-VITAMIN) Oral Tab Take 1 tablet by mouth every other day.      Sennosides-Docusate Sodium 8.6-50 MG Oral Cap Take 1 capsule by mouth daily as needed. (Patient not taking: Reported on 2/28/2025) 30 capsule 1    PEG 3350 Oral Powd Pack Take 17 g by mouth nightly. (Patient not taking: Reported on 2/28/2025)        Past Medical History:    Abnormal maternal glucose tolerance, complicating pregnancy, childbirth, or the puerperium, unspecified as to episode of care    Acute bronchitis    Back problem    Cataract    Chest pain, unspecified    admit 12/05    Deep vein thrombosis (HCC)    during pregnancy right leg    Diabetes (HCC)    Diverticulosis of large intestine    unsure if divertiulitis or diverticulosis    Esophageal reflux    Essential hypertension, benign    Helicobacter pylori gastritis    High blood pressure    High cholesterol    Incontinence    Osteoarthritis    knees    Palpitations    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    PONV (postoperative nausea and vomiting)    nausea,pain results in cardiac vasospasms    Sleep apnea    no device    Stroke (HCC)    r/o 4/11/17    Unspecified gastritis and gastroduodenitis without mention of hemorrhage    Visual impairment     glasses      Social History:  Social History     Socioeconomic History    Marital status:    Tobacco Use    Smoking status: Never     Passive exposure: Never    Smokeless tobacco: Never   Vaping Use    Vaping status: Never Used   Substance and Sexual Activity    Alcohol use: Not Currently     Comment: very seldom    Drug use: No   Other Topics Concern    Caffeine Concern Yes     Comment: 1 cup of coffee every morning and 1 glass of tea during dinner.  Sometimes dark chocolate.      Exercise No        REVIEW OF SYSTEMS:   GENERAL HEALTH: feels well otherwise    EXAM:   /60 (BP Location: Left arm, Patient Position: Sitting, Cuff Size: adult)   Pulse 71   Ht 5' 2\" (1.575 m)   Wt 144 lb (65.3 kg)   SpO2 95%   BMI 26.34 kg/m²   GENERAL: well developed, well nourished,in no apparent distress  SKIN: no rashes  NECK: supple,no adenopathy  LUNGS: clear to auscultation  CARDIO: RRR without murmur  EXTREMITIES: Bilateral barefoot skin diabetic exam is normal, visualized feet and the appearance is normal.  Bilateral monofilament/sensation of both feet is normal.  Pulsation pedal pulse exam of both lower legs/feet is normal as well.  L shoulder tender to palpation anterior aspect       ASSESSMENT AND PLAN:   1. Type 2 diabetes mellitus without complication, without long-term current use of insulin (HCC)  Will get labs from Dr Guevara    2. Essential hypertension  Stable BP  CPM w meds       3. Pure hypercholesterolemia  Await labs        4. Prinzmetal angina  On meds   her s/s could be related to this but rec call Dr Guevara office    May need another stress test     last I see is in 2021   if recur , rec ER         5. L shoulder pain      Will check xray and proceed       6. Acute sinusitis   Appears resolved       7. Chest tightness  Check CXR     rec call Dr Guevara office as noted above        The patient indicates understanding of these issues and agrees to the plan.  .

## 2025-03-03 ENCOUNTER — HOSPITAL ENCOUNTER (OUTPATIENT)
Dept: GENERAL RADIOLOGY | Age: 75
Discharge: HOME | End: 2025-03-03
Attending: FAMILY MEDICINE
Payer: MEDICARE

## 2025-03-03 DIAGNOSIS — M25.512 LEFT SHOULDER PAIN, UNSPECIFIED CHRONICITY: ICD-10-CM

## 2025-03-03 DIAGNOSIS — R07.89 CHEST TIGHTNESS: ICD-10-CM

## 2025-03-03 PROCEDURE — 71046 X-RAY EXAM CHEST 2 VIEWS: CPT | Performed by: FAMILY MEDICINE

## 2025-03-03 PROCEDURE — 73030 X-RAY EXAM OF SHOULDER: CPT | Performed by: FAMILY MEDICINE

## 2025-03-03 RX ORDER — FUROSEMIDE 20 MG/1
20 TABLET ORAL DAILY
Qty: 30 TABLET | Refills: 0 | Status: SHIPPED | OUTPATIENT
Start: 2025-03-03

## 2025-03-03 NOTE — TELEPHONE ENCOUNTER
Requesting    Name from pharmacy: Furosemide 20 Mg Tab Risi         Will file in chart as: FUROSEMIDE 20 MG Oral Tab    Sig: Take 1 tablet (20 mg total) by mouth daily.    Disp: 30 tablet    Refills: 0    Start: 3/3/2025    Class: Normal    Non-formulary    Last ordered: 7 months ago (7/9/2024) by Rikki Rizvi MD    Last refill: 7/10/2024    Rx #: [id number]    Hypertension Medications Protocol [id number]/03/2025 08:10 AM   Protocol Details CMP or BMP in past 12 months    Last BP reading less than 140/90    In person appointment or virtual visit in the past 12 mos or appointment in next 3 mos    EGFRCR or GFRNAA > 50    Medication is active on med list           LOV: 2/28/2025  RTC: none noted   Last Relevant Labs: 4/15/2024      Future Appointments   Date Time Provider Department Center   3/3/2025  2:00 PM PF XR RM1 [facility] X[facility] [place]   3/3/2025  2:15 PM PF XR RM1 PF XRAY [place]   3/6/2025  1:00 PM Wenceslao Bishop MD [facility]

## 2025-03-05 ENCOUNTER — TELEPHONE (OUTPATIENT)
Dept: INTERNAL MEDICINE CLINIC | Facility: CLINIC | Age: 75
End: 2025-03-05

## 2025-03-05 LAB
A/G RATIO: 1.5
ALBUMIN: 3.9 G/DL
ALKALINE PHOSPHATASE: 98
ALT: 16
AST: 15
BILIRUBIN, TOTAL: 0.5 MG/DL
BUN: 18 MG/DL (ref 7–22)
CALCIUM: 9.3
CARBON DIOXIDE: 30
CHLORIDE: 103
CHOL/HDLC RATIO: 2.7
CHOLESTEROL: 123
CREATININE: 0.8 MG/DL
EGFR IF AFRICN AM: 85.1
EGFR: 70.3
GLOBULIN: 2.6
GLUCOSE: 120
HDL CHOLESTEROL: 45 MG/DL
LDL CHOLESTEROL: 61 MG/DL (ref ?–130)
NON HDL CHOL: 78
POTASSIUM: 4.3
SODIUM: 139
TOTAL PROTEIN: 6.5
TRIGLYCERIDES: 106
VLDL: 21

## 2025-03-06 ENCOUNTER — OFFICE VISIT (OUTPATIENT)
Dept: NEUROLOGY | Facility: CLINIC | Age: 75
End: 2025-03-06
Payer: MEDICARE

## 2025-03-06 VITALS
WEIGHT: 144 LBS | SYSTOLIC BLOOD PRESSURE: 108 MMHG | RESPIRATION RATE: 16 BRPM | HEIGHT: 62 IN | DIASTOLIC BLOOD PRESSURE: 56 MMHG | BODY MASS INDEX: 26.5 KG/M2 | HEART RATE: 65 BPM

## 2025-03-06 DIAGNOSIS — G50.0 TRIGEMINAL NEURALGIA SYNDROME: ICD-10-CM

## 2025-03-06 DIAGNOSIS — D33.3 VESTIBULAR SCHWANNOMA (HCC): ICD-10-CM

## 2025-03-06 DIAGNOSIS — I72.0 CAROTID ANEURYSM, LEFT: Primary | ICD-10-CM

## 2025-03-06 PROCEDURE — 99213 OFFICE O/P EST LOW 20 MIN: CPT | Performed by: OTHER

## 2025-03-06 NOTE — PATIENT INSTRUCTIONS
Refill policies:    Allow 2-3 business days for refills; controlled substances may take longer.  Contact your pharmacy at least 5 days prior to running out of medication and have them send an electronic request or submit request through the “request refill” option in your [employer] account.  Refills are not addressed on weekends; covering physicians do not authorize routine medications on weekends.  No narcotics or controlled substances are refilled after noon on Fridays or by on call physicians.  By law, narcotics must be electronically prescribed.  A 30 day supply with no refills is the maximum allowed.  If your prescription is due for a refill, you may be due for a follow up appointment.  To best provide you care, patients receiving routine medications need to be seen at least once a year.  Patients receiving narcotic/controlled substance medications need to be seen at least once every 3 months.  In the event that your preferred pharmacy does not have the requested medication in stock (e.g. Backordered), it is your responsibility to find another pharmacy that has the requested medication available.  We will gladly send a new prescription to that pharmacy at your request.    Scheduling Tests:    If your physician has ordered radiology tests such as MRI or CT scans, please contact Central Scheduling at [phone] right away to schedule the test.  Once scheduled, the [facility] Centralized Referral Team will work with your insurance carrier to obtain pre-certification or prior authorization.  Depending on your insurance carrier, approval may take 3-10 days.  It is highly recommended patients assure they have received an authorization before having a test performed.  If test is done without insurance authorization, patient may be responsible for the entire amount billed.      Precertification and Prior Authorizations:  If your physician has recommended that you have a procedure or additional testing performed the [facility]  Centralized Referral Team will contact your insurance carrier to obtain pre-certification or prior authorization.    You are strongly encouraged to contact your insurance carrier to verify that your procedure/test has been approved and is a COVERED benefit.  Although the [facility] Centralized Referral Team does its due diligence, the insurance carrier gives the disclaimer that \"Although the procedure is authorized, this does not guarantee payment.\"    Ultimately the patient is responsible for payment.   Thank you for your understanding in this matter.  Paperwork Completion:  If you require FMLA or disability paperwork for your recovery, please make sure to either drop it off or have it faxed to our office at [phone]. Be sure the form has your name and date of birth on it.  The form will be faxed to our Forms Department and they will complete it for you.  There is a 25$ fee for all forms that need to be filled out.  Please be aware there is a 10-14 day turnaround time.  You will need to sign a release of information ([employer]) form if your paperwork does not come with one.  You may call the Forms Department with any questions at [phone].  Their fax number is [phone].

## 2025-03-06 NOTE — PROGRESS NOTES
[facility] Progress Note    HPI  Chief Complaint   Patient presents with    Neurologic Problem     L Carotid aneurysm/trigeminal neuralgia, PCP D/C'd gabapentin d/t \"resolved pain\"     In summary of prior visits:   \"     Patient presents in follow-up after recently discharged 4/12/17, following admission for acute R sided paresthesia with stroke in left basal ganglia .  Patient previously returned 4-13/17 for recurrence of her prior similar stroke symptoms approximately 9 AM, after she had woken up and felt tingling in the right side of the face.  She denied any associated headaches, nausea/emesis, vertigo, double vision, loss of vision, or focal weakness.  Symptoms were persistent throughout the day, but gradually improved.  Patient was evaluated in the ER and had MRI of the brain, which demonstrated possible acute on subacute infarct, but she was discharged on same dose of Plavix, and atorvastatin, as her recent stroke workup was completed. \"       Prior notes as per 7/7/2020.  Overall, since last visit, patient has been doing well in terms of stroke symptoms; she remains on Plavix and denies any new focal numbness/tingling/weakness, speech issues or vision changes; no new stroke or TIA symptoms.     She remains on gabapentin 600 mg bid and denies significant side effects.  However, since April she has been having worsening pain in the left hand.  She states this is worse in the night time even though she has been wearing her braces on her wrists at night.  She denies any weakness but states her symptoms limit her ability to do things in the left hand; worsens when she is gripping steering wheel.  Overall, R hand is not as severe.       Headaches have been stable and she previously had MRI/ MRA which showed a small left cavernous carotid aneurysm and was seen by neuro-interventional service with no intervention and monitoring yearly recommended.     Prior notes as per 10/26/2023.  Patient last  seen 7/7/2020.  She states she is here for a new issue. She states on 9/20/2023, she had new onset left ear pain, which would shoot down the jaw but also up to the top of the head.  She states if she would touch her head on the left side or touch her hair, she would have severe pain.  She still has been having some feeling like her hearing is muffled on the left side; she also states when she was trying to write.  When this first occurred, she also noted she was having difficulty with hand writing as well. She denied vision changes or double vision.        She was told she had trigeminal neuralgia and started on gabapentin by her PCP - currently up to 100 mg bid with some improvement.           Prior notes as per 1/29/2024.  Patient last seen 10/26/2203.  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 She was advised to see neurosurgery / neuro-interventional service (Dr. Yanes) for additional recommendations and was recommended to have serial imaging for surveillance rather than traditional angiogram.      She is still on gabapentin 100 mg bid but recently reduced to 100 mg daily by PCP with plan to stop taking if still not having left facial pain; this is resolved currently; she has some pain in R leg below knee - evaluation for DVT was normal.      Prior notes as per 5/30/2024.  Patient las seen 1/29/2024. She has been doing well overall aside from a few episodes of transient pain over the right side of the head lateral aspect of head, but no pain in the face and no vision changes or muscle twitching and denies associated light / sound sensitivity or nausea.  She has been on gabapentin at lower dose of 100 mg daily per PCP.     She had episode about a week and a half ago during which she was sleeping and had pain in the right leg, followed by \"feeling of shaking\" of  right hand digits 1-3 for !0 minutes but no loss of awareness or visible tremor (felt \"internal\").        Prior notes as per 9/30/2024.  Patient last seen 5/30/2024. Since last visit, she has been been doing well; denies new numbness/tingling/weakness but has intermittent dizzy spells, with no loss of awareness or auras of odd tastes, smells or sounds. She denies any other new neurologic symptoms and was seen by neuro-interventiional service with plan for continued watchful waiting with no intervention needed. She remains on gabapentin 100 mg daily.       Patient last seen 9/30/2024.  She has been doing well overall and denies new numbness/tingling/weakness and denies episodes of loss of awareness or auras of odd tastes, smells or sounds.      She accidentally ran out of gabapentin recently and has been off this since past Saturday and denies recurrence of pain; she remains on Plavix and atorvastatin and is doing well with no stroke like symptoms; she did have several episodes of chest pain when walking and is scheduled for stress test with cardiology.        Past Medical History:    Abnormal maternal glucose tolerance, complicating pregnancy, childbirth, or the puerperium, unspecified as to episode of care    Acute bronchitis    Back problem    Cataract    Chest pain, unspecified    admit 12/05    Deep vein thrombosis (HCC)    during pregnancy right leg    Diabetes (HCC)    Diverticulosis of large intestine    unsure if divertiulitis or diverticulosis    Esophageal reflux    Essential hypertension, benign    Helicobacter pylori gastritis    High blood pressure    High cholesterol    Incontinence    Osteoarthritis    knees    Palpitations    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    PONV (postoperative nausea and vomiting)    nausea,pain results in cardiac vasospasms    Sleep apnea    no device    Stroke (HCC)    r/o 4/11/17    Unspecified gastritis and gastroduodenitis  without mention of hemorrhage    Visual impairment    glasses     Past Surgical History:   Procedure Laterality Date    Back surgery  1984    Spinal diskectomy lumbar    at [place]        1987    Cardiac catherterization (pbp)      status normal resolved on 2007    Cataract      Cholecystectomy  1999    Colonoscopy  2011    colon polyp removal  francesca cee 5    Colonoscopy      Colonoscopy N/A 2017    Procedure: COLONOSCOPY;  Surgeon: Addison Castellano MD;  Location:  ENDOSCOPY    Hernia surgery  1999    Umbilical hernia repair;     Hysterectomy      d/t fibroids    Lumbar discectomy Left 2019    L4-L5   Dr Drew    Other      Spinal Steroid Injection     Other surgical history  2024    RIGHT KNEE ARTHROSCOPY PARTIAL LATERAL MENISCECTOMY SYNOVECTOMY ABRASIONPLASTY, REMOVAL OF SOFT TISSUE MASS    Shoulder surg proc unlisted      Spine surgery procedure unlisted      bulging discs in lower back    Upper gi endoscopy performed  2011     Family History   Problem Relation Age of Onset    Cancer Father          at age 52 Leukemia    Diabetes Father     Breast Cancer Paternal Grandmother         unknown    Cancer Paternal Grandmother         bone cancer    Cancer Other      Social History     Socioeconomic History    Marital status:    Tobacco Use    Smoking status: Never     Passive exposure: Never    Smokeless tobacco: Never   Vaping Use    Vaping status: Never Used   Substance and Sexual Activity    Alcohol use: Not Currently     Comment: very seldom    Drug use: No   Other Topics Concern    Caffeine Concern Yes     Comment: 1 cup of coffee every morning and 1 glass of tea during dinner.  Sometimes dark chocolate.      Exercise No       Allergies   Allergen Reactions    Penicillins RASH and SWELLING    Advil [Ibuprofen] NAUSEA AND VOMITING and DIZZINESS    Aspirin NAUSEA AND VOMITING and DIZZINESS    Codeine Sulfate NAUSEA AND VOMITING     Demerol NAUSEA ONLY and DIZZINESS    Hydrocodone NAUSEA ONLY and DIZZINESS    Naproxen DIZZINESS    Norvasc [Amlodipine Besylate] NAUSEA AND VOMITING    Nsaids NAUSEA ONLY and DIZZINESS    Tramadol NAUSEA AND VOMITING and DIZZINESS    Zetia [Ezetimibe] NAUSEA AND VOMITING     Tabs,Muscle spasm          Current Outpatient Medications:     FUROSEMIDE 20 MG Oral Tab, Take 1 tablet (20 mg total) by mouth daily. (Patient taking differently: Take 1 tablet (20 mg total) by mouth every other day.), Disp: 30 tablet, Rfl: 0    CLOPIDOGREL 75 MG Oral Tab, TAKE 1 TABLET BY MOUTH DAILY, Disp: 90 tablet, Rfl: 0    METOPROLOL TARTRATE 25 MG Oral Tab, TAKE 1 TABLET BY MOUTH 2 TIMES DAILY, Disp: 180 tablet, Rfl: 0    METFORMIN 500 MG Oral Tab, TAKE 1 TABLET BY MOUTH TWICE DAILY WITH MEALS, Disp: 180 tablet, Rfl: 0    RANOLAZINE  MG Oral Tablet 12 Hr, TAKE ONE TABLET BY MOUTH ONE TIME DAILY, Disp: 90 tablet, Rfl: 0    OMEPRAZOLE 40 MG Oral Capsule Delayed Release, TAKE 1 CAPSULE BY MOUTH DAILY, Disp: 90 capsule, Rfl: 0    ISOSORBIDE MONONITRATE  MG Oral Tablet 24 Hr, Take 1 tablet (120 mg total) by mouth daily., Disp: 90 tablet, Rfl: 0    ATORVASTATIN 40 MG Oral Tab, TAKE ONE TABLET BY MOUTH ONE TIME DAILY IN THE EVENING, Disp: 90 tablet, Rfl: 0    NIFEDIPINE ER 60 MG Oral Tablet 24 Hr, TAKE ONE TABLET BY MOUTH DAILY, Disp: 90 tablet, Rfl: 0    LISINOPRIL-HYDROCHLOROTHIAZIDE 20-25 MG Oral Tab, Take 1 tablet by mouth daily., Disp: 90 tablet, Rfl: 0    Glucose Blood (CONTOUR NEXT TEST) In Vitro Strip, Use daily--E11.9 Non insulin dependent, Disp: 100 strip, Rfl: 2    Microlet Lancets Does not apply Misc, USE TO TEST ONCE DAILY *DX E11.9*, Disp: 100 each, Rfl: 1    omega-3 fatty acids 1000 MG Oral Cap, Take 1,000 mg by mouth every other day., Disp: , Rfl:     NON FORMULARY, Take 1 tablet by mouth daily. LACTATE PILLS, Disp: , Rfl:     nitroGLYCERIN (NITROSTAT) 0.4 MG Sublingual SL Tab, Place 1 tablet (0.4 mg total) under  the tongue every 5 (five) minutes as needed., Disp: 30 tablet, Rfl: 5    Black Elderberry,Berry-Flower, 575 MG Oral Cap, Take by mouth every other day., Disp: , Rfl:     mometasone 0.1 % External Ointment, Apply topically as needed., Disp: , Rfl:     cetirizine 10 MG Oral Tab, Take 1 tablet (10 mg total) by mouth daily., Disp: , Rfl:     PEG 3350 Oral Powd Pack, Take 17 g by mouth as needed., Disp: , Rfl:     Multiple Vitamin (MULTI-VITAMIN) Oral Tab, Take 1 tablet by mouth every other day., Disp: , Rfl:     Review of Systems:  No chest pain or palpitations; otherwise as noted in HPI.    Exam:  /56 (BP Location: Left arm, Patient Position: Sitting, Cuff Size: adult)   Pulse 65   Resp 16   Ht 62\"   Wt 144 lb (65.3 kg)   BMI 26.34 kg/m²   Estimated body mass index is 26.34 kg/m² as calculated from the following:    Height as of this encounter: 62\".    Weight as of this encounter: 144 lb (65.3 kg).    Gen: well developed, well nourished, no acute distress  HEENT: normocephalic  Heart; normal S1/S2, regular rate and rhythm  Lungs: clear to auscultation bilaterally  Extremities: no edema, peripheral pulses intact    Neck: supple, full range of motion; no carotid bruits; no meningitic signs      Neuro:    NIHSS: 0     Mental status:  Orientation: Alert and oriented to person, place, time  Speech fluent and conversational     CN: PERRL, EOMI without nystagmus, VFF, smile symmetric, sensation intact, tongue and palate midline, SCM intact, hearing impaired bilaterally but left appears worse than right; otherwise, 2-12 intact; no sensory deficit in V1,V2, V3 distribution   Motor: 5/5 strength throughout, tone normal - no drift; no tremor noted; no bradykinesia or rigidity noted   DTR: 2+, symmetric, throughout, toes downgoing bilaterally; no clonus  Sensory: intact to light touch and pin throughout grossly  Coord: FNF intact with no tremor or dysmetria  Romberg:absent  Gait: antalgic gait due to knee  pain    Labs:   New    Component      Latest Ref Rng 2/26/2025   Cholesterol, Total 123 (E)   Triglycerides 106 (E)   HDL Cholesterol      mg/dL 45 (E)   VLDL 21 (E)   Chol/HDL Ratio 2.7 (E)   NON-HDL CHOLESTEROL 78 (E)   LDL Cholesterol Calc      <130 mg/dL 61 (E)      Legend:  (E) External lab result    Prior as noted below  Component      Latest Ref Rng & Units 12/5/2019 12/2/2019 12/12/2018   WBC      4.0 - 11.0 x10(3) uL 4.3     RBC      3.80 - 5.30 x10(6)uL 3.74 (L)     Hemoglobin      12.0 - 16.0 g/dL 10.9 (L)     Hematocrit      35.0 - 48.0 % 33.4 (L)     Platelet Count      150.0 - 450.0 10(3)uL 222.0     MCV      80.0 - 100.0 fL 89.3     MCH      26.0 - 34.0 pg 29.1     MCHC      31.0 - 37.0 g/dL 32.6     RDW      11.0 - 15.0 % 13.2     RDW-SD      35.1 - 46.3 fL 43.3     Prelim Neutrophil Abs      1.50 - 7.70 x10 (3) uL 2.69     Neutrophils Absolute      1.50 - 7.70 x10(3) uL 2.69     Lymphocytes Absolute      1.00 - 4.00 x10(3) uL 1.05     Monocytes Absolute      0.10 - 1.00 x10(3) uL 0.44     Eosinophils Absolute      0.00 - 0.70 x10(3) uL 0.12     Basophils Absolute      0.00 - 0.20 x10(3) uL 0.02     Immature Granulocyte Absolute      0.00 - 1.00 x10(3) uL 0.00     Neutrophils %      % 62.2     Lymphocytes %      % 24.3     Monocytes %      % 10.2     Eosinophils %      % 2.8     Basophils %      % 0.5     Immature Granulocyte %      % 0.0     Glucose      70 - 99 mg/dL 93     Sodium      136 - 145 mmol/L 143     Potassium      3.5 - 5.1 mmol/L 3.9     Chloride      98 - 112 mmol/L 111     Carbon Dioxide, Total      21.0 - 32.0 mmol/L 29.0     ANION GAP      0 - 18 mmol/L 3     BUN      7 - 18 mg/dL 10     CREATININE      0.55 - 1.02 mg/dL 0.70     BUN/CREAT Ratio      10.0 - 20.0 14.3     CALCIUM      8.5 - 10.1 mg/dL 9.0     CALCULATED OSMOLALITY      275 - 295 mOsm/kg 295     eGFR NON-AFR. AMERICAN      >=60 89     eGFR       >=60 103     AST (SGOT)      15 - 37 U/L 36     ALT  (SGPT)      13 - 56 U/L 51     ALKALINE PHOSPHATASE      55 - 142 U/L 105     Total Bilirubin      0.1 - 2.0 mg/dL 0.4     TOTAL PROTEIN      6.4 - 8.2 g/dL 6.4     Albumin      3.4 - 5.0 g/dL 3.0 (L)     Globulin      2.8 - 4.4 g/dL 3.4     A/G Ratio      1.0 - 2.0 0.9 (L)     HEMOGLOBIN A1c      <5.7 %  6.4 (H) 7.2 (H)   ESTIMATED AVERAGE GLUCOSE      68 - 126 mg/dL  137 (H) 160 (H)       Prior as noted below  Component      Latest Ref Rng & Units 9/11/2019   WBC      4.0 - 11.0 x10(3) uL 5.1   RBC      3.80 - 5.30 x10(6)uL 4.33   Hemoglobin      12.0 - 16.0 g/dL 12.7   Hematocrit      35.0 - 48.0 % 38.0   Platelet Count      150.0 - 450.0 10(3)uL 299.0   MCV      80.0 - 100.0 fL 87.8   MCH      26.0 - 34.0 pg 29.3   MCHC      31.0 - 37.0 g/dL 33.4   RDW      11.0 - 15.0 % 13.2   RDW-SD      35.1 - 46.3 fL 42.3   Prelim Neutrophil Abs      1.50 - 7.70 x10 (3) uL 3.11   Neutrophils Absolute      1.50 - 7.70 x10(3) uL 3.11   Lymphocytes Absolute      1.00 - 4.00 x10(3) uL 1.24   Monocytes Absolute      0.10 - 1.00 x10(3) uL 0.61   Eosinophils Absolute      0.00 - 0.70 x10(3) uL 0.11   Basophils Absolute      0.00 - 0.20 x10(3) uL 0.02   Immature Granulocyte Absolute      0.00 - 1.00 x10(3) uL 0.01   Neutrophils %      % 60.9   Lymphocytes %      % 24.3   Monocytes %      % 12.0   Eosinophils %      % 2.2   Basophils %      % 0.4   Immature Granulocyte %      % 0.2   Glucose      70 - 99 mg/dL 91   Sodium      136 - 145 mmol/L 141   Potassium      3.5 - 5.1 mmol/L 4.1   Chloride      98 - 112 mmol/L 108   Carbon Dioxide, Total      21.0 - 32.0 mmol/L 28.0   ANION GAP      0 - 18 mmol/L 5   BUN      7 - 18 mg/dL 16   CREATININE      0.55 - 1.02 mg/dL 0.89   BUN/CREAT Ratio      10.0 - 20.0 18.0   CALCIUM      8.5 - 10.1 mg/dL 9.5   CALCULATED OSMOLALITY      275 - 295 mOsm/kg 293   eGFR NON-AFR. AMERICAN      >=60 67   eGFR       >=60 77   AST (SGOT)      15 - 37 U/L 29   ALT (SGPT)      13 - 56 U/L 38    ALKALINE PHOSPHATASE      55 - 142 U/L 83   Total Bilirubin      0.1 - 2.0 mg/dL 0.6   TOTAL PROTEIN      6.4 - 8.2 g/dL 7.3   Albumin      3.4 - 5.0 g/dL 3.8   Globulin      2.8 - 4.4 g/dL 3.5   A/G Ratio      1.0 - 2.0 1.1       Imaging:     None new    Prior as noted below    MRI brain / MRA head/neck with and without contrast   Result Date: 11/21/2023  CONCLUSION:   1. No acute intracranial abnormality identified.  2. There is a subtle nodular focus of enhancement in lateral aspect of the right internal auditory canal measuring up to 6 x 2 x 3 mm that is concerning for a this tubular schwannoma.  Clinical correlation recommended.  The inner ear structures have a normal signal and morphology. The vestibular aqueducts are nondilated.  3. A branch of the left superior cerebellar artery course along the superior and medial aspects of the cisternal segment of the left trigeminal nerve resulting in local deformity and slight lateral displacement.  Clinical correlation recommended.  4. Mild chronic microvascular ischemic changes in the cerebral white matter.  Old lacunar infarct in the left thalamus.  5. A stable saccular aneurysm is seen projecting laterally from cavernous segment of the left internal carotid artery measuring up to 3 mm.  6. There is a nonspecific focal area of vascular prominence noted projecting anteriorly from area of the origin of the left ophthalmic artery measuring up to 4 mm that is concerning for a small aneurysm versus infundibulum, with vascular tortuosity or artifact not entirely excluded.  This could be further assessed with conventional cerebral angiography as clinically directed.  7. No evidence of occlusion, dissection, or flow-limiting stenosis in the cervical vertebral or carotid arteries. No evidence of hemodynamically significant carotid stenosis by NASCET criteria.        Prior as noted below    MRI brain with and without contrast / MRA brain (9/12/19):     FINDINGS:    MRI  BRAIN  INTRACRANIAL:  There is no evidence of acute ischemia or infarction.  There is scattered subcortical high signal white matter lesions seen in the centrum semiovale and periventricular white matter similar to the prior examination of 4/24/2018 consistent   with chronic microvascular disease.  There are no diffusion abnormalities evident.  There is an old lacunar infarct of the left posterior lateral thalamus measuring approximately 6 mm.   VENTRICLES/SULCI:   Ventricles and sulci are normal in caliber.  There are no extra-axial fluid collections.  There is no midline shift.  SINUSES/ORBITS:       The visualized paranasal sinuses are clear.  The orbits are unremarkable.  MASTOIDS:       The mastoids are clear.     MRA BRAIN  INTRACRANIAL CAROTIDS:         Within the left cavernous carotid segment there is a 2.9 x 2.1 mm aneurysm.  This is probably on a short neck which is difficult to measure but is estimated at between 1 and 1.5 mm.  The right cavernous carotid segment is   normal.  The A1 and M1 segments are normal.  The vertebrobasilar junction is normal.  There is a dominant left vertebral artery.  The basilar artery and basilar tip are normal.  There is fetal origin of the right PCA.  There is a patent left posterior   communicating artery.  The PCA branches are symmetrical and normal bilaterally.  ANTERIOR CEREBRALS:         No visible stenosis or aneurysm.  ANTERIOR COMMUNICATING:  No visible stenosis or aneurysm.  MIDDLE CEREBRALS:         No visible stenosis or aneurysm.  POSTERIOR COMMUNICATING: No visible stenosis or aneurysm.  SUPERIOR CEREBELLARS:         No visible stenosis or aneurysm.  BASILAR:             No visible stenosis or aneurysm.  INTRACRANIAL VERTEBRALS: No visible significant stenosis or dissection.     =====  CONCLUSION:  No evidence of acute cerebral ischemia or infarction.  No evidence of ventriculomegaly or intra-axial mass lesion or hemorrhage.     Chronic microvascular disease  involving both cerebral hemispheres unchanged from 4/24/2018.  Noted is a old 6 mm left posterior lateral thalamic lacunar infarct.     2.9 x 2.1 mm left cavernous carotid aneurysm with short neck.  No other evidence of aneurysm formation or branch occlusion is identified intracranially.       No results found.    Piror as noted below:    MRI brain (4/24//18):     FINDINGS:    Cerebral atrophy is present with symmetrical prominence of the ventricles.  There are scattered patchy areas of increased T2 intensity seen  predominantly within the periventricular white matter. These findings are consistent with age related changes.   There is no evidence of hemorrhage or mass. There is no evidence of acute infarction. No extra-axial fluid collection or midline shift is noted.     The visualized paranasal sinuses show mucous retention cyst within the left maxillary sinus..     =====  CONCLUSION:  1. No acute intracranial findings.  2. Cerebral atrophy with small vessel changes.    Other procedures:     None new since last visit    Prior as noted below    NCS/EMG (7/16/2020):     Sensory NCS      Nerve / Sites Rec. Site Onset Lat Peak Lat NP Amp PP Amp Segments Distance Peak Diff Velocity       ms ms µV µV   cm ms m/s   R Median - Digit II (Antidromic)      Wrist Dig II 3.70 4.38 1.7 1.8 Wrist - Dig II 13   35   L Median - Digit II (Antidromic)      Wrist Dig II NR NR NR NR Wrist - Dig II 13   NR   R Ulnar - Digit V (Antidromic)      Wrist Dig V 2.14 2.66 3.0 5.7 Wrist - Dig V 11   52      B.Elbow Dig V 2.08 2.60 3.3 5.9 B.Elbow - Wrist   -0.05     L Ulnar - Digit V (Antidromic)      Wrist Dig V 1.93 2.71 23.1 37.9 Wrist - Dig V 11   57      B.Elbow Dig V 1.88 2.66 25.1 41.7 B.Elbow - Wrist   -0.05     R Radial - Anatomical snuff box (Forearm)      Forearm Wrist 1.72 2.29 5.2 4.4 Forearm - Wrist 10   58   L Radial - Anatomical snuff box (Forearm)      Forearm Wrist 1.61 2.19 41.1 53.7 Forearm - Wrist 10   62   R Median, Ulnar  - Transcarpal comparison      Median Palm Wrist 2.34 3.02 4.3 3.1 Median Palm - Wrist 8   34      Ulnar Palm Wrist 1.35 1.88 3.1 3.0 Ulnar Palm - Wrist 8   59               Median Palm - Ulnar Palm   1.15     L Median, Ulnar - Transcarpal comparison      Median Palm Wrist NR NR NR NR Median Palm - Wrist 8   NR      Ulnar Palm Wrist 1.35 1.77 26.2 36.7 Ulnar Palm - Wrist 8   59               Median Palm - Ulnar Palm   NR         Motor NCS      Nerve / Sites Muscle Latency Amplitude Amp % Duration Area Segments Distance Lat Diff Velocity       ms mV % ms mVms   cm ms m/s   R Median - APB      Wrist APB 4.06 5.6 100 4.64 15.2 Wrist - APB 7          Elbow APB 7.81 5.4 95.9 4.79 14.7 Elbow - Wrist 20 3.75 53      A.Elbow APB 4.01 10.6 187 4.53 28.6 A.Elbow - Elbow   -3.80     L Median - APB      Wrist APB 5.05 2.9 100 8.80 11.5 Wrist - APB 7          Elbow APB 8.70 2.8 96.5 8.91 11.9 Elbow - Wrist 20 3.65 55   R Ulnar - ADM      Wrist ADM 2.40 12.7 100 4.84 36.2 Wrist - ADM 7          B.Elbow ADM 5.63 12.8 101 5.10 36.8 B.Elbow - Wrist 20 3.23 62   L Ulnar - ADM      Wrist ADM 2.40 9.8 100 5.99 30.4 Wrist - ADM 7          B.Elbow ADM 5.83 10.1 104 5.89 31.9 B.Elbow - Wrist 20.5 3.44 60       F  Wave      Nerve F Lat M Lat F-M Lat     ms ms ms   R Median - APB 25.3 4.4 20.9   R Ulnar - ADM 25.5 2.4 23.1   L Median - APB 28.0 5.1 22.9   L Ulnar - ADM 26.2 2.6 23.6       EMG                       EMG Summary Table       Spontaneous MUAP Recruitment   Muscle Nerve Roots IA Fib PSW Fasc H.F. Amp Dur. PPP Pattern   L. Deltoid Axillary C5-C6 N None None None None N N N N   L. Biceps brachii Musculocutaneous C5-C6 N None None None None N N N N   L. Triceps brachii Radial C6-C8 N None None None None N N N N   L. Extensor digitorum communis Radial C7-C8 N None None None None N N N N   L. Abductor pollicis brevis Median C8-T1 N None None None None N N N N   L. First dorsal interosseous Ulnar C8-T1 N None None None None N N N N   R.  Deltoid Axillary C5-C6 N None None None None N N N N   R. Biceps brachii Musculocutaneous C5-C6 N None None None None N N N N   R. Triceps brachii Radial C6-C8 N None None None None N N N N   R. Extensor digitorum communis Radial C7-C8 N None None None None N N N N   R. Abductor pollicis brevis Median C8-T1 N None None None None N N N N   R. First dorsal interosseous Ulnar C8-T1 N None None None None N N N N               Summary:     Nerve conduction studies:  1.  Right median sensory response was abnormal due to markedly reduced amplitude, with normal peak latency, and normal conduction velocity.  2.  Left median sensory response was absent.  3.  Right ulnar sensory response was abnormal due to markedly reduced amplitude, with normal peak latency, and normal conduction velocity.  4.  Left ulnar sensory response was normal.  5.  Right radial sensory response was abnormal due to markedly reduced amplitude, with normal peak latency, and normal conduction velocity.  6.  Left radial sensory spots was normal.  7.  Right median to ulnar palmar mixed nerve comparison study demonstrated significant inter-latency prolongation of median relative to ulnar nerve (approximately 1.15 ms), consistent with median nerve entrapment across the right wrist.  8.  Left median to ulnar palmar mixed nerve comparison study demonstrated absent median response.  9.  Right median motor response was normal; F wave response was normal.  10.  Left median motor response was abnormal due to prolonged peak latency, with reduced amplitude, and normal conduction velocity; F wave response was normal.  11.  Right ulnar motor response was normal; F wave response was normal.  12.  Left ulnar motor response was normal; F wave response was normal.     EMG (needle exam):  Concentric needle EMG was performed on the selected muscles listed above, with the following findings: All muscles tested were normal, including bilateral APB muscles.        Impression:    This is an abnormal study.  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     In addition, there is evidence for localized sensory neuropathic process in the right upper extremity, which may be related to prior history of trauma.  Clinical correlation is advised.  There is no electrophysiologic evidence to suggest a generalized large fiber polyneuropathy, or myopathy.     Overall, the findings of bilateral median nerve entrapment suggest a moderate to severe median nerve entrapment across the left wrist, and a mild median nerve entrapment across the right wrist, with sensory nerve affected only, with demyelinating type changes, and no secondary axonal loss.     Prior as noted below    EMG 5/4/18:     Sensory NCS      Nerve / Sites Rec. Site Onset Lat Peak Lat NP Amp PP Amp Segments Distance Peak Diff Velocity       ms ms µV µV   cm ms m/s   R Median - Digit II (Antidromic)      Wrist Dig II 3.44 4.48 22.6 44.8 Wrist - Dig II 14   41   R Ulnar - Digit V (Antidromic)      Wrist Dig V 2.14 2.92 33.5 43.7 Wrist - Dig V 11   52      B.Elbow Dig V 2.08 2.86 33.4 45.6 B.Elbow - Wrist   -0.05     R Radial - Anatomical snuff box (Forearm)      Forearm Wrist 1.77 2.34 31.5 49.2 Forearm - Wrist 10   56   R Sural - Ankle (Calf)      Calf Ankle 2.55 3.18 5.9 8.2 Calf - Ankle 13   51      2 Ankle 2.34 3.13 2.9 9.0              3 Ankle 2.66 3.33 2.5 5.2           R Median, Ulnar - Transcarpal comparison      Median Palm Wrist 2.50 3.07 19.5 28.4 Median Palm - Wrist 8   32      Ulnar Palm Wrist 1.35 1.93 24.0 25.8 Ulnar Palm - Wrist 8   59               Median Palm - Ulnar Palm   1.15         Motor NCS      Nerve / Sites Muscle Latency Amplitude Amp % Duration Area Segments Distance Lat Diff  Velocity       ms mV % ms mVms   cm ms m/s   R Median - APB      Wrist APB 4.43 4.2 100 7.55 13.4 Wrist - APB 7          Elbow APB 8.28 4.0 96.3 7.45 13.0 Elbow - Wrist 19.5 3.85 51   R Ulnar - ADM      Wrist ADM 2.45 10.6 100 5.63 30.1 Wrist - ADM 7          B.Elbow ADM 5.57 10.7 102 5.89 31.4 B.Elbow - Wrist 19.5 3.13 62      A.Elbow ADM 7.50 10.6 101 5.89 30.5 A.Elbow - B.Elbow 10 1.93 52   R Peroneal - EDB      Ankle EDB 4.27 0.6 100 4.43 1.8 Ankle - EDB 7          Fib head EDB 10.10 0.7 111 4.79 2.1 Fib head - Ankle 28 5.83 48   R Tibial - AH      Ankle AH 4.01 8.9 100 6.20 28.5 Ankle - AH 8          Pop fossa AH 11.15 5.8 65.1 7.08 22.3 Pop fossa - Ankle 38 7.14 53   R Peroneal - Tib Ant      Fib Head Tib Ant 4.11 7.9 100 8.54 42.4 Fib Head - Tib Ant 11          Pop fossa Tib Ant 5.57 7.8 99.1 8.65 42.6 Pop fossa - Fib Head 7.5 1.46 51       F  Wave      Nerve F Lat M Lat F-M Lat     ms ms ms   R Peroneal - EDB NR NR NR   R Tibial - AH 39.8 4.9 34.8   R Median - APB 27.4 3.9 23.5   R Ulnar - ADM 25.5 2.1 23.4       EMG                       EMG Summary Table       Spontaneous MUAP Recruitment   Muscle Nerve Roots IA Fib PSW Fasc H.F. Amp Dur. PPP Pattern   R. Deltoid Axillary C5-C6 N None None None None N N N N   R. Biceps brachii Musculocutaneous C5-C6 N None None None None N N N N   R. Triceps brachii Radial C6-C8 N None None None None N N N N   R. Extensor digitorum communis Radial C7-C8 N None None None None N N N N   R. First dorsal interosseous Ulnar C8-T1 N None None None None N N N N   R. Vastus medialis Femoral L2-L4 N None None None None N N N N   R. Tibialis anterior Deep peroneal (Fibular) L4-L5 N None None None None 1+ 1+ N Reduced   R. Peroneus longus Perineal L5-S1 N None None None None 1+ 1+ N Reduced   R. Gastrocnemius (Medial head) Tibial S1-S2 N None None None None N N N N   R. Extensor hallucis longus Deep peroneal (Fibular) L5-S1 N None None None None 1+ 1+ N Reduced                Summary:     Right sural sensory response was borderline due to borderline amplitude, with normal peak latency and normal conduction velocity.   Right median sensory response was abnormal due to prolonged peak latency, with normal amplitude and slowed conduction velocity   Right ulnar sensory response was normal  Right radial sensory response was normal.   Right median to ulnar palmar mixed nerve comparison study was abnormal and demonstrated significant interlatency prolongation of median relative to ulnar nerve (1.15 sec [>0.5 msec]), consistent with focal slowing of median nerve across the wrist and entrapment of median nerve across the wrist.   Right common peroneal motor response was abnormal due to markedly reduced amplitude, with normal distal motor latency and normal conduction velocity; F wave was absent  Right tibial motor response was normal; F wave was normal.  Right median motor response was borderline due to borderline distal motor latency and borderline amplitude with normal conduction velocity; F wave was normal.  Right ulnar motor response was normal; F wave was normal.      EMG (needle exam)  Concentric needle EMG was performed on the selected muscles listed above in the table, with the following findings:   No increased insertional or spontaneous activity was noted in any of the muscles  Motor unit potentials demonstrated chronic denervation with increased amplitude, duration and reduced recruitment in the R lower extremity in the R TA, peroneus longus and EHL muscles.         Impression:      This study is abnormal.  There is electrophysiologic evidence consistent with a Right median nerve entrapment across the wrist, with demyelinating features.  In addition, this is suggestive of but not diagnostic for a mild, chronic R lower lumbar radiculopathy.        There is no clear evidence for a large fiber polyneuropathy or myopathy as clinically questioned.     Diagnostic and imaging as previously  noted below:    MRI brain / MRA head / neck (4/11/17):     FINDINGS:  Ventricles and sulci are normal caliber. No mass effect or midline shift. There is a small focus of restricted diffusion involving the left basal ganglia measuring up to 0.6 cm. There is associated minimal low signal on the ADC map. There is    corresponding mild flair hyperintensity.        There is scattered mild chronic small vessel disease. No abnormal extra-axial fluid. Normal central flow voids.        The carotid arteries are patent. There is mild carotid bifurcation atherosclerosis. Narrowing is estimated at no more than 15-20% bilaterally. The vertebral arteries are patent. The left is dominant in caliber. The basilar artery is patent. There is no   carotid or vertebral basilar dissection. No focal high grade stenosis.        The anterior, middle and posterior cerebral arteries are patent. No intracranial aneurysm or arterial venous malformation demonstrated.        The superior cerebellar, AICA and PICA segments demonstrate flow related signal.          =====  CONCLUSION:     #1. Small subacute infarct left basal ganglia.  #2. Patent carotid and vertebral basilar systems. Mild carotid bifurcation atherosclerosis.  #3. Unremarkable [place] MRA.    Echo:   Conclusions:    1. Left ventricle: The cavity size was normal. Wall thickness was normal.     Systolic function was normal. The estimated ejection fraction was 65%. No     diagnostic evidence for regional wall motion abnormalities. Doppler     parameters are consistent with abnormal left ventricular relaxation -     grade 1 diastolic dysfunction.  2. Mitral valve: Systolic bowing without prolapse. There was trivial to mild     regurgitation.  3. Left atrium: The left atrial volume was mildly increased.  4. Right atrium: The atrium was mildly dilated.  5. Tricuspid valve: Structurally normal valve. There was moderate     regurgitation.  6. Pulmonary arteries: Systolic pressure  was mildly increased, in the range     of 40mm Hg to 45mm Hg.  Impressions:  There was no diagnostic evidence for a cardiac embolic source.  This study is compared with previous dated 16: Compared to the  previous study, systemic pressure has decreased from 151/70 mmHg to 137/65  mmHg. PA systolic pressure has decreased from ~ 60 mmHg to 40-45 mmHg.    Labs:     None new:    Prior as noted below     17:   LDL 43  Chol,total: 123  Tri  HDL 52    AST/ALT: normal     Prior as noted below:     LDL: 80  A1C: 6.5           Impression/ Plan:    Liss Fagan is a 74 year old woman with past medical history significant for HTN, DM type 2, GERD, who originally presented to ED 17, with numbness in R face/arm/ leg and found to have acute left basal ganglia infract     MRI brain previously repeated for similar symptoms and likely with extension of prior stroke    Workup unremarkable with normal Echo, LDL 80, A1C 6.5, and likely small vessel etiology.       Patient previously was seen earlier than scheduled due to new onset of severe headache, associated with nausea and numbness on the right side of the face.  This has since resolved.  Imaging demonstrated left cavernous carotid aneurysm 2019.  Patient was evaluated by neuro interventional radiology with no recommendation for acute intervention, and with recommendation for serial imaging ~ 1 yr from original image.      In terms of stroke symptoms, she is currently doing well on her dose of Plavix 75 mg daily and Lipitor 40 mg daily (could not tolerate ASA).        She subsequently presented for left side facial pain with symptoms somewhat suggestive of trigeminal neuralgia but with some atypical features with distribution primarily into V1 region and also with more continuous pain than lancinating pain.       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 She was advised to see neurosurgery / neuro-interventional service (Dr. Yanes) for additional recommendations and was recommended to have serial imaging for surveillance rather than traditional angiogram and has been following with neuro-interventional service with stable imaging and plan for continue monitoring with repeat imaging to be done ~1 year (ie ~ 7/2025).      She was on gabapentin 100 mg daily and has been doing well and recently stopped taking this and continues to do well - recommend continue to monitor.     1. Carotid aneurysm, left  As noted above     2. Vestibular schwannoma (HCC)  As noted above     3. Trigeminal neuralgia syndrome  As noted above     Return in about 6 months (around 9/6/2025).    Wenceslao Bishop MD, Neurology  [facility]  Pager [phone]  3/6/2025

## 2025-03-11 DIAGNOSIS — M25.512 LEFT SHOULDER PAIN, UNSPECIFIED CHRONICITY: Primary | ICD-10-CM

## 2025-03-26 ENCOUNTER — TELEPHONE (OUTPATIENT)
Dept: INTERNAL MEDICINE CLINIC | Facility: CLINIC | Age: 75
End: 2025-03-26

## 2025-03-26 NOTE — TELEPHONE ENCOUNTER
Pt is calling to request an appointment for today- she has been experiencing Chest discomfort, nausea, no appetite, tired , headache, post nasal drip with green discharge since Sunday- She saw Dr. Guevara cardiologist  on Monday and he said the Stress test was normal however she is scheduled for a CT scan April 10th.     Please call to triage..

## 2025-03-26 NOTE — TELEPHONE ENCOUNTER
TO: Please see below, JUSTICE    Spoke with patient, has been feeling sick since Sunday. Patient states she had chest discomfort since getting results of stress test on Monday. Stress test was normal, but since patient had pain during test, Dr. Guevara ordered CT scan on 4/14/2025. Patient experiencing green mucus, congestion, cough, post nasal drip, lack of appetite, nausea. Due to history of small aneurysms in back of head and tumor at ear, patient concerned with worsening conditions. Patient states she does not have chest discomfort at this time. This RN advised patient that if chest pain returns, she must go to ER. If symptoms of URI worsen, such as fever or worsening mucus/phlegm, to proceed to urgent care. Patient verbalized understanding. This RN scheduled patient to see TO as well.     Patient scheduled as below:    Future Appointments   Date Time Provider Department Center   3/31/2025  2:45 PM Rikki Rizvi MD [facility] [place]   9/11/2025  1:00 PM Wenceslao Bishop MD [facility] [place]

## 2025-03-27 ENCOUNTER — HOSPITAL ENCOUNTER (OUTPATIENT)
Age: 75
Discharge: HOME | End: 2025-03-27
Payer: MEDICARE

## 2025-03-27 VITALS
OXYGEN SATURATION: 95 % | DIASTOLIC BLOOD PRESSURE: 64 MMHG | SYSTOLIC BLOOD PRESSURE: 126 MMHG | HEART RATE: 85 BPM | WEIGHT: 144 LBS | TEMPERATURE: 99 F | RESPIRATION RATE: 18 BRPM | BODY MASS INDEX: 26 KG/M2

## 2025-03-27 DIAGNOSIS — I10 ESSENTIAL HYPERTENSION: ICD-10-CM

## 2025-03-27 DIAGNOSIS — Z86.73 HISTORY OF STROKE WITHIN LAST YEAR: ICD-10-CM

## 2025-03-27 DIAGNOSIS — B34.9 VIRAL SYNDROME: Primary | ICD-10-CM

## 2025-03-27 PROCEDURE — 99214 OFFICE O/P EST MOD 30 MIN: CPT

## 2025-03-27 PROCEDURE — 87502 INFLUENZA DNA AMP PROBE: CPT | Performed by: PHYSICIAN ASSISTANT

## 2025-03-27 PROCEDURE — 99213 OFFICE O/P EST LOW 20 MIN: CPT

## 2025-03-27 RX ORDER — ATORVASTATIN CALCIUM 40 MG/1
40 TABLET, FILM COATED ORAL EVERY EVENING
Qty: 90 TABLET | Refills: 0 | Status: SHIPPED | OUTPATIENT
Start: 2025-03-27

## 2025-03-27 RX ORDER — LISINOPRIL AND HYDROCHLOROTHIAZIDE 20; 25 MG/1; MG/1
1 TABLET ORAL DAILY
Qty: 90 TABLET | Refills: 0 | Status: SHIPPED | OUTPATIENT
Start: 2025-03-27

## 2025-03-27 RX ORDER — ONDANSETRON 4 MG/1
4 TABLET, ORALLY DISINTEGRATING ORAL EVERY 8 HOURS PRN
Qty: 10 TABLET | Refills: 0 | Status: SHIPPED | OUTPATIENT
Start: 2025-03-27

## 2025-03-27 RX ORDER — FUROSEMIDE 20 MG/1
20 TABLET ORAL DAILY
Qty: 30 TABLET | Refills: 0 | Status: SHIPPED | OUTPATIENT
Start: 2025-03-27

## 2025-03-27 NOTE — ED PROVIDER NOTES
Patient Seen in: [facility]      History     Chief Complaint   Patient presents with    Fatigue    Nausea/vomiting     Stated Complaint: fatigue/chills    Subjective:   HPI    74-year-old female with below stated past medical history presents to immediate care for evaluation of chills, fatigue, sinus pressure, postnasal drip, nausea, decreased appetite starting 4 days ago. Infrequent cough/throat clearing from PND. One episode of vomiting small amount of liquid last night.  No abdominal pain, urinary symptoms, fever.      Objective:     Past Medical History:    Abnormal maternal glucose tolerance, complicating pregnancy, childbirth, or the puerperium, unspecified as to episode of care    Acute bronchitis    Back problem    Cataract    Chest pain, unspecified    admit     Deep vein thrombosis (HCC)    during pregnancy right leg    Diabetes (HCC)    Diverticulosis of large intestine    unsure if divertiulitis or diverticulosis    Esophageal reflux    Essential hypertension, benign    Helicobacter pylori gastritis    High blood pressure    High cholesterol    Incontinence    Osteoarthritis    knees    Palpitations    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     PONV (postoperative nausea and vomiting)    nausea,pain results in cardiac vasospasms    Sleep apnea    no device    Stroke (HCC)    r/o 17    Unspecified gastritis and gastroduodenitis without mention of hemorrhage    Visual impairment    glasses              Past Surgical History:   Procedure Laterality Date    Back surgery      Spinal diskectomy lumbar    at [facility]        1987    Cardiac catherterization (pbp)      status normal resolved on 2007    Cataract      Cholecystectomy  1999    Colonoscopy  2011    colon polyp removal  francesca cee 5    Colonoscopy      Colonoscopy N/A 2017    Procedure: COLONOSCOPY;  Surgeon: Addison Castellano MD;  Location:   ENDOSCOPY    Hernia surgery  01/01/1999    Umbilical hernia repair;     Hysterectomy  01/01/981    d/t fibroids    Lumbar discectomy Left 2019    L4-L5   Dr Drew    Other      Spinal Steroid Injection     Other surgical history  06/04/2024    RIGHT KNEE ARTHROSCOPY PARTIAL LATERAL MENISCECTOMY SYNOVECTOMY ABRASIONPLASTY, REMOVAL OF SOFT TISSUE MASS    Shoulder surg proc unlisted      Spine surgery procedure unlisted      bulging discs in lower back    Upper gi endoscopy performed  01/24/2011                Social History     Socioeconomic History    Marital status:    Tobacco Use    Smoking status: Never     Passive exposure: Never    Smokeless tobacco: Never   Vaping Use    Vaping status: Never Used   Substance and Sexual Activity    Alcohol use: Not Currently     Comment: very seldom    Drug use: No   Other Topics Concern    Caffeine Concern Yes     Comment: 1 cup of coffee every morning and 1 glass of tea during dinner.  Sometimes dark chocolate.      Exercise No              Review of Systems    Positive for stated complaint: fatigue/chills  Other systems are as noted in HPI.  Constitutional and vital signs reviewed.      All other systems reviewed and negative except as noted above.    Physical Exam     ED Triage Vitals [03/27/25 1542]   /64   Pulse 85   Resp 18   Temp 98.7 °F (37.1 °C)   Temp src Oral   SpO2 95 %   O2 Device None (Room air)       Current Vitals:   Vital Signs  BP: 126/64  Pulse: 85  Resp: 18  Temp: 98.7 °F (37.1 °C)  Temp src: Oral    Oxygen Therapy  SpO2: 95 %  O2 Device: None (Room air)        Physical Exam  Vitals and nursing note reviewed.   Constitutional:       General: She is not in acute distress.     Appearance: Normal appearance. She is not ill-appearing, toxic-appearing or diaphoretic.   HENT:      Mouth/Throat:      Mouth: Mucous membranes are moist.      Pharynx: Oropharynx is clear.   Cardiovascular:      Rate and Rhythm: Normal rate.      Heart sounds: Normal  heart sounds.   Pulmonary:      Effort: Pulmonary effort is normal. No respiratory distress.      Breath sounds: Normal breath sounds. No wheezing.   Neurological:      Mental Status: She is alert and oriented to person, place, and time.   Psychiatric:         Behavior: Behavior normal.           ED Course     Labs Reviewed   RAPID SARS-COV-2 BY PCR - Normal   POCT FLU TEST - Normal    Narrative:     This assay is a rapid molecular in vitro test utilizing nucleic acid amplification of influenza A and B viral RNA.       MDM      Differential diagnosis considered but not limited to COVID, influenza, other viral URI, less likely pneumonia    Nontoxic in appearance.  Physical exam is reassuring and consistent with viral syndrome.  COVID and Flu negative.  Patient recalls taking Zofran in the past for nausea which helped.  Zofran prescribed.  Supportive care discussed. Signs/symptoms necessitating reevaluation discussed with patient understanding.    Medical Decision Making  Amount and/or Complexity of Data Reviewed  Labs: ordered. Decision-making details documented in ED Course.    Risk  OTC drugs.  Prescription drug management.        Disposition and Plan     Clinical Impression:  1. Viral syndrome         Disposition:  Discharge  3/27/2025  4:40 pm    Follow-up:  No follow-up provider specified.        Medications Prescribed:  Discharge Medication List as of 3/27/2025  4:49 PM        START taking these medications    Details   ondansetron 4 MG Oral Tablet Dispersible Take 1 tablet (4 mg total) by mouth every 8 (eight) hours as needed for Nausea., Normal, Disp-10 tablet, R-0                 Supplementary Documentation:

## 2025-03-27 NOTE — TELEPHONE ENCOUNTER
Requesting    Name from pharmacy: Nifedipine Er 24hr 60 Mg Tab [place]         Will file in chart as: NIFEDIPINE ER 60 MG Oral Tablet 24 Hr    Sig: TAKE ONE TABLET BY MOUTH DAILY    Disp: 90 tablet    Refills: 0    Start: 3/27/2025    Class: Normal    Non-formulary    Last ordered: 2 months ago (12/30/2024) by Rikki Rizvi MD    Last refill: 12/30/2024    Rx #: [id number]    Hypertension Medications Protocol [id number]/27/2025 09:30 AM   Protocol Details CMP or BMP in past 12 months    Last BP reading less than 140/90    In person appointment or virtual visit in the past 12 mos or appointment in next 3 mos    EGFRCR or GFRNAA > 50    Medication is active on med list       Name from pharmacy: Lisinopril/Hydrochlorothiazide 20-25 Mg Tab Sol[facility]         Will file in chart as: LISINOPRIL-HYDROCHLOROTHIAZIDE 20-25 MG Oral Tab    Sig: Take 1 tablet by mouth daily.    Disp: 90 tablet    Refills: 0    Start: 3/27/2025    Class: Normal    Non-formulary For: Essential hypertension    Last ordered: 2 months ago (12/30/2024) by Rikki Rizvi MD    Last refill: 12/30/2024    Rx #: [id number]    Hypertension Medications Protocol [id number]/27/2025 09:30 AM   Protocol Details CMP or BMP in past 12 months    Last BP reading less than 140/90    In person appointment or virtual visit in the past 12 mos or appointment in next 3 mos    EGFRCR or GFRNAA > 50    Medication is active on med list       Name from pharmacy: Atorvastatin Calcium 40 Mg Tab [facility]         Will file in chart as: ATORVASTATIN 40 MG Oral Tab    Sig: TAKE ONE TABLET BY MOUTH ONE TIME DAILY IN THE EVENING    Disp: 90 tablet    Refills: 0    Start: 3/27/2025    Class: Normal    Non-formulary For: History of stroke within last year    Last ordered: 2 months ago (12/30/2024) by Rikki Rizvi MD    Last refill: 12/30/2024    Rx #: [id number]    Cholesterol Medication Protocol [id number]/27/2025 09:30 AM   Protocol Details ALT < 80    ALT resulted within past year    Lipid panel within past  12 months    In person appointment or virtual visit in the past 12 mos or appointment in next 3 mos    Medication is active on med list       Name from pharmacy: Furosemide 20 Mg Tab [facility]         Will file in chart as: FUROSEMIDE 20 MG Oral Tab    Sig: TAKE 1 TABLET BY MOUTH ONCE DAILY    Disp: 30 tablet    Refills: 0    Start: 3/27/2025    Class: Normal    Non-formulary    Last ordered: 3 weeks ago (3/3/2025) by Rikki Rizvi MD    Last refill: 3/3/2025    Rx #: [id number]    Hypertension Medications Protocol [id number]/27/2025 09:30 AM   Protocol Details CMP or BMP in past 12 months    Last BP reading less than 140/90    In person appointment or virtual visit in the past 12 mos or appointment in next 3 mos    EGFRCR or GFRNAA > 50    Medication is active on med list        LOV: 2/28/2025  RTC: none noted   Last Relevant Labs: 4/15/2024      Future Appointments   Date Time Provider Department Center   3/31/2025  2:45 PM Rikki Rizvi MD [facility]   9/11/2025  1:00 PM Wenceslao Bishop MD [facility] [place]

## 2025-03-27 NOTE — ED INITIAL ASSESSMENT (HPI)
C/o onset on Sunday fatigue, chills, nausea, and headache/sensitive to touch. Post nasal drip. Vomited last night, unable to keep anything down.

## 2025-03-31 ENCOUNTER — OFFICE VISIT (OUTPATIENT)
Dept: INTERNAL MEDICINE CLINIC | Facility: CLINIC | Age: 75
End: 2025-03-31
Payer: MEDICARE

## 2025-03-31 VITALS
HEART RATE: 80 BPM | BODY MASS INDEX: 26.31 KG/M2 | DIASTOLIC BLOOD PRESSURE: 60 MMHG | HEIGHT: 62 IN | TEMPERATURE: 98 F | WEIGHT: 143 LBS | SYSTOLIC BLOOD PRESSURE: 128 MMHG | OXYGEN SATURATION: 95 %

## 2025-03-31 DIAGNOSIS — J01.00 ACUTE NON-RECURRENT MAXILLARY SINUSITIS: Primary | ICD-10-CM

## 2025-03-31 DIAGNOSIS — E11.9 TYPE 2 DIABETES MELLITUS WITHOUT COMPLICATION, WITHOUT LONG-TERM CURRENT USE OF INSULIN (HCC): ICD-10-CM

## 2025-03-31 LAB
CREAT UR-SCNC: 22.1 MG/DL
HEMOGLOBIN A1C: 6.2 %
MICROALBUMIN UR-MCNC: <0.3 MG/DL

## 2025-03-31 PROCEDURE — 99214 OFFICE O/P EST MOD 30 MIN: CPT | Performed by: FAMILY MEDICINE

## 2025-03-31 PROCEDURE — 82043 UR ALBUMIN QUANTITATIVE: CPT | Performed by: FAMILY MEDICINE

## 2025-03-31 PROCEDURE — 82570 ASSAY OF URINE CREATININE: CPT | Performed by: FAMILY MEDICINE
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
## 2025-04-07 ENCOUNTER — LAB ENCOUNTER (OUTPATIENT)
Dept: LAB | Age: 75
End: 2025-04-07
Attending: INTERNAL MEDICINE
Payer: MEDICARE

## 2025-04-07 DIAGNOSIS — E11.9 DIABETES MELLITUS (HCC): Primary | ICD-10-CM

## 2025-04-07 DIAGNOSIS — Z01.812 PRE-OPERATIVE LABORATORY EXAMINATION: ICD-10-CM

## 2025-04-07 LAB
BUN BLD-MCNC: 13 MG/DL (ref 9–23)
CREAT BLD-MCNC: 0.87 MG/DL
EGFRCR SERPLBLD CKD-EPI 2021: 70 ML/MIN/1.73M2 (ref 60–?)

## 2025-04-07 PROCEDURE — 36415 COLL VENOUS BLD VENIPUNCTURE: CPT

## 2025-04-07 PROCEDURE — 84520 ASSAY OF UREA NITROGEN: CPT

## 2025-04-07 PROCEDURE — 82565 ASSAY OF CREATININE: CPT

## 2025-04-22 RX ORDER — ISOSORBIDE MONONITRATE 120 MG/1
120 TABLET, EXTENDED RELEASE ORAL DAILY
Qty: 90 TABLET | Refills: 0 | Status: SHIPPED | OUTPATIENT
Start: 2025-04-22

## 2025-04-22 RX ORDER — FUROSEMIDE 20 MG/1
20 TABLET ORAL DAILY
Qty: 30 TABLET | Refills: 0 | Status: SHIPPED | OUTPATIENT
Start: 2025-04-22

## 2025-04-22 NOTE — TELEPHONE ENCOUNTER
Requesting    Name from pharmacy: Isosorbide Mononitrate Er 24hr 120 Mg Tab Torr         Will file in chart as: ISOSORBIDE MONONITRATE  MG Oral Tablet 24 Hr    Sig: Take 1 tablet (120 mg total) by mouth daily.    Disp: 90 tablet    Refills: 0    Start: 4/22/2025    Class: Normal    Non-formulary    Last ordered: 2 months ago (1/24/2025) by Rikki Rizvi MD    Last refill: 1/24/2025    Rx #: [id number]        Name from pharmacy: Furosemide 20 Mg Tab Risi         Will file in chart as: FUROSEMIDE 20 MG Oral Tab    Sig: TAKE 1 TABLET BY MOUTH ONCE DAILY    Disp: 30 tablet    Refills: 0    Start: 4/22/2025    Class: Normal    Non-formulary    Last ordered: 3 weeks ago (3/27/2025) by Rikki Rizvi MD    Last refill: 3/27/2025    Rx #: [id number]    Hypertension Medications Protocol Oopztq1104/22/2025 09:37 AM   Protocol Details CMP or BMP in past 12 months    Last BP reading less than 140/90    In person appointment or virtual visit in the past 12 mos or appointment in next 3 mos    EGFRCR or GFRNAA > 50    Medication is active on med list        LOV: 3/31/2025  RTC: none noted   Last Relevant Labs: 2/26/2025  Filled:   Isosorbide-1/24/2025 #90 with 0 refills  Furosemide-3/27/2025 #30 with 0 refills  Future Appointments   Date Time Provider Department Center   9/11/2025  1:00 PM Wenceslao Bishop MD [facility]

## 2025-05-09 RX ORDER — OMEPRAZOLE 40 MG/1
40 CAPSULE, DELAYED RELEASE ORAL DAILY
Qty: 90 CAPSULE | Refills: 3 | Status: SHIPPED | OUTPATIENT
Start: 2025-05-09

## 2025-05-09 NOTE — TELEPHONE ENCOUNTER
Requesting    Name from pharmacy: Omeprazole Dr 40 Mg Cap Nort         Will file in chart as: OMEPRAZOLE 40 MG Oral Capsule Delayed Release    Sig: TAKE 1 CAPSULE BY MOUTH DAILY    Disp: 90 capsule    Refills: 0    Start: 5/9/2025    Class: Normal    Non-formulary    Last ordered: 2 months ago (2/10/2025) by Rikki Rizvi MD    Last refill: 2/10/2025    Rx #: [id number]    Gastrointestional Medication Protocol Ixowzw0705/09/2025 09:56 AM   Protocol Details In person appointment or virtual visit in the past 12 mos or appointment in next 3 mos    Medication is active on med list        LOV: 3/31/2025  RTC: none noted   Last Relevant Labs: n/a  Filled: 2/10/2025 #90 with 0 refills    Future Appointments   Date Time Provider Department Center   6/4/2025 11:30 AM [facility] [id number]   6/4/2025 11:50 AM Jai Gutierrez MD [facility] [id number]   9/11/2025  1:00 PM Wenceslao Bishop MD [facility]

## 2025-05-20 RX ORDER — FUROSEMIDE 20 MG/1
20 TABLET ORAL DAILY
Qty: 30 TABLET | Refills: 1 | Status: SHIPPED | OUTPATIENT
Start: 2025-05-20

## 2025-05-20 NOTE — TELEPHONE ENCOUNTER
Furosemide 20 mg  Filled 4-22-25  Qty 30  0 refills  Future Appointments   Date Time Provider Department Center   6/4/2025 11:30 AM [facility] [id number]   6/4/2025 11:50 AM Jai Gutierrez MD [facility] [id number]   9/11/2025  1:00 PM Wenceslao Bishop MD [facility] 2-28-25

## 2025-05-23 DIAGNOSIS — Z86.73 HISTORY OF STROKE WITHOUT RESIDUAL DEFICITS: ICD-10-CM

## 2025-05-23 DIAGNOSIS — I10 ESSENTIAL HYPERTENSION: ICD-10-CM

## 2025-05-23 RX ORDER — METOPROLOL TARTRATE 25 MG/1
25 TABLET, FILM COATED ORAL 2 TIMES DAILY
Qty: 180 TABLET | Refills: 0 | Status: SHIPPED | OUTPATIENT
Start: 2025-05-23

## 2025-05-23 RX ORDER — CLOPIDOGREL BISULFATE 75 MG/1
75 TABLET ORAL DAILY
Qty: 90 TABLET | Refills: 0 | Status: SHIPPED | OUTPATIENT
Start: 2025-05-23

## 2025-05-23 RX ORDER — RANOLAZINE 500 MG/1
500 TABLET, EXTENDED RELEASE ORAL DAILY
Qty: 90 TABLET | Refills: 0 | Status: SHIPPED | OUTPATIENT
Start: 2025-05-23

## 2025-05-23 NOTE — TELEPHONE ENCOUNTER
Requesting    Name from pharmacy: Metoprolol Tartrate 25 Mg Tab Auro         Will file in chart as: METOPROLOL TARTRATE 25 MG Oral Tab    Sig: TAKE 1 TABLET BY MOUTH 2 TIMES DAILY    Disp: 180 tablet    Refills: 0    Start: 5/23/2025    Class: Normal    Non-formulary For: Essential hypertension    Last ordered: 2 months ago (2/24/2025) by Rikki Rizvi MD    Last refill: 2/24/2025    Rx #: [id number]    Hypertension Medications Protocol Upuejz2505/23/2025 09:28 AM   Protocol Details CMP or BMP in past 12 months    Last BP reading less than 140/90    In person appointment or virtual visit in the past 12 mos or appointment in next 3 mos    EGFRCR or GFRNAA > 50    Medication is active on med list       Name from pharmacy: Metformin Hydrochloride 500 Mg Tab Johnny         Will file in chart as: METFORMIN 500 MG Oral Tab    Sig: TAKE 1 TABLET BY MOUTH TWICE DAILY WITH MEALS    Disp: 180 tablet    Refills: 0    Start: 5/23/2025    Class: Normal    Non-formulary    Last ordered: 2 months ago (2/24/2025) by Rikki Rizvi MD    Last refill: 2/24/2025    Rx #: [id number]    Diabetes Medication Protocol Zdkoob2605/23/2025 09:28 AM   Protocol Details Last A1C < 7.5 and within past 6 months    In person appointment or virtual visit in the past 6 mos or appointment in next 3 mos    Microalbumin procedure in past 12 months or taking ACE/ARB    EGFRCR or GFRNAA > 50    GFR in the past 12 months    Medication is active on med list       Name from pharmacy: Clopidogrel 75 Mg Tab Nort         Will file in chart as: CLOPIDOGREL 75 MG Oral Tab    Sig: TAKE 1 TABLET BY MOUTH DAILY    Disp: 90 tablet    Refills: 0    Start: 5/23/2025    Class: Normal    Non-formulary For: History of stroke without residual deficits    Last ordered: 2 months ago (2/24/2025) by Rikki Rizvi MD    Last refill: 2/24/2025    Rx #: [id number]        Name from pharmacy: Ranolazine Er 12hr 500 Mg Tab Scie         Will file in chart as: RANOLAZINE  MG Oral Tablet 12 Hr     Sig: TAKE ONE TABLET BY MOUTH ONE TIME DAILY    Disp: 90 tablet    Refills: 0    Start: 5/23/2025    Class: Normal    Non-formulary    Last ordered: 2 months ago (2/24/2025) by Rikki Rizvi MD    Last refill: 2/24/2025    Rx #: [id number]        LOV: 3/31/2025  RTC: none noted   Last Relevant Labs: 3/31/2025  Filled: 2/24/2025 #90 day supply  with 0 refills    Future Appointments   Date Time Provider Department Center   6/4/2025 11:30 AM [facility] [id number]   6/4/2025 11:50 AM Jai Gutierrez MD [facility] [id number]   9/11/2025  1:00 PM Wenceslao Bishop MD [facility]

## 2025-06-04 ENCOUNTER — OFFICE VISIT (OUTPATIENT)
Facility: LOCATION | Age: 75
End: 2025-06-04
Payer: MEDICARE

## 2025-06-04 DIAGNOSIS — H93.293 ABNORMAL AUDITORY PERCEPTION OF BOTH EARS: Primary | ICD-10-CM

## 2025-06-04 DIAGNOSIS — D33.3 UNILATERAL VESTIBULAR SCHWANNOMA (HCC): ICD-10-CM

## 2025-06-04 DIAGNOSIS — H90.3 SENSORINEURAL HEARING LOSS (SNHL) OF BOTH EARS: Primary | ICD-10-CM

## 2025-06-04 PROCEDURE — 99203 OFFICE O/P NEW LOW 30 MIN: CPT | Performed by: OTOLARYNGOLOGY

## 2025-06-04 PROCEDURE — 92567 TYMPANOMETRY: CPT | Performed by: AUDIOLOGIST

## 2025-06-04 PROCEDURE — 92557 COMPREHENSIVE HEARING TEST: CPT | Performed by: AUDIOLOGIST

## 2025-06-04 NOTE — PROGRESS NOTES
Liss Fagan was seen for an audiometric evaluation and tympanogram today. Referred back to physician.Consider trial use with amplification pending medical clearance.   Repeat hearing test annually.     Emelina Serna M.A. [facility]

## 2025-06-04 NOTE — PROGRESS NOTES
Liss Fagan is a 75 year old female. No chief complaint on file.    HPI:   She has a longstanding history of balance issues.  She saw physical therapy and did not get better.  She then saw neurology and MRI scan was performed which showed a 6 mm vestibular schwannoma.  She has a history of hearing loss.  There is some concern that it is more on one side.  Current Medications[1]   Past Medical History[2]   Social History:  Short Social Hx on File[3]   Past Surgical History[4]      REVIEW OF SYSTEMS:   GENERAL HEALTH: feels well otherwise  GENERAL : denies fever, chills, sweats, weight loss, weight gain  SKIN: denies any unusual skin lesions or rashes  RESPIRATORY: denies shortness of breath with exertion  NEURO: denies headaches    EXAM:   There were no vitals taken for this visit.    System Findings Details   Constitutional  Overall appearance - Normal.   Psychiatric  Orientation - Oriented to time, place, person & situation. Appropriate mood and affect.   Head/Face  Facial features -- Normal. Skull - Normal.   Eyes  Pupils equal ,round ,react to light and accomidate   Ears, Nose, Throat, Neck  Ears clear no fluid or infection oropharynx clear neck no masses   Neurological  Memory - Normal. Cranial nerves - Cranial nerves II through XII grossly intact.   Lymph Detail  Submental. Submandibular. Anterior cervical. Posterior cervical. Supraclavicular.     Latest Audiogram Result (Hz) Exam performed: 6/4/2025 11:42 AM Last edited by Emelina Serna AUD on 6/4/2025 12:03 PM        125 250  1500 2000 3000 4000 6000 8000    Right air:  15 30  30  35 40 60  65    Left air:  25 30  30  35 45 55  75    Right mastoid bone:   30  30  35  60      Left mastoid bone:   30  30  35  55         Reliability:  Good    Transducer:  Headphones    Technique:  Conventional Audiometry    Comments:            Latest Speech Audiometry  Last edited by Emelina Serna AUD on 6/4/2025 12:02 PM       Ear Method PTA SAT  SRT [facility] Test/list Score (%) Intensity Mask/noise Notes    right live voice   30   W-22 100 80 60     left live voice   30   W-22 96 80 60                   Latest Tympanogram Result       Probe Tone (Hz): Unknown Exam performed: 6/4/2025 11:42 AM Last edited by Emelina Serna AUD on 6/4/2025 12:03 PM      Tympanograms  These were drawn by a user, not generated from device data      Right Ear Left Ear                     Right Ear Left Ear    Tympanogram type: Type As Type As    Canal volume (mL): 0.98 0.96    Peak pressure (daPa): -8 -2    Peak amplitude (mL): 0.17 0.18    Tympanogram width (daPa):        Comments:                    Latest Audiogram and Tympanogram Result Text  Last edited by Emelina Serna AUD on 6/4/2025 12:03 PM      Study Result                 Narrative & Impression  Liss Fagan was seen today for an audiological evaluation.  Patient reports decreased hearing in both ears.  Patient c/o difficulty hearing in noise.  Previous history of vertigo and balance issues noted.  Patient also reported having yearly MRI's for monitoring of small tumor in right ear.       Audiogram: mild to severe sensorineural hearing loss noted in both ears.     Word recognition ability in quiet: excellent for both ears.     Tympanograms:Type As- normal middle ear pressure with limited tympanic membrane mobility noted.     RECS: Follow-up with ENT.  Consider trial with amplification pending medical clearance.  Repeat hearing test annually.     Emelina Serna M.A. CCC-A  Audiologist [phone]                 ASSESSMENT AND PLAN:   1. Sensorineural hearing loss (SNHL) of both ears  Audiogram shows symmetrical high-frequency hearing loss consistent with sensory presbycusis.  She is medically cleared for hearing aids.    2. Unilateral vestibular schwannoma (HCC)  She will continue to follow-up with neurology and neurosurgery.  She has a follow-up appointment and MRI scan scheduled.      The  patient indicates understanding of these issues and agrees to the plan.    No follow-ups on file.    Jai Gutierrez MD  6/4/2025  1:00 PM       [1]   Current Outpatient Medications   Medication Sig Dispense Refill    METOPROLOL TARTRATE 25 MG Oral Tab TAKE 1 TABLET BY MOUTH 2 TIMES DAILY 180 tablet 0    METFORMIN 500 MG Oral Tab TAKE 1 TABLET BY MOUTH TWICE DAILY WITH MEALS 180 tablet 0    CLOPIDOGREL 75 MG Oral Tab TAKE 1 TABLET BY MOUTH DAILY 90 tablet 0    RANOLAZINE  MG Oral Tablet 12 Hr TAKE ONE TABLET BY MOUTH ONE TIME DAILY 90 tablet 0    furosemide 20 MG Oral Tab TAKE 1 TABLET BY MOUTH ONCE DAILY 30 tablet 1    Omeprazole 40 MG Oral Capsule Delayed Release Take 1 capsule (40 mg total) by mouth daily. 90 capsule 3    ISOSORBIDE MONONITRATE  MG Oral Tablet 24 Hr Take 1 tablet (120 mg total) by mouth daily. 90 tablet 0    NIFEDIPINE ER 60 MG Oral Tablet 24 Hr TAKE ONE TABLET BY MOUTH DAILY 90 tablet 0    LISINOPRIL-HYDROCHLOROTHIAZIDE 20-25 MG Oral Tab Take 1 tablet by mouth daily. 90 tablet 0    ATORVASTATIN 40 MG Oral Tab TAKE ONE TABLET BY MOUTH ONE TIME DAILY IN THE EVENING 90 tablet 0    ondansetron 4 MG Oral Tablet Dispersible Take 1 tablet (4 mg total) by mouth every 8 (eight) hours as needed for Nausea. 10 tablet 0    Glucose Blood (CONTOUR NEXT TEST) In Vitro Strip Use daily--E11.9 Non insulin dependent 100 strip 2    Microlet Lancets Does not apply Misc USE TO TEST ONCE DAILY *DX E11.9* 100 each 1    omega-3 fatty acids 1000 MG Oral Cap Take 1,000 mg by mouth every other day.      NON FORMULARY Take 1 tablet by mouth daily. LACTATE PILLS      nitroGLYCERIN (NITROSTAT) 0.4 MG Sublingual SL Tab Place 1 tablet (0.4 mg total) under the tongue every 5 (five) minutes as needed. 30 tablet 5    Black Elderberry,Berry-Flower, 575 MG Oral Cap Take by mouth every other day.      mometasone 0.1 % External Ointment Apply topically as needed.      cetirizine 10 MG Oral Tab Take 1 tablet (10 mg  total) by mouth daily.      PEG 3350 Oral Powd Pack Take 17 g by mouth as needed. (Patient not taking: Reported on 3/31/2025)      Multiple Vitamin (MULTI-VITAMIN) Oral Tab Take 1 tablet by mouth every other day.     [2]   Past Medical History:   Abnormal maternal glucose tolerance, complicating pregnancy, childbirth, or the puerperium, unspecified as to episode of care    Acute bronchitis    Back problem    Cataract    Chest pain, unspecified    admit     Deep vein thrombosis (HCC)    during pregnancy right leg    Diabetes (HCC)    Diverticulosis of large intestine    unsure if divertiulitis or diverticulosis    Esophageal reflux    Essential hypertension, benign    Helicobacter pylori gastritis    High blood pressure    High cholesterol    Incontinence    Osteoarthritis    knees    Palpitations    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     PONV (postoperative nausea and vomiting)    nausea,pain results in cardiac vasospasms    Sleep apnea    no device    Stroke (HCC)    r/o 17    Unspecified gastritis and gastroduodenitis without mention of hemorrhage    Visual impairment    glasses   [3]   Social History  Socioeconomic History    Marital status:    Tobacco Use    Smoking status: Never     Passive exposure: Never    Smokeless tobacco: Never   Vaping Use    Vaping status: Never Used   Substance and Sexual Activity    Alcohol use: Not Currently     Comment: very seldom    Drug use: No   Other Topics Concern    Caffeine Concern Yes     Comment: 1 cup of coffee every morning and 1 glass of tea during dinner.  Sometimes dark chocolate.      Exercise No   [4]   Past Surgical History:  Procedure Laterality Date    Back surgery      Spinal diskectomy lumbar    at [place]        1987    Cardiac catherterization (pbp)      status normal resolved on 2007    Cataract      Cholecystectomy  1999    Colonoscopy  2011    colon polyp  removal  francesca cee 5    Colonoscopy      Colonoscopy N/A 09/14/2017    Procedure: COLONOSCOPY;  Surgeon: Addison Castellano MD;  Location:  ENDOSCOPY    Hernia surgery  01/01/1999    Umbilical hernia repair;     Hysterectomy  01/01/981    d/t fibroids    Lumbar discectomy Left 2019    L4-L5   Dr Drew    Other      Spinal Steroid Injection     Other surgical history  06/04/2024    RIGHT KNEE ARTHROSCOPY PARTIAL LATERAL MENISCECTOMY SYNOVECTOMY ABRASIONPLASTY, REMOVAL OF SOFT TISSUE MASS    Shoulder surg proc unlisted      Spine surgery procedure unlisted      bulging discs in lower back    Upper gi endoscopy performed  01/24/2011

## 2025-06-15 DIAGNOSIS — E11.9 TYPE 2 DIABETES MELLITUS WITHOUT COMPLICATION, WITHOUT LONG-TERM CURRENT USE OF INSULIN (HCC): ICD-10-CM

## 2025-06-17 ENCOUNTER — TELEPHONE (OUTPATIENT)
Dept: INTERNAL MEDICINE CLINIC | Facility: CLINIC | Age: 75
End: 2025-06-17

## 2025-06-17 NOTE — TELEPHONE ENCOUNTER
Received Medicare Rx for DM testing supplies- countour next strips  Qty 100   0 refills  Placed in TO basket

## 2025-06-22 DIAGNOSIS — Z86.73 HISTORY OF STROKE WITHIN LAST YEAR: ICD-10-CM

## 2025-06-22 DIAGNOSIS — I10 ESSENTIAL HYPERTENSION: ICD-10-CM

## 2025-06-23 RX ORDER — ATORVASTATIN CALCIUM 40 MG/1
40 TABLET, FILM COATED ORAL EVERY EVENING
Qty: 90 TABLET | Refills: 0 | Status: SHIPPED | OUTPATIENT
Start: 2025-06-23

## 2025-06-23 RX ORDER — LISINOPRIL AND HYDROCHLOROTHIAZIDE 20; 25 MG/1; MG/1
1 TABLET ORAL DAILY
Qty: 90 TABLET | Refills: 0 | Status: SHIPPED | OUTPATIENT
Start: 2025-06-23

## 2025-06-23 NOTE — TELEPHONE ENCOUNTER
Requesting    Name from pharmacy: Nifedipine Er 24hr 60 Mg Tab Ocea         Will file in chart as: NIFEDIPINE ER 60 MG Oral Tablet 24 Hr    Sig: TAKE ONE TABLET BY MOUTH ONCE DAILY    Disp: 90 tablet    Refills: 0    Start: 6/22/2025    Class: Normal    Non-formulary    Last ordered: 2 months ago (3/27/2025) by Rikki Rizvi MD    Last refill: 3/27/2025    Rx #: [id number]    Hypertension Medications Protocol Doekhy7106/22/2025 09:28 AM   Protocol Details CMP or BMP in past 12 months    Last BP reading less than 140/90    In person appointment or virtual visit in the past 12 mos or appointment in next 3 mos    EGFRCR or GFRNAA > 50    Medication is active on med list       Name from pharmacy: Lisinopril/Hydrochlorothiazide 20-25 Mg Tab Solc         Will file in chart as: LISINOPRIL-HYDROCHLOROTHIAZIDE 20-25 MG Oral Tab    Sig: TAKE 1 TABLET BY MOUTH ONCE DAILY    Disp: 90 tablet    Refills: 0    Start: 6/22/2025    Class: Normal    Non-formulary For: Essential hypertension    Last ordered: 2 months ago (3/27/2025) by Rikki Rizvi MD    Last refill: 3/27/2025    Rx #: [id number]    Hypertension Medications Protocol Wgjlvf2006/22/2025 09:28 AM   Protocol Details CMP or BMP in past 12 months    Last BP reading less than 140/90    In person appointment or virtual visit in the past 12 mos or appointment in next 3 mos    EGFRCR or GFRNAA > 50    Medication is active on med list       Name from pharmacy: Atorvastatin Calcium 40 Mg Tab Nort         Will file in chart as: ATORVASTATIN 40 MG Oral Tab    Sig: TAKE ONE TABLET BY MOUTH ONE TIME DAILY IN THE EVENING    Disp: 90 tablet    Refills: 0    Start: 6/22/2025    Class: Normal    Non-formulary For: History of stroke within last year    Last ordered: 2 months ago (3/27/2025) by Rikki Rizvi MD    Last refill: 3/27/2025    Rx #: [id number]    Cholesterol Medication Protocol Kiyeuo5606/22/2025 09:28 AM   Protocol Details ALT < 80    ALT resulted within past year    Lipid panel within  past 12 months    In person appointment or virtual visit in the past 12 mos or appointment in next 3 mos    Medication is active on med list        LOV: 3/31/2025  RTC: none noted   Last Relevant Labs: 2/26/2025  Filled: 3/27/2025 #90 with 0 refills    Future Appointments   Date Time Provider Department Center   7/1/2025  8:30 PM PF MRI RM1 (1.5T) [facility] [place]   7/1/2025  9:30 PM PF MRI RM1 (1.5T) [facility] [place]   7/3/2025  1:30 PM Ariel Yanes MD [id number] [facility] [place]   9/11/2025  1:00 PM Wenceslao Bishop MD [facility] [place]

## 2025-07-01 ENCOUNTER — HOSPITAL ENCOUNTER (OUTPATIENT)
Dept: MRI IMAGING | Age: 75
Discharge: HOME | End: 2025-07-01
Attending: NURSE PRACTITIONER
Payer: MEDICARE

## 2025-07-01 DIAGNOSIS — D33.3 ACOUSTIC NEUROMA (HCC): ICD-10-CM

## 2025-07-01 DIAGNOSIS — I67.1 BRAIN ANEURYSM (HCC): ICD-10-CM

## 2025-07-01 PROCEDURE — 70546 MR ANGIOGRAPH HEAD W/O&W/DYE: CPT | Performed by: NURSE PRACTITIONER

## 2025-07-01 PROCEDURE — 70553 MRI BRAIN STEM W/O & W/DYE: CPT | Performed by: NURSE PRACTITIONER

## 2025-07-01 PROCEDURE — A9575 INJ GADOTERATE MEGLUMI 0.1ML: HCPCS | Performed by: NURSE PRACTITIONER

## 2025-07-01 RX ORDER — GADOTERATE MEGLUMINE 376.9 MG/ML
14 INJECTION INTRAVENOUS
Status: COMPLETED | OUTPATIENT
Start: 2025-07-01 | End: 2025-07-01

## 2025-07-01 RX ADMIN — GADOTERATE MEGLUMINE 14 ML: 376.9 INJECTION INTRAVENOUS at 21:11:00

## 2025-07-03 ENCOUNTER — OFFICE VISIT (OUTPATIENT)
Dept: SURGERY | Facility: CLINIC | Age: 75
End: 2025-07-03
Payer: MEDICARE

## 2025-07-03 ENCOUNTER — TELEPHONE (OUTPATIENT)
Dept: SURGERY | Facility: CLINIC | Age: 75
End: 2025-07-03

## 2025-07-03 VITALS
WEIGHT: 143 LBS | HEART RATE: 80 BPM | HEIGHT: 62 IN | DIASTOLIC BLOOD PRESSURE: 60 MMHG | SYSTOLIC BLOOD PRESSURE: 130 MMHG | BODY MASS INDEX: 26.31 KG/M2

## 2025-07-03 DIAGNOSIS — D33.3 ACOUSTIC NEUROMA (HCC): ICD-10-CM

## 2025-07-03 DIAGNOSIS — I67.1 BRAIN ANEURYSM (HCC): Primary | ICD-10-CM

## 2025-07-03 PROCEDURE — 99213 OFFICE O/P EST LOW 20 MIN: CPT | Performed by: NEUROLOGICAL SURGERY

## 2025-07-03 NOTE — PATIENT INSTRUCTIONS
Refill policies:    Allow 2-3 business days for refills; controlled substances may take longer.  Contact your pharmacy at least 5 days prior to running out of medication and have them send an electronic request or submit request through the “request refill” option in your [employer] account.  Refills are not addressed on weekends; covering physicians do not authorize routine medications on weekends.  No narcotics or controlled substances are refilled after noon on Fridays or by on call physicians.  By law, narcotics must be electronically prescribed.  A 30 day supply with no refills is the maximum allowed.  If your prescription is due for a refill, you may be due for a follow up appointment.  To best provide you care, patients receiving routine medications need to be seen at least once a year.  Patients receiving narcotic/controlled substance medications need to be seen at least once every 3 months.  In the event that your preferred pharmacy does not have the requested medication in stock (e.g. Backordered), it is your responsibility to find another pharmacy that has the requested medication available.  We will gladly send a new prescription to that pharmacy at your request.    Scheduling Tests:    If your physician has ordered radiology tests such as MRI or CT scans, please contact Central Scheduling at [phone] right away to schedule the test.  Once scheduled, the [facility] Centralized Referral Team will work with your insurance carrier to obtain pre-certification or prior authorization.  Depending on your insurance carrier, approval may take 3-10 days.  It is highly recommended patients assure they have received an authorization before having a test performed.  If test is done without insurance authorization, patient may be responsible for the entire amount billed.      Precertification and Prior Authorizations:  If your physician has recommended that you have a procedure or additional testing performed the [facility]  Centralized Referral Team will contact your insurance carrier to obtain pre-certification or prior authorization.    You are strongly encouraged to contact your insurance carrier to verify that your procedure/test has been approved and is a COVERED benefit.  Although the [facility] Centralized Referral Team does its due diligence, the insurance carrier gives the disclaimer that \"Although the procedure is authorized, this does not guarantee payment.\"    Ultimately the patient is responsible for payment.   Thank you for your understanding in this matter.  Paperwork Completion:  If you require FMLA or disability paperwork for your recovery, please make sure to either drop it off or have it faxed to our office at [phone]. Be sure the form has your name and date of birth on it.  The form will be faxed to our Forms Department and they will complete it for you.  There is a 25$ fee for all forms that need to be filled out.  Please be aware there is a 10-14 day turnaround time.  You will need to sign a release of information (FANTASMA) form if your paperwork does not come with one.  You may call the Forms Department with any questions at [phone].  Their fax number is [phone].

## 2025-07-03 NOTE — PROGRESS NOTES
The following individual(s) verbally consented to be recorded using ambient AI listening technology and understand that they can each withdraw their consent to this listening technology at any point by asking the clinician to turn off or pause the recording:    Patient name: Liss MOREJON Rylan

## 2025-07-05 NOTE — PROGRESS NOTES
[facility]  Neurological Surgery Clinic Note    Liss Fagan  4/15/1950  [id number]  PCP: Rikki Rizvi MD    REASON FOR VISIT:  Unruptured left cavernous ICA aneurysm  Possible left ophthalmic ICA aneurysm  6mm right IAC possible acoustic neuroma  Left ear/head pain     HISTORY OF PRESENT ILLNESS:  Liss Fagan is a 75 year old female who initially presented on 12/7/23 for evaluation of an unruptured left cavernous ICA aneurysm, a possible left ophthalmic ICA aneurysm, and a 6mm right IAC possible acoustic neuroma.  She began having left sided ear pain that radiated to the top of her head, not in a trigeminal distribution, which resolved with gabapentin.  She had a stroke in 2017 in the left basal ganglia at which time a cavernous ICA aneurysm was diagnosed.  Her sister sustained a ruptured aneurysm 10 years ago.  Serial imaging since 2017 demonstrates stability of the left cavernous aneurysm, seen most recently on MRA 11/20/23, which also demonstrates fullness at the left ophthalmic artery origin, which is slightly more prominent that prior MRAs. The MRI 11/20/23 also demonstrates some enhancement in the right IAC, which is stable to prior MRI brains. She previously had an episode of vertigo attributable to BPPV which improved with therapy.  She denies smoking or illicits.     Interval History 7/19/2024  MRI IAC w/wo 7/18/24 demonstrates a stable probable right acoustic neuroma and MRA 7/18/24 demonstrate stable aneurysms. Denies any new symptoms or issues.    Interval history 7/3/2025  The patient presents to clinic for routine follow-up.  She reports no new symptoms at this time.  MRI IAC with and without contrast and MRA brain with and without contrast 7/1/2025 demonstrate stability of the right intracanalicular acoustic neuroma and stability of the brain aneurysms.    PAST MEDICAL HISTORY:  Past Medical History:    Abnormal maternal glucose tolerance,  complicating pregnancy, childbirth, or the puerperium, unspecified as to episode of care    Acute bronchitis    Back problem    Cataract    Chest pain, unspecified    admit     Deep vein thrombosis (HCC)    during pregnancy right leg    Diabetes (HCC)    Diverticulosis of large intestine    unsure if divertiulitis or diverticulosis    Esophageal reflux    Essential hypertension    Essential hypertension, benign    Helicobacter pylori gastritis    High blood pressure    High cholesterol    Hyperlipidemia    Incontinence    Osteoarthritis    knees    Palpitations    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     PONV (postoperative nausea and vomiting)    nausea,pain results in cardiac vasospasms    Sleep apnea    no device    Stroke (HCC)    r/o 17    Unspecified gastritis and gastroduodenitis without mention of hemorrhage    Visual impairment    glasses       PAST SURGICAL HISTORY:  Past Surgical History:   Procedure Laterality Date    Back surgery      Spinal diskectomy lumbar    at [facility]        1987    Cardiac catherterization (pbp)      status normal resolved on 2007    Cataract      Cholecystectomy  1999    Colonoscopy  2011    colon polyp removal  francesca  coleman 5    Colonoscopy      Colonoscopy N/A 2017    Procedure: COLONOSCOPY;  Surgeon: Addison Castellano MD;  Location:  ENDOSCOPY    Hernia surgery  1999    Umbilical hernia repair;     Hysterectomy      d/t fibroids    Lumbar discectomy Left 2019    L4-L5   Dr Drew    Other      Spinal Steroid Injection     Other surgical history  2024    RIGHT KNEE ARTHROSCOPY PARTIAL LATERAL MENISCECTOMY SYNOVECTOMY ABRASIONPLASTY, REMOVAL OF SOFT TISSUE MASS    Shoulder surg proc unlisted      Spine surgery procedure unlisted      bulging discs in lower back    Upper gi endoscopy performed  2011       FAMILY HISTORY:  family history includes Breast  Cancer in her paternal grandmother; Cancer in her father, paternal grandmother, and another family member; Diabetes in her father.    SOCIAL HISTORY:   reports that she has never smoked. She has never been exposed to tobacco smoke. She has never used smokeless tobacco. She reports that she does not currently use alcohol. She reports that she does not use drugs.    ALLERGIES:  Allergies   Allergen Reactions    Penicillins RASH and SWELLING    Advil [Ibuprofen] NAUSEA AND VOMITING and DIZZINESS    Aspirin NAUSEA AND VOMITING and DIZZINESS    Codeine Sulfate NAUSEA AND VOMITING    Demerol NAUSEA ONLY and DIZZINESS    Hydrocodone NAUSEA ONLY and DIZZINESS    Naproxen DIZZINESS    Norvasc [Amlodipine Besylate] NAUSEA AND VOMITING    Nsaids NAUSEA ONLY and DIZZINESS    Tramadol NAUSEA AND VOMITING and DIZZINESS    Zetia [Ezetimibe] NAUSEA AND VOMITING     Tabs,Muscle spasm        MEDICATIONS:  Current Outpatient Medications on File Prior to Visit   Medication Sig Dispense Refill    NIFEDIPINE ER 60 MG Oral Tablet 24 Hr TAKE ONE TABLET BY MOUTH ONCE DAILY 90 tablet 0    LISINOPRIL-HYDROCHLOROTHIAZIDE 20-25 MG Oral Tab TAKE 1 TABLET BY MOUTH ONCE DAILY 90 tablet 0    ATORVASTATIN 40 MG Oral Tab TAKE ONE TABLET BY MOUTH ONE TIME DAILY IN THE EVENING 90 tablet 0    CONTOUR NEXT TEST In Vitro Strip Use daily 100 strip 0    METOPROLOL TARTRATE 25 MG Oral Tab TAKE 1 TABLET BY MOUTH 2 TIMES DAILY 180 tablet 0    METFORMIN 500 MG Oral Tab TAKE 1 TABLET BY MOUTH TWICE DAILY WITH MEALS 180 tablet 0    CLOPIDOGREL 75 MG Oral Tab TAKE 1 TABLET BY MOUTH DAILY 90 tablet 0    RANOLAZINE  MG Oral Tablet 12 Hr TAKE ONE TABLET BY MOUTH ONE TIME DAILY 90 tablet 0    furosemide 20 MG Oral Tab TAKE 1 TABLET BY MOUTH ONCE DAILY 30 tablet 1    Omeprazole 40 MG Oral Capsule Delayed Release Take 1 capsule (40 mg total) by mouth daily. 90 capsule 3    ISOSORBIDE MONONITRATE  MG Oral Tablet 24 Hr Take 1 tablet (120 mg total) by mouth  daily. 90 tablet 0    Microlet Lancets Does not apply Misc USE TO TEST ONCE DAILY *DX E11.9* 100 each 1    omega-3 fatty acids 1000 MG Oral Cap Take 1,000 mg by mouth every other day.      NON FORMULARY Take 1 tablet by mouth daily. LACTATE PILLS      nitroGLYCERIN (NITROSTAT) 0.4 MG Sublingual SL Tab Place 1 tablet (0.4 mg total) under the tongue every 5 (five) minutes as needed. 30 tablet 5    Black Elderberry,Berry-Flower, 575 MG Oral Cap Take by mouth every other day.      mometasone 0.1 % External Ointment Apply topically as needed.      cetirizine 10 MG Oral Tab Take 1 tablet (10 mg total) by mouth daily.      PEG 3350 Oral Powd Pack Take 17 g by mouth as needed.      Multiple Vitamin (MULTI-VITAMIN) Oral Tab Take 1 tablet by mouth every other day.       No current facility-administered medications on file prior to visit.       REVIEW OF SYSTEMS:  A 10-point system was reviewed.  Pertinent positives and negatives are noted in HPI.      PHYSICAL EXAMINATION:  VITAL SIGNS: /60 (BP Location: Right arm, Patient Position: Sitting, Cuff Size: large)   Pulse 80   Ht 62\"   Wt 143 lb (64.9 kg)   BMI 26.16 kg/m²     A&Ox3, no acute distress  PERRL, EOMi, FS, TM  Full strength x 4, no drift  Sensation intact     ASSESSMENT:  74yo female with a stable unruptured left cavernous ICA aneurysm, a stable possible left ophthalmic ICA aneurysm versus infundibulum, and a stable 6mm right IAC possible acoustic neuroma     We discussed the natural history of intracranial aneurysms and the risks of further workup and treatment.  We discussed that the left ophthalmic artery origin may be a small aneurysm versus an infundibulum. We reviewed the imaging together.  We discussed modifiable risk factors.  We discussed the signs and symptoms of subarachnoid hemorrhage and that emergency medical attention should be sought for these symptoms.  After this detailed discussion and answering all questions, the patient wished to proceed  with observation with serial imaging.     We discussed serial observation for the enhancement within the IAC and that surgery is not indicated at this time.      Plan:  Follow up in one year with a repeat MRA w/wo and an MRI IAC w/wo     Ariel Yanes MD  Neurological Surgery  [facility]     Care Time: 20 min including face to face time, chart review, imaging interpretation, and coordination of care

## 2025-07-16 RX ORDER — ISOSORBIDE MONONITRATE 120 MG/1
120 TABLET, EXTENDED RELEASE ORAL DAILY
Qty: 90 TABLET | Refills: 0 | Status: SHIPPED | OUTPATIENT
Start: 2025-07-16

## 2025-07-16 RX ORDER — FUROSEMIDE 20 MG/1
20 TABLET ORAL DAILY
Qty: 30 TABLET | Refills: 0 | Status: SHIPPED | OUTPATIENT
Start: 2025-07-16

## 2025-07-16 NOTE — TELEPHONE ENCOUNTER
Requesting    Name from pharmacy: Isosorbide Mononitrate Er 24hr 120 Mg Tab Torr         Will file in chart as: ISOSORBIDE MONONITRATE  MG Oral Tablet 24 Hr    Sig: Take 1 tablet (120 mg total) by mouth daily.    Disp: 90 tablet    Refills: 0    Start: 7/16/2025    Class: Normal    Non-formulary    Last ordered: 2 months ago (4/22/2025) by Rikki Rizvi MD    Last refill: 4/23/2025    Rx #: [id number]        Name from pharmacy: Furosemide 20 Mg Tab Risi         Will file in chart as: FUROSEMIDE 20 MG Oral Tab    Sig: TAKE 1 TABLET BY MOUTH ONCE DAILY    Disp: 30 tablet    Refills: 0    Start: 7/16/2025    Class: Normal    Non-formulary    Last ordered: 1 month ago (5/20/2025) by Rikki Rizvi MD    Last refill: 6/15/2025    Rx #: [id number]    Hypertension Medications Protocol [id number]/16/2025 09:27 AM   Protocol Details CMP or BMP in past 12 months    Last BP reading less than 140/90    In person appointment or virtual visit in the past 12 mos or appointment in next 3 mos    EGFRCR or GFRNAA > 50    Medication is active on med list        LOV: 3/31/2025  RTC: none noted   Last Relevant Labs: 2/26/2025  Filled:   Isosorbide-4/22/2025 #90 with 0 refills  Furosemide-6/15/2025 #30 with 0 refills  Future Appointments   Date Time Provider Department Center   8/6/2025 11:00 AM Rikki Rizvi MD [facility]   9/11/2025  1:00 PM Wenceslao Bishop MD [facility] [place]

## 2025-08-06 ENCOUNTER — OFFICE VISIT (OUTPATIENT)
Dept: INTERNAL MEDICINE CLINIC | Facility: CLINIC | Age: 75
End: 2025-08-06

## 2025-08-06 VITALS
SYSTOLIC BLOOD PRESSURE: 112 MMHG | HEIGHT: 62 IN | BODY MASS INDEX: 26.65 KG/M2 | OXYGEN SATURATION: 97 % | DIASTOLIC BLOOD PRESSURE: 60 MMHG | WEIGHT: 144.81 LBS | HEART RATE: 67 BPM | TEMPERATURE: 98 F

## 2025-08-06 DIAGNOSIS — Z12.31 VISIT FOR SCREENING MAMMOGRAM: ICD-10-CM

## 2025-08-06 DIAGNOSIS — Z00.00 ENCOUNTER FOR ANNUAL HEALTH EXAMINATION: ICD-10-CM

## 2025-08-06 DIAGNOSIS — D36.10 SCHWANNOMA: ICD-10-CM

## 2025-08-06 DIAGNOSIS — K21.00 GASTROESOPHAGEAL REFLUX DISEASE WITH ESOPHAGITIS WITHOUT HEMORRHAGE: ICD-10-CM

## 2025-08-06 DIAGNOSIS — R80.9 PROTEINURIA, UNSPECIFIED TYPE: ICD-10-CM

## 2025-08-06 DIAGNOSIS — R23.3 EASY BRUISABILITY: ICD-10-CM

## 2025-08-06 DIAGNOSIS — I70.0 ATHEROSCLEROSIS OF AORTA: Primary | ICD-10-CM

## 2025-08-06 DIAGNOSIS — M25.561 RIGHT KNEE PAIN, UNSPECIFIED CHRONICITY: ICD-10-CM

## 2025-08-06 DIAGNOSIS — G47.33 OBSTRUCTIVE SLEEP APNEA (ADULT) (PEDIATRIC): ICD-10-CM

## 2025-08-06 DIAGNOSIS — I20.1 PRINZMETAL ANGINA: ICD-10-CM

## 2025-08-06 DIAGNOSIS — I65.23 BILATERAL CAROTID ARTERY STENOSIS: ICD-10-CM

## 2025-08-06 DIAGNOSIS — M54.16 LUMBAR RADICULOPATHY: ICD-10-CM

## 2025-08-06 DIAGNOSIS — I10 ESSENTIAL HYPERTENSION: ICD-10-CM

## 2025-08-06 DIAGNOSIS — I87.2 VENOUS INSUFFICIENCY: ICD-10-CM

## 2025-08-06 DIAGNOSIS — E11.9 TYPE 2 DIABETES MELLITUS WITHOUT COMPLICATION, WITHOUT LONG-TERM CURRENT USE OF INSULIN (HCC): ICD-10-CM

## 2025-08-06 DIAGNOSIS — K29.70 GASTRITIS AND GASTRODUODENITIS: ICD-10-CM

## 2025-08-06 DIAGNOSIS — K29.90 GASTRITIS AND GASTRODUODENITIS: ICD-10-CM

## 2025-08-06 DIAGNOSIS — E78.00 PURE HYPERCHOLESTEROLEMIA: ICD-10-CM

## 2025-08-06 DIAGNOSIS — Z86.73 H/O: CVA (CEREBROVASCULAR ACCIDENT): ICD-10-CM

## 2025-08-06 PROCEDURE — 99213 OFFICE O/P EST LOW 20 MIN: CPT | Performed by: FAMILY MEDICINE

## 2025-08-06 PROCEDURE — G2211 COMPLEX E/M VISIT ADD ON: HCPCS | Performed by: FAMILY MEDICINE

## 2025-08-06 PROCEDURE — G0439 PPPS, SUBSEQ VISIT: HCPCS | Performed by: FAMILY MEDICINE

## 2025-08-07 ENCOUNTER — TELEPHONE (OUTPATIENT)
Dept: INTERNAL MEDICINE CLINIC | Facility: CLINIC | Age: 75
End: 2025-08-07

## 2025-08-14 DIAGNOSIS — Z86.73 HISTORY OF STROKE WITHOUT RESIDUAL DEFICITS: ICD-10-CM

## 2025-08-14 DIAGNOSIS — I10 ESSENTIAL HYPERTENSION: ICD-10-CM

## 2025-08-14 RX ORDER — CLOPIDOGREL BISULFATE 75 MG/1
75 TABLET ORAL DAILY
Qty: 90 TABLET | Refills: 0 | Status: SHIPPED | OUTPATIENT
Start: 2025-08-14

## 2025-08-14 RX ORDER — RANOLAZINE 500 MG/1
500 TABLET, EXTENDED RELEASE ORAL DAILY
Qty: 90 TABLET | Refills: 0 | Status: SHIPPED | OUTPATIENT
Start: 2025-08-14

## 2025-08-14 RX ORDER — FUROSEMIDE 20 MG/1
20 TABLET ORAL DAILY
Qty: 30 TABLET | Refills: 0 | Status: SHIPPED | OUTPATIENT
Start: 2025-08-14

## 2025-08-14 RX ORDER — METOPROLOL TARTRATE 25 MG/1
25 TABLET, FILM COATED ORAL 2 TIMES DAILY
Qty: 180 TABLET | Refills: 0 | Status: SHIPPED | OUTPATIENT
Start: 2025-08-14

## 2025-08-29 ENCOUNTER — LAB ENCOUNTER (OUTPATIENT)
Dept: LAB | Age: 75
End: 2025-08-29
Attending: FAMILY MEDICINE

## 2025-08-29 DIAGNOSIS — E78.00 PURE HYPERCHOLESTEROLEMIA: ICD-10-CM

## 2025-08-29 DIAGNOSIS — I10 ESSENTIAL HYPERTENSION: ICD-10-CM

## 2025-08-29 DIAGNOSIS — E11.9 TYPE 2 DIABETES MELLITUS WITHOUT COMPLICATION, WITHOUT LONG-TERM CURRENT USE OF INSULIN (HCC): ICD-10-CM

## 2025-08-29 LAB
ALBUMIN SERPL-MCNC: 4.3 G/DL (ref 3.2–4.8)
ALBUMIN/GLOB SERPL: 2 (ref 1–2)
ALP LIVER SERPL-CCNC: 89 U/L (ref 55–142)
ALT SERPL-CCNC: 21 U/L (ref 10–49)
ANION GAP SERPL CALC-SCNC: 12 MMOL/L (ref 0–18)
AST SERPL-CCNC: 21 U/L (ref ?–34)
BASOPHILS # BLD AUTO: 0.03 X10(3) UL (ref 0–0.2)
BASOPHILS NFR BLD AUTO: 0.7 %
BILIRUB SERPL-MCNC: 0.6 MG/DL (ref 0.2–1.1)
BUN BLD-MCNC: 19 MG/DL (ref 9–23)
CALCIUM BLD-MCNC: 10.1 MG/DL (ref 8.7–10.6)
CHLORIDE SERPL-SCNC: 102 MMOL/L (ref 98–112)
CHOLEST SERPL-MCNC: 128 MG/DL (ref ?–200)
CO2 SERPL-SCNC: 29 MMOL/L (ref 21–32)
CREAT BLD-MCNC: 1 MG/DL (ref 0.55–1.02)
EGFRCR SERPLBLD CKD-EPI 2021: 59 ML/MIN/1.73M2 (ref 60–?)
EOSINOPHIL # BLD AUTO: 0.15 X10(3) UL (ref 0–0.7)
EOSINOPHIL NFR BLD AUTO: 3.5 %
ERYTHROCYTE [DISTWIDTH] IN BLOOD BY AUTOMATED COUNT: 13.3 %
EST. AVERAGE GLUCOSE BLD GHB EST-MCNC: 143 MG/DL (ref 68–126)
FASTING PATIENT LIPID ANSWER: YES
FASTING STATUS PATIENT QL REPORTED: YES
GLOBULIN PLAS-MCNC: 2.2 G/DL (ref 2–3.5)
GLUCOSE BLD-MCNC: 111 MG/DL (ref 70–99)
HBA1C MFR BLD: 6.6 % (ref ?–5.7)
HCT VFR BLD AUTO: 37.3 % (ref 35–48)
HDLC SERPL-MCNC: 52 MG/DL (ref 40–59)
HGB BLD-MCNC: 12.3 G/DL (ref 12–16)
IMM GRANULOCYTES # BLD AUTO: 0.01 X10(3) UL (ref 0–1)
IMM GRANULOCYTES NFR BLD: 0.2 %
LDLC SERPL CALC-MCNC: 61 MG/DL (ref ?–100)
LYMPHOCYTES # BLD AUTO: 1.84 X10(3) UL (ref 1–4)
LYMPHOCYTES NFR BLD AUTO: 43 %
MCH RBC QN AUTO: 29.4 PG (ref 26–34)
MCHC RBC AUTO-ENTMCNC: 33 G/DL (ref 31–37)
MCV RBC AUTO: 89 FL (ref 80–100)
MONOCYTES # BLD AUTO: 0.43 X10(3) UL (ref 0.1–1)
MONOCYTES NFR BLD AUTO: 10 %
NEUTROPHILS # BLD AUTO: 1.82 X10 (3) UL (ref 1.5–7.7)
NEUTROPHILS # BLD AUTO: 1.82 X10(3) UL (ref 1.5–7.7)
NEUTROPHILS NFR BLD AUTO: 42.6 %
NONHDLC SERPL-MCNC: 76 MG/DL (ref ?–130)
OSMOLALITY SERPL CALC.SUM OF ELEC: 299 MOSM/KG (ref 275–295)
PLATELET # BLD AUTO: 318 10(3)UL (ref 150–450)
POTASSIUM SERPL-SCNC: 3.9 MMOL/L (ref 3.5–5.1)
PROT SERPL-MCNC: 6.5 G/DL (ref 5.7–8.2)
RBC # BLD AUTO: 4.19 X10(6)UL (ref 3.8–5.3)
SODIUM SERPL-SCNC: 143 MMOL/L (ref 136–145)
TRIGL SERPL-MCNC: 75 MG/DL (ref 30–149)
TSI SER-ACNC: 2.05 UIU/ML (ref 0.55–4.78)
VLDLC SERPL CALC-MCNC: 11 MG/DL (ref 0–30)
WBC # BLD AUTO: 4.3 X10(3) UL (ref 4–11)

## 2025-08-29 PROCEDURE — 85025 COMPLETE CBC W/AUTO DIFF WBC: CPT

## 2025-08-29 PROCEDURE — 80061 LIPID PANEL: CPT

## 2025-08-29 PROCEDURE — 80053 COMPREHEN METABOLIC PANEL: CPT

## 2025-08-29 PROCEDURE — 36415 COLL VENOUS BLD VENIPUNCTURE: CPT

## 2025-08-29 PROCEDURE — 83036 HEMOGLOBIN GLYCOSYLATED A1C: CPT

## 2025-08-29 PROCEDURE — 84443 ASSAY THYROID STIM HORMONE: CPT

## (undated) DIAGNOSIS — I10 ESSENTIAL HYPERTENSION: ICD-10-CM

## (undated) DIAGNOSIS — Z86.73 HISTORY OF STROKE WITHOUT RESIDUAL DEFICITS: ICD-10-CM

## (undated) DIAGNOSIS — Z86.73 HISTORY OF STROKE WITHIN LAST YEAR: ICD-10-CM

## (undated) DEVICE — SUTURE VICRYL 0 CT-1

## (undated) DEVICE — BANDAID COVERLET 1X3

## (undated) DEVICE — GLOVE SURG SENSICARE SZ 6-1/2

## (undated) DEVICE — ADHESIVE SKIN TOP FOR WND CLSR DERMBND ADV

## (undated) DEVICE — DRAPE MICROSCOPE NEURO PENTERO

## (undated) DEVICE — GAUZE SPONGES,USP TYPE VII GAUZE, 12 PLY: Brand: CURITY

## (undated) DEVICE — SHEET,DRAPE,40X58,STERILE: Brand: MEDLINE

## (undated) DEVICE — SOL  .9 1000ML BTL

## (undated) DEVICE — GLOVE SUR 7.5 SENSICARE PI PIP GRN PWD F

## (undated) DEVICE — 3M™ TEGADERM™ +PAD FILM DRESSING WITH NON-ADHERENT PAD, 3587, 3-1/2 IN X 4-1/8 IN (9 CM X 10.5 CM), 25/CAR, 4 CAR/CS: Brand: 3M™ TEGADERM™

## (undated) DEVICE — SUTURE VICRYL 2-0 CT-2

## (undated) DEVICE — REMOVER DURAPREP 3M

## (undated) DEVICE — MARKER SKIN 2 TIP

## (undated) DEVICE — #15 STERILE STAINLESS BLADE: Brand: STERILE STAINLESS BLADES

## (undated) DEVICE — GLOVE SURG TRIUMPH SZ 71/2

## (undated) DEVICE — SUPER TURBOVAC 90 IFS: Brand: COBLATION

## (undated) DEVICE — STERILE SYNTHETIC POLYISOPRENE POWDER-FREE SURGICAL GLOVES WITH HYDROGEL COATING: Brand: PROTEXIS

## (undated) DEVICE — DRAPE,LAPAROTOMY,PCH,STERILE: Brand: MEDLINE

## (undated) DEVICE — TUBING PMP L16FT DISP

## (undated) DEVICE — REM POLYHESIVE ADULT PATIENT RETURN ELECTRODE: Brand: VALLEYLAB

## (undated) DEVICE — PAIN TRAY: Brand: MEDLINE INDUSTRIES, INC.

## (undated) DEVICE — Device: Brand: DEFENDO AIR/WATER/SUCTION AND BIOPSY VALVE

## (undated) DEVICE — PADDING CAST COTTON  4

## (undated) DEVICE — COVER LT HNDL PLAS RIG 2 PER PK

## (undated) DEVICE — UPPER EXTREMITY CDS-LF: Brand: MEDLINE INDUSTRIES, INC.

## (undated) DEVICE — KNIFE CARPAL TUNNEL 08-0005

## (undated) DEVICE — KENDALL SCD EXPRESS SLEEVES, KNEE LENGTH, MEDIUM: Brand: KENDALL SCD

## (undated) DEVICE — SYRINGE 10ML LL CONTRL SYRINGE

## (undated) DEVICE — STANDARD HYPODERMIC NEEDLE,POLYPROPYLENE HUB: Brand: MONOJECT

## (undated) DEVICE — 3M™ TEGADERM™ +PAD FILM DRESSING WITH NON-ADHERENT PAD, 3584, 2-3/8 IN X 4 IN (6 CM X 10 CM), 50/CAR, 4 CAR/CS: Brand: 3M™ TEGADERM™

## (undated) DEVICE — KNEE ARTHROSCOPY CDS: Brand: MEDLINE INDUSTRIES, INC.

## (undated) DEVICE — SUTURE ETHILON 4-0 PS-2

## (undated) DEVICE — LIGHT HANDLE

## (undated) DEVICE — NEEDLE SPINAL 22X5 405148

## (undated) DEVICE — Device: Brand: PLUMEPEN

## (undated) DEVICE — SUTURE VICRYL 3-0 RB-1

## (undated) DEVICE — GLOVE SURG SENSICARE SZ 7-1/2

## (undated) DEVICE — GOWN SURG AERO CHROME XXL

## (undated) DEVICE — LAMINECTOMY CDS: Brand: MEDLINE INDUSTRIES, INC.

## (undated) DEVICE — 3.0MM PRECISION NEURO (MATCH HEAD)

## (undated) DEVICE — GLOVE BIOGEL ORTHO SZ 8

## (undated) DEVICE — GLOVE SURG SENSICARE SZ 7

## (undated) DEVICE — AGGRESSIVE PLUS, ANGLED CUTTER: Brand: FORMULA

## (undated) DEVICE — SUT MCRYL 3-0 27IN ABSRB UD 19MM PS-2 3/8

## (undated) DEVICE — FILTERLINE NASAL ADULT O2/CO2

## (undated) DEVICE — CONVERTORS STOCKINETTE: Brand: CONVERTORS

## (undated) DEVICE — MEDI-VAC NON-CONDUCTIVE SUCTION TUBING: Brand: CARDINAL HEALTH

## (undated) DEVICE — ALCOHOL 70% 4 OZ

## (undated) DEVICE — SPONGE GZ 4X4IN COT 12 PLY TYP VII WVN C

## (undated) DEVICE — OCCLUSIVE GAUZE STRIP OVERWRAP,3% BISMUTH TRIBROMOPHENATE IN PETROLATUM BLEND: Brand: XEROFORM

## (undated) DEVICE — 1200CC GUARDIAN II: Brand: GUARDIAN

## (undated) DEVICE — SPEEDBAND SUPERVIEW SUPER 7

## (undated) DEVICE — ZZ- DISC- NOSUB-SOLUTION RUBBING 4OZ 70% ISO ALC CLR

## (undated) DEVICE — SLEEVE COMPR MD KNEE LEN SGL USE KENDALL SCD

## (undated) DEVICE — GAMMEX® PI HYBRID SIZE 7.5, STERILE POWDER-FREE SURGICAL GLOVE, POLYISOPRENE AND NEOPRENE BLEND: Brand: GAMMEX

## (undated) DEVICE — DERMABOND LIQUID ADHESIVE

## (undated) DEVICE — TRANSPOSAL ULTRAFLEX DUO/QUAD ULTRA CART MANIFOLD

## (undated) DEVICE — GLOVE SUR 7.5 SENSICARE PI PIP CRM PWD F

## (undated) DEVICE — ENDOSCOPY PACK - LOWER: Brand: MEDLINE INDUSTRIES, INC.

## (undated) DEVICE — STERILE POLYISOPRENE POWDER-FREE SURGICAL GLOVES: Brand: PROTEXIS

## (undated) DEVICE — NEEDLE SPINAL 22X3-1/2 BLK

## (undated) DEVICE — CATH IV 14G X 5-1/4 ANGIO

## (undated) DEVICE — 3M™ STERI-STRIP™ REINFORCED ADHESIVE SKIN CLOSURES, R1547, 1/2 IN X 4 IN (12 MM X 100 MM), 6 STRIPS/ENVELOPE: Brand: 3M™ STERI-STRIP™

## (undated) DEVICE — DRILL SRG OIL CRTDG MAESTRO

## (undated) DEVICE — COVER,MAYO STAND,STERILE: Brand: MEDLINE

## (undated) DEVICE — DRAPE SUR SM W12XL18IN CLR PLAS REINF ADH

## (undated) DEVICE — 3M™ RED DOT™ MONITORING ELECTRODE WITH FOAM TAPE AND STICKY GEL, 50/BAG, 20/CASE, 72/PLT 2570: Brand: RED DOT™

## (undated) DEVICE — DRAPE C-ARM UNIVERSAL

## (undated) DEVICE — SOLUTION IRRIG 3000ML 0.9% NACL FLX CONT

## (undated) NOTE — LETTER
19    RE: Maico Harper    : 562    Dear Dr. De Leon Sessions,    Your patient is being scheduled for a pain management procedure at [facility].    Procedure:  Left transforaminal lumbar epidural steroid injection with local x3  Date of Pro

## (undated) NOTE — LETTER
[address]  [phone]          2019    RE: Wan Bolaños    : [id number]    Dear Dr. Sadi Howell    Your patient is being scheduled for a

## (undated) NOTE — MR AVS SNAPSHOT
[facility]  [address], [address] [address] [phone]               Thank you for choosing us for your health care visit with Angelika Barron MD.  We are glad to serve you and happy to provide you with this alford Medical Issues Discussed Today     Acute arterial ischemic stroke, vertebrobasilar, thalamic, left (HCC)    -  Primary      Instructions and Information about Your Health    Follow up in 3 weeks ~ 1 month from initial stroke  Continue Plavix 75 mg d ? Patient must present photo ID at time of . Scheduling Tests    If your physician has ordered radiology tests such as MRI or CT scans, do not schedule the test until this office has notified you that the test has been approved by your insurer. BP Pulse Temp Weight          120/60 mmHg 60 98.6 °F (37 °C) (Oral) 164 lb           Current Medications          This list is accurate as of: 4/19/17 11:59 PM.  Always use your most recent med list.                acetaminophen 500 MG Tabs   Take 500 mg Commonly known as:  MIRALAX           RANEXA 500 MG Tb12   Generic drug:  Ranolazine ER   TAKE 1 TABLET BY MOUTH EVERY DAY                   MyChart     Visit MyChart  You can access your MyChart to more actively manage your health care and view more detai

## (undated) NOTE — MR AVS SNAPSHOT
[facility]  [address], [address] [facility] [address] [phone]               Thank you for choosing us for your health care visit with Iza Coates MD.  We are glad to serve you and happy to provide you with this alford [address]    [phone]            May 22, 2017  7:00 AM   Occupatonal Therapy Visit by Therapist with Max Fraire Ambassador Darien Farah [facility])    [address] physically brought to the pharmacy. A 30 day supply with no refills is the maximum allowed. ? If your prescription is due for a refill, you may be due for a follow up appointment.   ? To best provide you care, patients receiving routine medications need t Penicillins Rash, Swelling    Advil [Ibuprofen] Nausea and vomiting, Dizziness    Tabs Feels faint    Aspirin Nausea and vomiting, Dizziness    Feels faint    Codeine Sulfate Nausea and vomiting    Tabs    Demerol Nausea only, Dizziness    Tabs Feels mary Generic drug:  NIFEdipine ER Osmotic Release   TAKE 1 TABLET BY MOUTH EVERY DAY           NITROSTAT 0.4 MG Subl   Generic drug:  nitroGLYCERIN   Place 0.4 mg under the tongue every 5 (five) minutes as needed.            omeprazole 20 MG Cpdr   TAKE 2 CAPSUL

## (undated) NOTE — LETTER
OUTSIDE TESTING RESULT REQUEST     IMPORTANT: FOR YOUR IMMEDIATE ATTENTION  Please FAX all test results listed below to: [phone]     Testing already done on or about: 24         Patient Name: Liss Fagan  Surgery Date: 2024  Medical Record: [id number]  CSN: [id number]  : 4/15/1950 - A: 74 y     Sex: female  Surgeon(s):  Harjit Joiner MD  Procedure: RIGHT KNEE ARTHROSCOPY PARTIAL LATERAL MENISCECTOMY SYNOVECTOMY ABRASIONPLASTY  Anesthesia Type: General     Surgeon: Harjit Joiner MD     The following Testing and Time Line are REQUIRED PER ANESTHESIA     EKG READ AND SIGNED WITHIN   90 days  CARDIAC CLEARANCE NOTE      Thank You,   Sent by:Yvonne

## (undated) NOTE — LETTER
19      RE: Roseline Wayne     : [birth year]    Dear Dr. Stephanie Alston,    This letter is to inform you that your patient is being scheduled for surgery with Dr. Leila Smiley on 19 at [facility]. We have asked the patient to contact your office to

## (undated) NOTE — LETTER
04/12/2017    Jin Raman      To Whom It May Concern:     This letter has been written at the patient's request. The above patient was seen at [facility] for treatment of a medical condition from 4/11/17-4/12/17    The patient may return to work/s

## (undated) NOTE — LETTER
Patient Name: Marzena Rosenthal  YOB: 1950          MRN :  [id number]  Date:  11/14/2022  Referring Physician:  Myrick Gottron                 21st Century Cures Act Notice to Patient: Medical documents like this are made available to patients in the interest of transparency. However, be advised this is a medical document and it is intended as gpfe-sy-otno communication between your medical providers. This medical document may contain abbreviations, assessments, medical data, and results or other terms that are unfamiliar. Medical documents are intended to carry relevant information, facts as evident, and the clinical opinion of the practitioner. As such, this medical document may be written in language that appears blunt or direct. You are encouraged to contact your medical provider and/or [facility] Patient Experience if you have any questions about this medical document.

## (undated) NOTE — LETTER
Dear Patient,  Please note that [facility] (“”) Ear Nose [facility] Th[facility] (“ENT”) does not sell hearing aid devices/supplies.  The audiologists that are overseeing your care today are employed at [facility] as well at [employer].  [employer] is an independent audiology company that sells hearing aid devices and supplies.  The company is not owned or affiliated with [facility].  [employer] is located at:  [facility]  [address]  [place], [place] [address] [phone]  Your audiologist is recommending that you could benefit from a hearing aid device and/or supplies.  If you decide to purchase a hearing aid device or supplies from [employer], the audiologists will receive a financial benefit from this sale.    As a patient and a consumer, you have the option to do your own research to find the most appropriate audiology vendor.  There are local and national audiology vendors that sell comparable products and services.  In addition to [employer], we have provided you with a preliminary list of audiology vendors that will allow you to start the due diligence process.   [employer]  [address]  [place], [place] [address] [phone]  [facility]  [address], [address]  [place], [place] [id number] [phone]  [facility]  [address], [address]  [place], [place] [address] [phone]  *Accepts Public Aid  [facility]  [address] [[email or web address]  [place], [place], [address]  [phone]    Please note that your choice of an audiology vendor will not affect the relationship you have with your audiologist in any capacity.

## (undated) NOTE — Clinical Note
Can you please schedule right SI joint injection, as well as left L5 selective nerve root block? Thanks. Fannie

## (undated) NOTE — LETTER
04/13/21        [address] [facility] [address]      Dear Brina Arvizu,    [address] [facility] records indicate that you have outstanding lab work and or testing that was ordered for you and has not yet been completed:  Fasting Blood Work   To sc

## (undated) NOTE — ED AVS SNAPSHOT
[facility] Emergency Department    [place] [address] [address]    Phone:  [phone]    Fax:  [phone]           Ms. Magdaleno Epley   MRN: [id number]    Department:  [facility] Emergency Department   Date of Visit: Pediatric [phone] Emergency Department   [phone]       To Check ER Wait Times:  TEXT 'ERwait' to 25669      Click [email or web address]      Or call [phone]    If you have any problems with your follow-up, please call our case [facility] before you leave. After you leave, you should follow the attached instructions. I have read and understand the instructions given to me by my caregivers. 24-Hour Pharmacies        Pharmacy Address Phone Number   [facility] [address] Impression:    CONCLUSION:       #1. There is an acute on subacute infarct in the left basal ganglia measuring 1.1 x 0.9 cm. This measured 0.9 x 0.7 cm on 4/11/17. #2. Mild chronic small vessel ischemic disease.            Critical results were discuss office, you can view your past visit information in [employer] by going to Visits < Visit Summaries. [employer] questions? Call [phone] for help. [employer] is NOT to be used for urgent needs. For medical emergencies, dial 911.

## (undated) NOTE — Clinical Note
Hello,   I saw her in office today - sent her for STAT MRI / MRA - exam is fine but symptoms concerning with her history. Eloisa Pierson MD, NeurologyWallingford Neuroscience WebsterPager 21

## (undated) NOTE — ED AVS SNAPSHOT
Ms. Nina Avalos   MRN: [id number]    Department:  [facility] Emergency Department   Date of Visit:  9/11/2019           Disclosure     Insurance plans vary and the physician(s) referred by the ER may not be covered by your plan.  Please conta tell this physician (or your personal doctor if your instructions are to return to your personal doctor) about any new or lasting problems. The primary care or specialist physician will see patients referred from the [facility] Emergency Department.  Levar Parikh

## (undated) NOTE — LETTER
24      Orthopedic Surgery   Pre-Operative Clearance Request    Patient Name:   Liss Fagan             :   4/15/1950    Surgeon: Dr. Joiner             Date of Surgery: 2024    Surgical Procedure: RIGHT KNEE ARTHROSCOPY PARTIAL LATERAL MENISCECTOMY SYNOVECTOMY ABRASIONPLASTY       MUST COMPLETE ALL OF THE FOLLOWING 2-3 WEEKS PRIOR TO YOUR SURGERY TO AVOID CANCELLATION, DUE TO THE RULE THEIR WILL BE NO EXCEPTIONS!      [x]  History and Physical      [x]  Medical  Clearance                                                                            **Please fax test results, H&P, and clearance to [phone] and to P.A.T at [phone]**

## (undated) NOTE — LETTER
Dear Dr. Zakia Ross,     This letter is to inform you that your patient is being scheduled for surgery with Dr. Dolores Brown on 12/2/19 at [facility]. We have asked the patient to contact your office to schedule a pre-operative exam/visit.     Diagnosis: Peg Zarate

## (undated) NOTE — Clinical Note
TCM call completed today. [facility] FU scheduled 12/12/2019. Thank you.  Procedure Date: 12/2/2019D/c Date:12/6/2019Procedure: Left L4-L5 MicrodiscectomySurgeon: Dr. Divine Reid

## (undated) NOTE — ED AVS SNAPSHOT
[facility] Emergency Department    [facility] [address] [address]    Phone:  [phone]    Fax:  [phone]           Ms. Jerri Polanco   MRN: [id number]    Department:  [facility] Emergency Department   Date of Visit: IF THERE IS ANY CHANGE OR WORSENING OF YOUR CONDITION, CALL YOUR PRIMARY CARE PHYSICIAN AT ONCE OR RETURN IMMEDIATELY TO THE EMERGENCY DEPARTMENT.     If you have been prescribed any medication(s), please fill your prescription right away and begin taking t

## (undated) NOTE — LETTER
24      Orthopedic Surgery   Pre-Operative Clearance Request    Patient Name:   Liss Fagan             :   4/15/1950    Surgeon: Dr. Joiner             Date of Surgery: 2024    Surgical Procedure: RIGHT KNEE ARTHROSCOPY PARTIAL LATERAL MENISCECTOMY SYNOVECTOMY ABRASIONPLASTY       MUST COMPLETE ALL OF THE FOLLOWING 2-3 WEEKS PRIOR TO YOUR SURGERY TO AVOID CANCELLATION, DUE TO THE RULE THEIR WILL BE NO EXCEPTIONS!      [x]  History and Physical      [x]  Medical  Clearance                       *Please fax test results, H&P, and clearance to [phone] and to P.A.T at [phone]**

## (undated) NOTE — LETTER
19        RE: Will Hill     : [id number]    Dear Dr. Johanna Davis,     This letter is to inform you that your patient is being scheduled for surgery with Dr. Jose Self on 19 at [facility]. We have asked the patient to contact your office

## (undated) NOTE — LETTER
Patient Name: Deniz Montgomery  YOB: 1950          MRN :  [id number]  Date:  5/19/2021  Referring Physician:  Dinesh Esposito    Progress Summary  Pt has attended 4, cancelled 1, and no shown 0 visits in Physical Therapy.           Subjecti continued progress with [facility]. Pt to call if symptoms return or fail to improve. Otherwise will plan discharge to [facility].       Patient/Family/Caregiver was advised of these findings, precautions, and treatment options and has agreed to actively participate in

## (undated) NOTE — LETTER
19  RE: Malu Bolivar    : [birth year]    Dear Dr. Sophia Vegas    Your patient is being scheduled for a pain management procedure at [facility].    Procedure:  Left L5 Selective Nerve Root Block  Date of Procedure: TBD pending medical clearanc

## (undated) NOTE — IP AVS SNAPSHOT
[facility] [place] [address] [place], [address] ~ [phone]                Discharge Summary   4/11/2017    Ms.  Fionacj Mora           Admission Information        Provider Department    4/11/2017 Jacky Jones DO E Clopidogrel Bisulfate 75 MG Tabs   Commonly known as:  PLAVIX   Next dose due:  4/13 @ 9am         Take 1 tablet (75 mg total) by mouth daily.     Salvador Avendaño how you take these medications        Instructions Authori Next dose due:  4/13 @ 9am         Take 1 Tab by mouth daily.                             NIFEDICAL XL 60 MG Tb24   Last time this was given:  60 mg on 4/12/2017  8:20 AM   Generic drug:  NIFEdipine ER Osmotic Release   Next dose due:  4/13 @ 9am         TA Future Appointments     Apr 20, 2017  9:40 AM   Stroke Follow Up with Arun Kiran MD   [facility] [place]     [address] [address] [phone]            Apr 26, 2017  1:00 PM   Hospital/SNF F/U with Fletcher Zoster (Shingles) 10/24/2016      Recent Hematology Lab Results  (Last 3 results in the past 90 days)    WBC RBC Hemoglobin Hematocrit MCV MCH MCHC RDW Platelet MPV    (70/53/86)  4.2 (04/11/17)  4.48 (04/11/17)  12.9 (04/11/17)  38.7 (04/11/17)  86.4 -- coverage. Patient [address] is a Federal Navigator program that can help with your Affordable Care Act coverage, as well as all types of Medicaid plans.   To get signed up and covered, please call [phone] and ask to get set up for an insuran and/or abnormal heart rates/rhythms   Most common side effects: Dizziness or feeling lightheaded (especially with standing), heart rate changes, headaches, nausea/vomiting   What to report to your healthcare team:  Dizziness, nausea, chest pain, weakness, MILAGROS BREEZE 2 TEST In Vitro Disk    Mometasone Furoate 0.1 % Apply Externally Cream    Multiple Vitamin (MULTI-VITAMIN) Oral Tab

## (undated) NOTE — Clinical Note
I had the pleasure of seeing Kiet Gayle on 6/12/2019. Please see my attached note. Julito Cruz MD FAC[facility]--Surgery

## (undated) NOTE — LETTER
Patient Name: Loren Mari  YOB: 1950          MRN number:  [id number]  Date:  5/18/2017  Referring Physician:  Carson Paz    Discharge Summary  Dx: Acute ischemic stroke          Authorized # of Visits:  10         Next MD visit: none 30 sec STS: 14x      Ramon Balance Test: 56/56  BLE strength: 5/5 throughout                                   Goals: (To be completed in 10 visits)  1.  Patient will improve RLE strength to 5/5 throughout to improve ability to ambulate reciprocally over sta

## (undated) NOTE — MR AVS SNAPSHOT
[place]  [address]  [address] [address] [phone]               Thank you for choosing us for your health care visit with Earnestine Dinh MD.  We are glad to serve you and happy to provide you with this sum Referred To    Chao Huggins MD   [address] [place] [address] [address]   Phone:  [phone]   Fax:  [phone]    Diagnoses:  Type 2 diabetes mellitus with complication, unspecified long term insulin use status ([facility]   Acute ischemic stroke Norvasc [Amlodipine Besylate] Nausea and vomiting    tabs    Nsaids Nausea only, Dizziness    Vicodin [Hydrocodone-Acetaminophen] Nausea only, Dizziness    Tabs      Zetia [Ezetimibe] Nausea and vomiting    Tabs,Muscle spasm                 Today's Vital What changed:  See the new instructions. Commonly known as:  PRILOSEC           PEG 3350 Pack   Take 17 g by mouth nightly.    Commonly known as:  MIRALAX           RANEXA 500 MG Tb12   Generic drug:  Ranolazine ER   TAKE 1 TABLET BY MOUTH EVERY DAY